# Patient Record
Sex: FEMALE | Race: BLACK OR AFRICAN AMERICAN | NOT HISPANIC OR LATINO | URBAN - METROPOLITAN AREA
[De-identification: names, ages, dates, MRNs, and addresses within clinical notes are randomized per-mention and may not be internally consistent; named-entity substitution may affect disease eponyms.]

---

## 2017-02-21 ENCOUNTER — EMERGENCY (EMERGENCY)
Facility: HOSPITAL | Age: 37
LOS: 1 days | Discharge: OTHER INSTITUTION | End: 2017-02-21
Attending: EMERGENCY MEDICINE
Payer: MEDICAID

## 2017-02-21 VITALS
DIASTOLIC BLOOD PRESSURE: 82 MMHG | HEART RATE: 102 BPM | WEIGHT: 179.02 LBS | HEIGHT: 63 IN | TEMPERATURE: 98 F | SYSTOLIC BLOOD PRESSURE: 131 MMHG | RESPIRATION RATE: 20 BRPM | OXYGEN SATURATION: 97 %

## 2017-02-21 DIAGNOSIS — R69 ILLNESS, UNSPECIFIED: ICD-10-CM

## 2017-02-21 DIAGNOSIS — R56.9 UNSPECIFIED CONVULSIONS: ICD-10-CM

## 2017-02-21 DIAGNOSIS — F32.9 MAJOR DEPRESSIVE DISORDER, SINGLE EPISODE, UNSPECIFIED: ICD-10-CM

## 2017-02-21 DIAGNOSIS — F31.9 BIPOLAR DISORDER, UNSPECIFIED: ICD-10-CM

## 2017-02-21 DIAGNOSIS — Z79.4 LONG TERM (CURRENT) USE OF INSULIN: ICD-10-CM

## 2017-02-21 DIAGNOSIS — E11.9 TYPE 2 DIABETES MELLITUS WITHOUT COMPLICATIONS: ICD-10-CM

## 2017-02-21 DIAGNOSIS — Z91.013 ALLERGY TO SEAFOOD: ICD-10-CM

## 2017-02-21 DIAGNOSIS — F14.10 COCAINE ABUSE, UNCOMPLICATED: ICD-10-CM

## 2017-02-21 LAB
AMPHET UR-MCNC: NEGATIVE — SIGNIFICANT CHANGE UP
ANION GAP SERPL CALC-SCNC: 13 MMOL/L — SIGNIFICANT CHANGE UP (ref 5–17)
APPEARANCE UR: CLEAR — SIGNIFICANT CHANGE UP
BARBITURATES UR SCN-MCNC: NEGATIVE — SIGNIFICANT CHANGE UP
BENZODIAZ UR-MCNC: NEGATIVE — SIGNIFICANT CHANGE UP
BILIRUB UR-MCNC: NEGATIVE — SIGNIFICANT CHANGE UP
BUN SERPL-MCNC: 15 MG/DL — SIGNIFICANT CHANGE UP (ref 8–20)
CALCIUM SERPL-MCNC: 8.6 MG/DL — SIGNIFICANT CHANGE UP (ref 8.6–10.2)
CHLORIDE SERPL-SCNC: 101 MMOL/L — SIGNIFICANT CHANGE UP (ref 98–107)
CO2 SERPL-SCNC: 24 MMOL/L — SIGNIFICANT CHANGE UP (ref 22–29)
COCAINE METAB.OTHER UR-MCNC: POSITIVE
COLOR SPEC: YELLOW — SIGNIFICANT CHANGE UP
CREAT SERPL-MCNC: 0.82 MG/DL — SIGNIFICANT CHANGE UP (ref 0.5–1.3)
DIFF PNL FLD: NEGATIVE — SIGNIFICANT CHANGE UP
ETHANOL SERPL-MCNC: <10 MG/DL — SIGNIFICANT CHANGE UP
GLUCOSE SERPL-MCNC: 385 MG/DL — HIGH (ref 70–115)
GLUCOSE UR QL: 1000 MG/DL
HCG UR QL: NEGATIVE — SIGNIFICANT CHANGE UP
HCT VFR BLD CALC: 41.7 % — SIGNIFICANT CHANGE UP (ref 37–47)
HGB BLD-MCNC: 14.3 G/DL — SIGNIFICANT CHANGE UP (ref 12–16)
KETONES UR-MCNC: NEGATIVE — SIGNIFICANT CHANGE UP
LEUKOCYTE ESTERASE UR-ACNC: NEGATIVE — SIGNIFICANT CHANGE UP
MCHC RBC-ENTMCNC: 29.9 PG — SIGNIFICANT CHANGE UP (ref 27–31)
MCHC RBC-ENTMCNC: 34.3 G/DL — SIGNIFICANT CHANGE UP (ref 32–36)
MCV RBC AUTO: 87.1 FL — SIGNIFICANT CHANGE UP (ref 81–99)
METHADONE UR-MCNC: NEGATIVE — SIGNIFICANT CHANGE UP
NITRITE UR-MCNC: NEGATIVE — SIGNIFICANT CHANGE UP
OPIATES UR-MCNC: NEGATIVE — SIGNIFICANT CHANGE UP
PCP SPEC-MCNC: SIGNIFICANT CHANGE UP
PCP UR-MCNC: NEGATIVE — SIGNIFICANT CHANGE UP
PH UR: 6 — SIGNIFICANT CHANGE UP (ref 4.8–8)
PLATELET # BLD AUTO: 297 K/UL — SIGNIFICANT CHANGE UP (ref 150–400)
POTASSIUM SERPL-MCNC: 4.4 MMOL/L — SIGNIFICANT CHANGE UP (ref 3.5–5.3)
POTASSIUM SERPL-SCNC: 4.4 MMOL/L — SIGNIFICANT CHANGE UP (ref 3.5–5.3)
PROT UR-MCNC: NEGATIVE MG/DL — SIGNIFICANT CHANGE UP
RBC # BLD: 4.79 M/UL — SIGNIFICANT CHANGE UP (ref 4.4–5.2)
RBC # FLD: 13.6 % — SIGNIFICANT CHANGE UP (ref 11–15.6)
SODIUM SERPL-SCNC: 138 MMOL/L — SIGNIFICANT CHANGE UP (ref 135–145)
SP GR SPEC: 1.01 — SIGNIFICANT CHANGE UP (ref 1.01–1.02)
THC UR QL: POSITIVE
UROBILINOGEN FLD QL: 1 MG/DL
WBC # BLD: 4.5 K/UL — LOW (ref 4.8–10.8)
WBC # FLD AUTO: 4.5 K/UL — LOW (ref 4.8–10.8)

## 2017-02-21 PROCEDURE — 99285 EMERGENCY DEPT VISIT HI MDM: CPT | Mod: 25

## 2017-02-21 PROCEDURE — 93010 ELECTROCARDIOGRAM REPORT: CPT

## 2017-02-21 PROCEDURE — 99285 EMERGENCY DEPT VISIT HI MDM: CPT

## 2017-02-21 PROCEDURE — 99053 MED SERV 10PM-8AM 24 HR FAC: CPT

## 2017-02-21 RX ORDER — ACETAMINOPHEN 500 MG
650 TABLET ORAL ONCE
Qty: 0 | Refills: 0 | Status: COMPLETED | OUTPATIENT
Start: 2017-02-21 | End: 2017-02-21

## 2017-02-21 RX ORDER — SODIUM CHLORIDE 9 MG/ML
1000 INJECTION INTRAMUSCULAR; INTRAVENOUS; SUBCUTANEOUS ONCE
Qty: 0 | Refills: 0 | Status: COMPLETED | OUTPATIENT
Start: 2017-02-21 | End: 2017-02-21

## 2017-02-21 RX ORDER — INSULIN HUMAN 100 [IU]/ML
10 INJECTION, SOLUTION SUBCUTANEOUS ONCE
Qty: 0 | Refills: 0 | Status: COMPLETED | OUTPATIENT
Start: 2017-02-21 | End: 2017-02-21

## 2017-02-21 RX ORDER — INSULIN HUMAN 100 [IU]/ML
2 INJECTION, SOLUTION SUBCUTANEOUS ONCE
Qty: 0 | Refills: 0 | Status: COMPLETED | OUTPATIENT
Start: 2017-02-21 | End: 2017-02-21

## 2017-02-21 RX ADMIN — INSULIN HUMAN 10 UNIT(S): 100 INJECTION, SOLUTION SUBCUTANEOUS at 07:46

## 2017-02-21 RX ADMIN — INSULIN HUMAN 2 UNIT(S): 100 INJECTION, SOLUTION SUBCUTANEOUS at 23:36

## 2017-02-21 RX ADMIN — Medication 200 MILLIGRAM(S): at 15:24

## 2017-02-21 RX ADMIN — Medication 300 MILLIGRAM(S): at 15:24

## 2017-02-21 RX ADMIN — Medication 2 MILLIGRAM(S): at 05:42

## 2017-02-21 RX ADMIN — SODIUM CHLORIDE 500 MILLILITER(S): 9 INJECTION INTRAMUSCULAR; INTRAVENOUS; SUBCUTANEOUS at 07:41

## 2017-02-21 RX ADMIN — Medication 650 MILLIGRAM(S): at 21:45

## 2017-02-21 NOTE — ED PROVIDER NOTE - PROGRESS NOTE DETAILS
pt with elevated sugar will need to clear medicaaly before going to  will place on 1;1 fsbs = 123 pt cleared for psych eval Dr Saunders called and saw pt already and pt to go to [facility] vitals and accucheck noted. Will give coverage with meal. Patient with fever likely due to viral syndrome. pt stable awaiting transfer will cover fsbs with humulin pt stable and to be transferred to [facility]
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
## 2017-02-21 NOTE — ED BEHAVIORAL HEALTH ASSESSMENT NOTE - HPI (INCLUDE ILLNESS QUALITY, SEVERITY, DURATION, TIMING, CONTEXT, MODIFYING FACTORS, ASSOCIATED SIGNS AND SYMPTOMS)
Reports father  4 days ago at [facility] from diabetic complications mother  1 month ago was alcoholic Patient feels hopeless Lived with father who supported her with his disability check as she has been unemployed for more than 2 years  over weekend tired to "poison self with alcohol and using a lot of crack cocaine" Now contemplating cutting wrist with razor  reports hopelessness helplessness racing thoughts  unable to contract for safety

## 2017-02-21 NOTE — ED ADULT TRIAGE NOTE - CHIEF COMPLAINT QUOTE
im having suicidal thoughts my dad  3 days ago mom 3 weeks ago hospitalized as a child    hx manic depressant also no period 21 days

## 2017-02-21 NOTE — ED PROVIDER NOTE - OBJECTIVE STATEMENT
35 yo female presents to er c/o depression and suicidal ideations getting worse, pt states both her parents have dies recently and she is depressed has thoughts of hurting herself no plan, noncompliant with her meds  admits to using cocaine/weed

## 2017-02-21 NOTE — ED BEHAVIORAL HEALTH ASSESSMENT NOTE - CURRENT MEDICATION
Lantus insulin 35 units with Humalog coverage  Dilantin 300 mg PO AM and 200 mg PO HS for seizure disorder - non compliant

## 2017-02-21 NOTE — ED ADULT NURSE NOTE - CAS DISCH CONDITION
Alert and oriented, smiles when on stretcher saying good bye to this staff member. States she is well. Ready to transfer. V/S 113/72 P 88 R 20 T 98.4  98*%/Stable

## 2017-02-21 NOTE — ED BEHAVIORAL HEALTH ASSESSMENT NOTE - OTHER PAST PSYCHIATRIC HISTORY (INCLUDE DETAILS REGARDING ONSET, COURSE OF ILLNESS, INPATIENT/OUTPATIENT TREATMENT)
admitted to [facility] and [place] as teenager  last summer at [facility] non compliant immediately with aftercare plan

## 2017-02-21 NOTE — ED BEHAVIORAL HEALTH ASSESSMENT NOTE - DESCRIPTION
passively cooperative minimal disclosure diabetes mellitus type 2 seizure disorder unemployed  adult children in [place]

## 2017-02-21 NOTE — ED ADULT NURSE NOTE - OBJECTIVE STATEMENT
pt reports wants to kill herself. both parents  within weeks of each other. no plan in place at this time.

## 2017-02-21 NOTE — ED BEHAVIORAL HEALTH NOTE - BEHAVIORAL HEALTH NOTE
SWNote 16:20 pt seen by psych,pt found to benefit from inpatient psych hospitaliz. SW attempted to obtain a bed for tonight, no beds available. Effie at [facility] to  if a bed opens up shortly( a pt may be d/c ).   18:00 Phone call from Chica at [facility] to inform worker that bed is not going to be available tonight. To call in the am hours. Pt will stay overnight. SW to follow in the am. All paperwork in place .Pt informed, verbalized understanding. No other concerns reported at this moment.

## 2017-02-22 VITALS
RESPIRATION RATE: 20 BRPM | OXYGEN SATURATION: 100 % | DIASTOLIC BLOOD PRESSURE: 79 MMHG | HEART RATE: 85 BPM | SYSTOLIC BLOOD PRESSURE: 125 MMHG | TEMPERATURE: 100 F

## 2017-02-22 LAB
CK MB CFR SERPL CALC: 2.2 NG/ML — SIGNIFICANT CHANGE UP (ref 0–6.7)
CK SERPL-CCNC: 206 U/L — HIGH (ref 25–170)
PHENYTOIN FREE SERPL-MCNC: 7.6 UG/ML — LOW (ref 10–20)
TROPONIN T SERPL-MCNC: <0.01 NG/ML — SIGNIFICANT CHANGE UP (ref 0–0.06)

## 2017-02-22 PROCEDURE — 80185 ASSAY OF PHENYTOIN TOTAL: CPT

## 2017-02-22 PROCEDURE — 84484 ASSAY OF TROPONIN QUANT: CPT

## 2017-02-22 PROCEDURE — 80048 BASIC METABOLIC PNL TOTAL CA: CPT

## 2017-02-22 PROCEDURE — 82553 CREATINE MB FRACTION: CPT

## 2017-02-22 PROCEDURE — 99285 EMERGENCY DEPT VISIT HI MDM: CPT

## 2017-02-22 PROCEDURE — 93005 ELECTROCARDIOGRAM TRACING: CPT

## 2017-02-22 PROCEDURE — 82550 ASSAY OF CK (CPK): CPT

## 2017-02-22 PROCEDURE — 85027 COMPLETE CBC AUTOMATED: CPT

## 2017-02-22 PROCEDURE — 81003 URINALYSIS AUTO W/O SCOPE: CPT

## 2017-02-22 PROCEDURE — 96372 THER/PROPH/DIAG INJ SC/IM: CPT | Mod: XU

## 2017-02-22 PROCEDURE — 96374 THER/PROPH/DIAG INJ IV PUSH: CPT

## 2017-02-22 PROCEDURE — 81025 URINE PREGNANCY TEST: CPT

## 2017-02-22 PROCEDURE — 80307 DRUG TEST PRSMV CHEM ANLYZR: CPT

## 2017-02-22 RX ORDER — INSULIN HUMAN 100 [IU]/ML
5 INJECTION, SOLUTION SUBCUTANEOUS ONCE
Qty: 0 | Refills: 0 | Status: COMPLETED | OUTPATIENT
Start: 2017-02-22 | End: 2017-02-22

## 2017-02-22 RX ADMIN — INSULIN HUMAN 5 UNIT(S): 100 INJECTION, SOLUTION SUBCUTANEOUS at 09:45

## 2017-02-22 RX ADMIN — Medication 300 MILLIGRAM(S): at 00:48

## 2017-02-22 RX ADMIN — Medication 300 MILLIGRAM(S): at 09:27

## 2017-02-22 RX ADMIN — INSULIN HUMAN 5 UNIT(S): 100 INJECTION, SOLUTION SUBCUTANEOUS at 08:57

## 2017-02-22 NOTE — ED ADULT NURSE REASSESSMENT NOTE - COMFORT CARE
po fluids offered/warm blanket provided/darkened lights
warm blanket provided/meal provided/darkened lights
warm blanket provided/po fluids offered
warm blanket provided/po fluids offered
warm blanket provided/po fluids offered/darkened lights
warm blanket provided/darkened lights
po fluids offered/wait time explained/made comfortable/meal provided/warm blanket provided
ambulated to bathroom

## 2017-02-22 NOTE — ED BEHAVIORAL HEALTH NOTE - BEHAVIORAL HEALTH NOTE
Social work note: Pt medically stable for discharge to inpatient psych facility.  Pt signed all voluntary paperwork and accepted bed available at [facility].  Pt transferred via ambulance.  Pt is self pay and did not require authorization.

## 2017-02-22 NOTE — ED ADULT NURSE REASSESSMENT NOTE - CONDITION
Pt remains on a 1:1 for safety. Up to void one time. V/S 11/67 P 79 R 17 T 98.8 PO2 99%. Pt appears in no distress. `/unchanged

## 2017-02-22 NOTE — ED ADULT NURSE REASSESSMENT NOTE - NS ED NURSE REASSESS COMMENT FT1
Belongings taken away to the back trailer. Pt placed in  yellow gown.
Patient remains on 1:1 for safety, VSS, patient eating snacks, will continue to monitor.
1:1 remains at bedside, currently resting/ sleeping , no s/s of hyper or hypoglycemia at this present time.  Will continue to monitor and maintain safety.
1:1 remains at bedside, no incidents to report at this time.  Will continue to monitor and maintain safety.
Patient recd this morning A/Ox3, VSS, denies chest pain or sob.  Respirations are even and unlabored, lungs cta, +bowel x4 quads, abdomen soft, nontender/nondistended, skin w/d/i. Patient reports she "feels bad", will not describe reasons, remains on 1:1 for safety, will continue to monitor.
Pt brought in to  approx 745p, Alert and oriented x3, flat affect, cooperative with  policy and procedure, Currently denies SI/HI at this present time but seems to be internally preoccupied at this time, no aggressive/assaultive behaviors noted at this time.  Pt ADLs met, currently showering, will continue to monitor and maintain safety.
pt continues on a 1;1 at her bedside, continues to state she is suicidal, no plan at this time, no aggressive /assaultive behaviors at this time.  Dr Leonard was made aware of CBG at 1946 of239, no s/s of hypo/hyperglycemia.  Pt. received Tylenol 650mg  at 2145 for temp of 100.6, MD aware.  Will continue to monitor and maintain safety.
pt expressed to RN that she feels suicidal now at this time and hearing voices to hurt herself, psych was made aware at this present time, cont on a 1:1 for safety.  Will continue to monitor and maintain safety
1:1 remains at patient bedside, pt offers no complaints.  Currently pt is asleep, no incidents to report.

## 2017-02-22 NOTE — ED ADULT NURSE REASSESSMENT NOTE - CONDITION
unchanged/Pt continues on 1:1 for safety. AM accucheck 292. 5 units of Humulin R given. Recheck 45 minutes later 280. additional 5 units given. Pt eating breakfast. Drinking fluid. Arrangements made with transport . Pt informed  approximately 1 hr. Appears glad. Resting comfortably

## 2017-02-22 NOTE — ED ADULT NURSE REASSESSMENT NOTE - GENERAL PATIENT STATE
comfortable appearance
anxious
comfortable appearance
comfortable appearance/resting/sleeping

## 2017-06-08 ENCOUNTER — INPATIENT (INPATIENT)
Facility: HOSPITAL | Age: 37
LOS: 4 days | Discharge: HOME | End: 2017-06-13
Attending: PSYCHIATRY & NEUROLOGY | Admitting: PSYCHIATRY & NEUROLOGY
Payer: MEDICAID

## 2017-06-08 ENCOUNTER — EMERGENCY (EMERGENCY)
Facility: HOSPITAL | Age: 37
LOS: 1 days | Discharge: OTHER INSTITUTION | End: 2017-06-08
Attending: EMERGENCY MEDICINE | Admitting: EMERGENCY MEDICINE
Payer: MEDICAID

## 2017-06-08 VITALS — RESPIRATION RATE: 14 BRPM | HEIGHT: 62 IN | WEIGHT: 173.94 LBS | TEMPERATURE: 98 F

## 2017-06-08 VITALS
DIASTOLIC BLOOD PRESSURE: 82 MMHG | HEART RATE: 99 BPM | OXYGEN SATURATION: 99 % | SYSTOLIC BLOOD PRESSURE: 162 MMHG | TEMPERATURE: 98 F | WEIGHT: 173.94 LBS | HEIGHT: 62 IN | RESPIRATION RATE: 20 BRPM

## 2017-06-08 VITALS
SYSTOLIC BLOOD PRESSURE: 126 MMHG | OXYGEN SATURATION: 98 % | TEMPERATURE: 98 F | RESPIRATION RATE: 18 BRPM | HEART RATE: 96 BPM | DIASTOLIC BLOOD PRESSURE: 79 MMHG

## 2017-06-08 DIAGNOSIS — R45.851 SUICIDAL IDEATIONS: ICD-10-CM

## 2017-06-08 DIAGNOSIS — F31.5 BIPOLAR DISORDER, CURRENT EPISODE DEPRESSED, SEVERE, WITH PSYCHOTIC FEATURES: ICD-10-CM

## 2017-06-08 DIAGNOSIS — F31.9 BIPOLAR DISORDER, UNSPECIFIED: ICD-10-CM

## 2017-06-08 DIAGNOSIS — E11.9 TYPE 2 DIABETES MELLITUS WITHOUT COMPLICATIONS: ICD-10-CM

## 2017-06-08 DIAGNOSIS — R56.9 UNSPECIFIED CONVULSIONS: ICD-10-CM

## 2017-06-08 DIAGNOSIS — Z91.013 ALLERGY TO SEAFOOD: ICD-10-CM

## 2017-06-08 DIAGNOSIS — F33.9 MAJOR DEPRESSIVE DISORDER, RECURRENT, UNSPECIFIED: ICD-10-CM

## 2017-06-08 DIAGNOSIS — Z79.4 LONG TERM (CURRENT) USE OF INSULIN: ICD-10-CM

## 2017-06-08 LAB
AMPHET UR-MCNC: NEGATIVE — SIGNIFICANT CHANGE UP
ANION GAP SERPL CALC-SCNC: 11 MMOL/L — SIGNIFICANT CHANGE UP (ref 5–17)
APAP SERPL-MCNC: <7.5 UG/ML — LOW (ref 10–26)
APPEARANCE UR: CLEAR — SIGNIFICANT CHANGE UP
BACTERIA # UR AUTO: ABNORMAL
BARBITURATES UR SCN-MCNC: NEGATIVE — SIGNIFICANT CHANGE UP
BASOPHILS # BLD AUTO: 0 K/UL — SIGNIFICANT CHANGE UP (ref 0–0.2)
BASOPHILS NFR BLD AUTO: 0.4 % — SIGNIFICANT CHANGE UP (ref 0–2)
BENZODIAZ UR-MCNC: NEGATIVE — SIGNIFICANT CHANGE UP
BILIRUB UR-MCNC: NEGATIVE — SIGNIFICANT CHANGE UP
BUN SERPL-MCNC: 15 MG/DL — SIGNIFICANT CHANGE UP (ref 8–20)
CALCIUM SERPL-MCNC: 9.1 MG/DL — SIGNIFICANT CHANGE UP (ref 8.6–10.2)
CHLORIDE SERPL-SCNC: 96 MMOL/L — LOW (ref 98–107)
CO2 SERPL-SCNC: 26 MMOL/L — SIGNIFICANT CHANGE UP (ref 22–29)
COCAINE METAB.OTHER UR-MCNC: POSITIVE
COLOR SPEC: YELLOW — SIGNIFICANT CHANGE UP
CREAT SERPL-MCNC: 0.77 MG/DL — SIGNIFICANT CHANGE UP (ref 0.5–1.3)
DIFF PNL FLD: NEGATIVE — SIGNIFICANT CHANGE UP
EOSINOPHIL # BLD AUTO: 0 K/UL — SIGNIFICANT CHANGE UP (ref 0–0.5)
EOSINOPHIL NFR BLD AUTO: 0.9 % — SIGNIFICANT CHANGE UP (ref 0–6)
EPI CELLS # UR: SIGNIFICANT CHANGE UP
GLUCOSE SERPL-MCNC: 366 MG/DL — HIGH (ref 70–115)
GLUCOSE UR QL: 1000 MG/DL
HCG SERPL-ACNC: <2 MIU/ML — SIGNIFICANT CHANGE UP
HCG UR QL: NEGATIVE — SIGNIFICANT CHANGE UP
HCT VFR BLD CALC: 44.6 % — SIGNIFICANT CHANGE UP (ref 37–47)
HGB BLD-MCNC: 15.7 G/DL — SIGNIFICANT CHANGE UP (ref 12–16)
KETONES UR-MCNC: NEGATIVE — SIGNIFICANT CHANGE UP
LEUKOCYTE ESTERASE UR-ACNC: ABNORMAL
LYMPHOCYTES # BLD AUTO: 1.9 K/UL — SIGNIFICANT CHANGE UP (ref 1–4.8)
LYMPHOCYTES # BLD AUTO: 35.3 % — SIGNIFICANT CHANGE UP (ref 20–55)
MCHC RBC-ENTMCNC: 30.4 PG — SIGNIFICANT CHANGE UP (ref 27–31)
MCHC RBC-ENTMCNC: 35.2 G/DL — SIGNIFICANT CHANGE UP (ref 32–36)
MCV RBC AUTO: 86.3 FL — SIGNIFICANT CHANGE UP (ref 81–99)
METHADONE UR-MCNC: NEGATIVE — SIGNIFICANT CHANGE UP
MONOCYTES # BLD AUTO: 0.4 K/UL — SIGNIFICANT CHANGE UP (ref 0–0.8)
MONOCYTES NFR BLD AUTO: 7.7 % — SIGNIFICANT CHANGE UP (ref 3–10)
NEUTROPHILS # BLD AUTO: 2.9 K/UL — SIGNIFICANT CHANGE UP (ref 1.8–8)
NEUTROPHILS NFR BLD AUTO: 55.3 % — SIGNIFICANT CHANGE UP (ref 37–73)
NITRITE UR-MCNC: NEGATIVE — SIGNIFICANT CHANGE UP
OPIATES UR-MCNC: NEGATIVE — SIGNIFICANT CHANGE UP
PCP SPEC-MCNC: SIGNIFICANT CHANGE UP
PCP UR-MCNC: NEGATIVE — SIGNIFICANT CHANGE UP
PH UR: 5 — SIGNIFICANT CHANGE UP (ref 5–8)
PHENYTOIN FREE SERPL-MCNC: <2.9 UG/ML — LOW (ref 10–20)
PLATELET # BLD AUTO: 306 K/UL — SIGNIFICANT CHANGE UP (ref 150–400)
POTASSIUM SERPL-MCNC: 4.3 MMOL/L — SIGNIFICANT CHANGE UP (ref 3.5–5.3)
POTASSIUM SERPL-SCNC: 4.3 MMOL/L — SIGNIFICANT CHANGE UP (ref 3.5–5.3)
PROT UR-MCNC: NEGATIVE MG/DL — SIGNIFICANT CHANGE UP
RBC # BLD: 5.17 M/UL — SIGNIFICANT CHANGE UP (ref 4.4–5.2)
RBC # FLD: 13.3 % — SIGNIFICANT CHANGE UP (ref 11–15.6)
RBC CASTS # UR COMP ASSIST: SIGNIFICANT CHANGE UP /HPF (ref 0–4)
SALICYLATES SERPL-MCNC: <2 MG/DL — LOW (ref 10–20)
SODIUM SERPL-SCNC: 133 MMOL/L — LOW (ref 135–145)
SP GR SPEC: 1.01 — SIGNIFICANT CHANGE UP (ref 1.01–1.02)
THC UR QL: POSITIVE
UROBILINOGEN FLD QL: NEGATIVE MG/DL — SIGNIFICANT CHANGE UP
WBC # BLD: 5.3 K/UL — SIGNIFICANT CHANGE UP (ref 4.8–10.8)
WBC # FLD AUTO: 5.3 K/UL — SIGNIFICANT CHANGE UP (ref 4.8–10.8)
WBC UR QL: SIGNIFICANT CHANGE UP

## 2017-06-08 PROCEDURE — 84702 CHORIONIC GONADOTROPIN TEST: CPT

## 2017-06-08 PROCEDURE — 80307 DRUG TEST PRSMV CHEM ANLYZR: CPT

## 2017-06-08 PROCEDURE — 99285 EMERGENCY DEPT VISIT HI MDM: CPT

## 2017-06-08 PROCEDURE — 93005 ELECTROCARDIOGRAM TRACING: CPT

## 2017-06-08 PROCEDURE — 36415 COLL VENOUS BLD VENIPUNCTURE: CPT

## 2017-06-08 PROCEDURE — 93010 ELECTROCARDIOGRAM REPORT: CPT

## 2017-06-08 PROCEDURE — 99285 EMERGENCY DEPT VISIT HI MDM: CPT | Mod: 25

## 2017-06-08 PROCEDURE — 80048 BASIC METABOLIC PNL TOTAL CA: CPT

## 2017-06-08 PROCEDURE — 80185 ASSAY OF PHENYTOIN TOTAL: CPT

## 2017-06-08 PROCEDURE — 85027 COMPLETE CBC AUTOMATED: CPT

## 2017-06-08 PROCEDURE — 81001 URINALYSIS AUTO W/SCOPE: CPT

## 2017-06-08 PROCEDURE — 96372 THER/PROPH/DIAG INJ SC/IM: CPT

## 2017-06-08 PROCEDURE — 81025 URINE PREGNANCY TEST: CPT

## 2017-06-08 RX ORDER — INSULIN GLARGINE 100 [IU]/ML
25 INJECTION, SOLUTION SUBCUTANEOUS AT BEDTIME
Qty: 0 | Refills: 0 | Status: DISCONTINUED | OUTPATIENT
Start: 2017-06-08 | End: 2017-06-12

## 2017-06-08 RX ORDER — SODIUM CHLORIDE 9 MG/ML
1000 INJECTION, SOLUTION INTRAVENOUS
Qty: 0 | Refills: 0 | Status: DISCONTINUED | OUTPATIENT
Start: 2017-06-08 | End: 2017-06-13

## 2017-06-08 RX ORDER — DEXTROSE 50 % IN WATER 50 %
25 SYRINGE (ML) INTRAVENOUS ONCE
Qty: 0 | Refills: 0 | Status: DISCONTINUED | OUTPATIENT
Start: 2017-06-08 | End: 2017-06-12

## 2017-06-08 RX ORDER — INSULIN LISPRO 100/ML
VIAL (ML) SUBCUTANEOUS AT BEDTIME
Qty: 0 | Refills: 0 | Status: DISCONTINUED | OUTPATIENT
Start: 2017-06-08 | End: 2017-06-12

## 2017-06-08 RX ORDER — INSULIN LISPRO 100/ML
VIAL (ML) SUBCUTANEOUS AT BEDTIME
Qty: 0 | Refills: 0 | Status: DISCONTINUED | OUTPATIENT
Start: 2017-06-08 | End: 2017-06-13

## 2017-06-08 RX ORDER — DEXTROSE 50 % IN WATER 50 %
12.5 SYRINGE (ML) INTRAVENOUS ONCE
Qty: 0 | Refills: 0 | Status: DISCONTINUED | OUTPATIENT
Start: 2017-06-08 | End: 2017-06-13

## 2017-06-08 RX ORDER — GLUCAGON INJECTION, SOLUTION 0.5 MG/.1ML
1 INJECTION, SOLUTION SUBCUTANEOUS ONCE
Qty: 0 | Refills: 0 | Status: DISCONTINUED | OUTPATIENT
Start: 2017-06-08 | End: 2017-06-12

## 2017-06-08 RX ORDER — INSULIN LISPRO 100/ML
VIAL (ML) SUBCUTANEOUS
Qty: 0 | Refills: 0 | Status: DISCONTINUED | OUTPATIENT
Start: 2017-06-08 | End: 2017-06-12

## 2017-06-08 RX ORDER — DOCUSATE SODIUM 100 MG
100 CAPSULE ORAL
Qty: 0 | Refills: 0 | Status: DISCONTINUED | OUTPATIENT
Start: 2017-06-08 | End: 2017-06-13

## 2017-06-08 RX ORDER — DEXTROSE 50 % IN WATER 50 %
25 SYRINGE (ML) INTRAVENOUS ONCE
Qty: 0 | Refills: 0 | Status: DISCONTINUED | OUTPATIENT
Start: 2017-06-08 | End: 2017-06-13

## 2017-06-08 RX ORDER — INSULIN GLARGINE 100 [IU]/ML
30 INJECTION, SOLUTION SUBCUTANEOUS AT BEDTIME
Qty: 0 | Refills: 0 | Status: DISCONTINUED | OUTPATIENT
Start: 2017-06-08 | End: 2017-06-12

## 2017-06-08 RX ORDER — DEXTROSE 50 % IN WATER 50 %
12.5 SYRINGE (ML) INTRAVENOUS ONCE
Qty: 0 | Refills: 0 | Status: DISCONTINUED | OUTPATIENT
Start: 2017-06-08 | End: 2017-06-12

## 2017-06-08 RX ORDER — ALBUTEROL 90 UG/1
2 AEROSOL, METERED ORAL EVERY 6 HOURS
Qty: 0 | Refills: 0 | Status: DISCONTINUED | OUTPATIENT
Start: 2017-06-08 | End: 2017-06-13

## 2017-06-08 RX ORDER — DEXTROSE 50 % IN WATER 50 %
1 SYRINGE (ML) INTRAVENOUS ONCE
Qty: 0 | Refills: 0 | Status: DISCONTINUED | OUTPATIENT
Start: 2017-06-08 | End: 2017-06-13

## 2017-06-08 RX ORDER — INSULIN LISPRO 100/ML
VIAL (ML) SUBCUTANEOUS
Qty: 0 | Refills: 0 | Status: DISCONTINUED | OUTPATIENT
Start: 2017-06-08 | End: 2017-06-13

## 2017-06-08 RX ORDER — DEXTROSE 50 % IN WATER 50 %
1 SYRINGE (ML) INTRAVENOUS ONCE
Qty: 0 | Refills: 0 | Status: DISCONTINUED | OUTPATIENT
Start: 2017-06-08 | End: 2017-06-12

## 2017-06-08 RX ORDER — ALBUTEROL 90 UG/1
2 AEROSOL, METERED ORAL EVERY 6 HOURS
Qty: 0 | Refills: 0 | Status: DISCONTINUED | OUTPATIENT
Start: 2017-06-08 | End: 2017-06-12

## 2017-06-08 RX ORDER — SODIUM CHLORIDE 9 MG/ML
1000 INJECTION, SOLUTION INTRAVENOUS
Qty: 0 | Refills: 0 | Status: DISCONTINUED | OUTPATIENT
Start: 2017-06-08 | End: 2017-06-12

## 2017-06-08 RX ORDER — DIPHENHYDRAMINE HCL 50 MG
50 CAPSULE ORAL EVERY 6 HOURS
Qty: 0 | Refills: 0 | Status: DISCONTINUED | OUTPATIENT
Start: 2017-06-08 | End: 2017-06-13

## 2017-06-08 RX ORDER — NICOTINE POLACRILEX 2 MG
1 GUM BUCCAL DAILY
Qty: 0 | Refills: 0 | Status: DISCONTINUED | OUTPATIENT
Start: 2017-06-08 | End: 2017-06-12

## 2017-06-08 RX ORDER — HALOPERIDOL DECANOATE 100 MG/ML
5 INJECTION INTRAMUSCULAR EVERY 6 HOURS
Qty: 0 | Refills: 0 | Status: DISCONTINUED | OUTPATIENT
Start: 2017-06-08 | End: 2017-06-13

## 2017-06-08 RX ORDER — DIPHENHYDRAMINE HCL 50 MG
50 CAPSULE ORAL AT BEDTIME
Qty: 0 | Refills: 0 | Status: DISCONTINUED | OUTPATIENT
Start: 2017-06-08 | End: 2017-06-13

## 2017-06-08 RX ORDER — INSULIN GLARGINE 100 [IU]/ML
25 INJECTION, SOLUTION SUBCUTANEOUS ONCE
Qty: 0 | Refills: 0 | Status: COMPLETED | OUTPATIENT
Start: 2017-06-08 | End: 2017-06-08

## 2017-06-08 RX ORDER — GLUCAGON INJECTION, SOLUTION 0.5 MG/.1ML
1 INJECTION, SOLUTION SUBCUTANEOUS ONCE
Qty: 0 | Refills: 0 | Status: DISCONTINUED | OUTPATIENT
Start: 2017-06-08 | End: 2017-06-13

## 2017-06-08 RX ORDER — NICOTINE POLACRILEX 2 MG
4 GUM BUCCAL
Qty: 0 | Refills: 0 | Status: DISCONTINUED | OUTPATIENT
Start: 2017-06-08 | End: 2017-06-13

## 2017-06-08 RX ORDER — INSULIN LISPRO 100/ML
5 VIAL (ML) SUBCUTANEOUS
Qty: 0 | Refills: 0 | Status: DISCONTINUED | OUTPATIENT
Start: 2017-06-08 | End: 2017-06-12

## 2017-06-08 RX ADMIN — Medication: at 21:38

## 2017-06-08 RX ADMIN — INSULIN GLARGINE 25 UNIT(S): 100 INJECTION, SOLUTION SUBCUTANEOUS at 21:37

## 2017-06-08 RX ADMIN — Medication 300 MILLIGRAM(S): at 11:29

## 2017-06-08 RX ADMIN — Medication 1 PATCH: at 13:16

## 2017-06-08 RX ADMIN — INSULIN GLARGINE 25 UNIT(S): 100 INJECTION, SOLUTION SUBCUTANEOUS at 11:22

## 2017-06-08 NOTE — ED STATDOCS - OBJECTIVE STATEMENT
35 y/o female h/o asthma, IDDM, seizure disorder, presenting c/o SI. Pt admits to cocaine use this morning (states first time using). Pt states her mother passed away several months ago and her father  2 days ago. Pt denies specific plan, any pains, fever, sweats. States she stopped taking her Dilantin medication ~1 month ago but has continued taking Lantus for her DM. Pt also reports she tried to jump in front of a train but couldn't bring herself to do it. No further complaints at this time.

## 2017-06-08 NOTE — ED ADULT NURSE NOTE - OBJECTIVE STATEMENT
Patient received awake and alert, complains of suicidal ideation, no plan or intent, reports her Father  two days ago, lives with her Mother, smoked crack cocaine this am, seeking in-patient psychiatric help and rehab. Denies any prior suicide attempts, reports was in [facility] a few months ago with same complaints of feeling suicidal, reports rehab in the past at [facility] [id number], reports she finished program, insulin dependant diabetic. Smokes two packs of cigarettes daily, has not been taking any medications nor has she seen PCP or Psychiatrist.

## 2017-06-08 NOTE — ED STATDOCS - NS ED MD SCRIBE ATTENDING SCRIBE SECTIONS
REVIEW OF SYSTEMS/PAST MEDICAL/SURGICAL/SOCIAL HISTORY/HIV/PHYSICAL EXAM/VITAL SIGNS( Pullset)/DISPOSITION/HISTORY OF PRESENT ILLNESS

## 2017-06-08 NOTE — CONSULT NOTE ADULT - SUBJECTIVE AND OBJECTIVE BOX
BRUNA MARTINEZ     Chief Complaint: Patient is a 36y old  Female who presents with a chief complaint of     PAST MEDICAL & SURGICAL HISTORY:  Seizure  IDDM (insulin dependent diabetes mellitus)  Bipolar disorder  No significant past surgical history      HPI/OVERNIGHT EVENTS: 37 y/o female with hx of crack cocaine abuse and multiple episodes of SI requiring inpatient management presents to ED with SI for the past "few months" stating that she wanted to jump in front of a train while at the station this morning, but just couldn't get herself to do it. She attempted to drown herself in her bathtub 2 days ago, and admits to multiple other failed or aborted attempts over the past several months. Her mother  several months ago of an unspecified cause, after which she began experiencing depressed mood, unexplained anger, anhedonia, decreased energy, insomnia, loss of appetite, recurrent thoughts of suicide, as well as anxiousness, restlessness, and ruminating thoughts. Her father passed away several days ago, unknown cause, after which her symptoms worsened and she began having increased thoughts of suicide. She was first hospitalized in [facility] for SI when she was 9 y/o and since then she received inpatient care numerous times for SI, her most recent admission was this past winter at [facility] for SI. She denies any current or previous outpatient treatment or pharmacologic management for psychiatric symptoms. She states that she has had recurrent episodes of depressive sxs in the past with SI, and also admits to episodes of euphoria, increased activity, reduced need for sleep, impulsive behavior, and increased spending in the past which lasts for up to one week, though she denies any of these symptoms currently or recently. Pt has been smoking crack cocaine and cigarettes, 2 PPD, since the age of 15, and she last smoked crack cocaine this morning "$100 worth". She recalls treatment for substance abuse in the past when she was admitted to [facility] in , and she wants help for her substance abuse now. Pt denies confusion, hallucinations, delusional thoughts, misuse/abuse of other substances, and HI.  She has had diabetes for more than 20 years always on insulin. She skips doses and does not check her insulin.    MEDICATIONS  (STANDING):  nicotine - 21 mG/24Hr(s) Patch 1patch Transdermal daily  phenytoin   Capsule 300milliGRAM(s) Oral two times a day      Vital Signs Last 24 Hrs  T(C): 36.9, Max: 36.9 ( @ 11:03)  T(F): 98.4, Max: 98.4 ( @ 11:03)  HR: 103 (99 - 103)  BP: 139/88 (139/88 - 162/82)  BP(mean): --  RR: 20 (20 - 20)  SpO2: 96% (96% - 99%)    PHYSICAL EXAM:  Constitutional: NAD, well-groomed, well-developed  HEENT: PERRLA, EOMI, Normal Hearing, MMM  Neck: No LAD, No JVD  Back: Normal spine flexure, No CVA tenderness  Respiratory: CTAB Cardiovascular: S1 and S2, RRR, no M/G/R  Gastrointestinal: BS+, soft, NT/ND  Extremities: No peripheral edema  Vascular: 2+ peripheral pulses  Neurological: A/O x 3, no focal deficits  Psychiatric: Normal mood, normal affect  Musculoskeletal: 5/5 strength b/l upper and lower extremities  Skin: No rashes    CAPILLARY BLOOD GLUCOSE  253 (2017 13:02)  294 (2017 10:42)    LABS:                        15.7   5.3   )-----------( 306      ( 2017 11:06 )             44.6         133<L>  |  96<L>  |  15.0  ----------------------------<  366<H>  4.3   |  26.0  |  0.77    Ca    9.1      2017 11:06        Urinalysis Basic - ( 2017 13:56 )    Color: Yellow / Appearance: Clear / S.015 / pH: x  Gluc: x / Ketone: Negative  / Bili: Negative / Urobili: Negative mg/dL   Blood: x / Protein: Negative mg/dL / Nitrite: Negative   Leuk Esterase: Trace / RBC: x / WBC x   Sq Epi: x / Non Sq Epi: x / Bacteria: x        RADIOLOGY & ADDITIONAL TESTS:

## 2017-06-08 NOTE — ED ADULT TRIAGE NOTE - CHIEF COMPLAINT QUOTE
Patient arrived to ED today with c/o wanting to kill herself.  Patient denies alcohol use today.  Patient states she used some cocaine earlier today.

## 2017-06-08 NOTE — ED STATDOCS - CARE PLAN
Principal Discharge DX:	Bipolar disorder  Secondary Diagnosis:	IDDM (insulin dependent diabetes mellitus)  Secondary Diagnosis:	Seizure

## 2017-06-08 NOTE — ED ADULT NURSE REASSESSMENT NOTE - CONDITION
awake and visible on unit, constantly asking for food, patient encouraged to comply with Diabetic diet.
Report given to Juan MUNIZ on Low 6 floor at [facility] at 1500 [phone]

## 2017-06-08 NOTE — ED BEHAVIORAL HEALTH ASSESSMENT NOTE - HPI (INCLUDE ILLNESS QUALITY, SEVERITY, DURATION, TIMING, CONTEXT, MODIFYING FACTORS, ASSOCIATED SIGNS AND SYMPTOMS)
35 y/o female with hx of crack cocaine abuse and multiple episodes of SI requiring inpatient management presents to ED with SI for the past "few months" stating that she wanted to jump in front of a train while at the station this morning, but just couldn't get herself to do it. She attempted to drown herself in her bathtub 2 days ago, and admits to multiple other failed or aborted attempts over the past several months. Her mother  several months ago of an unspecified cause, after which she began experiencing depressed mood, unexplained anger, anhedonia, decreased energy, insomnia, loss of appetite, recurrent thoughts of suicide, as well as anxiousness, restlessness, and ruminating thoughts. Her father passed away several days ago, unknown cause, after which her symptoms worsened and she began having increased thoughts of suicide. She was first hospitalized in [facility] for SI when she was 9 y/o and since then she received inpatient care numerous times for SI, her most recent admission was this past winter at [facility] for SI. She denies any current or previous outpatient treatment or pharmacologic management for psychiatric symptoms. She states that she has had recurrent episodes of depressive sxs in the past with SI, and also admits to episodes of euphoria, increased activity, reduced need for sleep, impulsive behavior, and increased spending in the past which lasts for up to one week, though she denies any of these symptoms currently or recently. Pt has been smoking crack cocaine and cigarettes, 2 PPD, since the age of 15, and she last smoked crack cocaine this morning "$100 worth". She recalls treatment for substance abuse in the past when she was admitted to [facility] in , and she wants help for her substance abuse now. Pt denies confusion, hallucinations, delusional thoughts, misuse/abuse of other substances, and HI.

## 2017-06-08 NOTE — ED STATDOCS - PROGRESS NOTE DETAILS
NP NOTE: on behalf of Dr. Lee, called medicine consult as pt likely to be transferred to [facility]. Requested Dilantin 1 gm, 300mg ordered, will order remaining dose. NP NOTE: on behalf of Dr. Lee, called medicine consult as pt likely to be transferred to [facility]. Requested Dilantin 1 gm, 300mg has been ordered/administered, will wait for consult for dosing. NP NOTE: on behalf of Dr. Lee, called medicine consult as pt likely to be transferred to [facility]. Requested Dilantin 1 gm, 300mg has been ordered/administered, will wait for consult for dosing. Dr. Collier to see patient.

## 2017-06-08 NOTE — ED BEHAVIORAL HEALTH ASSESSMENT NOTE - SUMMARY
35 y/o female with hx of recurrent depressive sxs, possible manic sxs, SI, and abuse of crack cocaine presents to ED with active SI and depressive sxs for the past "few months" triggered by the death of her mother and worsened by the recent death of her father. Seeking inpt tx for suicidality and substance abuse.

## 2017-06-08 NOTE — ED STATDOCS - ATTENDING CONTRIBUTION TO CARE
I, Sathya Lee, performed the initial face to face bedside interview with this patient regarding history of present illness, review of symptoms and relevant past medical, social and family history.  I completed an independent physical examination.  I was the initial provider who evaluated this patient. I have signed out the follow up of any pending tests (i.e. labs, radiological studies) to the ACP.  I have communicated the patient’s plan of care and disposition with the ACP.  The history, relevant review of systems, past medical and surgical history, medical decision making, and physical examination was documented by the scribe in my presence and I attest to the accuracy of the documentation.

## 2017-06-08 NOTE — ED BEHAVIORAL HEALTH ASSESSMENT NOTE - DIFFERENTIAL
Mood disturbance vs. Major depression consider consider Bipolar d/o current episode depression  Substance abuse

## 2017-06-08 NOTE — CONSULT NOTE ADULT - PROBLEM SELECTOR RECOMMENDATION 2
I educated the patient regarding Diabetes. I urged her to check her sugars.  I will resume Lantus 30 units sq qhs and add humalo 4 units sq ac tid.

## 2017-06-08 NOTE — ED BEHAVIORAL HEALTH ASSESSMENT NOTE - DESCRIPTION (FIRST USE, LAST USE, QUANTITY, FREQUENCY, DURATION)
would not elaborate on amount or frequency recurrent crack use, began using at age 15, last smoked this morning "$100 worth" smokes 2 PPD x 21 years

## 2017-06-08 NOTE — ED BEHAVIORAL HEALTH NOTE - BEHAVIORAL HEALTH NOTE
SW Note: Pt will be transferred to [facility], Low 6. Unit # [phone]. Allyssa MUNIZ already called unit with hand off. Pt accepted by Dr. Mejia. Pt requested this facility because she has had previous tx there. Pt signed Vol. admission form. legals faxed to [facility] admissions. Ambulance arranged with [facility]. [facility] financial dept indicates that medicaid policy is inactive. pt listed as self pay.
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
## 2017-06-08 NOTE — ED BEHAVIORAL HEALTH ASSESSMENT NOTE - OTHER PAST PSYCHIATRIC HISTORY (INCLUDE DETAILS REGARDING ONSET, COURSE OF ILLNESS, INPATIENT/OUTPATIENT TREATMENT)
admitted to [facility] and [place] as teenager  last summer at [facility] non compliant immediately with aftercare plan

## 2017-06-09 LAB
ALBUMIN SERPL ELPH-MCNC: 3.3 G/DL — SIGNIFICANT CHANGE UP (ref 3.3–5)
ALP SERPL-CCNC: 53 U/L — SIGNIFICANT CHANGE UP (ref 40–120)
ALT FLD-CCNC: 16 U/L — SIGNIFICANT CHANGE UP (ref 4–33)
AST SERPL-CCNC: 14 U/L — SIGNIFICANT CHANGE UP (ref 4–32)
BILIRUB SERPL-MCNC: 0.3 MG/DL — SIGNIFICANT CHANGE UP (ref 0.2–1.2)
BUN SERPL-MCNC: 13 MG/DL — SIGNIFICANT CHANGE UP (ref 7–23)
CALCIUM SERPL-MCNC: 8.3 MG/DL — LOW (ref 8.4–10.5)
CHLORIDE SERPL-SCNC: 99 MMOL/L — SIGNIFICANT CHANGE UP (ref 98–107)
CHOLEST SERPL-MCNC: 226 MG/DL — HIGH (ref 120–199)
CO2 SERPL-SCNC: 21 MMOL/L — LOW (ref 22–31)
CREAT SERPL-MCNC: 0.86 MG/DL — SIGNIFICANT CHANGE UP (ref 0.5–1.3)
GLUCOSE SERPL-MCNC: 126 MG/DL — HIGH (ref 70–99)
HBA1C BLD-MCNC: 10.7 % — HIGH (ref 4–5.6)
HDLC SERPL-MCNC: 73 MG/DL — HIGH (ref 45–65)
HIV1 AG SER QL: SIGNIFICANT CHANGE UP
HIV1+2 AB SPEC QL: SIGNIFICANT CHANGE UP
LIPID PNL WITH DIRECT LDL SERPL: 153 MG/DL — SIGNIFICANT CHANGE UP
POTASSIUM SERPL-MCNC: 4.2 MMOL/L — SIGNIFICANT CHANGE UP (ref 3.5–5.3)
POTASSIUM SERPL-SCNC: 4.2 MMOL/L — SIGNIFICANT CHANGE UP (ref 3.5–5.3)
PROT SERPL-MCNC: 6.5 G/DL — SIGNIFICANT CHANGE UP (ref 6–8.3)
SODIUM SERPL-SCNC: 135 MMOL/L — SIGNIFICANT CHANGE UP (ref 135–145)
TRIGL SERPL-MCNC: 73 MG/DL — SIGNIFICANT CHANGE UP (ref 10–149)
TSH SERPL-MCNC: 1.52 UIU/ML — SIGNIFICANT CHANGE UP (ref 0.27–4.2)

## 2017-06-09 PROCEDURE — 99233 SBSQ HOSP IP/OBS HIGH 50: CPT

## 2017-06-09 RX ORDER — QUETIAPINE FUMARATE 200 MG/1
25 TABLET, FILM COATED ORAL
Qty: 0 | Refills: 0 | Status: DISCONTINUED | OUTPATIENT
Start: 2017-06-09 | End: 2017-06-10

## 2017-06-09 RX ADMIN — INSULIN GLARGINE 25 UNIT(S): 100 INJECTION, SOLUTION SUBCUTANEOUS at 20:59

## 2017-06-09 RX ADMIN — Medication 100 MILLIGRAM(S): at 20:27

## 2017-06-09 RX ADMIN — QUETIAPINE FUMARATE 25 MILLIGRAM(S): 200 TABLET, FILM COATED ORAL at 20:27

## 2017-06-09 RX ADMIN — Medication 100 MILLIGRAM(S): at 08:54

## 2017-06-10 LAB
HCV AB S/CO SERPL IA: 0.14 S/CO — SIGNIFICANT CHANGE UP
HCV AB SERPL-IMP: SIGNIFICANT CHANGE UP
T PALLIDUM AB TITR SER: NEGATIVE — SIGNIFICANT CHANGE UP

## 2017-06-10 PROCEDURE — 99232 SBSQ HOSP IP/OBS MODERATE 35: CPT

## 2017-06-10 RX ORDER — QUETIAPINE FUMARATE 200 MG/1
50 TABLET, FILM COATED ORAL
Qty: 0 | Refills: 0 | Status: DISCONTINUED | OUTPATIENT
Start: 2017-06-10 | End: 2017-06-11

## 2017-06-10 RX ADMIN — QUETIAPINE FUMARATE 50 MILLIGRAM(S): 200 TABLET, FILM COATED ORAL at 20:59

## 2017-06-10 RX ADMIN — Medication 100 MILLIGRAM(S): at 21:00

## 2017-06-10 RX ADMIN — Medication: at 16:31

## 2017-06-10 RX ADMIN — Medication: at 12:16

## 2017-06-10 RX ADMIN — Medication 4 MILLIGRAM(S): at 20:07

## 2017-06-10 RX ADMIN — QUETIAPINE FUMARATE 25 MILLIGRAM(S): 200 TABLET, FILM COATED ORAL at 09:35

## 2017-06-10 RX ADMIN — INSULIN GLARGINE 25 UNIT(S): 100 INJECTION, SOLUTION SUBCUTANEOUS at 21:00

## 2017-06-10 RX ADMIN — Medication: at 07:34

## 2017-06-11 PROCEDURE — 99232 SBSQ HOSP IP/OBS MODERATE 35: CPT

## 2017-06-11 RX ORDER — QUETIAPINE FUMARATE 200 MG/1
100 TABLET, FILM COATED ORAL
Qty: 0 | Refills: 0 | Status: DISCONTINUED | OUTPATIENT
Start: 2017-06-11 | End: 2017-06-13

## 2017-06-11 RX ADMIN — QUETIAPINE FUMARATE 50 MILLIGRAM(S): 200 TABLET, FILM COATED ORAL at 09:04

## 2017-06-11 RX ADMIN — Medication 4 MILLIGRAM(S): at 19:10

## 2017-06-11 RX ADMIN — INSULIN GLARGINE 25 UNIT(S): 100 INJECTION, SOLUTION SUBCUTANEOUS at 21:24

## 2017-06-11 RX ADMIN — QUETIAPINE FUMARATE 100 MILLIGRAM(S): 200 TABLET, FILM COATED ORAL at 19:09

## 2017-06-11 RX ADMIN — Medication: at 17:18

## 2017-06-12 DIAGNOSIS — F17.200 NICOTINE DEPENDENCE, UNSPECIFIED, UNCOMPLICATED: ICD-10-CM

## 2017-06-12 DIAGNOSIS — F31.4 BIPOLAR DISORDER, CURRENT EPISODE DEPRESSED, SEVERE, WITHOUT PSYCHOTIC FEATURES: ICD-10-CM

## 2017-06-12 DIAGNOSIS — E11.9 TYPE 2 DIABETES MELLITUS WITHOUT COMPLICATIONS: ICD-10-CM

## 2017-06-12 DIAGNOSIS — E78.2 MIXED HYPERLIPIDEMIA: ICD-10-CM

## 2017-06-12 PROCEDURE — 99223 1ST HOSP IP/OBS HIGH 75: CPT

## 2017-06-12 PROCEDURE — 99233 SBSQ HOSP IP/OBS HIGH 50: CPT

## 2017-06-12 RX ORDER — DEXTROSE 50 % IN WATER 50 %
1 SYRINGE (ML) INTRAVENOUS ONCE
Qty: 0 | Refills: 0 | Status: DISCONTINUED | OUTPATIENT
Start: 2017-06-12 | End: 2017-06-13

## 2017-06-12 RX ORDER — INSULIN LISPRO 100/ML
2 VIAL (ML) SUBCUTANEOUS
Qty: 0 | Refills: 0 | Status: DISCONTINUED | OUTPATIENT
Start: 2017-06-12 | End: 2017-06-13

## 2017-06-12 RX ORDER — SODIUM CHLORIDE 9 MG/ML
1000 INJECTION, SOLUTION INTRAVENOUS
Qty: 0 | Refills: 0 | Status: DISCONTINUED | OUTPATIENT
Start: 2017-06-12 | End: 2017-06-13

## 2017-06-12 RX ORDER — DEXTROSE 50 % IN WATER 50 %
25 SYRINGE (ML) INTRAVENOUS ONCE
Qty: 0 | Refills: 0 | Status: DISCONTINUED | OUTPATIENT
Start: 2017-06-12 | End: 2017-06-13

## 2017-06-12 RX ORDER — GLUCAGON INJECTION, SOLUTION 0.5 MG/.1ML
1 INJECTION, SOLUTION SUBCUTANEOUS ONCE
Qty: 0 | Refills: 0 | Status: DISCONTINUED | OUTPATIENT
Start: 2017-06-12 | End: 2017-06-13

## 2017-06-12 RX ORDER — QUETIAPINE FUMARATE 200 MG/1
1 TABLET, FILM COATED ORAL
Qty: 30 | Refills: 0 | OUTPATIENT
Start: 2017-06-12 | End: 2017-06-27

## 2017-06-12 RX ORDER — DEXTROSE 50 % IN WATER 50 %
12.5 SYRINGE (ML) INTRAVENOUS ONCE
Qty: 0 | Refills: 0 | Status: DISCONTINUED | OUTPATIENT
Start: 2017-06-12 | End: 2017-06-13

## 2017-06-12 RX ORDER — INSULIN GLARGINE 100 [IU]/ML
28 INJECTION, SOLUTION SUBCUTANEOUS AT BEDTIME
Qty: 0 | Refills: 0 | Status: DISCONTINUED | OUTPATIENT
Start: 2017-06-12 | End: 2017-06-13

## 2017-06-12 RX ADMIN — Medication 100 MILLIGRAM(S): at 21:38

## 2017-06-12 RX ADMIN — Medication: at 09:06

## 2017-06-12 RX ADMIN — QUETIAPINE FUMARATE 100 MILLIGRAM(S): 200 TABLET, FILM COATED ORAL at 21:39

## 2017-06-12 RX ADMIN — INSULIN GLARGINE 28 UNIT(S): 100 INJECTION, SOLUTION SUBCUTANEOUS at 21:38

## 2017-06-12 RX ADMIN — QUETIAPINE FUMARATE 100 MILLIGRAM(S): 200 TABLET, FILM COATED ORAL at 09:06

## 2017-06-12 NOTE — CONSULT NOTE ADULT - PROBLEM SELECTOR RECOMMENDATION 9
increase lantus to 28 u, added humalog 2 u tid  continue HISS  DM diet  f/u FS  discussed about diet control and regular exercise with Pt

## 2017-06-12 NOTE — CONSULT NOTE ADULT - ASSESSMENT
37 yo F PMH DM II on lantus, uncontrolled due to medication non-compliance, Hx of smoking crack cocaine and cigarettes, bipolar disorder was admitted in [facility] for SI depression. Medical consult was called for DM management.

## 2017-06-12 NOTE — CONSULT NOTE ADULT - SUBJECTIVE AND OBJECTIVE BOX
HPI: 35 yo F PMH DM II on lantus, uncontrolled due to medication non-compliance, Hx of smoking crack cocaine and cigarettes, bipolar disorder was admitted in [facility] for SI depression. Medical consult was called for DM management. Pt has long Hx of DM, no self FS at home. Recent Hg A1C 10.7, on June 9, 2017. Called Pt pharmacy ([facility], [place] at [phone]), confirmed pt home insulin regimen (lantus 25 u QHS and humalog 2 u TID, she did not pick her Rx from Feb). FS ran 200's in [facility]. Pt denied CP, SOB, no N/V/D, denied dysuria.    PAST MEDICAL & SURGICAL HISTORY:  Seizure  IDDM (insulin dependent diabetes mellitus)  Bipolar disorder  No significant past surgical history      Review of Systems:   CONSTITUTIONAL: No fever, weight loss, or fatigue  EYES: No eye pain, visual disturbances, or discharge  ENMT:  No difficulty hearing, tinnitus, vertigo; No sinus or throat pain  NECK: No pain or stiffness  BREASTS: No pain, masses, or nipple discharge  RESPIRATORY: No cough, wheezing, chills or hemoptysis; No shortness of breath  CARDIOVASCULAR: No chest pain, palpitations, dizziness, or leg swelling  GASTROINTESTINAL: No abdominal or epigastric pain. No nausea, vomiting, or hematemesis; No diarrhea or constipation. No melena or hematochezia.  GENITOURINARY: No dysuria, frequency, hematuria, or incontinence  NEUROLOGICAL: No headaches, memory loss, loss of strength, numbness, or tremors  SKIN: No itching, burning, rashes, or lesions   LYMPH NODES: No enlarged glands  ENDOCRINE: uncontrolled DM.   MUSCULOSKELETAL: No joint pain or swelling; No muscle, back, or extremity pain  PSYCHIATRIC: No depression, anxiety, mood swings, or difficulty sleeping  HEME/LYMPH: No easy bruising, or bleeding gums  ALLERY AND IMMUNOLOGIC: No hives or eczema    Allergies    No Known Drug Allergies  Seafood (Swelling)  shellfish (Swelling)    Intolerances        Social History:   smoking cigarettes PPD since age 15  smoking cracking cocaine    FAMILY HISTORY:  No pertinent family history in first degree relatives      MEDICATIONS  (STANDING):  insulin glargine Injectable (LANTUS) 25Unit(s) SubCutaneous at bedtime  insulin lispro (HumaLOG) corrective regimen sliding scale  SubCutaneous three times a day before meals  insulin lispro (HumaLOG) corrective regimen sliding scale  SubCutaneous at bedtime  dextrose 5%. 1000milliLiter(s) IV Continuous <Continuous>  dextrose 50% Injectable 12.5Gram(s) IV Push once  dextrose 50% Injectable 25Gram(s) IV Push once  dextrose 50% Injectable 25Gram(s) IV Push once  docusate sodium 100milliGRAM(s) Oral two times a day  QUEtiapine 100milliGRAM(s) Oral two times a day    MEDICATIONS  (PRN):  ALBUTerol    90 MICROgram(s) HFA Inhaler 2Puff(s) Inhalation every 6 hours PRN Shortness of Breath and/or Wheezing  diphenhydrAMINE   Injectable 50milliGRAM(s) IntraMuscular every 6 hours PRN Agitation  LORazepam   Injectable 2milliGRAM(s) IntraMuscular every 4 hours PRN severe agitation  haloperidol    Injectable 5milliGRAM(s) IntraMuscular every 6 hours PRN severe agitation  nicotine  Polacrilex Gum 4milliGRAM(s) Oral every 2 hours PRN breakthrough cravings  dextrose Gel 1Dose(s) Oral once PRN Blood Glucose LESS THAN 70 milliGRAM(s)/deciliter  glucagon  Injectable 1milliGRAM(s) IntraMuscular once PRN Glucose LESS THAN 70 milligrams/deciliter  diphenhydrAMINE   Capsule 50milliGRAM(s) Oral at bedtime PRN Insomnia      Vital Signs Last 24 Hrs  T(C): 36.1, Max: 36.1 (06-12 @ 09:39)  HR: 88 (88 - 88)  BP: 116/74 (116/74 - 116/74)  RR: --  SpO2: --  Wt(kg): --  CAPILLARY BLOOD GLUCOSE  233 (12 Jun 2017 11:39)  225 (12 Jun 2017 07:25)  210 (11 Jun 2017 21:38)  220 (11 Jun 2017 16:51)    I&O's Summary      PHYSICAL EXAM:  GENERAL: NAD, well-developed  HEAD:  Atraumatic, Normocephalic  EYES: EOMI, PERRLA, conjunctiva and sclera clear  NECK: Supple, No JVD  CHEST/LUNG: Clear to auscultation bilaterally; No wheeze  HEART: Regular rate and rhythm; No murmurs, rubs, or gallops  ABDOMEN: Soft, Nontender, Nondistended; Bowel sounds present  EXTREMITIES:  2+ Peripheral Pulses, No clubbing, cyanosis, or edema  PSYCH: AAOx3  NEUROLOGY: non-focal  SKIN: No rashes or lesions    LABS:                    EKG:    RADIOLOGY & ADDITIONAL TESTS:    Imaging Personally Reviewed:    Consultant(s) Notes Reviewed:      Care Discussed with Consultants/Other Providers: HPI: 37 yo F PMH DM II on lantus, uncontrolled due to medication non-compliance, Hx of smoking crack cocaine and cigarettes, bipolar disorder was admitted in [facility] for SI depression. Medical consult was called for DM management. Pt has long Hx of DM, no self FS at home. Recent Hg A1C 10.7, on June 9, 2017. Called Pt pharmacy ([facility], [place] at [phone]), confirmed pt home insulin regimen (lantus 25 u QHS and humalog 2 u TID, she did not pick her Rx from Feb). FS ran 200's in [facility]. Pt denied CP, SOB, no N/V/D, denied dysuria.    PAST MEDICAL & SURGICAL HISTORY:  Seizure  IDDM (insulin dependent diabetes mellitus)  Bipolar disorder  No significant past surgical history      Review of Systems:   CONSTITUTIONAL: No fever, weight loss, or fatigue  EYES: No eye pain, visual disturbances, or discharge  ENMT:  No difficulty hearing, tinnitus, vertigo; No sinus or throat pain  NECK: No pain or stiffness  BREASTS: No pain, masses, or nipple discharge  RESPIRATORY: No cough, wheezing, chills or hemoptysis; No shortness of breath  CARDIOVASCULAR: No chest pain, palpitations, dizziness, or leg swelling  GASTROINTESTINAL: No abdominal or epigastric pain. No nausea, vomiting, or hematemesis; No diarrhea or constipation. No melena or hematochezia.  GENITOURINARY: No dysuria, frequency, hematuria, or incontinence  NEUROLOGICAL: No headaches, memory loss, loss of strength, numbness, or tremors  SKIN: No itching, burning, rashes, or lesions   LYMPH NODES: No enlarged glands  ENDOCRINE: uncontrolled DM.   MUSCULOSKELETAL: No joint pain or swelling; No muscle, back, or extremity pain  PSYCHIATRIC: No depression, anxiety, mood swings, or difficulty sleeping  HEME/LYMPH: No easy bruising, or bleeding gums  ALLERY AND IMMUNOLOGIC: No hives or eczema    Allergies    No Known Drug Allergies  Seafood (Swelling)  shellfish (Swelling)    Intolerances        Social History:   smoking cigarettes PPD since age 15  smoking cracking cocaine    FAMILY HISTORY:  No pertinent family history in first degree relatives      MEDICATIONS  (STANDING):  insulin glargine Injectable (LANTUS) 25Unit(s) SubCutaneous at bedtime  insulin lispro (HumaLOG) corrective regimen sliding scale  SubCutaneous three times a day before meals  insulin lispro (HumaLOG) corrective regimen sliding scale  SubCutaneous at bedtime  dextrose 5%. 1000milliLiter(s) IV Continuous <Continuous>  dextrose 50% Injectable 12.5Gram(s) IV Push once  dextrose 50% Injectable 25Gram(s) IV Push once  dextrose 50% Injectable 25Gram(s) IV Push once  docusate sodium 100milliGRAM(s) Oral two times a day  QUEtiapine 100milliGRAM(s) Oral two times a day    MEDICATIONS  (PRN):  ALBUTerol    90 MICROgram(s) HFA Inhaler 2Puff(s) Inhalation every 6 hours PRN Shortness of Breath and/or Wheezing  diphenhydrAMINE   Injectable 50milliGRAM(s) IntraMuscular every 6 hours PRN Agitation  LORazepam   Injectable 2milliGRAM(s) IntraMuscular every 4 hours PRN severe agitation  haloperidol    Injectable 5milliGRAM(s) IntraMuscular every 6 hours PRN severe agitation  nicotine  Polacrilex Gum 4milliGRAM(s) Oral every 2 hours PRN breakthrough cravings  dextrose Gel 1Dose(s) Oral once PRN Blood Glucose LESS THAN 70 milliGRAM(s)/deciliter  glucagon  Injectable 1milliGRAM(s) IntraMuscular once PRN Glucose LESS THAN 70 milligrams/deciliter  diphenhydrAMINE   Capsule 50milliGRAM(s) Oral at bedtime PRN Insomnia      Vital Signs Last 24 Hrs  T(C): 36.1, Max: 36.1 (06-12 @ 09:39)  HR: 88 (88 - 88)  BP: 116/74 (116/74 - 116/74)  RR: --  SpO2: --  Wt(kg): --  CAPILLARY BLOOD GLUCOSE  233 (12 Jun 2017 11:39)  225 (12 Jun 2017 07:25)  210 (11 Jun 2017 21:38)  220 (11 Jun 2017 16:51)    I&O's Summary      PHYSICAL EXAM:  GENERAL: NAD, well-developed  HEAD:  Atraumatic, Normocephalic  EYES: EOMI, PERRLA, conjunctiva and sclera clear  NECK: Supple, No JVD  CHEST/LUNG: Clear to auscultation bilaterally; No wheeze  HEART: Regular rate and rhythm; No murmurs, rubs, or gallops  ABDOMEN: Soft, Nontender, Nondistended; Bowel sounds present  EXTREMITIES:  2+ Peripheral Pulses, No clubbing, cyanosis, or edema  PSYCH: AAOx3, calm  NEUROLOGY: non-focal  SKIN: No rashes or lesions    LABS:    Complete Blood Count + Automated Diff (06.08.17 @ 11:06)    WBC Count: 5.3 K/uL    RBC Count: 5.17 M/uL    Hemoglobin: 15.7 g/dL    Hematocrit: 44.6 %    Mean Cell Volume: 86.3 fl    Mean Cell Hemoglobin: 30.4 pg    Mean Cell Hemoglobin Conc: 35.2 g/dL    Red Cell Distrib Width: 13.3 %    Platelet Count - Automated: 306 K/uL    Auto Neutrophil #: 2.9 K/uL    Auto Lymphocyte #: 1.9 K/uL    Auto Monocyte #: 0.4 K/uL    Auto Eosinophil #: 0.0 K/uL    Auto Basophil #: 0.0 K/uL    Auto Neutrophil %: 55.3: Differential percentages must be correlated with absolute numbers for  clinical significance. %    Auto Lymphocyte %: 35.3 %    Auto Monocyte %: 7.7 %    Auto Eosinophil %: 0.9 %    Auto Basophil %: 0.4 %    Comprehensive Metabolic Panel in AM (06.09.17 @ 07:52)    Sodium, Serum: 135 mmol/L    Potassium, Serum: 4.2 mmol/L    Chloride, Serum: 99 mmol/L    Carbon Dioxide, Serum: 21 mmol/L    Blood Urea Nitrogen, Serum: 13 mg/dL    Creatinine, Serum: 0.86 mg/dL    Glucose, Serum: 126 mg/dL    Calcium, Total Serum: 8.3 mg/dL    Protein Total, Serum: 6.5 g/dL    Albumin, Serum: 3.3 g/dL    Bilirubin Total, Serum: 0.3 mg/dL    Alkaline Phosphatase, Serum: 53: Please note new reference ranges are adjusted for age and  gender. u/L    Aspartate Aminotransferase (AST/SGOT): 14 u/L    Alanine Aminotransferase (ALT/SGPT): 16 u/L    eGFR if Non : 86: The units for eGFR are ml/min/1.73m2 (normalized body  surface area). The eGFR is calculated from a serum  creatinine using the CKD-EPI equation. Other variables  required for calculation are race, age and sex. Among  patients with chronic kidney dise86: ase (CKD), the eGFR is  useful in determining the stage of disease according to  KDOQI CKD classification. All eGFR results are reported  numerically with the following interpretation.    GFR  (ml/min/1.73 m2)          W/KIDNEY DAMAGE    W/O KIDNEY DM86: G      Hemoglobin A1C, Whole Blood: 10.7: High Risk (prediabetic)    5.7 - 6.4 %  Diabetic, diagnostic           > 6.5 %  ADA diabetic treatment goal    < 7.0 %    HbA1C values may not accurately reflect mean blood glucose  in patients with Hb variants.  Suggest clinical correlation. % (06.09.17 @ 07:52)    ==========================================================  >= 90.......................Stage 1..............Normal  60-89.......................Stage 2...........Decreased GFR  30-59.......................Stage 3..............Stage 3  15-29.......86: ................Stage 4..............Stage 4  < 15........................Stage 5..............Stage 5    Each stage of CKD assumes that the associated GFR level  has been in effect for at least 3 months. Determination of  stages one and two (with eGF86: R > 59ml/min/m2) requires  estimation of kidney damage for at least 3 months as  defined by structural or functional abnormalities.    Limitations: All estimates of GFR will be less accurate  for patients at extremes of muscle mass (including but  not86:  limited to frail elderly, critically ill, or cancer  patients), those with unusual diets, and those with  conditions associated with reduced secretion or  extrarenal elimination of creatinine. The eGFR equation  is not recommended for use in [id number]:  with unstable  creatinine levels. mL/min    eGFR if : 99 mL/min      Direct LDL: 153:   RESULT (mg/dL)   (For adults 18 years and older)  ----------------------------------------------------------  Below 70         Ideal for people at very high risk of  heart disease  Below 100        Ideal for people at risk of heart disease  100 - [address]990: 9        [place]  130 - 159        Borderline high  160 - 189        High  190 and above    Very high mg/dL (06.09.17 @ 07:52)                  EKG:Diagnosis Line Normal sinus rhythm  Possible Left atrial enlargement  Left ventricular hypertrophy  T wave abnormality, consider inferior ischemia  Abnormal ECGAL TESTS:        RADIOLOGY & ADDITION    Imaging Personally Reviewed:    Consultant(s) Notes Reviewed:      Care Discussed with Consultants/Other Providers:

## 2017-06-13 VITALS — SYSTOLIC BLOOD PRESSURE: 114 MMHG | DIASTOLIC BLOOD PRESSURE: 66 MMHG | TEMPERATURE: 98 F

## 2017-06-13 PROCEDURE — 99238 HOSP IP/OBS DSCHRG MGMT 30/<: CPT

## 2017-06-13 PROCEDURE — 99233 SBSQ HOSP IP/OBS HIGH 50: CPT

## 2017-06-13 RX ORDER — ATORVASTATIN CALCIUM 80 MG/1
20 TABLET, FILM COATED ORAL AT BEDTIME
Qty: 0 | Refills: 0 | Status: DISCONTINUED | OUTPATIENT
Start: 2017-06-13 | End: 2017-06-13

## 2017-06-13 RX ADMIN — Medication: at 08:38

## 2017-06-13 RX ADMIN — Medication 2 UNIT(S): at 08:38

## 2017-06-13 RX ADMIN — QUETIAPINE FUMARATE 100 MILLIGRAM(S): 200 TABLET, FILM COATED ORAL at 08:38

## 2017-06-13 NOTE — PROGRESS NOTE ADULT - ASSESSMENT
35 yo F PMH DM II on lantus, uncontrolled due to medication non-compliance, Hx of smoking crack cocaine and cigarettes, bipolar disorder was admitted in [facility] for SI depression. Medical consult was called for DM management.

## 2017-06-13 NOTE — PROGRESS NOTE ADULT - PROBLEM SELECTOR PLAN 1
FS improved, continue lantus 28 u and humalog 2 u tid with HISS  repeat Hg A1C in 3 months, encourage medication compliance

## 2017-06-13 NOTE — PROGRESS NOTE ADULT - SUBJECTIVE AND OBJECTIVE BOX
CC/Reason for Consult: follow up DM management.     SUBJECTIVE / OVERNIGHT EVENTS: FS improved, pt compliant with medical treatment.     MEDICATIONS  (STANDING):  insulin lispro (HumaLOG) corrective regimen sliding scale  SubCutaneous three times a day before meals  insulin lispro (HumaLOG) corrective regimen sliding scale  SubCutaneous at bedtime  dextrose 5%. 1000milliLiter(s) IV Continuous <Continuous>  dextrose 50% Injectable 12.5Gram(s) IV Push once  dextrose 50% Injectable 25Gram(s) IV Push once  dextrose 50% Injectable 25Gram(s) IV Push once  docusate sodium 100milliGRAM(s) Oral two times a day  QUEtiapine 100milliGRAM(s) Oral two times a day  insulin glargine Injectable (LANTUS) 28Unit(s) SubCutaneous at bedtime  insulin lispro Injectable (HumaLOG) 2Unit(s) SubCutaneous three times a day before meals  dextrose 50% Injectable 12.5Gram(s) IV Push once  dextrose 50% Injectable 25Gram(s) IV Push once  dextrose 50% Injectable 25Gram(s) IV Push once  atorvastatin 20milliGRAM(s) Oral at bedtime    MEDICATIONS  (PRN):  ALBUTerol    90 MICROgram(s) HFA Inhaler 2Puff(s) Inhalation every 6 hours PRN Shortness of Breath and/or Wheezing  diphenhydrAMINE   Injectable 50milliGRAM(s) IntraMuscular every 6 hours PRN Agitation  LORazepam   Injectable 2milliGRAM(s) IntraMuscular every 4 hours PRN severe agitation  haloperidol    Injectable 5milliGRAM(s) IntraMuscular every 6 hours PRN severe agitation  nicotine  Polacrilex Gum 4milliGRAM(s) Oral every 2 hours PRN breakthrough cravings  dextrose Gel 1Dose(s) Oral once PRN Blood Glucose LESS THAN 70 milliGRAM(s)/deciliter  glucagon  Injectable 1milliGRAM(s) IntraMuscular once PRN Glucose LESS THAN 70 milligrams/deciliter  diphenhydrAMINE   Capsule 50milliGRAM(s) Oral at bedtime PRN Insomnia  dextrose Gel 1Dose(s) Oral once PRN Blood Glucose LESS THAN 70 milliGRAM(s)/deciliter  glucagon  Injectable 1milliGRAM(s) IntraMuscular once PRN Glucose LESS THAN 70 milligrams/deciliter      Vital Signs Last 24 Hrs  T(C): 36.7, Max: 36.7 (06-13 @ 06:35)  HR: --  BP: 114/66 (114/66 - 114/66)  RR: --  SpO2: --  Wt(kg): --  CAPILLARY BLOOD GLUCOSE  212 (13 Jun 2017 07:53)  205 (12 Jun 2017 20:33)    I&O's Summary      PHYSICAL EXAM:  GENERAL: NAD, well-developed  HEAD:  Atraumatic, Normocephalic  EYES: EOMI, PERRLA, conjunctiva and sclera clear  NECK: Supple, No JVD  CHEST/LUNG: Clear to auscultation bilaterally; No wheeze  HEART: Regular rate and rhythm; No murmurs, rubs, or gallops  ABDOMEN: Soft, Nontender, Nondistended; Bowel sounds present  EXTREMITIES:  2+ Peripheral Pulses, No clubbing, cyanosis, or edema  PSYCH: AAOx3  NEUROLOGY: non-focal  SKIN: No rashes or lesions    LABS:                    RADIOLOGY & ADDITIONAL TESTS:    Imaging Personally Reviewed:    Consultant(s) Notes Reviewed:      Care Discussed with Consultants/Other Providers:

## 2017-09-01 ENCOUNTER — OUTPATIENT (OUTPATIENT)
Dept: OUTPATIENT SERVICES | Facility: HOSPITAL | Age: 37
LOS: 1 days | End: 2017-09-01
Payer: MEDICAID

## 2017-09-05 DIAGNOSIS — R69 ILLNESS, UNSPECIFIED: ICD-10-CM

## 2017-10-01 PROCEDURE — G9001: CPT

## 2017-12-20 ENCOUNTER — EMERGENCY (EMERGENCY)
Facility: HOSPITAL | Age: 37
LOS: 1 days | Discharge: HOME | End: 2017-12-20
Attending: EMERGENCY MEDICINE
Payer: COMMERCIAL

## 2017-12-20 VITALS
DIASTOLIC BLOOD PRESSURE: 88 MMHG | SYSTOLIC BLOOD PRESSURE: 152 MMHG | WEIGHT: 160.06 LBS | HEIGHT: 62 IN | HEART RATE: 127 BPM | TEMPERATURE: 98 F | OXYGEN SATURATION: 99 % | RESPIRATION RATE: 18 BRPM

## 2017-12-20 DIAGNOSIS — F19.94 OTHER PSYCHOACTIVE SUBSTANCE USE, UNSPECIFIED WITH PSYCHOACTIVE SUBSTANCE-INDUCED MOOD DISORDER: ICD-10-CM

## 2017-12-20 DIAGNOSIS — F14.10 COCAINE ABUSE, UNCOMPLICATED: ICD-10-CM

## 2017-12-20 PROCEDURE — 99284 EMERGENCY DEPT VISIT MOD MDM: CPT

## 2017-12-20 PROCEDURE — 93010 ELECTROCARDIOGRAM REPORT: CPT

## 2017-12-20 PROCEDURE — 90792 PSYCH DIAG EVAL W/MED SRVCS: CPT

## 2017-12-20 RX ADMIN — Medication 2 MILLIGRAM(S): at 16:28

## 2017-12-20 NOTE — ED STATDOCS - SHIFT CHANGE DETAILS
RECEIVED IN SIGN OUT FROM DR LIRIANO, WHO RECEIVED SIGN OUT FROM DR CARLSON.  SUBSTANCE ABUSE AND SI. + MEDICAL COMORBIDITIES  WILL FOLLOW PSYCH RECOMMENDATIONS

## 2017-12-20 NOTE — ED BEHAVIORAL HEALTH ASSESSMENT NOTE - RISK ASSESSMENT
Chronic risk includes hx of substance induced mood disroder and substance abuse, possible prior suicide attempts. ACute risk include cocaine intoxication. However maykel currently denies suicidal ideation plan or intent, displays help seeking behavior, is no hopeless, denies depression, does not have guns, reports social supports; assessed to not be at imminent risk of harm to self or others Patient at acute risk due to suicidal ideation with plan to jump in front of car.

## 2017-12-20 NOTE — ED STATDOCS - MEDICAL DECISION MAKING DETAILS
Pt drug addiction suicidal not homicidal. Past hx of psych admission. Will need to be evaluated by psychiatrist.

## 2017-12-20 NOTE — ED STATDOCS - OBJECTIVE STATEMENT
37 y/o F pt with a hx of DM and seizures presents to ED stating she is addicted to cocaine. She adds that the substance abuse drug enhances her thoughts including SI. Her last usage of the substance abuse dug was 9 hours ago. Pt was admitted last year for this addiction. Denies N/V/D, fever, chills, SOB, CP, and abdominal pain. No further complaints at this time.

## 2017-12-20 NOTE — ED ADULT NURSE NOTE - OBJECTIVE STATEMENT
Patient reports fleeting suicidal ideation, as she is not over death of her mother in 2013. Reports multiple stressors r/t substance abuse. Stated she last used crack cocaine about 9 hours ago. Multiple hospitalizations for substance abuse and mental illness. Reports she was feeling depressed, but not currently suicidal. Also denied thoughts to harm self/others. No a/v hallucinations.

## 2017-12-20 NOTE — ED BEHAVIORAL HEALTH ASSESSMENT NOTE - HPI (INCLUDE ILLNESS QUALITY, SEVERITY, DURATION, TIMING, CONTEXT, MODIFYING FACTORS, ASSOCIATED SIGNS AND SYMPTOMS)
35 year old woman, long hx of crack cocaine and cannabis, children in foster care, domiciled with friend, prior psychiatric hospitalization at [facility] earlier this  year, prior inpatient detox/rehab, BIB self due to having suicidal thoughts.  Patient reports any negative emotion she may have gets amplified everytime she does crack, which is daily. She reports daily suicidal ideation since the age of 14 which intensifies when she does crack, states it became intense today, however denies associated plan or intent, states "I don't really want to die". She states she wishes to stay in ED in order to come down off of her crack high.   She reports her mood in general is all over the place, frequent flucations, denies extended periods of depressed mood. She reports having thoughts of hurting a friend erlier today while high on crack because she felt angry at a friend whom she feels did not support her after death of mother however denies homicidal plan or intent. she reports suicidal ideation has resovled because she feels she is in a calm environment.   She denies AH, VH and paranoia.  She states when transferred to [facility] last time she signed out AMA, while there she was told she does not have a primary psychiatric disorder, but was given diagnosis of Substance induced mood disorder, she also states she does not think she has a psychiatric diagnosis.  she denies hx of manic episodes however during last visit in april did report hx of blake.   She reports interest in stopping cocaine.  She reports not eating well for days, however immediately requests meal. 35 year old woman, long hx of crack cocaine and cannabis, children in foster care, domiciled with friend, prior psychiatric hospitalization at [facility] earlier this  year, prior inpatient detox/rehab, BIB self due to having suicidal thoughts.  Patient reports any negative emotion she may have gets amplified every time she does crack, which is daily. She reports daily suicidal ideation since the age of 14 which intensifies when she does crack, states it became intense today, however denies associated plan or intent, states "I don't really want to die". She states she wishes to stay in ED in order to come down off of her crack high.   She reports her mood in general is all over the place, frequent fluctuations, denies extended periods of depressed mood. She reports having thoughts of hurting a friend erlier today while high on crack because she felt angry at a friend whom she feels did not support her after death of mother however denies homicidal plan or intent. she reports suicidal ideation has resolved because she feels she is in a calm environment.   She denies AH, VH and paranoia.  She states when transferred to [facility] last time she signed out AMA, while there she was told she does not have a primary psychiatric disorder, but was given diagnosis of Substance induced mood disorder, she also states she does not think she has a psychiatric diagnosis.  Patient was initially held to come off crack high and be discharged as she reported resolution of SI, however on following day endorsed depressed mood with suicidal ideation plan to jump in front of car with intent. She reports rapidly fluctuating and unpredictable moods and requested inpatient psychiatric admission.   she denies hx of manic episodes however during last visit in April did report hx of blake.   She reports interest in stopping cocaine.  She reports not eating well for days, however immediately requests meal.

## 2017-12-20 NOTE — ED BEHAVIORAL HEALTH ASSESSMENT NOTE - OTHER PAST PSYCHIATRIC HISTORY (INCLUDE DETAILS REGARDING ONSET, COURSE OF ILLNESS, INPATIENT/OUTPATIENT TREATMENT)
[facility] admission age 10  past admission to [facility] un sure of dates   admission to [facility] in April 2017, diagnosed with substance inudced mood disorder.   not in outpatient treatment

## 2017-12-20 NOTE — ED STATDOCS - CARE PLAN
Principal Discharge DX:	Cocaine abuse Principal Discharge DX:	Substance induced mood disorder  Secondary Diagnosis:	Cocaine abuse

## 2017-12-20 NOTE — ED BEHAVIORAL HEALTH ASSESSMENT NOTE - DETAILS
self ED physician NA per record age 10 cut wrist refused to elaborate current method however today denies prior attempts admissions

## 2017-12-20 NOTE — ED ADULT TRIAGE NOTE - CHIEF COMPLAINT QUOTE
pt reports she hasn't grieved yet over her mother death and she feels really angry. expresses anger towards herself. states multiple times "I do not want to hurt others". no plan reported, used crack cocaine 8 hrs ago. also with pain to left posterior leg.

## 2017-12-20 NOTE — ED BEHAVIORAL HEALTH ASSESSMENT NOTE - DESCRIPTION
cooperative obesity diabetes mellitus never  children ages 12, 8 ,4 reports today they are in foster care however previously reported them  living in [place] with grandfather Vital Signs Last 24 Hrs  T(C): 36.9 (21 Dec 2017 15:05), Max: 36.9 (20 Dec 2017 19:10)  T(F): 98.4 (21 Dec 2017 15:05), Max: 98.5 (21 Dec 2017 07:37)  HR: 81 (21 Dec 2017 15:05) (80 - 98)  BP: 116/80 (21 Dec 2017 15:05) (109/65 - 132/77)  BP(mean): --  RR: 18 (21 Dec 2017 15:05) (16 - 20)  SpO2: 98% (21 Dec 2017 15:05) (98% - 100%)

## 2017-12-20 NOTE — ED BEHAVIORAL HEALTH ASSESSMENT NOTE - SUMMARY
35 year old with history of substance issues now presents due to suicidal ideation, now resolved, wants to stay in ED to come down off crack. Patient presents hyperactive and anxious secondary to crack.  will give ativan 2 mg PO,   observe patient to improve from crack agitation/ hyperactivity then discharge. 35 year old with history of substance issues now presents due to suicidal ideation, now resolved, wants to stay in ED to come down off crack. Patient presents hyperactive and anxious secondary to crack.  intially discharge recommended as maykel reported resolution of suicidal thoughts and just needing to come off crack high however patient reported return of suicidal thoughts, endorsed plan to jump in front of car.

## 2017-12-20 NOTE — ED STATDOCS - PROGRESS NOTE DETAILS
Pt. seen by psych. Pt. will remain with psych until the morning. Pt. will most likely be discharged. labs cancelled by psych. Pt. with no chest pain. No sob. Pt. endorsed to Dr. Eduardo. marii sign out  night was uneventful  signed out to am attending await bed placement marii sign out  night was uneventful  called for a fs 233, will not cover when patient will get breakfast, she does nt know her lantus dose  signed out to am attending await bed placement CLEARED FOR DISCHARGE PT WAS TO BE DISCHARGED BY AS PER DR OWENS, PT IS SUICIDAL AGAIN AND WILL BE TRANSFERRED TO INPATIENT

## 2017-12-21 VITALS
SYSTOLIC BLOOD PRESSURE: 117 MMHG | OXYGEN SATURATION: 97 % | HEART RATE: 85 BPM | TEMPERATURE: 98 F | RESPIRATION RATE: 16 BRPM | DIASTOLIC BLOOD PRESSURE: 74 MMHG

## 2017-12-21 DIAGNOSIS — F33.8 OTHER RECURRENT DEPRESSIVE DISORDERS: ICD-10-CM

## 2017-12-21 LAB
ALBUMIN SERPL ELPH-MCNC: 3.2 G/DL — LOW (ref 3.3–5.2)
ALP SERPL-CCNC: 51 U/L — SIGNIFICANT CHANGE UP (ref 40–120)
ALT FLD-CCNC: 14 U/L — SIGNIFICANT CHANGE UP
AMPHET UR-MCNC: NEGATIVE — SIGNIFICANT CHANGE UP
ANION GAP SERPL CALC-SCNC: 14 MMOL/L — SIGNIFICANT CHANGE UP (ref 5–17)
APPEARANCE UR: CLEAR — SIGNIFICANT CHANGE UP
AST SERPL-CCNC: 14 U/L — SIGNIFICANT CHANGE UP
BACTERIA # UR AUTO: ABNORMAL
BARBITURATES UR SCN-MCNC: NEGATIVE — SIGNIFICANT CHANGE UP
BASOPHILS # BLD AUTO: 0 K/UL — SIGNIFICANT CHANGE UP (ref 0–0.2)
BASOPHILS NFR BLD AUTO: 0.3 % — SIGNIFICANT CHANGE UP (ref 0–2)
BENZODIAZ UR-MCNC: NEGATIVE — SIGNIFICANT CHANGE UP
BILIRUB SERPL-MCNC: 0.2 MG/DL — LOW (ref 0.4–2)
BILIRUB UR-MCNC: NEGATIVE — SIGNIFICANT CHANGE UP
BUN SERPL-MCNC: 15 MG/DL — SIGNIFICANT CHANGE UP (ref 8–20)
CALCIUM SERPL-MCNC: 8.9 MG/DL — SIGNIFICANT CHANGE UP (ref 8.6–10.2)
CHLORIDE SERPL-SCNC: 99 MMOL/L — SIGNIFICANT CHANGE UP (ref 98–107)
CO2 SERPL-SCNC: 25 MMOL/L — SIGNIFICANT CHANGE UP (ref 22–29)
COCAINE METAB.OTHER UR-MCNC: POSITIVE
COLOR SPEC: YELLOW — SIGNIFICANT CHANGE UP
CREAT SERPL-MCNC: 0.69 MG/DL — SIGNIFICANT CHANGE UP (ref 0.5–1.3)
DIFF PNL FLD: NEGATIVE — SIGNIFICANT CHANGE UP
EOSINOPHIL # BLD AUTO: 0.1 K/UL — SIGNIFICANT CHANGE UP (ref 0–0.5)
EOSINOPHIL NFR BLD AUTO: 2.8 % — SIGNIFICANT CHANGE UP (ref 0–6)
EPI CELLS # UR: SIGNIFICANT CHANGE UP
ETHANOL SERPL-MCNC: <10 MG/DL — SIGNIFICANT CHANGE UP
GLUCOSE BLDC GLUCOMTR-MCNC: 134 MG/DL — HIGH (ref 70–99)
GLUCOSE BLDC GLUCOMTR-MCNC: 233 MG/DL — HIGH (ref 70–99)
GLUCOSE SERPL-MCNC: 185 MG/DL — HIGH (ref 70–115)
GLUCOSE UR QL: NEGATIVE MG/DL — SIGNIFICANT CHANGE UP
HCT VFR BLD CALC: 38.8 % — SIGNIFICANT CHANGE UP (ref 37–47)
HGB BLD-MCNC: 13.2 G/DL — SIGNIFICANT CHANGE UP (ref 12–16)
KETONES UR-MCNC: ABNORMAL
LEUKOCYTE ESTERASE UR-ACNC: ABNORMAL
LYMPHOCYTES # BLD AUTO: 1.5 K/UL — SIGNIFICANT CHANGE UP (ref 1–4.8)
LYMPHOCYTES # BLD AUTO: 45.6 % — SIGNIFICANT CHANGE UP (ref 20–55)
MCHC RBC-ENTMCNC: 29.5 PG — SIGNIFICANT CHANGE UP (ref 27–31)
MCHC RBC-ENTMCNC: 34 G/DL — SIGNIFICANT CHANGE UP (ref 32–36)
MCV RBC AUTO: 86.6 FL — SIGNIFICANT CHANGE UP (ref 81–99)
METHADONE UR-MCNC: NEGATIVE — SIGNIFICANT CHANGE UP
MONOCYTES # BLD AUTO: 0.4 K/UL — SIGNIFICANT CHANGE UP (ref 0–0.8)
MONOCYTES NFR BLD AUTO: 10.9 % — HIGH (ref 3–10)
NEUTROPHILS # BLD AUTO: 1.3 K/UL — LOW (ref 1.8–8)
NEUTROPHILS NFR BLD AUTO: 40.4 % — SIGNIFICANT CHANGE UP (ref 37–73)
NITRITE UR-MCNC: NEGATIVE — SIGNIFICANT CHANGE UP
OPIATES UR-MCNC: NEGATIVE — SIGNIFICANT CHANGE UP
PCP SPEC-MCNC: SIGNIFICANT CHANGE UP
PCP UR-MCNC: NEGATIVE — SIGNIFICANT CHANGE UP
PH UR: 6 — SIGNIFICANT CHANGE UP (ref 5–8)
PLATELET # BLD AUTO: 259 K/UL — SIGNIFICANT CHANGE UP (ref 150–400)
POTASSIUM SERPL-MCNC: 4.4 MMOL/L — SIGNIFICANT CHANGE UP (ref 3.5–5.3)
POTASSIUM SERPL-SCNC: 4.4 MMOL/L — SIGNIFICANT CHANGE UP (ref 3.5–5.3)
PROT SERPL-MCNC: 6.1 G/DL — LOW (ref 6.6–8.7)
PROT UR-MCNC: NEGATIVE MG/DL — SIGNIFICANT CHANGE UP
RBC # BLD: 4.48 M/UL — SIGNIFICANT CHANGE UP (ref 4.4–5.2)
RBC # FLD: 13.9 % — SIGNIFICANT CHANGE UP (ref 11–15.6)
RBC CASTS # UR COMP ASSIST: SIGNIFICANT CHANGE UP /HPF (ref 0–4)
SODIUM SERPL-SCNC: 138 MMOL/L — SIGNIFICANT CHANGE UP (ref 135–145)
SP GR SPEC: 1.02 — SIGNIFICANT CHANGE UP (ref 1.01–1.02)
THC UR QL: POSITIVE
TSH SERPL-MCNC: 1.56 UIU/ML — SIGNIFICANT CHANGE UP (ref 0.27–4.2)
UROBILINOGEN FLD QL: NEGATIVE MG/DL — SIGNIFICANT CHANGE UP
WBC # BLD: 3.2 K/UL — LOW (ref 4.8–10.8)
WBC # FLD AUTO: 3.2 K/UL — LOW (ref 4.8–10.8)
WBC UR QL: ABNORMAL

## 2017-12-21 PROCEDURE — 93010 ELECTROCARDIOGRAM REPORT: CPT

## 2017-12-21 PROCEDURE — 84443 ASSAY THYROID STIM HORMONE: CPT

## 2017-12-21 PROCEDURE — 36415 COLL VENOUS BLD VENIPUNCTURE: CPT

## 2017-12-21 PROCEDURE — 81001 URINALYSIS AUTO W/SCOPE: CPT

## 2017-12-21 PROCEDURE — 80053 COMPREHEN METABOLIC PANEL: CPT

## 2017-12-21 PROCEDURE — 85027 COMPLETE CBC AUTOMATED: CPT

## 2017-12-21 PROCEDURE — 93005 ELECTROCARDIOGRAM TRACING: CPT

## 2017-12-21 PROCEDURE — 82962 GLUCOSE BLOOD TEST: CPT

## 2017-12-21 PROCEDURE — 80307 DRUG TEST PRSMV CHEM ANLYZR: CPT

## 2017-12-21 PROCEDURE — 99285 EMERGENCY DEPT VISIT HI MDM: CPT | Mod: 25

## 2017-12-21 RX ADMIN — Medication 200 MILLIGRAM(S): at 14:23

## 2017-12-21 NOTE — ED ADULT NURSE REASSESSMENT NOTE - STATUS
awaiting discharge, no change
awaiting discharge, no change
awaiting discharge, assessment change
awaiting transfer, no change
awaiting transfer, no change

## 2017-12-21 NOTE — SBIRT NOTE. - NSSBIRTSERVICES_GEN_A_ED
Full Screen ONLY/Referral to Treatment Provided/Smoking Cessation Information Provided/Brief Intervention Performed

## 2017-12-21 NOTE — ED ADULT NURSE REASSESSMENT NOTE - NS ED NURSE REASSESS COMMENT FT1
Patient endorses not feeling safe if discharged and was reassessed.  Patient now awaiting transfer.
Pt currently resting/sleeping comfortably. No distress noted at this time. Safety maintained. Will continue to monitor the pt for safety.
Patient educated about pending transfer to [facility] and agreed with plan.  Patient motivated to continue treatment inpatient for depression.  No attempts to harm self or others and safety maintained.
Assumed care of patient at 0720.  Patient awake and calm.  Patient denies suicidal or homicidal ideation.  Patient requesting to talk to  to explore resources available to her prior to discharge.  Safety of patient maintained.

## 2017-12-21 NOTE — SBIRT NOTE. - NSSBIRTSERVICES_GEN_A_ED_FT
Provided SBIRT services: Full screen positive. Referral to Treatment attempted. Screening results were reviewed with the patient and patient was provided information about healthy guidelines and potential negative consequences associated with level of risk. Motivation and readiness to reduce or stop use was discussed and goals and activities to make changes were suggested/offered.  Options discussed for further evaluation and treatment, but referral to treatment was not completed because  Patient additional medical care  Audit Score: 0  DAST Score: 6  Duration = 15 Minutes

## 2017-12-21 NOTE — ED BEHAVIORAL HEALTH NOTE - BEHAVIORAL HEALTH NOTE
SW Note - pt accepted to [facility] by Elaina; faxed [facility] results. pt on 9.13 to [facility] Admitted by Dr. Currie. [facility] called to transport pt. Will get auth.

## 2017-12-21 NOTE — ED ADULT NURSE REASSESSMENT NOTE - COMFORT CARE
meal provided/plan of care explained/ambulated to bathroom/po fluids offered
plan of care explained/warm blanket provided/wait time explained/po fluids offered/repositioned
plan of care explained/po fluids offered
po fluids offered
WDL

## 2018-01-24 ENCOUNTER — EMERGENCY (EMERGENCY)
Facility: HOSPITAL | Age: 38
LOS: 1 days | Discharge: OTHER INSTITUTION | End: 2018-01-24
Attending: EMERGENCY MEDICINE
Payer: COMMERCIAL

## 2018-01-24 VITALS
DIASTOLIC BLOOD PRESSURE: 99 MMHG | WEIGHT: 164.91 LBS | OXYGEN SATURATION: 101 % | TEMPERATURE: 98 F | SYSTOLIC BLOOD PRESSURE: 179 MMHG | HEART RATE: 101 BPM | RESPIRATION RATE: 16 BRPM | HEIGHT: 62 IN

## 2018-01-24 LAB
ALBUMIN SERPL ELPH-MCNC: 3.8 G/DL — SIGNIFICANT CHANGE UP (ref 3.3–5.2)
ALP SERPL-CCNC: 73 U/L — SIGNIFICANT CHANGE UP (ref 40–120)
ALT FLD-CCNC: 13 U/L — SIGNIFICANT CHANGE UP
AMPHET UR-MCNC: NEGATIVE — SIGNIFICANT CHANGE UP
ANION GAP SERPL CALC-SCNC: 14 MMOL/L — SIGNIFICANT CHANGE UP (ref 5–17)
APAP SERPL-MCNC: <7.5 UG/ML — LOW (ref 10–26)
APPEARANCE UR: CLEAR — SIGNIFICANT CHANGE UP
AST SERPL-CCNC: 23 U/L — SIGNIFICANT CHANGE UP
BARBITURATES UR SCN-MCNC: NEGATIVE — SIGNIFICANT CHANGE UP
BENZODIAZ UR-MCNC: NEGATIVE — SIGNIFICANT CHANGE UP
BILIRUB SERPL-MCNC: 0.4 MG/DL — SIGNIFICANT CHANGE UP (ref 0.4–2)
BILIRUB UR-MCNC: NEGATIVE — SIGNIFICANT CHANGE UP
BUN SERPL-MCNC: 19 MG/DL — SIGNIFICANT CHANGE UP (ref 8–20)
CALCIUM SERPL-MCNC: 8.8 MG/DL — SIGNIFICANT CHANGE UP (ref 8.6–10.2)
CHLORIDE SERPL-SCNC: 101 MMOL/L — SIGNIFICANT CHANGE UP (ref 98–107)
CO2 SERPL-SCNC: 23 MMOL/L — SIGNIFICANT CHANGE UP (ref 22–29)
COCAINE METAB.OTHER UR-MCNC: POSITIVE
COLOR SPEC: YELLOW — SIGNIFICANT CHANGE UP
CREAT SERPL-MCNC: 0.92 MG/DL — SIGNIFICANT CHANGE UP (ref 0.5–1.3)
DIFF PNL FLD: ABNORMAL
EPI CELLS # UR: SIGNIFICANT CHANGE UP
GLUCOSE SERPL-MCNC: 207 MG/DL — HIGH (ref 70–115)
GLUCOSE UR QL: 1000 MG/DL
HCG UR QL: NEGATIVE — SIGNIFICANT CHANGE UP
HCT VFR BLD CALC: 41.4 % — SIGNIFICANT CHANGE UP (ref 37–47)
HGB BLD-MCNC: 13.9 G/DL — SIGNIFICANT CHANGE UP (ref 12–16)
KETONES UR-MCNC: ABNORMAL
LEUKOCYTE ESTERASE UR-ACNC: ABNORMAL
MCHC RBC-ENTMCNC: 29.2 PG — SIGNIFICANT CHANGE UP (ref 27–31)
MCHC RBC-ENTMCNC: 33.6 G/DL — SIGNIFICANT CHANGE UP (ref 32–36)
MCV RBC AUTO: 87 FL — SIGNIFICANT CHANGE UP (ref 81–99)
METHADONE UR-MCNC: NEGATIVE — SIGNIFICANT CHANGE UP
NITRITE UR-MCNC: NEGATIVE — SIGNIFICANT CHANGE UP
OPIATES UR-MCNC: NEGATIVE — SIGNIFICANT CHANGE UP
PCP SPEC-MCNC: SIGNIFICANT CHANGE UP
PCP UR-MCNC: NEGATIVE — SIGNIFICANT CHANGE UP
PH UR: 6.5 — SIGNIFICANT CHANGE UP (ref 5–8)
PLATELET # BLD AUTO: 255 K/UL — SIGNIFICANT CHANGE UP (ref 150–400)
POTASSIUM SERPL-MCNC: 4.5 MMOL/L — SIGNIFICANT CHANGE UP (ref 3.5–5.3)
POTASSIUM SERPL-SCNC: 4.5 MMOL/L — SIGNIFICANT CHANGE UP (ref 3.5–5.3)
PROT SERPL-MCNC: 7.4 G/DL — SIGNIFICANT CHANGE UP (ref 6.6–8.7)
PROT UR-MCNC: 15 MG/DL
RBC # BLD: 4.76 M/UL — SIGNIFICANT CHANGE UP (ref 4.4–5.2)
RBC # FLD: 14.1 % — SIGNIFICANT CHANGE UP (ref 11–15.6)
SALICYLATES SERPL-MCNC: <0.6 MG/DL — LOW (ref 10–20)
SODIUM SERPL-SCNC: 138 MMOL/L — SIGNIFICANT CHANGE UP (ref 135–145)
SP GR SPEC: 1.01 — SIGNIFICANT CHANGE UP (ref 1.01–1.02)
THC UR QL: POSITIVE
UROBILINOGEN FLD QL: NEGATIVE MG/DL — SIGNIFICANT CHANGE UP
WBC # BLD: 5.2 K/UL — SIGNIFICANT CHANGE UP (ref 4.8–10.8)
WBC # FLD AUTO: 5.2 K/UL — SIGNIFICANT CHANGE UP (ref 4.8–10.8)
WBC UR QL: SIGNIFICANT CHANGE UP

## 2018-01-24 PROCEDURE — 93010 ELECTROCARDIOGRAM REPORT: CPT

## 2018-01-24 PROCEDURE — 99285 EMERGENCY DEPT VISIT HI MDM: CPT

## 2018-01-24 RX ORDER — INSULIN GLARGINE 100 [IU]/ML
20 INJECTION, SOLUTION SUBCUTANEOUS AT BEDTIME
Qty: 0 | Refills: 0 | Status: DISCONTINUED | OUTPATIENT
Start: 2018-01-24 | End: 2018-01-28

## 2018-01-24 RX ADMIN — INSULIN GLARGINE 20 UNIT(S): 100 INJECTION, SOLUTION SUBCUTANEOUS at 22:29

## 2018-01-24 NOTE — ED ADULT NURSE NOTE - OBJECTIVE STATEMENT
Patient presented in  area dressed in casual attire with personal belongings in several bags.  Patient endorses she needs inpatient psychiatric hospitalization due to worsening depression and experiencing auditory hallucinations.  Patient describes the hallucinations as being command in nature with a single male voice telling her people are watching her.  Patient reports feeling suicidal with no specific plan or intent to harm herself.  Patient was transferred from [facility] in past for treatment with depression and suicidal ideations and was released from [facility] on 1/11/18.  Patient reports she has been staying at a friends house, was unable to obtain medication sent by [facility] secondary to not having insurance ID card which was lost in mail per patient, and does not recall being given any foolow-up from [facility].  Patient admits she relapsed on crack cocaine with last use three days ago.  Cooperative and pleasant during interview.

## 2018-01-24 NOTE — ED BEHAVIORAL HEALTH ASSESSMENT NOTE - RISK ASSESSMENT
Patient at acute risk due to suicidal ideation with plan to jump in front of car. Patient at acute risk due to suicidal ideation with reported plan to jump in front of car, noncompliance with treatment paranoia and AH.

## 2018-01-24 NOTE — ED BEHAVIORAL HEALTH ASSESSMENT NOTE - OTHER PAST PSYCHIATRIC HISTORY (INCLUDE DETAILS REGARDING ONSET, COURSE OF ILLNESS, INPATIENT/OUTPATIENT TREATMENT)
[facility] admission age 10  past admission to [facility] un sure of dates   admission to [facility] in April 2017, diagnosed with substance inudced mood disorder.   not in outpatient treatment

## 2018-01-24 NOTE — ED STATDOCS - CARE PLAN
Principal Discharge DX:	Bipolar disorder  Secondary Diagnosis:	Cocaine abuse  Secondary Diagnosis:	Noncompliance with medication regimen

## 2018-01-24 NOTE — ED BEHAVIORAL HEALTH ASSESSMENT NOTE - DETAILS
NA per record age 10 cut wrist refused to elaborate current method however on last admission  today denied prior attempts self patient does not remember name of psychiatrist [facility] December 2017; admission

## 2018-01-24 NOTE — ED ADULT NURSE NOTE - CHIEF COMPLAINT QUOTE
pt presents to ED with suicidal thought , pt states "I want to kill myself". pt states she is hearing voices. pt states voices are telling her to kill herself and to do it fast. pt does not have a plan to hurt herself. pt placed on 1:1 for safety.

## 2018-01-24 NOTE — ED ADULT NURSE NOTE - CHPI ED SYMPTOMS NEG
no weight loss/no agitation/no paranoia/no homicidal/no change in level of consciousness/no weakness

## 2018-01-24 NOTE — ED STATDOCS - OBJECTIVE STATEMENT
"Thoughts of killing myself"  Reports symtpoms for a few years, worse today.  reports command hallucinations tell her to kill hersefl.  Denies specific plan/ method.  Not currently taking meds: previously prescribed haldol, trazadone, remuron. Has not taken any meds since discharge from [facility] in nov 2017.  H/O crack cocaine abuse: last used 3 days ago.  denies headahce, chest pain, sob, cough, abd pain, dysuria

## 2018-01-24 NOTE — ED BEHAVIORAL HEALTH ASSESSMENT NOTE - SUMMARY
37 year old with history of substance issues now presents due to suicidal ideation, now resolved, wants to stay in ED to come down off crack. Patient presents hyperactive and anxious secondary to crack.  initially discharge recommended as patient reported resolution of suicidal thoughts and just needing to come off crack high however patient reported return of suicidal thoughts, endorsed plan to jump in front of car.  Patient to be held for further evaluation as she is not able to participate in full psychiatric interview. 37 year old with history of substance issues now presents due to suicidal ideation, now resolved, wants to stay in ED to come down off crack. Patient presents hyperactive and anxious secondary to crack.  initially discharge recommended as patient reported resolution of suicidal thoughts and just needing to come off crack high however patient reported return of suicidal thoughts, endorsed plan to jump in front of car.  Patient to be held for further evaluation as she is not able to participate in full psychiatric interview.    Patient re-evaluated on 1/25/18. Patient reports she is having fluctuating unstable moods feels paranoid "in general" that people are after her, reports  stating that people are after her and feels agitated, requesting medications. She reports noncompliance with zoloft and haldol since discharge from [facility] stating the medications did not make her "feel right" cannot further elaborate. She reports she is having strong urges to kill herself and feels she needs to be hospitalized for safety.

## 2018-01-24 NOTE — ED BEHAVIORAL HEALTH ASSESSMENT NOTE - HPI (INCLUDE ILLNESS QUALITY, SEVERITY, DURATION, TIMING, CONTEXT, MODIFYING FACTORS, ASSOCIATED SIGNS AND SYMPTOMS)
37 year old woman, long hx of crack cocaine and cannabis, children in foster care, domiciled with friend, prior psychiatric hospitalization at [facility] and [facility] last month, prior inpatient detox/rehab, BIB self due to having suicidal thoughts.  Upon arrival to Triage patient reported having suicidal ideation without plan and AH.   Pt is a 38 yo F with unknown past psychiatric history, history of crack cocaine use, DMII, Asthma, and seizure disorder presenting to  because she is hearing voices and having suicidal ideations. Pt states that she has been hearing voices for “mad years” but is unable to tell how long she has been having voices. She states the voices are telling her that “people are out to get her” and that “nobody is her friend”. She states that between the constant voices and her “anger” she is having suicidal ideations. She has had suicidal ideations in the past and has been hospitalized at [facility] and [facility]. She endorses that she had one aborted suicide attempt when she was a teenager in which she cut herself with a razor blade but did not follow through with the attempt. She states that the voices make her antisocial so she doesn’t have any friends, and states that her mom recently passed away so she has nothing to live for now. She denies having active suicidal intent or plan, but that if she is discharged she feels like she might try to actually kill herself. She states that she knows that “she signed out before she should have the last time she was here” but states she will not do that this time because she “actually wants help this time and she wants to get into a drug program to stop using crack”. Pt endorses feelings of hopelessness, decreased energy (feels “drained” all the time), decreased appetite, and poor sleep due to the auditory hallucinations. Pt denies visual or tactile hallucinations, anxiety, euphoria, depressed mood, restlessness, panic, or obsessions.    Attempted to further evaluate patient; she was poorly responsive, not responding to questions, or closing eyes immediately when asked to wake up, had same behavior when visited by SBIRT. At this time patient now denied having any AH, reports she is still taking medications prescribed by [facility] but is not attending outpatient treatment , and that they have been significantly helpful but is unable to name which medications she is on. [facility] , however [facility] does not have her on file when called.  Attempted to re-assess suicidality, patient stopped responding to these questions and kept eyes closed. 37 year old woman, long hx of crack cocaine and cannabis, children in foster care, domiciled with friend, prior psychiatric hospitalization at [facility] and [facility] last month, prior inpatient detox/rehab, BIB self due to having suicidal thoughts.  Upon arrival to Triage patient reported having suicidal ideation without plan and AH.   Pt is a 36 yo F with unknown past psychiatric history, history of crack cocaine use, DMII, Asthma, and seizure disorder presenting to  because she is hearing voices and having suicidal ideations. Pt states that she has been hearing voices for “mad years” but is unable to tell how long she has been having voices. She states the voices are telling her that “people are out to get her” and that “nobody is her friend”. She states that between the constant voices and her “anger” she is having suicidal ideations. She has had suicidal ideations in the past and has been hospitalized at [facility] and [facility]. She endorses that she had one aborted suicide attempt when she was a teenager in which she cut herself with a razor blade but did not follow through with the attempt. She states that the voices make her antisocial so she doesn’t have any friends, and states that her mom recently passed away so she has nothing to live for now. She denies having active suicidal intent or plan, but that if she is discharged she feels like she might try to actually kill herself. She states that she knows that “she signed out before she should have the last time she was here” but states she will not do that this time because she “actually wants help this time and she wants to get into a drug program to stop using crack”. Pt endorses feelings of hopelessness, decreased energy (feels “drained” all the time), decreased appetite, and poor sleep due to the auditory hallucinations. Pt denies visual or tactile hallucinations, anxiety, euphoria, depressed mood, restlessness, panic, or obsessions.    Attempted to further evaluate patient; she was poorly responsive, not responding to questions, or closing eyes immediately when asked to wake up, had same behavior when visited by SBIRT. At this time patient now denied having any AH, reports she is still taking medications prescribed by [facility] but is not attending outpatient treatment , and that they have been significantly helpful but is unable to name which medications she is on. [facility] , however [facility] does not have her on file when called.  Attempted to re-assess suicidality, patient stopped responding to these questions and kept eyes closed.  Patient re-evaluted on 1/25/18. Patient reports she is having fluctuating unstable moods feels paranoid "in general" that people are after her, reports  stating that people are after her and feels agitated, requesting medications. She reports noncompliance with zoloft and haldol since discharge from [facility] stating the medications did not make her "feel right" cannot further elaborate. She reports she is having strong urges to kill herself and feels she needs to be hospitalized for safety.

## 2018-01-24 NOTE — ED BEHAVIORAL HEALTH ASSESSMENT NOTE - DESCRIPTION
Vital Signs Last 24 Hrs  T(C): 36.8 (24 Jan 2018 10:49), Max: 36.8 (24 Jan 2018 10:49)  T(F): 98.2 (24 Jan 2018 10:49), Max: 98.2 (24 Jan 2018 10:49)  HR: 96 (24 Jan 2018 10:49) (96 - 101)  BP: 133/83 (24 Jan 2018 10:49) (133/83 - 179/99)  BP(mean): --  RR: 20 (24 Jan 2018 10:49) (16 - 20)  SpO2: 100% (24 Jan 2018 10:49) (100% - 101%) obesity diabetes mellitus never  children ages 12, 8 ,4

## 2018-01-24 NOTE — ED ADULT TRIAGE NOTE - CHIEF COMPLAINT QUOTE
pt presents to ED with suicidal thought , pt states "I want to kill myself". pt states she is hearing voices. pt states voices are telling her to kill herself and to do it fast. pt does not have a plan to hurt herself. pt presents to ED with suicidal thought , pt states "I want to kill myself". pt states she is hearing voices. pt states voices are telling her to kill herself and to do it fast. pt does not have a plan to hurt herself. pt placed on 1:1 for safety.

## 2018-01-25 ENCOUNTER — INPATIENT (INPATIENT)
Facility: HOSPITAL | Age: 38
LOS: 3 days | Discharge: HOME | End: 2018-01-29
Attending: PSYCHIATRY & NEUROLOGY | Admitting: PSYCHIATRY & NEUROLOGY
Payer: MEDICAID

## 2018-01-25 VITALS
SYSTOLIC BLOOD PRESSURE: 109 MMHG | RESPIRATION RATE: 15 BRPM | HEART RATE: 93 BPM | OXYGEN SATURATION: 99 % | TEMPERATURE: 98 F | DIASTOLIC BLOOD PRESSURE: 56 MMHG

## 2018-01-25 DIAGNOSIS — F39 UNSPECIFIED MOOD [AFFECTIVE] DISORDER: ICD-10-CM

## 2018-01-25 PROCEDURE — 80053 COMPREHEN METABOLIC PANEL: CPT

## 2018-01-25 PROCEDURE — 93005 ELECTROCARDIOGRAM TRACING: CPT

## 2018-01-25 PROCEDURE — 96372 THER/PROPH/DIAG INJ SC/IM: CPT

## 2018-01-25 PROCEDURE — 99285 EMERGENCY DEPT VISIT HI MDM: CPT | Mod: 25

## 2018-01-25 PROCEDURE — 85027 COMPLETE CBC AUTOMATED: CPT

## 2018-01-25 PROCEDURE — 36415 COLL VENOUS BLD VENIPUNCTURE: CPT

## 2018-01-25 PROCEDURE — 82962 GLUCOSE BLOOD TEST: CPT

## 2018-01-25 PROCEDURE — 81025 URINE PREGNANCY TEST: CPT

## 2018-01-25 PROCEDURE — 81001 URINALYSIS AUTO W/SCOPE: CPT

## 2018-01-25 PROCEDURE — 80307 DRUG TEST PRSMV CHEM ANLYZR: CPT

## 2018-01-25 RX ORDER — GLUCAGON INJECTION, SOLUTION 0.5 MG/.1ML
1 INJECTION, SOLUTION SUBCUTANEOUS ONCE
Qty: 0 | Refills: 0 | Status: DISCONTINUED | OUTPATIENT
Start: 2018-01-25 | End: 2018-01-29

## 2018-01-25 RX ORDER — HALOPERIDOL DECANOATE 100 MG/ML
5 INJECTION INTRAMUSCULAR EVERY 6 HOURS
Qty: 0 | Refills: 0 | Status: DISCONTINUED | OUTPATIENT
Start: 2018-01-25 | End: 2018-01-29

## 2018-01-25 RX ORDER — RISPERIDONE 4 MG/1
1 TABLET ORAL AT BEDTIME
Qty: 0 | Refills: 0 | Status: DISCONTINUED | OUTPATIENT
Start: 2018-01-25 | End: 2018-01-26

## 2018-01-25 RX ORDER — DIPHENHYDRAMINE HCL 50 MG
50 CAPSULE ORAL EVERY 6 HOURS
Qty: 0 | Refills: 0 | Status: DISCONTINUED | OUTPATIENT
Start: 2018-01-25 | End: 2018-01-29

## 2018-01-25 RX ORDER — DEXTROSE 50 % IN WATER 50 %
1 SYRINGE (ML) INTRAVENOUS ONCE
Qty: 0 | Refills: 0 | Status: DISCONTINUED | OUTPATIENT
Start: 2018-01-25 | End: 2018-01-29

## 2018-01-25 RX ORDER — NICOTINE POLACRILEX 2 MG
2 GUM BUCCAL
Qty: 0 | Refills: 0 | Status: DISCONTINUED | OUTPATIENT
Start: 2018-01-25 | End: 2018-01-29

## 2018-01-25 RX ORDER — DIPHENHYDRAMINE HCL 50 MG
50 CAPSULE ORAL ONCE
Qty: 0 | Refills: 0 | Status: DISCONTINUED | OUTPATIENT
Start: 2018-01-25 | End: 2018-01-29

## 2018-01-25 RX ORDER — HALOPERIDOL DECANOATE 100 MG/ML
5 INJECTION INTRAMUSCULAR ONCE
Qty: 0 | Refills: 0 | Status: DISCONTINUED | OUTPATIENT
Start: 2018-01-25 | End: 2018-01-29

## 2018-01-25 RX ORDER — HYDROXYZINE HCL 10 MG
50 TABLET ORAL EVERY 6 HOURS
Qty: 0 | Refills: 0 | Status: DISCONTINUED | OUTPATIENT
Start: 2018-01-25 | End: 2018-01-29

## 2018-01-25 RX ORDER — SODIUM CHLORIDE 9 MG/ML
1000 INJECTION, SOLUTION INTRAVENOUS
Qty: 0 | Refills: 0 | Status: DISCONTINUED | OUTPATIENT
Start: 2018-01-25 | End: 2018-01-29

## 2018-01-25 RX ADMIN — Medication 200 MILLIGRAM(S): at 21:12

## 2018-01-25 RX ADMIN — RISPERIDONE 1 MILLIGRAM(S): 4 TABLET ORAL at 21:13

## 2018-01-25 NOTE — CHART NOTE - NSCHARTNOTEFT_GEN_A_CORE
Screening Medical Evaluation  Patient Admitted from: [facility] ED    [facility] admitting diagnosis:  Episodic mood disorder    PAST MEDICAL & SURGICAL HISTORY:  Cocaine abuse  Seizure  IDDM (insulin dependent diabetes mellitus)  Bipolar disorder  No significant past surgical history        Allergies    No Known Drug Allergies  Seafood (Swelling)  shellfish (Swelling)    Intolerances        Social History:     FAMILY HISTORY:  No pertinent family history in first degree relatives      MEDICATIONS  (STANDING):  dextrose 5%. 1000 milliLiter(s) (50 mL/Hr) IV Continuous <Continuous>  phenytoin   Capsule 200 milliGRAM(s) Oral two times a day  risperiDONE   Tablet 1 milliGRAM(s) Oral at bedtime    MEDICATIONS  (PRN):  dextrose Gel 1 Dose(s) Oral once PRN Blood Glucose LESS THAN 70 milliGRAM(s)/deciliter  diphenhydrAMINE   Capsule 50 milliGRAM(s) Oral every 6 hours PRN EPS and/or agitation  diphenhydrAMINE   Injectable 50 milliGRAM(s) IntraMuscular once PRN EPS and/or agitation  glucagon  Injectable 1 milliGRAM(s) IntraMuscular once PRN Glucose LESS THAN 70 milligrams/deciliter  haloperidol     Tablet 5 milliGRAM(s) Oral every 6 hours PRN agitation/aggression  haloperidol    Injectable 5 milliGRAM(s) IntraMuscular once PRN agitation/aggression  hydrOXYzine hydrochloride 50 milliGRAM(s) Oral every 6 hours PRN anxiety/agitation  LORazepam   Injectable 2 milliGRAM(s) IntraMuscular once PRN agitation/aggression  nicotine  Polacrilex Gum 2 milliGRAM(s) Oral every 2 hours PRN nicotine dependence      Vital Signs Last 24 Hrs  T(C): 36.9 (2018 12:14), Max: 37.1 (2018 23:34)  T(F): 98.5 (2018 12:14), Max: 98.8 (2018 11:21)  HR: 93 (2018 12:14) (89 - 101)  BP: 109/56 (2018 12:14) (109/56 - 127/81)  RR: 15 (2018 12:14) (15 - 20)  SpO2: 99% (2018 12:14) (97% - 100%)  CAPILLARY BLOOD GLUCOSE      POCT Blood Glucose.: 227 mg/dL (2018 02:12)  POCT Blood Glucose.: 258 mg/dL (2018 22:25)        PHYSICAL EXAM:  GENERAL: NAD, well-developed  HEAD:  Atraumatic, Normocephalic  EYES: EOMI, PERRLA, conjunctiva and sclera clear  NECK: Supple.  CHEST/LUNG: Clear to auscultation bilaterally; No wheezes, rhonchi, or crackles present.  HEART: Regular rate and rhythm; +s1 and +s2; No murmurs, rubs, or gallops  ABDOMEN: Soft, Nontender, Nondistended; Normoactive Bowel sounds present  EXTREMITIES:  2+ radial and 2+ posterior tibial peripheral Pulses, No clubbing, cyanosis, or edema  PSYCH: AAOx3  NEUROLOGY: CN 2-12 intact  SKIN: No rashes or lesions    LABS:                        13.9   5.2   )-----------( 255      ( 2018 11:45 )             41.4         138  |  101  |  19.0  ----------------------------<  207<H>  4.5   |  23.0  |  0.92    Ca    8.8      2018 11:45    TPro  7.4  /  Alb  3.8  /  TBili  0.4  /  DBili  x   /  AST  23  /  ALT  13  /  AlkPhos  73            Urinalysis Basic - ( 2018 11:20 )    Color: Yellow / Appearance: Clear / S.015 / pH: x  Gluc: x / Ketone: Moderate  / Bili: Negative / Urobili: Negative mg/dL   Blood: x / Protein: 15 mg/dL / Nitrite: Negative   Leuk Esterase: Trace / RBC: x / WBC 0-2   Sq Epi: x / Non Sq Epi: Occasional / Bacteria: x        RADIOLOGY & ADDITIONAL TESTS:    Assessment and Plan:  37 year old female presenting today from [facility] ED to [facility] with presenting diagnosis of  Episodic mood disorder with PMH of Cocaine abuse, Seizure, IDDM (insulin dependent diabetes mellitus), and Bipolar disorder. Pt denies any medical concerns at this time. Pt has been noncompliant with home medications for IDDM and other medical conditions. Denies any fever, chills, headache, dizziness, SOB, chest pain, abdominal pain, N/V/D/C, dysuria.  1) IDDM: Continue to monitor blood glucose levels. Watch for hypoglycemia. Dextrose Gel 1 dose prn blood glucose if less than70 mg/dL and glucagon injectable 1 mg IM prn if blood glucose less than 70 mg/dL.  2) Episodic mood disorder: Follow care plan as per primary psych team.

## 2018-01-25 NOTE — ED ADULT NURSE REASSESSMENT NOTE - NS ED NURSE REASSESS COMMENT FT1
@ 22:25.
Called Dr. Jacques to inform about pt's Fingerstick, no interventions at this time, pt in no acute distress no s/s of hypo/hyperglycemia.   Will continue to monitor and maintain safety.
Spoke Dr Jacques regarding to Finger stick, currently no [id number].  Md stated repeat in 2hrs.  Pt in no acute distress at this time.  Will continue to monitor and maintain safety.  No s/s of hyper/hypoglycemia at this time.
pt in no acute distress at this present time, Finger stick at 2:12a was 227, pt has no s/s of hypo/hyperglycemia at this time.  Will notify MD.  Will continue to monitor and maintain safety.
Pt currently resting offers no complaints at this present time, no s/s of hypo/hyperglycemia at this time, in no acute distress.  Will continue to monitor and maintain safety.
Pt currently in bed offers no complaints at this present time.  in bed no s/s of hypo/hyperglycemia at this time.  No aggressive behaviors at this time.  Will continue to monitor and maintain safety.
Patient ate 100% of her lunch, pleasant while awake.  Patient currently resting in bed and not attempting to harm self or others.
Patient mood and behavior subdued.  No attempts to harm self or others, agrees with plan for inpatient treatment.  Safety maintained.

## 2018-01-25 NOTE — ED ADULT NURSE REASSESSMENT NOTE - GENERAL PATIENT STATE
comfortable appearance
comfortable appearance
comfortable appearance/resting/sleeping
cooperative/comfortable appearance

## 2018-01-26 PROCEDURE — 99232 SBSQ HOSP IP/OBS MODERATE 35: CPT

## 2018-01-26 PROCEDURE — 99223 1ST HOSP IP/OBS HIGH 75: CPT

## 2018-01-26 RX ORDER — INSULIN LISPRO 100/ML
VIAL (ML) SUBCUTANEOUS AT BEDTIME
Qty: 0 | Refills: 0 | Status: DISCONTINUED | OUTPATIENT
Start: 2018-01-26 | End: 2018-01-29

## 2018-01-26 RX ORDER — ALBUTEROL 90 UG/1
2 AEROSOL, METERED ORAL EVERY 6 HOURS
Qty: 0 | Refills: 0 | Status: DISCONTINUED | OUTPATIENT
Start: 2018-01-26 | End: 2018-01-29

## 2018-01-26 RX ORDER — INSULIN GLARGINE 100 [IU]/ML
20 INJECTION, SOLUTION SUBCUTANEOUS AT BEDTIME
Qty: 0 | Refills: 0 | Status: DISCONTINUED | OUTPATIENT
Start: 2018-01-26 | End: 2018-01-29

## 2018-01-26 RX ORDER — ACETAMINOPHEN 500 MG
650 TABLET ORAL ONCE
Qty: 0 | Refills: 0 | Status: COMPLETED | OUTPATIENT
Start: 2018-01-26 | End: 2018-01-26

## 2018-01-26 RX ORDER — RISPERIDONE 4 MG/1
1 TABLET ORAL
Qty: 0 | Refills: 0 | Status: DISCONTINUED | OUTPATIENT
Start: 2018-01-26 | End: 2018-01-29

## 2018-01-26 RX ORDER — INSULIN LISPRO 100/ML
VIAL (ML) SUBCUTANEOUS
Qty: 0 | Refills: 0 | Status: DISCONTINUED | OUTPATIENT
Start: 2018-01-26 | End: 2018-01-29

## 2018-01-26 RX ORDER — RISPERIDONE 4 MG/1
1 TABLET ORAL ONCE
Qty: 0 | Refills: 0 | Status: COMPLETED | OUTPATIENT
Start: 2018-01-26 | End: 2018-01-26

## 2018-01-26 RX ADMIN — Medication 200 MILLIGRAM(S): at 09:19

## 2018-01-26 RX ADMIN — Medication 50 MILLIGRAM(S): at 17:56

## 2018-01-26 RX ADMIN — Medication 200 MILLIGRAM(S): at 21:25

## 2018-01-26 RX ADMIN — INSULIN GLARGINE 20 UNIT(S): 100 INJECTION, SOLUTION SUBCUTANEOUS at 21:23

## 2018-01-26 RX ADMIN — Medication 4: at 17:55

## 2018-01-26 RX ADMIN — RISPERIDONE 1 MILLIGRAM(S): 4 TABLET ORAL at 21:25

## 2018-01-26 RX ADMIN — Medication 650 MILLIGRAM(S): at 17:12

## 2018-01-26 RX ADMIN — RISPERIDONE 1 MILLIGRAM(S): 4 TABLET ORAL at 09:26

## 2018-01-26 RX ADMIN — Medication 650 MILLIGRAM(S): at 08:00

## 2018-01-26 NOTE — CONSULT NOTE ADULT - SUBJECTIVE AND OBJECTIVE BOX
HPI:  36 yo F crack cocaine abuse (last use few days ago per chart), asthma, seizure disorder, diabetes on insulin, prior psych hospitalizations at [facility] and [facility], most recently at [facility] (previously dx with MDD, bipolar depression, substance induced mood disorder, recently signed herself out of [facility]) presented to ED reporting auditory hallucinations commanding her to hurt herself. Admitted to [facility] for further psychiatric management. Per psych, she is providing history which contradicts what she stated on prior admissions.     Patient sleeping in bed, arousable to voice, however only allows brief interview/exam before getting little bit annoyed, wanting to go back to sleep. No acute complaints, aware that medicine service available if any concerns, states that she uses "insulin 25 Units" at home.     PAST MEDICAL & SURGICAL HISTORY:  Cocaine abuse  Seizure  IDDM (insulin dependent diabetes mellitus)  Bipolar disorder  No significant past surgical history    Review of Systems:   CONSTITUTIONAL: No f/c  EYES: No eye pain  ENMT:  No throat pain  NECK: No pain   RESPIRATORY: No shortness of breath  CARDIOVASCULAR: No chest pain  GASTROINTESTINAL: No abdominal pain. No nausea, vomiting  GENITOURINARY: No dysuria,  NEUROLOGICAL: No headaches, loss of strength, numbness.  PSYCH: +AH  SKIN: No rash  ENDOCRINE: Diabetes on insulin  MUSCULOSKELETAL: No joint pain  HEME/LYMPH: No easy bleeding/bruising  ALLERY AND IMMUNOLOGIC: NKDA, rxn seafood    Allergies  No Known Drug Allergies  Seafood (Swelling)  shellfish (Swelling)    Intolerances    Social History:   Lives alone, unemployed, +crack cocaine, +cannabis use, smoker    FAMILY HISTORY:  Multiple family members with psychiatric issues    MEDICATIONS  (STANDING):  dextrose 5%. 1000 milliLiter(s) (50 mL/Hr) IV Continuous <Continuous>  phenytoin   Capsule 200 milliGRAM(s) Oral two times a day  risperiDONE   Tablet 1 milliGRAM(s) Oral two times a day    MEDICATIONS  (PRN):  dextrose Gel 1 Dose(s) Oral once PRN Blood Glucose LESS THAN 70 milliGRAM(s)/deciliter  diphenhydrAMINE   Capsule 50 milliGRAM(s) Oral every 6 hours PRN EPS and/or agitation  diphenhydrAMINE   Injectable 50 milliGRAM(s) IntraMuscular once PRN EPS and/or agitation  glucagon  Injectable 1 milliGRAM(s) IntraMuscular once PRN Glucose LESS THAN 70 milligrams/deciliter  haloperidol     Tablet 5 milliGRAM(s) Oral every 6 hours PRN agitation/aggression  haloperidol    Injectable 5 milliGRAM(s) IntraMuscular once PRN agitation/aggression  hydrOXYzine hydrochloride 50 milliGRAM(s) Oral every 6 hours PRN anxiety/agitation  LORazepam   Injectable 2 milliGRAM(s) IntraMuscular once PRN agitation/aggression  nicotine  Polacrilex Gum 2 milliGRAM(s) Oral every 2 hours PRN nicotine dependence    Vital Signs Last 24 Hrs  T(C): --  T(F): --  HR: 84  BP: 124/63  BP(mean): --  RR: --  SpO2: --  CAPILLARY BLOOD GLUCOSE    PHYSICAL EXAM:  GENERAL: NAD, sleeping in bed, arousable to voice  HEAD:  Atraumatic, Normocephalic  EYES: EOMI, conjunctiva and sclera clear  NECK: Supple, No JVD  CHEST/LUNG: Clear to auscultation bilaterally; No wheeze  HEART: S1S2 Regular rate and rhythm;   ABDOMEN: Soft, Nontender, Bowel sounds present  EXTREMITIES:   No clubbing, cyanosis, or edema  PSYCH: AAOx3, calm  NEUROLOGY: non-focal, moving all extremities,   SKIN: No obvious rash (limited skin exam)    LABS:  Reviewed from 1/24: no leukocytosis (WC 5.2), no anemia (hgb 13.9) no thrombocytopenia (plt 255)  Cr .92, no chronic kidney disease, with hyperglycemia Glu 207    , 227    A1C 10.7 on June 9th 2017    Tox screen + THC and cocaine    EKG(personally reviewed): sinus 90 bpm 1/24/18 qtc 459 ms    RADIOLOGY & ADDITIONAL TESTS:    Imaging Personally Reviewed:  No imaging studies available for review    Consultant(s) Notes Reviewed:      Care Discussed with Consultants/Other Providers: Dr. Sorenson (psych)

## 2018-01-26 NOTE — CONSULT NOTE ADULT - ASSESSMENT
38 yo F cocaine abuse, smoker, uncontrolled DM2 (last known A1C 10.7) on insulin (reports of non-compliance in past), seizure disorder, prior psych admissions admitted to [facility] for further management of AH and SI.     DM2 uncontrolled (last A1C 10.7) on insulin (?compliance issues)  - elevated sugars here >200  - will start patient on basal insulin with sliding scale coverage  - diabetic diet  - monitor FS and adjust regimen as needed, consider addition of premeal humalog if needed  - check current hgbA1C    Seizure disorder: c/w home medication (on dilantin bid)    Tobacco abuse:   c/w nicotine replacement therapy    Cocaine abuse:    cessation    Asthma: no wheeze noted on exam today  - albuterol prn SOB/wheezing    Psych (previously dx MDD vs bipolar vs substance induced mood disorder) p/w Auditory hallucinations and suicidal ideation  - management as per psych  - ?possibility of malingering per psych given inconsistent history 36 yo F cocaine abuse, smoker, uncontrolled DM2 (last known A1C 10.7) on insulin (reports of non-compliance in past), seizure disorder, prior psych admissions admitted to [facility] for further management of AH and SI.     DM2 uncontrolled (last A1C 10.7) on insulin (?compliance issues)  - elevated sugars here >200  - will start patient on basal insulin with sliding scale coverage (as unknown recent A1C and unclear compliance with insulin at home, will start at slightly lower than reported home dose and titrate up as needed).   - diabetic diet  - monitor FS and adjust regimen as needed, consider addition of premeal humalog if needed  - check current hgbA1C    Seizure disorder: c/w home medication (on dilantin bid)    Tobacco abuse:   c/w nicotine replacement therapy    Cocaine abuse:    cessation    Asthma: no wheeze noted on exam today  - albuterol prn SOB/wheezing    Psych (previously dx MDD vs bipolar vs substance induced mood disorder) p/w Auditory hallucinations and suicidal ideation  - management as per psych  - ?possibility of malingering per psych given inconsistent history

## 2018-01-27 VITALS — TEMPERATURE: 98 F | RESPIRATION RATE: 20 BRPM

## 2018-01-27 PROCEDURE — 99232 SBSQ HOSP IP/OBS MODERATE 35: CPT

## 2018-01-27 RX ADMIN — Medication 50 MILLIGRAM(S): at 19:45

## 2018-01-27 RX ADMIN — RISPERIDONE 1 MILLIGRAM(S): 4 TABLET ORAL at 21:28

## 2018-01-27 RX ADMIN — INSULIN GLARGINE 20 UNIT(S): 100 INJECTION, SOLUTION SUBCUTANEOUS at 21:27

## 2018-01-27 RX ADMIN — Medication: at 07:31

## 2018-01-27 RX ADMIN — Medication 200 MILLIGRAM(S): at 08:52

## 2018-01-27 RX ADMIN — Medication 2: at 12:51

## 2018-01-27 RX ADMIN — Medication 200 MILLIGRAM(S): at 21:28

## 2018-01-27 RX ADMIN — Medication 2 MILLIGRAM(S): at 19:45

## 2018-01-27 RX ADMIN — Medication 1: at 17:06

## 2018-01-27 RX ADMIN — RISPERIDONE 1 MILLIGRAM(S): 4 TABLET ORAL at 08:52

## 2018-01-28 LAB
GLUCOSE BLDC GLUCOMTR-MCNC: 172 MG/DL — HIGH (ref 70–99)
GLUCOSE BLDC GLUCOMTR-MCNC: 197 MG/DL — HIGH (ref 70–99)
GLUCOSE BLDC GLUCOMTR-MCNC: 224 MG/DL — HIGH (ref 70–99)
GLUCOSE BLDC GLUCOMTR-MCNC: 256 MG/DL — HIGH (ref 70–99)

## 2018-01-28 PROCEDURE — 99232 SBSQ HOSP IP/OBS MODERATE 35: CPT

## 2018-01-28 RX ORDER — ACETAMINOPHEN 500 MG
650 TABLET ORAL EVERY 6 HOURS
Qty: 0 | Refills: 0 | Status: DISCONTINUED | OUTPATIENT
Start: 2018-01-28 | End: 2018-01-29

## 2018-01-28 RX ADMIN — Medication 200 MILLIGRAM(S): at 08:29

## 2018-01-28 RX ADMIN — Medication 50 MILLIGRAM(S): at 21:00

## 2018-01-28 RX ADMIN — Medication 1: at 17:07

## 2018-01-28 RX ADMIN — RISPERIDONE 1 MILLIGRAM(S): 4 TABLET ORAL at 08:29

## 2018-01-28 RX ADMIN — Medication 200 MILLIGRAM(S): at 21:16

## 2018-01-28 RX ADMIN — Medication 650 MILLIGRAM(S): at 10:31

## 2018-01-28 RX ADMIN — RISPERIDONE 1 MILLIGRAM(S): 4 TABLET ORAL at 21:16

## 2018-01-28 RX ADMIN — Medication 3: at 12:44

## 2018-01-28 RX ADMIN — Medication 2: at 08:29

## 2018-01-28 RX ADMIN — INSULIN GLARGINE 20 UNIT(S): 100 INJECTION, SOLUTION SUBCUTANEOUS at 21:16

## 2018-01-28 RX ADMIN — Medication 50 MILLIGRAM(S): at 16:30

## 2018-01-29 VITALS — RESPIRATION RATE: 18 BRPM | TEMPERATURE: 99 F

## 2018-01-29 LAB
GLUCOSE BLDC GLUCOMTR-MCNC: 159 MG/DL — HIGH (ref 70–99)
GLUCOSE BLDC GLUCOMTR-MCNC: 233 MG/DL — HIGH (ref 70–99)

## 2018-01-29 PROCEDURE — 99238 HOSP IP/OBS DSCHRG MGMT 30/<: CPT

## 2018-01-29 RX ORDER — RISPERIDONE 4 MG/1
1 TABLET ORAL
Qty: 28 | Refills: 0 | OUTPATIENT
Start: 2018-01-29 | End: 2018-02-11

## 2018-01-29 RX ORDER — INSULIN GLARGINE 100 [IU]/ML
20 INJECTION, SOLUTION SUBCUTANEOUS
Qty: 2.8 | Refills: 0 | OUTPATIENT
Start: 2018-01-29 | End: 2018-02-11

## 2018-01-29 RX ADMIN — RISPERIDONE 1 MILLIGRAM(S): 4 TABLET ORAL at 08:53

## 2018-01-29 RX ADMIN — Medication 200 MILLIGRAM(S): at 08:53

## 2018-01-29 RX ADMIN — Medication 1: at 08:30

## 2018-01-29 RX ADMIN — Medication 2: at 12:48

## 2018-01-29 RX ADMIN — Medication 50 MILLIGRAM(S): at 08:40

## 2018-02-08 ENCOUNTER — EMERGENCY (EMERGENCY)
Facility: HOSPITAL | Age: 38
LOS: 1 days | Discharge: OTHER INSTITUTION | End: 2018-02-08
Attending: EMERGENCY MEDICINE | Admitting: STUDENT IN AN ORGANIZED HEALTH CARE EDUCATION/TRAINING PROGRAM
Payer: COMMERCIAL

## 2018-02-08 VITALS
TEMPERATURE: 99 F | OXYGEN SATURATION: 98 % | SYSTOLIC BLOOD PRESSURE: 123 MMHG | DIASTOLIC BLOOD PRESSURE: 81 MMHG | HEART RATE: 85 BPM | RESPIRATION RATE: 18 BRPM

## 2018-02-08 VITALS
RESPIRATION RATE: 18 BRPM | TEMPERATURE: 98 F | WEIGHT: 175.05 LBS | SYSTOLIC BLOOD PRESSURE: 156 MMHG | DIASTOLIC BLOOD PRESSURE: 97 MMHG | HEART RATE: 93 BPM | OXYGEN SATURATION: 100 % | HEIGHT: 63 IN

## 2018-02-08 DIAGNOSIS — F29 UNSPECIFIED PSYCHOSIS NOT DUE TO A SUBSTANCE OR KNOWN PHYSIOLOGICAL CONDITION: ICD-10-CM

## 2018-02-08 DIAGNOSIS — F14.90 COCAINE USE, UNSPECIFIED, UNCOMPLICATED: ICD-10-CM

## 2018-02-08 LAB
ALBUMIN SERPL ELPH-MCNC: 3.4 G/DL — SIGNIFICANT CHANGE UP (ref 3.3–5.2)
ALP SERPL-CCNC: 69 U/L — SIGNIFICANT CHANGE UP (ref 40–120)
ALT FLD-CCNC: 31 U/L — SIGNIFICANT CHANGE UP
AMPHET UR-MCNC: NEGATIVE — SIGNIFICANT CHANGE UP
ANION GAP SERPL CALC-SCNC: 10 MMOL/L — SIGNIFICANT CHANGE UP (ref 5–17)
APAP SERPL-MCNC: <7.5 UG/ML — LOW (ref 10–26)
APPEARANCE UR: CLEAR — SIGNIFICANT CHANGE UP
AST SERPL-CCNC: 20 U/L — SIGNIFICANT CHANGE UP
BARBITURATES UR SCN-MCNC: NEGATIVE — SIGNIFICANT CHANGE UP
BASOPHILS # BLD AUTO: 0 K/UL — SIGNIFICANT CHANGE UP (ref 0–0.2)
BASOPHILS NFR BLD AUTO: 0.2 % — SIGNIFICANT CHANGE UP (ref 0–2)
BENZODIAZ UR-MCNC: NEGATIVE — SIGNIFICANT CHANGE UP
BILIRUB SERPL-MCNC: <0.2 MG/DL — LOW (ref 0.4–2)
BILIRUB UR-MCNC: NEGATIVE — SIGNIFICANT CHANGE UP
BUN SERPL-MCNC: 10 MG/DL — SIGNIFICANT CHANGE UP (ref 8–20)
CALCIUM SERPL-MCNC: 8.6 MG/DL — SIGNIFICANT CHANGE UP (ref 8.6–10.2)
CHLORIDE SERPL-SCNC: 102 MMOL/L — SIGNIFICANT CHANGE UP (ref 98–107)
CO2 SERPL-SCNC: 27 MMOL/L — SIGNIFICANT CHANGE UP (ref 22–29)
COCAINE METAB.OTHER UR-MCNC: POSITIVE
COLOR SPEC: YELLOW — SIGNIFICANT CHANGE UP
CREAT SERPL-MCNC: 0.68 MG/DL — SIGNIFICANT CHANGE UP (ref 0.5–1.3)
DIFF PNL FLD: NEGATIVE — SIGNIFICANT CHANGE UP
EOSINOPHIL # BLD AUTO: 0.1 K/UL — SIGNIFICANT CHANGE UP (ref 0–0.5)
EOSINOPHIL NFR BLD AUTO: 2.9 % — SIGNIFICANT CHANGE UP (ref 0–6)
ETHANOL SERPL-MCNC: <10 MG/DL — SIGNIFICANT CHANGE UP
GLUCOSE SERPL-MCNC: 295 MG/DL — HIGH (ref 70–115)
GLUCOSE UR QL: 1000 MG/DL
HCG UR QL: NEGATIVE — SIGNIFICANT CHANGE UP
HCT VFR BLD CALC: 37.5 % — SIGNIFICANT CHANGE UP (ref 37–47)
HGB BLD-MCNC: 12.6 G/DL — SIGNIFICANT CHANGE UP (ref 12–16)
KETONES UR-MCNC: NEGATIVE — SIGNIFICANT CHANGE UP
LEUKOCYTE ESTERASE UR-ACNC: NEGATIVE — SIGNIFICANT CHANGE UP
LYMPHOCYTES # BLD AUTO: 1.8 K/UL — SIGNIFICANT CHANGE UP (ref 1–4.8)
LYMPHOCYTES # BLD AUTO: 43.4 % — SIGNIFICANT CHANGE UP (ref 20–55)
MCHC RBC-ENTMCNC: 29.3 PG — SIGNIFICANT CHANGE UP (ref 27–31)
MCHC RBC-ENTMCNC: 33.6 G/DL — SIGNIFICANT CHANGE UP (ref 32–36)
MCV RBC AUTO: 87.2 FL — SIGNIFICANT CHANGE UP (ref 81–99)
METHADONE UR-MCNC: NEGATIVE — SIGNIFICANT CHANGE UP
MONOCYTES # BLD AUTO: 0.5 K/UL — SIGNIFICANT CHANGE UP (ref 0–0.8)
MONOCYTES NFR BLD AUTO: 12.2 % — HIGH (ref 3–10)
NEUTROPHILS # BLD AUTO: 1.7 K/UL — LOW (ref 1.8–8)
NEUTROPHILS NFR BLD AUTO: 41.3 % — SIGNIFICANT CHANGE UP (ref 37–73)
NITRITE UR-MCNC: NEGATIVE — SIGNIFICANT CHANGE UP
OPIATES UR-MCNC: NEGATIVE — SIGNIFICANT CHANGE UP
PCP SPEC-MCNC: SIGNIFICANT CHANGE UP
PCP UR-MCNC: NEGATIVE — SIGNIFICANT CHANGE UP
PH UR: 6 — SIGNIFICANT CHANGE UP (ref 5–8)
PHENYTOIN FREE SERPL-MCNC: <2.9 UG/ML — LOW (ref 10–20)
PLATELET # BLD AUTO: 356 K/UL — SIGNIFICANT CHANGE UP (ref 150–400)
POTASSIUM SERPL-MCNC: 4.2 MMOL/L — SIGNIFICANT CHANGE UP (ref 3.5–5.3)
POTASSIUM SERPL-SCNC: 4.2 MMOL/L — SIGNIFICANT CHANGE UP (ref 3.5–5.3)
PROT SERPL-MCNC: 6.5 G/DL — LOW (ref 6.6–8.7)
PROT UR-MCNC: NEGATIVE MG/DL — SIGNIFICANT CHANGE UP
RBC # BLD: 4.3 M/UL — LOW (ref 4.4–5.2)
RBC # FLD: 13.9 % — SIGNIFICANT CHANGE UP (ref 11–15.6)
SALICYLATES SERPL-MCNC: <0.6 MG/DL — LOW (ref 10–20)
SODIUM SERPL-SCNC: 139 MMOL/L — SIGNIFICANT CHANGE UP (ref 135–145)
SP GR SPEC: 1.01 — SIGNIFICANT CHANGE UP (ref 1.01–1.02)
THC UR QL: POSITIVE
TSH SERPL-MCNC: 2.63 UIU/ML — SIGNIFICANT CHANGE UP (ref 0.27–4.2)
UROBILINOGEN FLD QL: NEGATIVE MG/DL — SIGNIFICANT CHANGE UP
WBC # BLD: 4.1 K/UL — LOW (ref 4.8–10.8)
WBC # FLD AUTO: 4.1 K/UL — LOW (ref 4.8–10.8)

## 2018-02-08 PROCEDURE — 99285 EMERGENCY DEPT VISIT HI MDM: CPT

## 2018-02-08 PROCEDURE — 81025 URINE PREGNANCY TEST: CPT

## 2018-02-08 PROCEDURE — 80307 DRUG TEST PRSMV CHEM ANLYZR: CPT

## 2018-02-08 PROCEDURE — 93005 ELECTROCARDIOGRAM TRACING: CPT

## 2018-02-08 PROCEDURE — 82962 GLUCOSE BLOOD TEST: CPT

## 2018-02-08 PROCEDURE — 84443 ASSAY THYROID STIM HORMONE: CPT

## 2018-02-08 PROCEDURE — 96372 THER/PROPH/DIAG INJ SC/IM: CPT

## 2018-02-08 PROCEDURE — 81003 URINALYSIS AUTO W/O SCOPE: CPT

## 2018-02-08 PROCEDURE — 99285 EMERGENCY DEPT VISIT HI MDM: CPT | Mod: 25

## 2018-02-08 PROCEDURE — 36415 COLL VENOUS BLD VENIPUNCTURE: CPT

## 2018-02-08 PROCEDURE — 80185 ASSAY OF PHENYTOIN TOTAL: CPT

## 2018-02-08 PROCEDURE — 80053 COMPREHEN METABOLIC PANEL: CPT

## 2018-02-08 PROCEDURE — 93010 ELECTROCARDIOGRAM REPORT: CPT

## 2018-02-08 PROCEDURE — 85027 COMPLETE CBC AUTOMATED: CPT

## 2018-02-08 RX ORDER — INSULIN HUMAN 100 [IU]/ML
2 INJECTION, SOLUTION SUBCUTANEOUS ONCE
Qty: 0 | Refills: 0 | Status: COMPLETED | OUTPATIENT
Start: 2018-02-08 | End: 2018-02-08

## 2018-02-08 RX ORDER — RISPERIDONE 4 MG/1
1 TABLET ORAL
Qty: 0 | Refills: 0 | Status: DISCONTINUED | OUTPATIENT
Start: 2018-02-08 | End: 2018-02-12

## 2018-02-08 RX ADMIN — Medication 200 MILLIGRAM(S): at 12:49

## 2018-02-08 RX ADMIN — RISPERIDONE 1 MILLIGRAM(S): 4 TABLET ORAL at 12:49

## 2018-02-08 RX ADMIN — RISPERIDONE 1 MILLIGRAM(S): 4 TABLET ORAL at 18:20

## 2018-02-08 RX ADMIN — INSULIN HUMAN 2 UNIT(S): 100 INJECTION, SOLUTION SUBCUTANEOUS at 07:37

## 2018-02-08 RX ADMIN — Medication 200 MILLIGRAM(S): at 18:20

## 2018-02-08 NOTE — ED BEHAVIORAL HEALTH ASSESSMENT NOTE - HPI (INCLUDE ILLNESS QUALITY, SEVERITY, DURATION, TIMING, CONTEXT, MODIFYING FACTORS, ASSOCIATED SIGNS AND SYMPTOMS)
Pt is a 36 yo female with history of episodic mood disorder  cocaine use, seizure disorder, h/o cutting and SI, DM who presents because she is hearing voices "telling her to hurt herself and others" and she is afraid she might hurt herself and she needs to go back to [facility]. Pt states she has been using cocaine and marijuana. She states she is tired. She states she has been taking her medications.

## 2018-02-08 NOTE — ED BEHAVIORAL HEALTH ASSESSMENT NOTE - CURRENT MEDICATION
Basaglar, Humalog  Phenytoin 200mg BID  Ventolin  Risperidone, Depakote 500mg BID, Haldol 10mg, Trazadone 100mg

## 2018-02-08 NOTE — ED ADULT NURSE REASSESSMENT NOTE - NS ED NURSE REASSESS COMMENT FT1
Pt brought to  approx 650a, stating she is depressed and wants to hurt other people, calm and cooperative at this present time in no acute distress.  Fingerstick at 6:58a 295, pt has no s/s of hyper/hypoglycemia at this present time.  Complaint with  policy and procedure at this time, wanded by security.   Will continue to monitor and maintain safety.

## 2018-02-08 NOTE — ED PROVIDER NOTE - PROGRESS NOTE DETAILS
ATTENDING MD Malini: signout from [facility]. Pt with suicidal ideation, polysubstance abuse, prior hospitalizations. Medically stable. Evaluated by psychiatry and inpatient care recommended. Bed search in progress.

## 2018-02-08 NOTE — ED BEHAVIORAL HEALTH ASSESSMENT NOTE - SUMMARY
Pt is 36 yo F with long history of inpatient hospitalizations for mood / psychotic disorders and substance use presenting for suicidal ideations and AH. Pt feels like she needs to be back in [facility]. Pt is still under the influence of cocaine.

## 2018-02-08 NOTE — ED PROVIDER NOTE - OBJECTIVE STATEMENT
36 y/o F pt with a pmhx bipolar disorder, cocaine abuse, IDDM and seizures presents to the ED c/o SI with a plan worsening today. She has a plan to cut herself. She has been admitted to the hospital for SI. Denies chest pain, sob, back pain, n/v/d, urinary symptoms, recent trauma, neck pain or any other complaints. NKDA. Unable to obtain full Hx and ROS due to pt's mental status secondary to psychiatric disorder.

## 2018-02-08 NOTE — ED BEHAVIORAL HEALTH ASSESSMENT NOTE - OTHER PAST PSYCHIATRIC HISTORY (INCLUDE DETAILS REGARDING ONSET, COURSE OF ILLNESS, INPATIENT/OUTPATIENT TREATMENT)
[place] admission age 10  past admission to [facility] un sure of dates   admission to [facility] in April 2017, Jan 2018  not in outpatient treatment

## 2018-02-08 NOTE — ED ADULT NURSE REASSESSMENT NOTE - CONDITION
Pt sleeping in rounds, . 2 units Humulin R given in Left arm. Pt returned to sleep. Pt offers no complaint . appears very fatigued. will continue to monitor/unchanged

## 2018-02-08 NOTE — ED BEHAVIORAL HEALTH NOTE - BEHAVIORAL HEALTH NOTE
SW Note: Plan is to transfer pt for inpt psychiatric tx. Referral made to [facility]. bed available and chart being reviewed. Working with Jacquelin in admissions [phone]. Tried to question pt about hx of seizures and sleep apnea. Pt difficult to arouse. Closing eyes and falling asleep. Pt stated she last had a seizure when she was " young" and that she has a sleep apnea machine at home but doesn't use it and doesn't remember when she last used it. Reports that when she was inpt at [facility] 1/2018 she didn't use a machine. 9.37 legals on chart. SW to follow for placement.

## 2018-02-08 NOTE — ED BEHAVIORAL HEALTH ASSESSMENT NOTE - DESCRIPTION
Sleeping. Barely arousable.  T(C): 36.9 (08 Feb 2018 11:08), Max: 37 (08 Feb 2018 07:20)  T(F): 98.5 (08 Feb 2018 11:08), Max: 98.6 (08 Feb 2018 07:20)  HR: 84 (08 Feb 2018 11:08) (84 - 93)  BP: 150/92 (08 Feb 2018 11:08) (148/92 - 156/97)  RR: 18 (08 Feb 2018 11:08) (18 - 18)  SpO2: 96% (08 Feb 2018 11:08) (96% - 100%) DM, asthma, seizure disorder never  children ages 12, 8 ,4

## 2018-02-08 NOTE — ED BEHAVIORAL HEALTH ASSESSMENT NOTE - RISK ASSESSMENT
Elevated risk: pt unable to contract for safety, active substance use, current SI, psychotic features (AH)

## 2018-02-08 NOTE — ED BEHAVIORAL HEALTH NOTE - BEHAVIORAL HEALTH NOTE
SW Note - spoke to pt MD Dr Ying and MD states that only "pt reports using C-PAP and pt denies using recently, IF she till has it is in [facility] somewhere and no idea where" spoke to Effie at [facility] and she checked with MD (simultaneously presented to [facility] - low on beds not sure they can take her back - per Eva GUIDRY..ND.) pt very sleepy, kept drifting off during conversation. and pt accepted to [facility] on 9.37 by Dr. Currie - transport called and pt comfortable waiting. Will need Auth.

## 2018-02-09 NOTE — ED BEHAVIORAL HEALTH NOTE - BEHAVIORAL HEALTH NOTE
SW Note: Pt transferred to [facility] 2/8/18. Ins precert completed. Spoke with Shannan from [facility], [phone]. Auth approved for 2 days 2/8/18 & 2/9/18. Auth# [id number]. Review due 2/9 with Alesha Nick @ [phone]. Info forwarded to [facility] UR dept.

## 2018-02-20 ENCOUNTER — INPATIENT (INPATIENT)
Facility: HOSPITAL | Age: 38
LOS: 0 days | Discharge: HOME | DRG: 313 | End: 2018-02-21
Attending: INTERNAL MEDICINE | Admitting: HOSPITALIST
Payer: COMMERCIAL

## 2018-02-20 VITALS
RESPIRATION RATE: 18 BRPM | TEMPERATURE: 98 F | HEART RATE: 63 BPM | HEIGHT: 62 IN | OXYGEN SATURATION: 95 % | SYSTOLIC BLOOD PRESSURE: 113 MMHG | WEIGHT: 173.94 LBS | DIASTOLIC BLOOD PRESSURE: 66 MMHG

## 2018-02-20 DIAGNOSIS — R07.9 CHEST PAIN, UNSPECIFIED: ICD-10-CM

## 2018-02-20 DIAGNOSIS — E11.9 TYPE 2 DIABETES MELLITUS WITHOUT COMPLICATIONS: ICD-10-CM

## 2018-02-20 DIAGNOSIS — F14.10 COCAINE ABUSE, UNCOMPLICATED: ICD-10-CM

## 2018-02-20 DIAGNOSIS — Z29.9 ENCOUNTER FOR PROPHYLACTIC MEASURES, UNSPECIFIED: ICD-10-CM

## 2018-02-20 DIAGNOSIS — F17.210 NICOTINE DEPENDENCE, CIGARETTES, UNCOMPLICATED: ICD-10-CM

## 2018-02-20 DIAGNOSIS — F31.9 BIPOLAR DISORDER, UNSPECIFIED: ICD-10-CM

## 2018-02-20 DIAGNOSIS — R56.9 UNSPECIFIED CONVULSIONS: ICD-10-CM

## 2018-02-20 LAB
ALBUMIN SERPL ELPH-MCNC: 3.3 G/DL — SIGNIFICANT CHANGE UP (ref 3.3–5.2)
ALP SERPL-CCNC: 37 U/L — LOW (ref 40–120)
ALT FLD-CCNC: 16 U/L — SIGNIFICANT CHANGE UP
ANION GAP SERPL CALC-SCNC: 9 MMOL/L — SIGNIFICANT CHANGE UP (ref 5–17)
APTT BLD: 31.6 SEC — SIGNIFICANT CHANGE UP (ref 27.5–37.4)
AST SERPL-CCNC: 25 U/L — SIGNIFICANT CHANGE UP
BASOPHILS # BLD AUTO: 0 K/UL — SIGNIFICANT CHANGE UP (ref 0–0.2)
BASOPHILS NFR BLD AUTO: 0.2 % — SIGNIFICANT CHANGE UP (ref 0–2)
BILIRUB SERPL-MCNC: <0.2 MG/DL — LOW (ref 0.4–2)
BUN SERPL-MCNC: 18 MG/DL — SIGNIFICANT CHANGE UP (ref 8–20)
CALCIUM SERPL-MCNC: 8.5 MG/DL — LOW (ref 8.6–10.2)
CHLORIDE SERPL-SCNC: 102 MMOL/L — SIGNIFICANT CHANGE UP (ref 98–107)
CO2 SERPL-SCNC: 26 MMOL/L — SIGNIFICANT CHANGE UP (ref 22–29)
CREAT SERPL-MCNC: 0.66 MG/DL — SIGNIFICANT CHANGE UP (ref 0.5–1.3)
EOSINOPHIL # BLD AUTO: 0 K/UL — SIGNIFICANT CHANGE UP (ref 0–0.5)
EOSINOPHIL NFR BLD AUTO: 1 % — SIGNIFICANT CHANGE UP (ref 0–6)
GLUCOSE SERPL-MCNC: 110 MG/DL — SIGNIFICANT CHANGE UP (ref 70–115)
HCT VFR BLD CALC: 36.9 % — LOW (ref 37–47)
HGB BLD-MCNC: 12.1 G/DL — SIGNIFICANT CHANGE UP (ref 12–16)
INR BLD: 0.94 RATIO — SIGNIFICANT CHANGE UP (ref 0.88–1.16)
LYMPHOCYTES # BLD AUTO: 2.1 K/UL — SIGNIFICANT CHANGE UP (ref 1–4.8)
LYMPHOCYTES # BLD AUTO: 41.7 % — SIGNIFICANT CHANGE UP (ref 20–55)
MCHC RBC-ENTMCNC: 28.8 PG — SIGNIFICANT CHANGE UP (ref 27–31)
MCHC RBC-ENTMCNC: 32.8 G/DL — SIGNIFICANT CHANGE UP (ref 32–36)
MCV RBC AUTO: 87.9 FL — SIGNIFICANT CHANGE UP (ref 81–99)
MONOCYTES # BLD AUTO: 0.5 K/UL — SIGNIFICANT CHANGE UP (ref 0–0.8)
MONOCYTES NFR BLD AUTO: 10.3 % — HIGH (ref 3–10)
NEUTROPHILS # BLD AUTO: 2.4 K/UL — SIGNIFICANT CHANGE UP (ref 1.8–8)
NEUTROPHILS NFR BLD AUTO: 46.6 % — SIGNIFICANT CHANGE UP (ref 37–73)
NT-PROBNP SERPL-SCNC: 97 PG/ML — SIGNIFICANT CHANGE UP (ref 0–300)
PLATELET # BLD AUTO: 194 K/UL — SIGNIFICANT CHANGE UP (ref 150–400)
POTASSIUM SERPL-MCNC: 5.5 MMOL/L — HIGH (ref 3.5–5.3)
POTASSIUM SERPL-SCNC: 5.5 MMOL/L — HIGH (ref 3.5–5.3)
PROT SERPL-MCNC: 6.5 G/DL — LOW (ref 6.6–8.7)
PROTHROM AB SERPL-ACNC: 10.3 SEC — SIGNIFICANT CHANGE UP (ref 9.8–12.7)
RBC # BLD: 4.2 M/UL — LOW (ref 4.4–5.2)
RBC # FLD: 14.2 % — SIGNIFICANT CHANGE UP (ref 11–15.6)
SODIUM SERPL-SCNC: 137 MMOL/L — SIGNIFICANT CHANGE UP (ref 135–145)
TROPONIN T SERPL-MCNC: <0.01 NG/ML — SIGNIFICANT CHANGE UP (ref 0–0.06)
WBC # BLD: 5.1 K/UL — SIGNIFICANT CHANGE UP (ref 4.8–10.8)
WBC # FLD AUTO: 5.1 K/UL — SIGNIFICANT CHANGE UP (ref 4.8–10.8)

## 2018-02-20 PROCEDURE — 99497 ADVNCD CARE PLAN 30 MIN: CPT | Mod: GC

## 2018-02-20 PROCEDURE — 99285 EMERGENCY DEPT VISIT HI MDM: CPT

## 2018-02-20 PROCEDURE — 71045 X-RAY EXAM CHEST 1 VIEW: CPT | Mod: 26

## 2018-02-20 PROCEDURE — 99222 1ST HOSP IP/OBS MODERATE 55: CPT

## 2018-02-20 PROCEDURE — 99223 1ST HOSP IP/OBS HIGH 75: CPT | Mod: GC

## 2018-02-20 RX ORDER — IPRATROPIUM/ALBUTEROL SULFATE 18-103MCG
3 AEROSOL WITH ADAPTER (GRAM) INHALATION ONCE
Qty: 0 | Refills: 0 | Status: COMPLETED | OUTPATIENT
Start: 2018-02-20 | End: 2018-02-20

## 2018-02-20 RX ORDER — DEXTROSE 50 % IN WATER 50 %
25 SYRINGE (ML) INTRAVENOUS ONCE
Qty: 0 | Refills: 0 | Status: DISCONTINUED | OUTPATIENT
Start: 2018-02-20 | End: 2018-02-21

## 2018-02-20 RX ORDER — GLUCAGON INJECTION, SOLUTION 0.5 MG/.1ML
1 INJECTION, SOLUTION SUBCUTANEOUS ONCE
Qty: 0 | Refills: 0 | Status: DISCONTINUED | OUTPATIENT
Start: 2018-02-20 | End: 2018-02-21

## 2018-02-20 RX ORDER — BENZTROPINE MESYLATE 1 MG
2 TABLET ORAL
Qty: 0 | Refills: 0 | Status: DISCONTINUED | OUTPATIENT
Start: 2018-02-20 | End: 2018-02-21

## 2018-02-20 RX ORDER — SODIUM CHLORIDE 9 MG/ML
1000 INJECTION, SOLUTION INTRAVENOUS
Qty: 0 | Refills: 0 | Status: DISCONTINUED | OUTPATIENT
Start: 2018-02-20 | End: 2018-02-21

## 2018-02-20 RX ORDER — ALBUTEROL 90 UG/1
2.5 AEROSOL, METERED ORAL EVERY 4 HOURS
Qty: 0 | Refills: 0 | Status: DISCONTINUED | OUTPATIENT
Start: 2018-02-20 | End: 2018-02-21

## 2018-02-20 RX ORDER — ASPIRIN/CALCIUM CARB/MAGNESIUM 324 MG
81 TABLET ORAL DAILY
Qty: 0 | Refills: 0 | Status: DISCONTINUED | OUTPATIENT
Start: 2018-02-20 | End: 2018-02-21

## 2018-02-20 RX ORDER — INSULIN LISPRO 100/ML
VIAL (ML) SUBCUTANEOUS
Qty: 0 | Refills: 0 | Status: DISCONTINUED | OUTPATIENT
Start: 2018-02-20 | End: 2018-02-21

## 2018-02-20 RX ORDER — DIVALPROEX SODIUM 500 MG/1
500 TABLET, DELAYED RELEASE ORAL
Qty: 0 | Refills: 0 | Status: DISCONTINUED | OUTPATIENT
Start: 2018-02-20 | End: 2018-02-21

## 2018-02-20 RX ORDER — DEXTROSE 50 % IN WATER 50 %
1 SYRINGE (ML) INTRAVENOUS ONCE
Qty: 0 | Refills: 0 | Status: DISCONTINUED | OUTPATIENT
Start: 2018-02-20 | End: 2018-02-21

## 2018-02-20 RX ORDER — HALOPERIDOL DECANOATE 100 MG/ML
5 INJECTION INTRAMUSCULAR EVERY 6 HOURS
Qty: 0 | Refills: 0 | Status: DISCONTINUED | OUTPATIENT
Start: 2018-02-20 | End: 2018-02-21

## 2018-02-20 RX ORDER — DIPHENHYDRAMINE HCL 50 MG
50 CAPSULE ORAL ONCE
Qty: 0 | Refills: 0 | Status: DISCONTINUED | OUTPATIENT
Start: 2018-02-20 | End: 2018-02-20

## 2018-02-20 RX ORDER — SIMVASTATIN 20 MG/1
10 TABLET, FILM COATED ORAL AT BEDTIME
Qty: 0 | Refills: 0 | Status: DISCONTINUED | OUTPATIENT
Start: 2018-02-20 | End: 2018-02-21

## 2018-02-20 RX ORDER — BENZTROPINE MESYLATE 1 MG
1 TABLET ORAL
Qty: 0 | Refills: 0 | Status: DISCONTINUED | OUTPATIENT
Start: 2018-02-20 | End: 2018-02-21

## 2018-02-20 RX ORDER — INSULIN GLARGINE 100 [IU]/ML
20 INJECTION, SOLUTION SUBCUTANEOUS AT BEDTIME
Qty: 0 | Refills: 0 | Status: DISCONTINUED | OUTPATIENT
Start: 2018-02-20 | End: 2018-02-21

## 2018-02-20 RX ORDER — DEXTROSE 50 % IN WATER 50 %
12.5 SYRINGE (ML) INTRAVENOUS ONCE
Qty: 0 | Refills: 0 | Status: DISCONTINUED | OUTPATIENT
Start: 2018-02-20 | End: 2018-02-21

## 2018-02-20 RX ORDER — NICOTINE POLACRILEX 2 MG
2 GUM BUCCAL
Qty: 0 | Refills: 0 | Status: DISCONTINUED | OUTPATIENT
Start: 2018-02-20 | End: 2018-02-21

## 2018-02-20 RX ORDER — NICOTINE POLACRILEX 2 MG
1 GUM BUCCAL DAILY
Qty: 0 | Refills: 0 | Status: DISCONTINUED | OUTPATIENT
Start: 2018-02-20 | End: 2018-02-21

## 2018-02-20 RX ORDER — HYDROXYZINE HCL 10 MG
50 TABLET ORAL EVERY 6 HOURS
Qty: 0 | Refills: 0 | Status: DISCONTINUED | OUTPATIENT
Start: 2018-02-20 | End: 2018-02-21

## 2018-02-20 RX ORDER — TRAZODONE HCL 50 MG
100 TABLET ORAL AT BEDTIME
Qty: 0 | Refills: 0 | Status: DISCONTINUED | OUTPATIENT
Start: 2018-02-20 | End: 2018-02-21

## 2018-02-20 RX ORDER — SODIUM CHLORIDE 9 MG/ML
3 INJECTION INTRAMUSCULAR; INTRAVENOUS; SUBCUTANEOUS ONCE
Qty: 0 | Refills: 0 | Status: COMPLETED | OUTPATIENT
Start: 2018-02-20 | End: 2018-02-20

## 2018-02-20 RX ORDER — HALOPERIDOL DECANOATE 100 MG/ML
10 INJECTION INTRAMUSCULAR EVERY 12 HOURS
Qty: 0 | Refills: 0 | Status: DISCONTINUED | OUTPATIENT
Start: 2018-02-20 | End: 2018-02-21

## 2018-02-20 RX ADMIN — Medication 125 MILLIGRAM(S): at 21:14

## 2018-02-20 RX ADMIN — SODIUM CHLORIDE 3 MILLILITER(S): 9 INJECTION INTRAMUSCULAR; INTRAVENOUS; SUBCUTANEOUS at 18:10

## 2018-02-20 RX ADMIN — Medication 3 MILLILITER(S): at 21:14

## 2018-02-20 NOTE — CONSULT NOTE ADULT - PROBLEM SELECTOR RECOMMENDATION 9
Cycle Troponins  ECHO  Exercise Nuclear Stress TEST    If Stress Test and Echo are normal, Patient can be discharged home on 2/21/2018

## 2018-02-20 NOTE — ED PROVIDER NOTE - CARE PLAN
Principal Discharge DX:	Chest pain with high risk of acute coronary syndrome  Secondary Diagnosis:	Cocaine abuse  Secondary Diagnosis:	IDDM (insulin dependent diabetes mellitus)

## 2018-02-20 NOTE — CONSULT NOTE ADULT - ASSESSMENT
Patient is a 37 year old  female with past medical history of Asthma, IDDM, Chronic Smoker, + ETOH abuse and +Crack/ Cocaine use, last use 1 week ago comes to ER with chest pain

## 2018-02-20 NOTE — H&P ADULT - ASSESSMENT
36yo F w/PMH DM on insulin, Asthma Intermittent, Bipolar, seizure, Cocaine and nicotine addiction. Admitted to rule out ACS. 36yo F w/PMH DM on insulin, Asthma Intermittent, Bipolar, seizure, Cocaine and nicotine addiction. Admitted to rule out ACS.    Admit to Medicine/Hospitalist Team Telemetry  Bedrest  Consistent Carb Diet   NPO after midnight  vital routine

## 2018-02-20 NOTE — H&P ADULT - PROBLEM SELECTOR PLAN 1
No previous history of ACS  Seen by cardiology Dr. Arnold  trop (-) x 1  Pro BNP WNL  EKG nonspecific T-wave abnormality  Scheduled for TTE and stress test in AM  NPO after midnight  Aspirin 81 in AM  simvastatin 10 hs  Serial troponins x 3  EKG in AM  Lipid Panel in AM  HbA1c in AM

## 2018-02-20 NOTE — ED ADULT TRIAGE NOTE - CHIEF COMPLAINT QUOTE
patient arrived via EMS, awake, alert, and oriented times 3,  breathing unlabored.  Patient complaining of midsternal chest pain radiating to upper back which started 2 hours ago.  No SOB.  No cough.  patient also complaining of intermittent SOB on exertion.  patient sent from [facility].  patient was given 325 Aspirin and 2 sublingual nitro with relief

## 2018-02-20 NOTE — H&P ADULT - NSHPPHYSICALEXAM_GEN_ALL_CORE
Vital Signs Last 24 Hrs  T(C): 36.9 (20 Feb 2018 16:23), Max: 36.9 (20 Feb 2018 16:23)  T(F): 98.4 (20 Feb 2018 16:23), Max: 98.4 (20 Feb 2018 16:23)  HR: 81 (20 Feb 2018 19:43) (63 - 81)  BP: 113/66 (20 Feb 2018 16:23) (113/66 - 113/66)  RR: 18 (20 Feb 2018 16:23) (18 - 18)  SpO2: 95% (20 Feb 2018 16:23) (95% - 95%)    GENERAL: NAD, well-groomed, well-developed  HEAD:  Atraumatic, Normocephalic  EYES: EOMI, PERRLA, 3mm -> 2mm, conjunctiva and sclera clear  ENMT: No tonsillar erythema, exudates, or enlargement; Moist mucous membranes, Good dentition, No lesions  NECK: Supple, No JVD  RESPIRATORY: Clear to auscultation bilaterally; No rales, rhonchi, wheezing, or rubs  CARDIOVASCULAR: Regular rate; No murmurs, rubs, or gallops  GI: Soft, Nontender, Nondistended; Bowel sounds present  EXTREMITIES:  FROM, 2+ Peripheral Pulses, No clubbing, cyanosis, or edema  LYMPH: No cervical, submandibular, supraclavicular, axial lymphadenopathy noted  NERVOUS SYSTEM:  Alert & Oriented X3, Good concentration; Motor Strength 5/5 B/L upper and lower extremities  SKIN: No suspicious rashes or lesions noted

## 2018-02-20 NOTE — H&P ADULT - PMH
Bipolar disorder    Cocaine abuse    IDDM (insulin dependent diabetes mellitus)    Mild intermittent asthma without complication    Seizure

## 2018-02-20 NOTE — H&P ADULT - HISTORY OF PRESENT ILLNESS
36yo F w/PMH DM on insulin, Asthma Intermittent, Bipolar, seizure, Cocaine and nicotine addiction Presents with CP. Pt felt substernal CP morning of admission. It was sharp, radiated to Left flank and back, 7/10. She has never felt that way before. Pain was relieved in the ambulance with nitro. She denies SOB, heart palpitations, nausea, abdominal pain, cough, wheezing, sick contacts, recent travel. 36yo F w/PMH DM on insulin, Asthma Intermittent, Bipolar, seizure, Cocaine and nicotine addiction who is currently residing at [facility] after involuntary admission for psychiatric reasons who presents with CP. Pt felt substernal CP morning of admission. It was sharp, radiated to Left flank and back, 7/10. She has never felt that way before. Pain was relieved in the ambulance with nitro. She denies SOB, heart palpitations, nausea, abdominal pain, cough, wheezing, sick contacts, recent travel. 38yo F w/PMH DM on insulin, Asthma Intermittent, Bipolar, seizure, Cocaine and nicotine addiction who is currently residing at [facility] after involuntary admission for psychiatric reasons who presents with CP. Pt felt substernal CP morning of admission. It was sharp, radiated to Left flank and back, 7/10. She has never felt that way before. Pain was relieved in the ambulance with nitro. She denies SOB, heart palpitations, nausea, abdominal pain, cough, wheezing, sick contacts, recent travel.
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
## 2018-02-20 NOTE — H&P ADULT - NSHPSOCIALHISTORY_GEN_ALL_CORE
Current smoker on nicoderm and nicorette at [facility]  Denies alcohol usage  Cocaine usage - last time 10 days previous

## 2018-02-20 NOTE — ED PROVIDER NOTE - OBJECTIVE STATEMENT
36 y/o F pt with hx of DM and asthma presents to ED from [facility] c/o chest pain radiating to back and intermittent SOB for 2 days. Denies abdominal pain, motorsensory loss, and headache. Pt also drank alcohol PTA.

## 2018-02-20 NOTE — ED ADULT NURSE NOTE - CHIEF COMPLAINT QUOTE
patient arrived via EMS, awake, alert, and oriented times 3,  breathing unlabored.  Patient complaining of midsternal chest pain radiating to upper back which started 2 hours ago.  No SOB.  No cough.  patient also complaining of intermittent SOB on exertion.  patient sent from [facility].  patient was given 325 Aspirin and 2 sublingual nitro with relief

## 2018-02-20 NOTE — H&P ADULT - PROBLEM SELECTOR PLAN 2
C/W home dose Lantus 20 hs  Finger sticks premeal and hs  HISS moderate  Consistent Carbohydrate diet  HbA1c AM C/W home dose Lantus 20 hs  POC QAC/HS  HISS moderate  Consistent Carbohydrate diet  HbA1c AM

## 2018-02-20 NOTE — H&P ADULT - ATTENDING COMMENTS
20 min time spent discussing advanced care planning including code status, existence of health care proxy, plan of treatment, consultants if called, and prognosis. Family and pt in agreement with above, all questions answered and concerns addressed.

## 2018-02-21 VITALS
TEMPERATURE: 99 F | OXYGEN SATURATION: 97 % | SYSTOLIC BLOOD PRESSURE: 108 MMHG | DIASTOLIC BLOOD PRESSURE: 58 MMHG | RESPIRATION RATE: 39 BRPM | HEART RATE: 79 BPM

## 2018-02-21 DIAGNOSIS — R69 ILLNESS, UNSPECIFIED: ICD-10-CM

## 2018-02-21 DIAGNOSIS — F39 UNSPECIFIED MOOD [AFFECTIVE] DISORDER: ICD-10-CM

## 2018-02-21 PROBLEM — Z00.00 ENCOUNTER FOR PREVENTIVE HEALTH EXAMINATION: Status: ACTIVE | Noted: 2018-02-21

## 2018-02-21 LAB
ANION GAP SERPL CALC-SCNC: 11 MMOL/L — SIGNIFICANT CHANGE UP (ref 5–17)
BUN SERPL-MCNC: 17 MG/DL — SIGNIFICANT CHANGE UP (ref 8–20)
CALCIUM SERPL-MCNC: 8.6 MG/DL — SIGNIFICANT CHANGE UP (ref 8.6–10.2)
CHLORIDE SERPL-SCNC: 98 MMOL/L — SIGNIFICANT CHANGE UP (ref 98–107)
CHOLEST SERPL-MCNC: 318 MG/DL — HIGH (ref 110–199)
CO2 SERPL-SCNC: 25 MMOL/L — SIGNIFICANT CHANGE UP (ref 22–29)
CREAT SERPL-MCNC: 0.65 MG/DL — SIGNIFICANT CHANGE UP (ref 0.5–1.3)
GLUCOSE SERPL-MCNC: 136 MG/DL — HIGH (ref 70–115)
HBA1C BLD-MCNC: 8.6 % — HIGH (ref 4–5.6)
HCG SERPL-ACNC: <5 MIU/ML — SIGNIFICANT CHANGE UP
HDLC SERPL-MCNC: 132 MG/DL — SIGNIFICANT CHANGE UP
LIPID PNL WITH DIRECT LDL SERPL: 177 MG/DL — SIGNIFICANT CHANGE UP
POTASSIUM SERPL-MCNC: 4.2 MMOL/L — SIGNIFICANT CHANGE UP (ref 3.5–5.3)
POTASSIUM SERPL-SCNC: 4.2 MMOL/L — SIGNIFICANT CHANGE UP (ref 3.5–5.3)
SODIUM SERPL-SCNC: 134 MMOL/L — LOW (ref 135–145)
TOTAL CHOLESTEROL/HDL RATIO MEASUREMENT: 2 RATIO — LOW (ref 3.3–7.1)
TRIGL SERPL-MCNC: 70 MG/DL — SIGNIFICANT CHANGE UP (ref 10–200)
TROPONIN T SERPL-MCNC: <0.01 NG/ML — SIGNIFICANT CHANGE UP (ref 0–0.06)
TROPONIN T SERPL-MCNC: <0.01 NG/ML — SIGNIFICANT CHANGE UP (ref 0–0.06)

## 2018-02-21 PROCEDURE — 93306 TTE W/DOPPLER COMPLETE: CPT | Mod: 26

## 2018-02-21 PROCEDURE — 99238 HOSP IP/OBS DSCHRG MGMT 30/<: CPT

## 2018-02-21 RX ORDER — BENZTROPINE MESYLATE 1 MG
1 TABLET ORAL
Qty: 0 | Refills: 0 | COMMUNITY

## 2018-02-21 RX ORDER — DIVALPROEX SODIUM 500 MG/1
2 TABLET, DELAYED RELEASE ORAL
Qty: 0 | Refills: 0 | COMMUNITY

## 2018-02-21 RX ORDER — HALOPERIDOL DECANOATE 100 MG/ML
1 INJECTION INTRAMUSCULAR
Qty: 0 | Refills: 0 | COMMUNITY

## 2018-02-21 RX ORDER — ALBUTEROL 90 UG/1
2 AEROSOL, METERED ORAL
Qty: 0 | Refills: 0 | COMMUNITY

## 2018-02-21 RX ORDER — DIVALPROEX SODIUM 500 MG/1
2 TABLET, DELAYED RELEASE ORAL
Qty: 120 | Refills: 0
Start: 2018-02-21 | End: 2018-03-22

## 2018-02-21 RX ORDER — INSULIN LISPRO 100/ML
1 VIAL (ML) SUBCUTANEOUS
Qty: 0 | Refills: 0 | COMMUNITY

## 2018-02-21 RX ORDER — NICOTINE POLACRILEX 2 MG
1 GUM BUCCAL
Qty: 0 | Refills: 0 | COMMUNITY

## 2018-02-21 RX ORDER — INSULIN LISPRO 100/ML
1 VIAL (ML) SUBCUTANEOUS
Qty: 10 | Refills: 0
Start: 2018-02-21

## 2018-02-21 RX ORDER — INSULIN GLARGINE 100 [IU]/ML
20 INJECTION, SOLUTION SUBCUTANEOUS
Qty: 10 | Refills: 0
Start: 2018-02-21 | End: 2018-03-22

## 2018-02-21 RX ORDER — HALOPERIDOL DECANOATE 100 MG/ML
1 INJECTION INTRAMUSCULAR
Qty: 60 | Refills: 0
Start: 2018-02-21 | End: 2018-03-22

## 2018-02-21 RX ORDER — BENZTROPINE MESYLATE 1 MG
1 TABLET ORAL
Qty: 60 | Refills: 0
Start: 2018-02-21 | End: 2018-03-22

## 2018-02-21 RX ORDER — TRAZODONE HCL 50 MG
1 TABLET ORAL
Qty: 30 | Refills: 0
Start: 2018-02-21 | End: 2018-03-22

## 2018-02-21 RX ORDER — TRAZODONE HCL 50 MG
1 TABLET ORAL
Qty: 0 | Refills: 0 | COMMUNITY

## 2018-02-21 RX ORDER — ALBUTEROL 90 UG/1
2 AEROSOL, METERED ORAL
Qty: 1 | Refills: 0
Start: 2018-02-21 | End: 2018-03-07

## 2018-02-21 RX ORDER — INSULIN GLARGINE 100 [IU]/ML
20 INJECTION, SOLUTION SUBCUTANEOUS
Qty: 0 | Refills: 0 | COMMUNITY

## 2018-02-21 RX ADMIN — HALOPERIDOL DECANOATE 10 MILLIGRAM(S): 100 INJECTION INTRAMUSCULAR at 05:08

## 2018-02-21 RX ADMIN — Medication 100 MILLIGRAM(S): at 05:08

## 2018-02-21 RX ADMIN — DIVALPROEX SODIUM 500 MILLIGRAM(S): 500 TABLET, DELAYED RELEASE ORAL at 05:08

## 2018-02-21 RX ADMIN — Medication 1 MILLIGRAM(S): at 05:07

## 2018-02-21 NOTE — CHART NOTE - NSCHARTNOTEFT_GEN_A_CORE
SOCIAL WORK NOTE:  NURSE MANAGER ASKED THIS WORKER TO MEET WITH PATIENT TO HELP HER WITH TRANSPORT TO GET HER BELONGINGS AND THEN HOME.  PATIENT STATED SHE WAS A PATIENT AT [facility] AND WAS SCHEDULED TO BE DISCHARGED TODAY BUT WAS SENT TO OUR ED WITH CHEST PAIN.  PATIENT MEDICALLY CLEARED FOR DISCHARGE BUT WAS UPSET AND ANGRY SHE DOESN'T HAVE ACCESS TO HER BELONGINGS.  MADE PATIENT AWARE THAT I AM UNABLE TO GET HER MEDICAID TAXI TO [facility] DUE TO POSSIBLE MISINTERPRETATION OF [employer] LAWS BUT CAN PROVIDE HER WITH BUS TICKETS TO ARRIVE THERE, GATHER HER BELONGINGS AND THEN SHE CAN USE THE TAXI VOUCHER I PROVIDED HER HOME IN THE AMOUNT OF $34.00.  PATIENT AGREED TO THIS PLAN. ESCORTED HER TO ED LOBBY EXIT.

## 2018-02-21 NOTE — PROGRESS NOTE ADULT - SUBJECTIVE AND OBJECTIVE BOX
CC: chest pain    INTERVAL HPI/OVERNIGHT EVENTS: Patient seen and examined, at the time of my assesment she became very agitated and combative. She said she did not want to remain in the hospital. HEr chest pain is "gone now and just wants to get out of here" she did not complete the second part of her stress test this morning.       Vital Signs Last 24 Hrs  T(C): 37.2 (21 Feb 2018 11:36), Max: 37.2 (21 Feb 2018 11:36)  T(F): 98.9 (21 Feb 2018 11:36), Max: 98.9 (21 Feb 2018 11:36)  HR: 79 (21 Feb 2018 11:36) (63 - 81)  BP: 108/58 (21 Feb 2018 11:36) (108/58 - 150/83)  BP(mean): --  RR: 39 (21 Feb 2018 11:36) (17 - 39)  SpO2: 97% (21 Feb 2018 11:36) (95% - 100%)    PHYSICAL EXAM:    GENERAL: AOX3, agitated  CHEST/LUNG: Clear to auscultation bilaterally; No rales, rhonchi, wheezing, or rubs  HEART: Regular rate and rhythm; No murmurs, rubs, or gallops  ABDOMEN: Soft, Nontender, Nondistended; Bowel sounds present  EXTREMITIES:  2+ Peripheral Pulses, No clubbing, cyanosis, or edema        MEDICATIONS  (STANDING):  aspirin enteric coated 81 milliGRAM(s) Oral daily  benztropine 1 milliGRAM(s) Oral two times a day  dextrose 5%. 1000 milliLiter(s) (50 mL/Hr) IV Continuous <Continuous>  dextrose 50% Injectable 12.5 Gram(s) IV Push once  dextrose 50% Injectable 25 Gram(s) IV Push once  dextrose 50% Injectable 25 Gram(s) IV Push once  diVALproex  milliGRAM(s) Oral two times a day  haloperidol     Tablet 10 milliGRAM(s) Oral every 12 hours  insulin glargine Injectable (LANTUS) 20 Unit(s) SubCutaneous at bedtime  insulin lispro (HumaLOG) corrective regimen sliding scale   SubCutaneous Before meals and at bedtime  nicotine -  14 mG/24Hr(s) Patch 1 patch Transdermal daily  phenytoin   Capsule 100 milliGRAM(s) Oral every 12 hours  simvastatin 10 milliGRAM(s) Oral at bedtime  traZODone 100 milliGRAM(s) Oral at bedtime    MEDICATIONS  (PRN):  ALBUTerol    0.083% 2.5 milliGRAM(s) Nebulizer every 4 hours PRN Shortness of Breath and/or Wheezing  benztropine 2 milliGRAM(s) Oral two times a day PRN Extrapyramidal symptoms  dextrose Gel 1 Dose(s) Oral once PRN Blood Glucose LESS THAN 70 milliGRAM(s)/deciliter  glucagon  Injectable 1 milliGRAM(s) IntraMuscular once PRN Glucose LESS THAN 70 milligrams/deciliter  haloperidol     Tablet 5 milliGRAM(s) Oral every 6 hours PRN Acute Agitation  hydrOXYzine hydrochloride 50 milliGRAM(s) Oral every 6 hours PRN Anxiety  LORazepam     Tablet 2 milliGRAM(s) Oral every 6 hours PRN Anxiety  nicotine  Polacrilex Lozenge 2 milliGRAM(s) Oral every 2 hours PRN Nicotine w/d      Allergies    No Known Drug Allergies  Seafood (Swelling)  shellfish (Swelling)    Intolerances          LABS:                          12.1   5.1   )-----------( 194      ( 20 Feb 2018 18:20 )             36.9     02-21    134<L>  |  98  |  17.0  ----------------------------<  136<H>  4.2   |  25.0  |  0.65    Ca    8.6      21 Feb 2018 08:41    TPro  6.5<L>  /  Alb  3.3  /  TBili  <0.2<L>  /  DBili  x   /  AST  25  /  ALT  16  /  AlkPhos  37<L>  02-20    PT/INR - ( 20 Feb 2018 18:20 )   PT: 10.3 sec;   INR: 0.94 ratio         PTT - ( 20 Feb 2018 18:20 )  PTT:31.6 sec      RADIOLOGY & ADDITIONAL TESTS:

## 2018-02-21 NOTE — DISCHARGE NOTE ADULT - PATIENT PORTAL LINK FT
You can access the [facility] Patient Portal, offered by [facility], by registering with the following website: [email or web address]

## 2018-02-21 NOTE — ED BEHAVIORAL HEALTH ASSESSMENT NOTE - SUMMARY
36 yo female with history of mood and psychotic disorders, polysubstance use, who presented from [facility] for chest pain. Once medically cleared she can be discharged as [facility] was planning on discharging her today.

## 2018-02-21 NOTE — ED BEHAVIORAL HEALTH ASSESSMENT NOTE - HPI (INCLUDE ILLNESS QUALITY, SEVERITY, DURATION, TIMING, CONTEXT, MODIFYING FACTORS, ASSOCIATED SIGNS AND SYMPTOMS)
Pt is a 38 yo female who had been at [facility] since February 9th involuntary admission for psychosis and polysubstance use who presented from [facility] last night for complaints of substernal chest pain. Pt admitted to medicine being worked up for cardiac symptoms. At time of interview, pt states that she no longer has the chest pain and is ready to go back to [facility]. She states she wanted to get back to [facility] because she is being discharged today and she wants to get all of her things. Pt states that she is doing much better, was pleasant and smiling during interview. She is denying any acute psych sx specifically denies suicidal urges, violent urges, or psychotic sx.    COLLATERAL: Spoke with admission coordinator at [facility] who confirmed that pt is to be discharged from [facility] today or tomorrow, gave number to the  Tenzin Menezes NP who has been caring for her. Left a message and awaiting returned call [phone].

## 2018-02-21 NOTE — ED ADULT NURSE REASSESSMENT NOTE - NS ED NURSE REASSESS COMMENT FT1
MD Cardenas @ bedside
Pt calm and cooeprative with [facility], pt refusing to wear tele monitoring @ this time, updated on poc, by tthis rn, pt states "nah I just don't want to wear that", risks and benefits explained to pt, pt verbalized understanding and still refusing tele box, in no apparent distress, md made aware in ed, will continue to monitor and reassess
Report given to MAKAYLAU FLAVIO Chambers pt to be moved by ROSA Duckworth with [facility] bedside to a16 per flavio meadows

## 2018-02-21 NOTE — ED BEHAVIORAL HEALTH ASSESSMENT NOTE - OTHER PAST PSYCHIATRIC HISTORY (INCLUDE DETAILS REGARDING ONSET, COURSE OF ILLNESS, INPATIENT/OUTPATIENT TREATMENT)
[facility] admission age 10  past admission to [facility] unsure of dates   admission to [facility] in April 2017, Jan 2018  not in outpatient treatment  [facility] 1/25-1/28/2018 for mood disorder, [facility] 2/9/18 - current for psychosis

## 2018-02-21 NOTE — DISCHARGE NOTE ADULT - MEDICATION SUMMARY - MEDICATIONS TO TAKE
I will START or STAY ON the medications listed below when I get home from the hospital:    Milk of Magnesia 8% oral suspension  -- 15 milliliter(s) by mouth once a day (at bedtime), As Needed constipation  -- Indication: For Constipation    Mylanta oral suspension  -- 10 milliliter(s) by mouth 4 times a day (after meals and at bedtime), As Needed  -- Indication: For Constipation    Dilantin 100 mg oral capsule  -- 2 tab(s) by mouth 2 times a day   -- Indication: For Seizure    Ativan 2 mg oral tablet  -- 1 tab(s) by mouth every 6 hours, As Needed Anxiety  -- Indication: For Bipolar disorder    Depakote 500 mg oral delayed release tablet  -- 2 tab(s) by mouth 2 times a day  -- Indication: For Bipolar disorder    Desyrel 100 mg oral tablet  -- 1 tab(s) by mouth once a day (at bedtime)  -- Indication: For Depression    HumaLOG 100 units/mL injectable solution  -- 1 unit(s) injectable 4 times a day (before meals and at bedtime), As Needed  -- Indication: For Diabetes mellitus    Lantus 100 units/mL subcutaneous solution  -- 20 unit(s) subcutaneous once a day (at bedtime)  -- Indication: For Diabetes mellitus    Cogentin 1 mg oral tablet  -- 1 tab(s) by mouth 2 times a day  -- Indication: For Bipolar disorder    Haldol 10 mg oral tablet  -- 1 tab(s) by mouth 2 times a day  -- Indication: For Bipolar disorder    Vistaril 50 mg oral capsule  -- 1 cap(s) by mouth 4 times a day, As Needed - for anxiety  -- Indication: For Bipolar disorder    Ventolin 90 mcg/inh inhalation aerosol with adapter  -- 2 puff(s) inhaled every 4 hours, As Needed  -- Indication: For asthma    EpiPen Auto-Injector 0.3 mg injectable kit  -- injectable once a day, As Needed Anaphylaxis  -- Indication: For as needed    Nicoderm 14 mg/24 hr transdermal film, extended release  -- 1 patch by transdermal patch once a day  -- Indication: For tobacco abuse

## 2018-02-21 NOTE — ED BEHAVIORAL HEALTH ASSESSMENT NOTE - DESCRIPTION
irritable eager for discharge worried about belongings at [facility]  T(C): 37.2 (21 Feb 2018 11:36), Max: 37.2 (21 Feb 2018 11:36)  T(F): 98.9 (21 Feb 2018 11:36), Max: 98.9 (21 Feb 2018 11:36)  HR: 79 (21 Feb 2018 11:36) (63 - 81)  BP: 108/58 (21 Feb 2018 11:36) (108/58 - 150/83)  RR: 39 (21 Feb 2018 11:36) (17 - 39)  SpO2: 97% (21 Feb 2018 11:36) (95% - 100%) never  children ages 12, 8 ,4 DM, asthma, seizure disorder

## 2018-02-21 NOTE — ED BEHAVIORAL HEALTH ASSESSMENT NOTE - CURRENT MEDICATION
As per [facility]: Ativan 2mg PRN, Cogentin 3mg BID, Depakote 500mg BID, Desyrel 100mg hs, Dilantin 200mg BID, Haldol 10mg BID, Haldol 5mg PRN, Vistaril 50mg PRN

## 2018-02-21 NOTE — ED BEHAVIORAL HEALTH ASSESSMENT NOTE - DETAILS
[facility] 2/9/18 - current, [facility] 1/25/18-1/29/18 for mood disorder See HPI s/w primary team Left message with LILLY Dave

## 2018-02-21 NOTE — PROGRESS NOTE ADULT - ASSESSMENT
38yo F w/PMH DM on insulin, Asthma Intermittent, Bipolar, seizure, Cocaine and nicotine addiction. Admitted to rule out ACS.      Patient became aggressive, refused second part of stress test this morning. Spoke with cardiology, they cannot clear her for discharge without completing cardiac work up. I tried to explain this to the patient and she became very combative and agitated. She asked me to get out of the room followed me out and began yelling. security was called. I spoke with Dr Saunders, he said she was discharged from [facility] [place]. She is able to go home; she left the hospital AMA. Dr Saunders did call in her psych medications to her pharmacy.

## 2018-02-21 NOTE — DISCHARGE NOTE ADULT - HOSPITAL COURSE
38yo F w/PMH DM on insulin, Asthma Intermittent, Bipolar, seizure, Cocaine and nicotine addiction who is currently residing at [facility] after involuntary admission for psychiatric reasons who presents with CP. Pt felt substernal CP morning of admission. It was sharp, radiated to Left flank and back, 7/10. She has never felt that way before. Pain was relieved in the ambulance with nitro. serial cardiac enzymes were negatie. Echocardiogram with normal ef and no wall motion abnormlality. chest xray negative.   Patient became aggressive, refused second part of stress test. Spoke with cardiology, they cannot clear her for discharge without completing cardiac work up. I tried to explain this to the patient and she became very combative and agitated. She asked me to get out of the room followed me out and began yelling. security was called. I spoke with Dr Saunders, he said she was discharged from [facility]. She is able to go home; she left the hospital AMA. Dr Saunders did call in her psych medications to her pharmacy.

## 2018-03-08 ENCOUNTER — APPOINTMENT (OUTPATIENT)
Dept: CARDIOLOGY | Facility: CLINIC | Age: 38
End: 2018-03-08

## 2018-03-27 ENCOUNTER — EMERGENCY (EMERGENCY)
Facility: HOSPITAL | Age: 38
LOS: 1 days | End: 2018-03-27
Payer: OTHER GOVERNMENT

## 2018-03-27 PROCEDURE — 99283 EMERGENCY DEPT VISIT LOW MDM: CPT

## 2018-03-27 PROCEDURE — 73610 X-RAY EXAM OF ANKLE: CPT | Mod: 26,LT

## 2018-03-27 PROCEDURE — 73590 X-RAY EXAM OF LOWER LEG: CPT | Mod: 26,LT

## 2018-04-03 ENCOUNTER — OUTPATIENT (OUTPATIENT)
Dept: OUTPATIENT SERVICES | Facility: HOSPITAL | Age: 38
LOS: 1 days | End: 2018-04-03
Payer: OTHER GOVERNMENT

## 2018-04-03 PROCEDURE — 73718 MRI LOWER EXTREMITY W/O DYE: CPT | Mod: 26,LT

## 2018-04-03 PROCEDURE — 73721 MRI JNT OF LWR EXTRE W/O DYE: CPT | Mod: 26,LT

## 2018-05-23 PROCEDURE — 85027 COMPLETE CBC AUTOMATED: CPT

## 2018-05-23 PROCEDURE — 94640 AIRWAY INHALATION TREATMENT: CPT

## 2018-05-23 PROCEDURE — 85610 PROTHROMBIN TIME: CPT

## 2018-05-23 PROCEDURE — 85730 THROMBOPLASTIN TIME PARTIAL: CPT

## 2018-05-23 PROCEDURE — 71045 X-RAY EXAM CHEST 1 VIEW: CPT

## 2018-05-23 PROCEDURE — 80048 BASIC METABOLIC PNL TOTAL CA: CPT

## 2018-05-23 PROCEDURE — 93306 TTE W/DOPPLER COMPLETE: CPT

## 2018-05-23 PROCEDURE — 78451 HT MUSCLE IMAGE SPECT SING: CPT

## 2018-05-23 PROCEDURE — 80061 LIPID PANEL: CPT

## 2018-05-23 PROCEDURE — 83036 HEMOGLOBIN GLYCOSYLATED A1C: CPT

## 2018-05-23 PROCEDURE — 36415 COLL VENOUS BLD VENIPUNCTURE: CPT

## 2018-05-23 PROCEDURE — G0378: CPT

## 2018-05-23 PROCEDURE — 84484 ASSAY OF TROPONIN QUANT: CPT

## 2018-05-23 PROCEDURE — 83880 ASSAY OF NATRIURETIC PEPTIDE: CPT

## 2018-05-23 PROCEDURE — 99285 EMERGENCY DEPT VISIT HI MDM: CPT | Mod: 25

## 2018-05-23 PROCEDURE — 84702 CHORIONIC GONADOTROPIN TEST: CPT

## 2018-05-23 PROCEDURE — 80053 COMPREHEN METABOLIC PANEL: CPT

## 2018-05-23 PROCEDURE — 93005 ELECTROCARDIOGRAM TRACING: CPT

## 2018-05-23 PROCEDURE — A9500: CPT

## 2020-03-06 NOTE — ED STATDOCS - DR. NAME
Speech Language Pathology  Daily Treatment     Patient Name: Rey Medina  Age:  23 y.o., Sex:  male  Medical Record #: [id number]  Today's Date: 3/6/2020     Subjective    Pt pointing to chair and floor to indicate preference to complete therapy in room this session. Mother present.      Objective     03/06/20 0934   Receptive Language / Auditory Comprehension   Follows One Unit Commands Supervision (5)   Follows Two Unit Commands Minimal (4)   Dysphagia    Other Treatments trials of Kit Kelsey   Diet / Liquid Recommendation Thin (0);Minced & Moist (5) - (Dysphagia II)   Nutritional Liquid Intake Rating Scale Non thickened beverages   Nutritional Food Intake Rating Scale Total oral diet with multiple consistencies but requiring special preparation or compensations   Interdisciplinary Plan of Care Collaboration   IDT Collaboration with  Family / Caregiver;Nursing   Patient Position at End of Therapy Seated;Self Releasing Lap Belt Applied;Family / Friend in Room   Collaboration Comments mother present for session   SLP Total Time Spent   SLP Individual Total Time Spent (Mins) 30   Charge Group   SLP Treatment - Individual Speech Language Treatment - Individual   SLP Swallowing Dysfunction Treatment Swallowing Dysfunction Treatment         Assessment    Pt agreeable to trials of Kit Kelsey (pt preferred item), pt demonstrating munch bite chew pattern (no rotary pattern exhibited). Pt with timely swallow and able to elicit 2nd and 3rd swallows with prompt from SLP. Pt with 1 instance of immediate, productive cough response (as evidenced by secretions from stoma site) with thin liquid wash via straw handled cup. No other overt s/sx of pen/asp. 1-step auditory commands: 100%, 2-step auditory commands: 80% IND, with repetition increased to 90%. Pt answering open ended questions with extra time needed and repetitions requested secondary to decreased intelligibility.     Plan    Continue oral trials as appropriate, continue  to target receptive/expressive language     Cole

## 2020-09-22 NOTE — ED BEHAVIORAL HEALTH ASSESSMENT NOTE - NS ED BHA MED ROS SKIN
One month history of "wobbly" gait  Patient reports feeling unsteady intermittently and bilateral numbness in his hands and feet  Gait is not explained by abnormal MRI findings as the findings were not cerebellar  Possible etiologies include alcoholic neuropathy or vestibular origin       Plan  · Patient counseled on alcohol abstinence  · PT/OT follow-up No complaints

## 2021-01-14 NOTE — H&P ADULT - NSHPOUTPATIENTPROVIDERS_GEN_ALL_CORE
Tenzin, NP Intermediate / Complex Repair - Final Wound Length In Cm: 3.5 Tenzin, NP  Lives at [facility]

## 2021-03-01 ENCOUNTER — OUTPATIENT (OUTPATIENT)
Dept: OUTPATIENT SERVICES | Facility: HOSPITAL | Age: 41
LOS: 1 days | End: 2021-03-01
Payer: MEDICAID

## 2021-03-19 ENCOUNTER — EMERGENCY (EMERGENCY)
Facility: HOSPITAL | Age: 41
LOS: 1 days | Discharge: HOME | End: 2021-03-19
Attending: EMERGENCY MEDICINE
Payer: COMMERCIAL

## 2021-03-19 VITALS
WEIGHT: 205.91 LBS | TEMPERATURE: 98 F | HEART RATE: 96 BPM | OXYGEN SATURATION: 100 % | RESPIRATION RATE: 14 BRPM | HEIGHT: 62 IN | SYSTOLIC BLOOD PRESSURE: 135 MMHG | DIASTOLIC BLOOD PRESSURE: 85 MMHG

## 2021-03-19 DIAGNOSIS — F25.9 SCHIZOAFFECTIVE DISORDER, UNSPECIFIED: ICD-10-CM

## 2021-03-19 PROBLEM — R56.9 UNSPECIFIED CONVULSIONS: Chronic | Status: ACTIVE | Noted: 2017-02-21

## 2021-03-19 PROBLEM — F14.10 COCAINE ABUSE, UNCOMPLICATED: Chronic | Status: ACTIVE | Noted: 2018-01-24

## 2021-03-19 PROBLEM — J45.20 MILD INTERMITTENT ASTHMA, UNCOMPLICATED: Chronic | Status: ACTIVE | Noted: 2018-02-20

## 2021-03-19 LAB
ALBUMIN SERPL ELPH-MCNC: 3.8 G/DL — SIGNIFICANT CHANGE UP (ref 3.3–5.2)
ALP SERPL-CCNC: 76 U/L — SIGNIFICANT CHANGE UP (ref 40–120)
ALT FLD-CCNC: 19 U/L — SIGNIFICANT CHANGE UP
AMPHET UR-MCNC: NEGATIVE — SIGNIFICANT CHANGE UP
ANION GAP SERPL CALC-SCNC: 14 MMOL/L — SIGNIFICANT CHANGE UP (ref 5–17)
APAP SERPL-MCNC: <3 UG/ML — LOW (ref 10–26)
APPEARANCE UR: ABNORMAL
AST SERPL-CCNC: 21 U/L — SIGNIFICANT CHANGE UP
BACTERIA # UR AUTO: ABNORMAL
BARBITURATES UR SCN-MCNC: NEGATIVE — SIGNIFICANT CHANGE UP
BASOPHILS # BLD AUTO: 0.02 K/UL — SIGNIFICANT CHANGE UP (ref 0–0.2)
BASOPHILS NFR BLD AUTO: 0.5 % — SIGNIFICANT CHANGE UP (ref 0–2)
BENZODIAZ UR-MCNC: NEGATIVE — SIGNIFICANT CHANGE UP
BILIRUB SERPL-MCNC: 0.3 MG/DL — LOW (ref 0.4–2)
BILIRUB UR-MCNC: NEGATIVE — SIGNIFICANT CHANGE UP
BUN SERPL-MCNC: 10 MG/DL — SIGNIFICANT CHANGE UP (ref 8–20)
CALCIUM SERPL-MCNC: 8.7 MG/DL — SIGNIFICANT CHANGE UP (ref 8.6–10.2)
CHLORIDE SERPL-SCNC: 99 MMOL/L — SIGNIFICANT CHANGE UP (ref 98–107)
CO2 SERPL-SCNC: 25 MMOL/L — SIGNIFICANT CHANGE UP (ref 22–29)
COCAINE METAB.OTHER UR-MCNC: POSITIVE
COLOR SPEC: YELLOW — SIGNIFICANT CHANGE UP
CREAT SERPL-MCNC: 0.81 MG/DL — SIGNIFICANT CHANGE UP (ref 0.5–1.3)
DIFF PNL FLD: ABNORMAL
EOSINOPHIL # BLD AUTO: 0.09 K/UL — SIGNIFICANT CHANGE UP (ref 0–0.5)
EOSINOPHIL NFR BLD AUTO: 2.3 % — SIGNIFICANT CHANGE UP (ref 0–6)
EPI CELLS # UR: ABNORMAL
ETHANOL SERPL-MCNC: <10 MG/DL — SIGNIFICANT CHANGE UP (ref 0–9)
GLUCOSE BLDC GLUCOMTR-MCNC: 337 MG/DL — HIGH (ref 70–99)
GLUCOSE SERPL-MCNC: 239 MG/DL — HIGH (ref 70–99)
GLUCOSE UR QL: 250 MG/DL
HCT VFR BLD CALC: 41.6 % — SIGNIFICANT CHANGE UP (ref 34.5–45)
HGB BLD-MCNC: 13.9 G/DL — SIGNIFICANT CHANGE UP (ref 11.5–15.5)
IMM GRANULOCYTES NFR BLD AUTO: 0.5 % — SIGNIFICANT CHANGE UP (ref 0–1.5)
KETONES UR-MCNC: ABNORMAL
LEUKOCYTE ESTERASE UR-ACNC: ABNORMAL
LYMPHOCYTES # BLD AUTO: 1.61 K/UL — SIGNIFICANT CHANGE UP (ref 1–3.3)
LYMPHOCYTES # BLD AUTO: 41.6 % — SIGNIFICANT CHANGE UP (ref 13–44)
MCHC RBC-ENTMCNC: 29.1 PG — SIGNIFICANT CHANGE UP (ref 27–34)
MCHC RBC-ENTMCNC: 33.4 GM/DL — SIGNIFICANT CHANGE UP (ref 32–36)
MCV RBC AUTO: 87 FL — SIGNIFICANT CHANGE UP (ref 80–100)
METHADONE UR-MCNC: NEGATIVE — SIGNIFICANT CHANGE UP
MONOCYTES # BLD AUTO: 0.54 K/UL — SIGNIFICANT CHANGE UP (ref 0–0.9)
MONOCYTES NFR BLD AUTO: 14 % — SIGNIFICANT CHANGE UP (ref 2–14)
NEUTROPHILS # BLD AUTO: 1.59 K/UL — LOW (ref 1.8–7.4)
NEUTROPHILS NFR BLD AUTO: 41.1 % — LOW (ref 43–77)
NITRITE UR-MCNC: NEGATIVE — SIGNIFICANT CHANGE UP
OPIATES UR-MCNC: NEGATIVE — SIGNIFICANT CHANGE UP
PCP SPEC-MCNC: SIGNIFICANT CHANGE UP
PCP UR-MCNC: NEGATIVE — SIGNIFICANT CHANGE UP
PH UR: 6 — SIGNIFICANT CHANGE UP (ref 5–8)
PLATELET # BLD AUTO: 321 K/UL — SIGNIFICANT CHANGE UP (ref 150–400)
POTASSIUM SERPL-MCNC: 4.3 MMOL/L — SIGNIFICANT CHANGE UP (ref 3.5–5.3)
POTASSIUM SERPL-SCNC: 4.3 MMOL/L — SIGNIFICANT CHANGE UP (ref 3.5–5.3)
PROT SERPL-MCNC: 7.4 G/DL — SIGNIFICANT CHANGE UP (ref 6.6–8.7)
PROT UR-MCNC: 30 MG/DL
RBC # BLD: 4.78 M/UL — SIGNIFICANT CHANGE UP (ref 3.8–5.2)
RBC # FLD: 14 % — SIGNIFICANT CHANGE UP (ref 10.3–14.5)
RBC CASTS # UR COMP ASSIST: ABNORMAL /HPF (ref 0–4)
SALICYLATES SERPL-MCNC: <0.6 MG/DL — LOW (ref 10–20)
SARS-COV-2 RNA SPEC QL NAA+PROBE: SIGNIFICANT CHANGE UP
SODIUM SERPL-SCNC: 138 MMOL/L — SIGNIFICANT CHANGE UP (ref 135–145)
SP GR SPEC: 1.02 — SIGNIFICANT CHANGE UP (ref 1.01–1.02)
THC UR QL: POSITIVE
TSH SERPL-MCNC: 1.41 UIU/ML — SIGNIFICANT CHANGE UP (ref 0.27–4.2)
UROBILINOGEN FLD QL: 4 MG/DL
WBC # BLD: 3.87 K/UL — SIGNIFICANT CHANGE UP (ref 3.8–10.5)
WBC # FLD AUTO: 3.87 K/UL — SIGNIFICANT CHANGE UP (ref 3.8–10.5)
WBC UR QL: ABNORMAL

## 2021-03-19 PROCEDURE — 93010 ELECTROCARDIOGRAM REPORT: CPT

## 2021-03-19 PROCEDURE — 90792 PSYCH DIAG EVAL W/MED SRVCS: CPT

## 2021-03-19 PROCEDURE — 99218: CPT

## 2021-03-19 RX ORDER — NICOTINE POLACRILEX 2 MG
1 GUM BUCCAL
Qty: 0 | Refills: 0 | DISCHARGE

## 2021-03-19 RX ORDER — INSULIN LISPRO 100/ML
VIAL (ML) SUBCUTANEOUS
Refills: 0 | Status: DISCONTINUED | OUTPATIENT
Start: 2021-03-19 | End: 2021-03-24

## 2021-03-19 RX ORDER — MAGNESIUM HYDROXIDE 400 MG/1
15 TABLET, CHEWABLE ORAL
Qty: 0 | Refills: 0 | DISCHARGE

## 2021-03-19 RX ORDER — HYDROXYZINE HCL 10 MG
1 TABLET ORAL
Qty: 0 | Refills: 0 | DISCHARGE

## 2021-03-19 RX ORDER — DEXTROSE 50 % IN WATER 50 %
25 SYRINGE (ML) INTRAVENOUS ONCE
Refills: 0 | Status: DISCONTINUED | OUTPATIENT
Start: 2021-03-19 | End: 2021-03-24

## 2021-03-19 RX ORDER — EPINEPHRINE 0.3 MG/.3ML
0 INJECTION INTRAMUSCULAR; SUBCUTANEOUS
Qty: 0 | Refills: 0 | DISCHARGE

## 2021-03-19 RX ORDER — DEXTROSE 50 % IN WATER 50 %
15 SYRINGE (ML) INTRAVENOUS ONCE
Refills: 0 | Status: DISCONTINUED | OUTPATIENT
Start: 2021-03-19 | End: 2021-03-24

## 2021-03-19 RX ORDER — ASPIRIN/CALCIUM CARB/MAGNESIUM 324 MG
81 TABLET ORAL DAILY
Refills: 0 | Status: DISCONTINUED | OUTPATIENT
Start: 2021-03-19 | End: 2021-03-24

## 2021-03-19 RX ORDER — SODIUM CHLORIDE 9 MG/ML
1000 INJECTION, SOLUTION INTRAVENOUS
Refills: 0 | Status: DISCONTINUED | OUTPATIENT
Start: 2021-03-19 | End: 2021-03-24

## 2021-03-19 RX ORDER — INSULIN GLARGINE 100 [IU]/ML
25 INJECTION, SOLUTION SUBCUTANEOUS AT BEDTIME
Refills: 0 | Status: DISCONTINUED | OUTPATIENT
Start: 2021-03-19 | End: 2021-03-24

## 2021-03-19 RX ORDER — DEXTROSE 50 % IN WATER 50 %
12.5 SYRINGE (ML) INTRAVENOUS ONCE
Refills: 0 | Status: DISCONTINUED | OUTPATIENT
Start: 2021-03-19 | End: 2021-03-24

## 2021-03-19 RX ORDER — GLUCAGON INJECTION, SOLUTION 0.5 MG/.1ML
1 INJECTION, SOLUTION SUBCUTANEOUS ONCE
Refills: 0 | Status: DISCONTINUED | OUTPATIENT
Start: 2021-03-19 | End: 2021-03-24

## 2021-03-19 RX ADMIN — INSULIN GLARGINE 25 UNIT(S): 100 INJECTION, SOLUTION SUBCUTANEOUS at 22:04

## 2021-03-19 NOTE — ED BEHAVIORAL HEALTH ASSESSMENT NOTE - SUMMARY
39 y/o female, lives with bf Perry, PPH depression, psychosis with hx of crack cocaine abuse (states in remission 1 yr) and multiple episodes of SI requiring inpatient management presents to ED with depression and SI for the past month,  increased after learning her 29 yo brother was killed yesterday, with vague plan to OD, h/o aborted suicide attempt by train,  per record, h/o attempt to drown herself in her bathtub, h/o cutting wrists,  PMH IDDM, HTN bib  for psych admissions secondary to depression and SI  Pt reports depressed mood, sadness, grief,  anger, anhedonia, impaired sleep and command  to "end it".  Pt is not on meds and has not been in tx since last psych admission since last in pt admission at [facility] Feb 2018.  Pt admits to crack cocaine abuse however claims she is in remission x 1 year. Pt came to ED with a suitcase planning a psych admission.  No evidence of agitation, aggression, mood is depressed, SI with plan to OD with alcohol.  Spoke with  Perry, who reports Pt depressed irritable and has made suicidal statements.  reports Pt has been struggling with addiction currently and he told her she could not be with him if she was using and she decided to get help.  Pt will be admitted to in psych on voluntary status for mood stability tx of psychosis and safety, pending available bed and med clearance.

## 2021-03-19 NOTE — ED ADULT TRIAGE NOTE - CHIEF COMPLAINT QUOTE
Patient A&Ox4 complaining of anxiety being "kyle high" & wants to see a doctor. Stated recently lost her brother. Denies any Hx of anxiety.

## 2021-03-19 NOTE — ED PROVIDER NOTE - NS ED ROS FT
Constitutional: (-) fever  (-)chills  (-)sweats  Eyes/ENT: (-)   Cardiovascular: (-) chest pain, (-) palpitations (-) edema   Respiratory: (-) cough, (-) shortness of breath   Gastrointestinal: (-)nausea  (-)vomiting, (-) diarrhea  (-) abdominal pain   :  (-)dysuria, (-)frequency, (-)urgency, (-)hematuria  Musculoskeletal: (-) neck pain, (-) back pain, (-) joint pain  Integumentary: (-) rash, (-) edema  Neurological: (-) headache, (-) altered mental status  (-)LOC   Psych: (+) SI (+) AH (+) anxiety

## 2021-03-19 NOTE — ED BEHAVIORAL HEALTH ASSESSMENT NOTE - DETAILS
reports intermittent SI with plan to ED and command AH to "end it". Dr Morillo no questions to follow

## 2021-03-19 NOTE — ED BEHAVIORAL HEALTH ASSESSMENT NOTE - REASON FOR REFERRAL
Called and relayed message below from Emi.  Patient verbalized understanding and has no further questions at this time. Anticoag flowsheet updated.  Patient asked about compression stockings, see other encounter today.        \"Notes recorded by Emi Santos PA-C on 7/5/2019 at 5:19 PM CDT  The patient should be called and told that the INR is in the therapeutic range at 2.4  and that he/she should continue the same dose of coumadin at 8 mg daily and stop Lovenox and return for the next lab draw in 3 days on Monday 7/8/19. Please complete anticoagulation flowsheet an updated medication list.\"   depression SI

## 2021-03-19 NOTE — ED BEHAVIORAL HEALTH ASSESSMENT NOTE - OTHER PAST PSYCHIATRIC HISTORY (INCLUDE DETAILS REGARDING ONSET, COURSE OF ILLNESS, INPATIENT/OUTPATIENT TREATMENT)
[facility] admission age 10  past admission to [facility], [facility] (last admission)   &  [facility]    not in outpatient treatment  [facility] 1/25-1/28/2018 for mood disorder, [facility] 2/9/18 - current for psychosis

## 2021-03-19 NOTE — ED BEHAVIORAL HEALTH ASSESSMENT NOTE - PAST PSYCHOTROPIC MEDICATION
Ativan 2mg PRN, Cogentin 3mg BID, Depakote 500mg BID, Desyrel 100mg hs, Dilantin 200mg BID, Haldol 10mg BID, Haldol 5mg PRN, Vistaril 50mg PRN  Risperdal,

## 2021-03-19 NOTE — ED BEHAVIORAL HEALTH ASSESSMENT NOTE - HPI (INCLUDE ILLNESS QUALITY, SEVERITY, DURATION, TIMING, CONTEXT, MODIFYING FACTORS, ASSOCIATED SIGNS AND SYMPTOMS)
41 y/o female, lives with bf Perry, PPH depression, psychosis with hx of crack cocaine abuse (states in remission 1 yr) and multiple episodes of SI requiring inpatient management presents to ED with depression and SI for the past month,  increased after learning her 29 yo brother was killed yesterday, with vague plan to OD, h/o aborted suicide attempt by train,  per record, h/o attempt to drown herself in her bathtub, h/o cutting wrists,  PMH IDDM, HTN bib  for psych admissions secondary to depression and SI  Pt reports depressed mood, sadness, grief,  anger, anhedonia, impaired sleep and command  to "end it".  Pt is not on meds and has not been in tx since last psych admission since last in pt admission at [facility] Feb 2018.  Pt admits to crack cocaine abuse however claims she is in remission x 1 year. Pt came to ED with a suitcase planning a psych admission.  No evidence of agitation, aggression, mood is depressed, SI with plan to OD with alcohol.  Spoke with jayce Amador, who reports Pt depressed irritable and has made suicidal statements.  reports Pt has been struggling with addiction currently and he told her she could not be with him if she was using and she decided to get help.

## 2021-03-19 NOTE — ED BEHAVIORAL HEALTH ASSESSMENT NOTE - RISK ASSESSMENT
Moderate Acute Suicide Risk RF SI, AH, noncompliance, maladaptive coping  PF supportive BF, help seeking

## 2021-03-19 NOTE — ED ADULT NURSE NOTE - HPI (INCLUDE ILLNESS QUALITY, SEVERITY, DURATION, TIMING, CONTEXT, MODIFYING FACTORS, ASSOCIATED SIGNS AND SYMPTOMS)
Patient comes to  ED after being medically cleared in main ED. She reports she is very depressed and anxious after her brother  yesterday. Pt has been in hospitals in the past for depression, SI and AH. Stated she hasn't been on medications for a "long time", and needs to get back on them. Admits to hx of cocaine addiction, but hasn't used for "about a year". She is cooperative with intake interview and provided blood and urine for labwork.  Endorses need for inpatient hospitalization.

## 2021-03-19 NOTE — ED PROVIDER NOTE - PHYSICAL EXAMINATION
General:     NAD, well-nourished, well-appearing  Head:     NC/AT, EOMI, oral mucosa moist  Neck:     trachea midline  Lungs:     CTA b/l, no w/r/r  CVS:     S1S2, RRR, no m/g/r  Abd:     +BS, s/nt/nd, no organomegaly  Ext:    2+ radial and pedal pulses, no c/c/e  Neuro: AAOx3, no sensory/motor deficits General:     NAD  Head:     NC/AT, EOMI, oral mucosa moist  Neck:     trachea midline  Lungs:     CTA b/l, no w/r/r  CVS:     S1S2, RRR, no m/g/r  Abd:     +BS, s/nt/nd, no organomegaly  Ext:    2+ radial and pedal pulses, no c/c/e  Neuro: grossly intact

## 2021-03-19 NOTE — CHART NOTE - NSCHARTNOTEFT_GEN_A_CORE
SWNote: pt seen by behavioral NP(Tye) pt found to benefit from inpatient hospitalization 9.13 status. Covid negative result just obtained. [facility]) and [facility](Jane) no adult female bed available. SW to follow.

## 2021-03-19 NOTE — ED BEHAVIORAL HEALTH ASSESSMENT NOTE - ADDITIONAL DETAILS ALL
plan to OD, h/o aborted suicide attempt by train,  per record, h/o attempt to drown herself in her bathtub, h/o cutting wrists

## 2021-03-19 NOTE — ED BEHAVIORAL HEALTH ASSESSMENT NOTE - NSCURPASTPSYDX_PSY_ALL_CORE
Mood disorder/Psychotic disorder/Alcohol/Substance Use disorders/Cluster B Personality disorder/traits

## 2021-03-19 NOTE — ED CDU PROVIDER INITIAL DAY NOTE - OBJECTIVE STATEMENT
40yoF with PMH signif for anxiety, Bipolar disorder, Cocaine abuse, IDDM, Mild intermittent asthma, Seizure and CAD(s/p stent); now p/w anxiety, SI and auditory hallucinations. Pt states auditory hallucinations are telling her not to talk to anyone. Pt notes she is grieving the death of her brother who  yesterday. Pt states she has been seen by psych in  and was transferred to [facility], discharged with vistaril and trazodone. Pt notes using crack cocaine, last use was a year ago. Pt denies currently being on medications and hasn't been on insulin in 2 weeks. Denies HI. Denies VH. Denies N/V.   PMH: Bipolar disorder, Cocaine abuse, IDDM, Mild intermittent asthma, Seizure, heart stent  Meds: Lantis 25u, Vistaril, Coreg, ASA 81mg,  SOCIAL: +social EtOH use

## 2021-03-19 NOTE — ED PROVIDER NOTE - OBJECTIVE STATEMENT
40yoF with PMH signif for anxiety, Bipolar disorder, Cocaine abuse, IDDM, Mild intermittent asthma, Seizure and heart stent now p/w anxiety, SI and auditory hallucinations. Pt states auditory hallucinations are telling her not to talk to anyone. Pt notes she is grieving the death of her brother who  yesterday. Pt states she has been seen by psych in  and was transferred to [facility], discharged with vistaril and trazodone. Pt notes using crack cocaine, last use was a year ago. Pt denies currently being on medications and hasn't been on insulin in 2 weeks. Denies HI. Denies VH. Denies N/V.   PMH: Bipolar disorder, Cocaine abuse, IDDM, Mild intermittent asthma, Seizure, heart stent  Meds: Lantis 25u, Vistaril, Coreg, ASA 81mg,  SOCIAL: +social EtOH use 40yoF with PMH signif for anxiety, Bipolar disorder, Cocaine abuse, IDDM, Mild intermittent asthma, Seizure and CAD(s/p stent); now p/w anxiety, SI and auditory hallucinations. Pt states auditory hallucinations are telling her not to talk to anyone. Pt notes she is grieving the death of her brother who  yesterday. Pt states she has been seen by psych in  and was transferred to [facility] [place], discharged with vistaril and trazodone. Pt notes using crack cocaine, last use was a year ago. Pt denies currently being on medications and hasn't been on insulin in 2 weeks. Denies HI. Denies VH. Denies N/V.   PMH: Bipolar disorder, Cocaine abuse, IDDM, Mild intermittent asthma, Seizure, heart stent  Meds: Lantis 25u, Vistaril, Coreg, ASA 81mg,  SOCIAL: +social EtOH use

## 2021-03-19 NOTE — ED CDU PROVIDER INITIAL DAY NOTE - PHYSICAL EXAMINATION
General:     NAD  Head:     NC/AT, EOMI, oral mucosa moist  Neck:     trachea midline  Lungs:     CTA b/l, no w/r/r  CVS:     S1S2, RRR, no m/g/r  Abd:     +BS, s/nt/nd, no organomegaly  Ext:    2+ radial and pedal pulses, no c/c/e  Neuro: grossly intact

## 2021-03-19 NOTE — ED BEHAVIORAL HEALTH ASSESSMENT NOTE - DIFFERENTIAL
substance induced mood disorder  COVID Exposure Screen- Patient    1.	*In the past 14 days, have you been around anyone with a positive COVID-19 test?*   (  ) Yes   ( x ) No   (  ) Unknown- Reason (e.g. patient uncertain, sedated, refusing to answer, etc.):  ______  IF YES PROCEED TO QUESTION #2. IF NO or UNKNOWN, PLEASE SKIP TO QUESTION #7  2.	Were you within 6 feet of them for at least 15 minutes? (  ) Yes   (  ) No   (  ) Unknown- Reason: ______    3.	Have you provided care for them? (  ) Yes   (  ) No   (  ) Unknown- Reason: ______    4.	Have you had direct physical contact with them (touched, hugged, or kissed them)?  (  ) Yes   (  ) No    (  ) Unknown- Reason: ______    5.	Have you shared eating or drinking utensils with them? (  ) Yes   (  ) No    (  ) Unknown- Reason: ______    6.	Have they sneezed, coughed, or somehow got respiratory droplets on you? (  ) Yes   (  ) No    (  ) Unknown- Reason: ______      7.	*Have you been out of [place] within the past 14 days?*  (  ) Yes   ( x ) No   (  ) Unknown- Reason (e.g. patient uncertain, sedated, refusing to answer, etc.): _______  IF YES PLEASE ANSWER THE FOLLOWING QUESTIONS:  8.	Which state/country have you been to? ______   9.	Were you there over 24 hours? (  ) Yes   (  ) No    (  ) Unknown- Reason: ______    10.	What date did you return to [facility]? ______

## 2021-03-19 NOTE — ED BEHAVIORAL HEALTH ASSESSMENT NOTE - DESCRIPTION
never  children ages 12, 8 ,4 Cooperative, fair relatedness, no agitation or aggression. DM, asthma, seizure disorder

## 2021-03-20 ENCOUNTER — INPATIENT (INPATIENT)
Facility: HOSPITAL | Age: 41
LOS: 5 days | Discharge: NURSING, REHAB OR LONG-TERM CARE | End: 2021-03-26
Attending: PSYCHIATRY & NEUROLOGY | Admitting: PSYCHIATRY & NEUROLOGY
Payer: MEDICAID

## 2021-03-20 VITALS
TEMPERATURE: 98 F | OXYGEN SATURATION: 100 % | RESPIRATION RATE: 20 BRPM | SYSTOLIC BLOOD PRESSURE: 121 MMHG | HEART RATE: 82 BPM | DIASTOLIC BLOOD PRESSURE: 79 MMHG

## 2021-03-20 VITALS — RESPIRATION RATE: 14 BRPM | WEIGHT: 190.92 LBS | OXYGEN SATURATION: 100 % | HEIGHT: 63 IN | TEMPERATURE: 98 F

## 2021-03-20 DIAGNOSIS — F25.9 SCHIZOAFFECTIVE DISORDER, UNSPECIFIED: ICD-10-CM

## 2021-03-20 DIAGNOSIS — F33.2 MAJOR DEPRESSIVE DISORDER, RECURRENT SEVERE WITHOUT PSYCHOTIC FEATURES: ICD-10-CM

## 2021-03-20 DIAGNOSIS — F19.20 OTHER PSYCHOACTIVE SUBSTANCE DEPENDENCE, UNCOMPLICATED: ICD-10-CM

## 2021-03-20 DIAGNOSIS — Z91.19 PATIENT'S NONCOMPLIANCE WITH OTHER MEDICAL TREATMENT AND REGIMEN: ICD-10-CM

## 2021-03-20 DIAGNOSIS — F43.21 ADJUSTMENT DISORDER WITH DEPRESSED MOOD: ICD-10-CM

## 2021-03-20 LAB
A1C WITH ESTIMATED AVERAGE GLUCOSE RESULT: 11.9 % — HIGH (ref 4–5.6)
COVID-19 SPIKE DOMAIN AB INTERP: NEGATIVE — SIGNIFICANT CHANGE UP
COVID-19 SPIKE DOMAIN ANTIBODY RESULT: 0.4 U/ML — SIGNIFICANT CHANGE UP
ESTIMATED AVERAGE GLUCOSE: 295 MG/DL — HIGH (ref 68–114)
GLUCOSE BLDC GLUCOMTR-MCNC: 106 MG/DL — HIGH (ref 70–99)
GLUCOSE BLDC GLUCOMTR-MCNC: 211 MG/DL — HIGH (ref 70–99)
HCG SERPL-ACNC: <1 MIU/ML — SIGNIFICANT CHANGE UP
SARS-COV-2 IGG+IGM SERPL QL IA: 0.4 U/ML — SIGNIFICANT CHANGE UP
SARS-COV-2 IGG+IGM SERPL QL IA: NEGATIVE — SIGNIFICANT CHANGE UP

## 2021-03-20 PROCEDURE — 93005 ELECTROCARDIOGRAM TRACING: CPT

## 2021-03-20 PROCEDURE — 80053 COMPREHEN METABOLIC PANEL: CPT

## 2021-03-20 PROCEDURE — 86769 SARS-COV-2 COVID-19 ANTIBODY: CPT

## 2021-03-20 PROCEDURE — 80061 LIPID PANEL: CPT

## 2021-03-20 PROCEDURE — 99232 SBSQ HOSP IP/OBS MODERATE 35: CPT

## 2021-03-20 PROCEDURE — 85025 COMPLETE CBC W/AUTO DIFF WBC: CPT

## 2021-03-20 PROCEDURE — G0378: CPT

## 2021-03-20 PROCEDURE — 36415 COLL VENOUS BLD VENIPUNCTURE: CPT

## 2021-03-20 PROCEDURE — U0003: CPT

## 2021-03-20 PROCEDURE — 84702 CHORIONIC GONADOTROPIN TEST: CPT

## 2021-03-20 PROCEDURE — 99217: CPT

## 2021-03-20 PROCEDURE — 80048 BASIC METABOLIC PNL TOTAL CA: CPT

## 2021-03-20 PROCEDURE — 85027 COMPLETE CBC AUTOMATED: CPT

## 2021-03-20 PROCEDURE — U0005: CPT

## 2021-03-20 PROCEDURE — 80307 DRUG TEST PRSMV CHEM ANLYZR: CPT

## 2021-03-20 PROCEDURE — 99221 1ST HOSP IP/OBS SF/LOW 40: CPT

## 2021-03-20 PROCEDURE — 84443 ASSAY THYROID STIM HORMONE: CPT

## 2021-03-20 PROCEDURE — 82962 GLUCOSE BLOOD TEST: CPT

## 2021-03-20 PROCEDURE — 83036 HEMOGLOBIN GLYCOSYLATED A1C: CPT

## 2021-03-20 PROCEDURE — 99285 EMERGENCY DEPT VISIT HI MDM: CPT

## 2021-03-20 PROCEDURE — 81001 URINALYSIS AUTO W/SCOPE: CPT

## 2021-03-20 PROCEDURE — 0031A: CPT

## 2021-03-20 RX ORDER — NICOTINE POLACRILEX 2 MG
1 GUM BUCCAL DAILY
Refills: 0 | Status: DISCONTINUED | OUTPATIENT
Start: 2021-03-20 | End: 2021-03-24

## 2021-03-20 RX ORDER — DEXTROSE 50 % IN WATER 50 %
25 SYRINGE (ML) INTRAVENOUS ONCE
Refills: 0 | Status: DISCONTINUED | OUTPATIENT
Start: 2021-03-20 | End: 2021-03-22

## 2021-03-20 RX ORDER — DEXTROSE 50 % IN WATER 50 %
15 SYRINGE (ML) INTRAVENOUS ONCE
Refills: 0 | Status: DISCONTINUED | OUTPATIENT
Start: 2021-03-20 | End: 2021-03-22

## 2021-03-20 RX ORDER — INSULIN GLARGINE 100 [IU]/ML
25 INJECTION, SOLUTION SUBCUTANEOUS AT BEDTIME
Refills: 0 | Status: DISCONTINUED | OUTPATIENT
Start: 2021-03-20 | End: 2021-03-22

## 2021-03-20 RX ORDER — QUETIAPINE FUMARATE 200 MG/1
50 TABLET, FILM COATED ORAL AT BEDTIME
Refills: 0 | Status: DISCONTINUED | OUTPATIENT
Start: 2021-03-20 | End: 2021-03-22

## 2021-03-20 RX ORDER — SODIUM CHLORIDE 9 MG/ML
1000 INJECTION, SOLUTION INTRAVENOUS
Refills: 0 | Status: DISCONTINUED | OUTPATIENT
Start: 2021-03-20 | End: 2021-03-22

## 2021-03-20 RX ORDER — HALOPERIDOL DECANOATE 100 MG/ML
5 INJECTION INTRAMUSCULAR EVERY 6 HOURS
Refills: 0 | Status: DISCONTINUED | OUTPATIENT
Start: 2021-03-20 | End: 2021-03-26

## 2021-03-20 RX ORDER — INSULIN LISPRO 100/ML
VIAL (ML) SUBCUTANEOUS
Refills: 0 | Status: DISCONTINUED | OUTPATIENT
Start: 2021-03-20 | End: 2021-03-22

## 2021-03-20 RX ORDER — GLUCAGON INJECTION, SOLUTION 0.5 MG/.1ML
1 INJECTION, SOLUTION SUBCUTANEOUS ONCE
Refills: 0 | Status: DISCONTINUED | OUTPATIENT
Start: 2021-03-20 | End: 2021-03-26

## 2021-03-20 RX ORDER — ALBUTEROL 90 UG/1
2 AEROSOL, METERED ORAL EVERY 6 HOURS
Refills: 0 | Status: DISCONTINUED | OUTPATIENT
Start: 2021-03-20 | End: 2021-03-24

## 2021-03-20 RX ORDER — ASPIRIN/CALCIUM CARB/MAGNESIUM 324 MG
81 TABLET ORAL DAILY
Refills: 0 | Status: DISCONTINUED | OUTPATIENT
Start: 2021-03-20 | End: 2021-03-26

## 2021-03-20 RX ORDER — DEXTROSE 50 % IN WATER 50 %
12.5 SYRINGE (ML) INTRAVENOUS ONCE
Refills: 0 | Status: DISCONTINUED | OUTPATIENT
Start: 2021-03-20 | End: 2021-03-22

## 2021-03-20 RX ORDER — DIVALPROEX SODIUM 500 MG/1
1000 TABLET, DELAYED RELEASE ORAL ONCE
Refills: 0 | Status: DISCONTINUED | OUTPATIENT
Start: 2021-03-20 | End: 2021-03-24

## 2021-03-20 RX ADMIN — QUETIAPINE FUMARATE 50 MILLIGRAM(S): 200 TABLET, FILM COATED ORAL at 22:28

## 2021-03-20 RX ADMIN — Medication 2 MILLIGRAM(S): at 22:28

## 2021-03-20 RX ADMIN — Medication 81 MILLIGRAM(S): at 10:09

## 2021-03-20 RX ADMIN — Medication 2: at 11:45

## 2021-03-20 RX ADMIN — INSULIN GLARGINE 25 UNIT(S): 100 INJECTION, SOLUTION SUBCUTANEOUS at 23:01

## 2021-03-20 NOTE — BEHAVIORAL HEALTH ASSESSMENT NOTE - NSBHREFERDETAILS_PSY_A_CORE_FT
Pt admitted to [place] [id number] [address] inpatient psychiatry on 3/20/2021 on a 9.13 voluntary legal status for acute safety and for further evaluation and treatment of her recent grief fueled anxious depression and recent SI.

## 2021-03-20 NOTE — BEHAVIORAL HEALTH ASSESSMENT NOTE - HPI (INCLUDE ILLNESS QUALITY, SEVERITY, DURATION, TIMING, CONTEXT, MODIFYING FACTORS, ASSOCIATED SIGNS AND SYMPTOMS)
· The pt is a 40 year-old AA female , lives with a h/o polysubstance use d/o, substance -induced mood and anxiety disorders, and a PMH + for IDDM and HTN  who was BIB self on 3/19/2021 to [facility] due to pt report of SI and worsening depression after the sudden death of her younger brother.   The pt,  with hx of crack cocaine abuse (states in remission 1 yr) and multiple episodes of SI requiring inpatient management presents to ED with depression and SI for the past month,  increased after learning her 27 yo brother was killed yesterday, with vague plan to OD, h/o aborted suicide attempt by train,  per record, h/o attempt to drown herself in her bathtub, h/o cutting wrists,  PMH IDDM, HTN bib bf for psych admissions secondary to depression and SI  Pt reports depressed mood, sadness, grief,  anger, anhedonia, impaired sleep and command  to "end it".  Pt is not on meds and has not been in tx since last psych admission since last in pt admission at [facility] Feb 2018.  Pt admits to crack cocaine abuse however claims she is in remission x 1 year. Pt came to ED with a suitcase planning a psych admission.  No evidence of agitation, aggression, mood is depressed, SI with plan to OD with alcohol.    The pt. was  admitted to [address] inpatient psychiatry on 3/20/2021 on a 9.13 voluntary legal status for acute safety and for further evaluation and treatment of her recent grief fueled anxious depression and recent SI.

## 2021-03-20 NOTE — BEHAVIORAL HEALTH ASSESSMENT NOTE - PROBLEM SELECTOR PLAN 1
1. To discuss antidepressant treatment options  2.Disposition planning ongoing  3.Pt encouraged to attend therapy groups as tolerated

## 2021-03-20 NOTE — ED ADULT NURSE REASSESSMENT NOTE - COMFORT CARE
darkened lights/meal provided/po fluids offered/side rails down/wait time explained/warm blanket provided
darkened lights

## 2021-03-20 NOTE — BEHAVIORAL HEALTH ASSESSMENT NOTE - NSBHCHARTREVIEWVS_PSY_A_CORE FT
Vital Signs Last 24 Hrs  T(C): 36.9 (20 Mar 2021 13:15), Max: 36.9 (19 Mar 2021 17:35)  T(F): 98.4 (20 Mar 2021 13:15), Max: 98.4 (19 Mar 2021 17:35)  HR: 82 (20 Mar 2021 11:20) (79 - 96)  BP: 121/79 (20 Mar 2021 11:20) (106/66 - 136/90)  BP(mean): --  RR: 14 (20 Mar 2021 13:15) (14 - 20)  SpO2: 100% (20 Mar 2021 13:15) (97% - 100%)

## 2021-03-20 NOTE — BEHAVIORAL HEALTH ASSESSMENT NOTE - SUMMARY
The pt is a 40 year-old AA female , lives with a h/o polysubstance use d/o, substance -induced mood and anxiety disorders, and a PMH + for IDDM and HTN  who was BIB self on 3/19/2021 to [facility] due to pt report of SI and worsening depression after the sudden death of her younger brother.   The pt,  with hx of crack cocaine abuse (states in remission 1 yr) and multiple episodes of SI requiring inpatient management presents to ED with depression and SI for the past month,  increased after learning her 27 yo brother was killed yesterday, with vague plan to OD, h/o aborted suicide attempt by train,  per record, h/o attempt to drown herself in her bathtub, h/o cutting wrists,  PMH IDDM, HTN bib bf for psych admissions secondary to depression and SI  Pt reports depressed mood, sadness, grief,  anger, anhedonia, impaired sleep and command  to "end it".  Pt is not on meds and has not been in tx since last psych admission since last in pt admission at [facility] Feb 2018.  Pt admits to crack cocaine abuse however claims she is in remission x 1 year. Pt came to ED with a suitcase planning a psych admission.  No evidence of agitation, aggression, mood is depressed, SI with plan to OD with alcohol.    The pt. was  admitted to [address] inpatient psychiatry on 3/20/2021 on a 9.13 voluntary legal status for acute safety and for further evaluation and treatment of her recent grief fueled anxious depression and recent SI.

## 2021-03-20 NOTE — ED ADULT NURSE REASSESSMENT NOTE - NS ED NURSE REASSESS COMMENT FT1
Report called to Joya MUNIZ on [address] at [facility].
Assumed care of patient. patient alert and cooperative. Pt requesting dinner tray and po fluids given and tolerated well.  Pt aware of accucheck to be done later tonight and patient in agreement.  Pt offering no complaints at present time. Pt aware of waiting for transfer for bed availability.  Pt tolerated about 90 % of dinner tray given will monitor and chart changes and maintain safe environment
Assumed care of patient at 0720.  Patient resting in bed sleeping with no distress and regular nonlabored breathing.  Safety of patient maintained.

## 2021-03-20 NOTE — ED CDU PROVIDER DISPOSITION NOTE - CLINICAL COURSE
Pt with bipolar and schizoaffective as well as anxiety (also sz and sthma) presents anxious, SI and auditory hallucinations after loss of her brother a few days ago  Psychiatric evaluation determined need for inpatient hospitalization  agree with determination, so does patient

## 2021-03-20 NOTE — ED CDU PROVIDER SUBSEQUENT DAY NOTE - HISTORY
Pt in observation awaiting placement for inpatient psychiatric care  Pt has hx bipolar and schizoaffective disorder.  Pt has auditory hallucinations   after her brother  2 days ago

## 2021-03-20 NOTE — PATIENT PROFILE BEHAVIORAL HEALTH - REASON FOR ADMISSION
Patient is a 40 year-old Black female, domiciled with best friend Perry, with a history of depression, and crack/cocaine abuse, with self-reported remission x1 year, however, patient tested positive for THC and Cocaine in the ED; she also has a history of multiple episodes of SI which required admissions; she presented to the ED with depression and SI x1 month, s/p learning of the death of her 28 year-old brother.  Per report, she had vague plan to OD, and has had multiple prior aborted suicide attempts by train, to drown herself in bathtub and of cutting wrists.  She reported depressed mood, sadness, grief, anger, anhedonia and impaired sleep and command  to” end it”.  She is admitted on a 913 voluntary status.  Her admitting diagnosis is Schizoaffective Disorder.  Her admitting physician is Dr. Romero.  Her medical history includes Type 2 Diabetes, Seizure Disorder, Asthma, and Hypertension.  She has no known drug allergies, but has shellfish allergy.  During admission interview, patient is cooperative, but not very forthcoming with certain information; she denied she had three children, and said she only had two, and denied having diabetes and hypertension.  Patient reported she is depressed and anxious and needs help.  Continue supportive care and safety; continue monitor patient closely.

## 2021-03-20 NOTE — CONSULT NOTE ADULT - ASSESSMENT
41 yo F with PMH significant for IDDM, HTN, Cardiac stent, Asthma, Depression, Psychosis, Hx of crack cocaine abuse in remission 1 year, multiple  episodes of Suicidal ideation admitted to N for Mx of Depression and SI.     # Depression/Psychosis/ Suicidal ideation/ Psych problems  - Manage as per primary psych team     # Hx of IDDM  - Pt reported that she takes 25 U Lantus at bedtime  - ISS  - Diabetic diet  - fingerstick check  - HBA1C: 11.9    # Hx of Cardiac stent  - c/w ASA, coreg    # Hx of  HTN  - Pt reported that she takes Coreg at home but doesn't remember dose, Pt reported that she will call family member to ask for home dose and let nurse know    # Asthma  - Nebs prn  - stable    # Hx of Marijuana and cocaine use  - Counselled on cessation, education provided  - EKG  - Denies chest pain, palpitations or any acute c/o at present    # DVT Ppx  - Encourage Ambulation  , Pt is Ambulatory    IMPROVE VTE Individual Risk Assessment    RISK                                                                Points    [  ] Previous VTE                                                  3    [  ] Thrombophilia                                               2    [  ] Lower limb paralysis                                      2        (unable to hold up >15 seconds)      [  ] Current Cancer                                              2         (within 6 months)    [  ] Immobilization > 24 hrs                                1    [  ] ICU/CCU stay > 24 hours                              1    [  ] Age > 60                                                      1    IMPROVE VTE Score ____0_____    IMPROVE Score 0-1: Low Risk, No VTE prophylaxis required for most patients, encourage ambulation.   IMPROVE Score 2-3: At risk, pharmacologic VTE prophylaxis is indicated for most patients (in the absence of a contraindication)  IMPROVE Score > or = 4: High Risk, pharmacologic VTE prophylaxis is indicated for most patients (in the absence of a contraindication)    Case d/w

## 2021-03-20 NOTE — ED CDU PROVIDER SUBSEQUENT DAY NOTE - PROGRESS NOTE DETAILS
Med list found in aold chart: Dilantin 100 mg BID Lantus 20 unit HS Humalog 1 unit before meal and at QHS Ventolin inhaler 2 puff Q 6 hr Haldol 10 mg bid Desyrel 100 mg HS Depakote DR 1000 mg BID  Cogentin 1 mg BID Ativan 2 mg q 6hr prn anxiety Nicoderm 14 mg/d vistaril 50 mg QID prn anxiety

## 2021-03-20 NOTE — CONSULT NOTE ADULT - ATTENDING COMMENTS
Case discussed and reviewed in detail after initial evaluation above by HOOD Calero. Please note my plan below.     41 y/o F w/ PMH of DM, asthma, bipolar, seizure disorder, polysubstance abuse, CAD s/p stent, depression, psychosis, p/w suicidal ideation     *Suicidal ideation / depression / bipolar / psychosis  -Management as per psych    *IDDM  -C/w basal-bolus insulin  -Diabetic diet    *H/o CAD / HTN / asthma  -C/w home meds and f/u outpatient for further management if conditions remain stable during hospitalization     *DVT ppx  -Encourage ambulation

## 2021-03-20 NOTE — ED CDU PROVIDER SUBSEQUENT DAY NOTE - MEDICAL DECISION MAKING DETAILS
Pt with known psychiatric illness presented with auditory Hallucination and si  also anxiety  brother  a few days ago  voices concern for her own well being  BH arslan sig needing inpatient psychiatric care

## 2021-03-20 NOTE — BEHAVIORAL HEALTH ASSESSMENT NOTE - RISK ASSESSMENT
moderate risk due to polysubstance use d/o and h/o apparent dangerous impulsivity Moderate Acute Suicide Risk

## 2021-03-20 NOTE — CHART NOTE - NSCHARTNOTEFT_GEN_A_CORE
As per MD, patient is medically stable for discharge. As per NP, patient would benefit from 9.13 inpatient admission. CHER placed call to [facility]- employee, Teresa, reported there are no beds available. CHER placed call to [facility]- employee, Hanna, reported there is a bed available and MD will review the case. Gabriela requested CHER fax over patient's 9.13 legals to Fax: [phone]. CHER completed fax. CHER continues to follow for safe and appropriate discharge.

## 2021-03-20 NOTE — CHART NOTE - NSCHARTNOTEFT_GEN_A_CORE
As per MD, patient is medically stable. As per NP patient would benefit from inpatient admission. Dr. Romero completed certificate of transfer for transport. Patient completed 9.13 legals. Patient was accepted to [facility] by Dr. Mesa- sending MD is Dr. Mckeon. SW completed  transfer packet with certificate of transfer BH assessment, negative covid, 9.13 legals, EKG, and negative covid results. Dr. Sultana provided report with [facility]. EMS employee Waqar arranged trip. Patient will require auth as patient has [employer]. SW to follow for auth.

## 2021-03-21 LAB
ANION GAP SERPL CALC-SCNC: 4 MMOL/L — LOW (ref 5–17)
BUN SERPL-MCNC: 16 MG/DL — SIGNIFICANT CHANGE UP (ref 7–23)
CALCIUM SERPL-MCNC: 8.9 MG/DL — SIGNIFICANT CHANGE UP (ref 8.5–10.1)
CHLORIDE SERPL-SCNC: 104 MMOL/L — SIGNIFICANT CHANGE UP (ref 96–108)
CHOLEST SERPL-MCNC: 225 MG/DL — HIGH
CO2 SERPL-SCNC: 29 MMOL/L — SIGNIFICANT CHANGE UP (ref 22–31)
CREAT SERPL-MCNC: 1 MG/DL — SIGNIFICANT CHANGE UP (ref 0.5–1.3)
GLUCOSE SERPL-MCNC: 196 MG/DL — HIGH (ref 70–99)
HCT VFR BLD CALC: 43.6 % — SIGNIFICANT CHANGE UP (ref 34.5–45)
HDLC SERPL-MCNC: 70 MG/DL — SIGNIFICANT CHANGE UP
HGB BLD-MCNC: 13.9 G/DL — SIGNIFICANT CHANGE UP (ref 11.5–15.5)
LIPID PNL WITH DIRECT LDL SERPL: 137 MG/DL — HIGH
MCHC RBC-ENTMCNC: 28.5 PG — SIGNIFICANT CHANGE UP (ref 27–34)
MCHC RBC-ENTMCNC: 31.9 GM/DL — LOW (ref 32–36)
MCV RBC AUTO: 89.3 FL — SIGNIFICANT CHANGE UP (ref 80–100)
NON HDL CHOLESTEROL: 155 MG/DL — HIGH
PLATELET # BLD AUTO: 291 K/UL — SIGNIFICANT CHANGE UP (ref 150–400)
POTASSIUM SERPL-MCNC: 4.5 MMOL/L — SIGNIFICANT CHANGE UP (ref 3.5–5.3)
POTASSIUM SERPL-SCNC: 4.5 MMOL/L — SIGNIFICANT CHANGE UP (ref 3.5–5.3)
RBC # BLD: 4.88 M/UL — SIGNIFICANT CHANGE UP (ref 3.8–5.2)
RBC # FLD: 14.2 % — SIGNIFICANT CHANGE UP (ref 10.3–14.5)
SODIUM SERPL-SCNC: 137 MMOL/L — SIGNIFICANT CHANGE UP (ref 135–145)
TRIGL SERPL-MCNC: 89 MG/DL — SIGNIFICANT CHANGE UP
WBC # BLD: 3.86 K/UL — SIGNIFICANT CHANGE UP (ref 3.8–10.5)
WBC # FLD AUTO: 3.86 K/UL — SIGNIFICANT CHANGE UP (ref 3.8–10.5)

## 2021-03-21 PROCEDURE — 99232 SBSQ HOSP IP/OBS MODERATE 35: CPT

## 2021-03-21 PROCEDURE — 93010 ELECTROCARDIOGRAM REPORT: CPT

## 2021-03-21 RX ADMIN — QUETIAPINE FUMARATE 50 MILLIGRAM(S): 200 TABLET, FILM COATED ORAL at 20:35

## 2021-03-21 RX ADMIN — INSULIN GLARGINE 25 UNIT(S): 100 INJECTION, SOLUTION SUBCUTANEOUS at 21:08

## 2021-03-21 RX ADMIN — Medication 2 MILLIGRAM(S): at 20:34

## 2021-03-21 RX ADMIN — HALOPERIDOL DECANOATE 5 MILLIGRAM(S): 100 INJECTION INTRAMUSCULAR at 20:34

## 2021-03-21 RX ADMIN — Medication 2: at 08:15

## 2021-03-21 RX ADMIN — Medication 3: at 17:50

## 2021-03-21 RX ADMIN — Medication 1: at 12:12

## 2021-03-21 RX ADMIN — Medication 81 MILLIGRAM(S): at 09:21

## 2021-03-21 NOTE — BH SAFETY PLAN - INTERNAL COPING STRATEGY 1
Inpatient:   1. Ask for help from staff when I need it.  2. Attend group programming to utilize my coping skills.  3. Journal about my emotions to reduce my anxiety and aggression.

## 2021-03-21 NOTE — BH SAFETY PLAN - WARNING SIGN 3
Thoughts of hurting other people  "When I'm anxious it turns into aggression since it makes me think of wanting to hurt someone"

## 2021-03-21 NOTE — PROGRESS NOTE BEHAVIORAL HEALTH - NSBHFUPINTERVALHXFT_PSY_A_CORE
Vaccine Information Statement(s) was given today. This has been reviewed, questions answered, and verbal consent given by Parent for injection(s) and administration of Multi Vaccines between birth and 6 months.   Covering MD Note ( 3/21/2021)  Pt seen in her room where she has continued to retreat to her bed despite ongoing staff support and encouragement to have the pt participate in therapy groups.   The pt was seen by the hospitalist and her Lantus dose was increased from 20 to 25 UNITS qhs for treatment of her IDDM. Pt with a h/o Coreg for HTN but pt unsure of dose and stated she would verify with her family. Vital signs have been largely WNL on no antihypertensive med at this time. Pt had not been consistently attending therapy groups but did so with writer's reminder that rehab treatment would entail a high percentage of group therapy attendance.   Pt alert and oriented x 3. Not initiating interactions with staff or peers but slowly becoming more amenable to join with select peers in art focused groups.    The pt was begun on low dose Seroquel 50 mg po qhs for mood and for sleep with good effect. The pt has not wished to discuss the recent death of her younger brother.   The pt continues to appear dysphoric and irritable with constricted affect but she has not currently endorsed SI /HI.  Initial referral diagnosis appears doubtful  with current pt clinical information and presentation. Judgment is fair with limited insight.

## 2021-03-22 PROCEDURE — 99232 SBSQ HOSP IP/OBS MODERATE 35: CPT

## 2021-03-22 RX ORDER — INSULIN LISPRO 100/ML
VIAL (ML) SUBCUTANEOUS
Refills: 0 | Status: DISCONTINUED | OUTPATIENT
Start: 2021-03-22 | End: 2021-03-26

## 2021-03-22 RX ORDER — DEXTROSE 50 % IN WATER 50 %
15 SYRINGE (ML) INTRAVENOUS ONCE
Refills: 0 | Status: DISCONTINUED | OUTPATIENT
Start: 2021-03-22 | End: 2021-03-26

## 2021-03-22 RX ORDER — DEXTROSE 50 % IN WATER 50 %
12.5 SYRINGE (ML) INTRAVENOUS ONCE
Refills: 0 | Status: DISCONTINUED | OUTPATIENT
Start: 2021-03-22 | End: 2021-03-26

## 2021-03-22 RX ORDER — SODIUM CHLORIDE 9 MG/ML
1000 INJECTION, SOLUTION INTRAVENOUS
Refills: 0 | Status: DISCONTINUED | OUTPATIENT
Start: 2021-03-22 | End: 2021-03-26

## 2021-03-22 RX ORDER — INSULIN LISPRO 100/ML
4 VIAL (ML) SUBCUTANEOUS ONCE
Refills: 0 | Status: COMPLETED | OUTPATIENT
Start: 2021-03-22 | End: 2021-03-22

## 2021-03-22 RX ORDER — HALOPERIDOL DECANOATE 100 MG/ML
2 INJECTION INTRAMUSCULAR
Refills: 0 | Status: DISCONTINUED | OUTPATIENT
Start: 2021-03-22 | End: 2021-03-26

## 2021-03-22 RX ORDER — DEXTROSE 50 % IN WATER 50 %
25 SYRINGE (ML) INTRAVENOUS ONCE
Refills: 0 | Status: DISCONTINUED | OUTPATIENT
Start: 2021-03-22 | End: 2021-03-26

## 2021-03-22 RX ORDER — INSULIN GLARGINE 100 [IU]/ML
25 INJECTION, SOLUTION SUBCUTANEOUS AT BEDTIME
Refills: 0 | Status: DISCONTINUED | OUTPATIENT
Start: 2021-03-22 | End: 2021-03-26

## 2021-03-22 RX ADMIN — INSULIN GLARGINE 25 UNIT(S): 100 INJECTION, SOLUTION SUBCUTANEOUS at 20:45

## 2021-03-22 RX ADMIN — Medication 4 UNIT(S): at 20:45

## 2021-03-22 RX ADMIN — Medication 3: at 08:04

## 2021-03-22 RX ADMIN — Medication 81 MILLIGRAM(S): at 09:48

## 2021-03-22 RX ADMIN — Medication 2: at 17:59

## 2021-03-22 RX ADMIN — Medication 4: at 12:17

## 2021-03-22 RX ADMIN — Medication 2 MILLIGRAM(S): at 20:40

## 2021-03-22 RX ADMIN — HALOPERIDOL DECANOATE 2 MILLIGRAM(S): 100 INJECTION INTRAMUSCULAR at 20:39

## 2021-03-22 NOTE — CHART NOTE - NSCHARTNOTEFT_GEN_A_CORE
CHER placed call to [employer] ([phone]) and spoke with employee, Kiley DOYLE. CHER provided patient's policy # and obtained authorization. Kiley reported authorization # is [id number]. Dates of authorization are 3/20, 3/21, 3/22. # of days authorized: 3 days. Kiley reported name and number of person for concurrent review will be Kristi AMARO ([phone]). CHER informed HH, as patient was transferred on 3/20/21.

## 2021-03-22 NOTE — PROGRESS NOTE BEHAVIORAL HEALTH - NSBHFUPINTERVALHXFT_PSY_A_CORE
Covering MD Note ( 3/21/2021)  Pt seen in her room where she has continued to retreat to her bed despite ongoing staff support and encouragement to have the pt participate in therapy groups.   The pt was seen by the hospitalist and her Lantus dose was increased from 20 to 25 UNITS qhs for treatment of her IDDM. Pt with a h/o Coreg for HTN but pt unsure of dose and stated she would verify with her family. Vital signs have been largely WNL on no antihypertensive med at this time. Pt had not been consistently attending therapy groups but did so with writer's reminder that rehab treatment would entail a high percentage of group therapy attendance.   Pt alert and oriented x 3. Not initiating interactions with staff or peers but slowly becoming more amenable to join with select peers in art focused groups.   The pt was begun on low dose Seroquel 50 mg po qhs for mood and for sleep with good effect. The pt has not wished to discuss the recent death of her younger brother.  The pt continues to appear dysphoric and irritable with constricted affect but she has not currently endorsed SI /HI.  Initial referral diagnosis appears doubtful  with current pt clinical information and presentation. Judgment is fair with limited insight.    03/22/2021: Patient was seen today AM. She is admitted voluntarily from [facility]. She is 40, has 2 children a D-18 domiciled by herself and a S-15 domiciled with Paternal GF. She endorses that she is unemployed, domiciled with BF, who also helps her financial ly, she does not receive any PA, endorses that she has been non-compliant with meds with fair sleep/appetite. She was initially started on Seroquel 50 mg HS, which she got for 2 days, but today AM she endorses that she has nightmares so she opted to take Haldol with Ambien. She endorses that she has bipolar D/O since childhood and was also indulging Crack Cocaine, she was on a binge all day the day before she came to [facility]. She has been using Crack Cocaine 3-4 times a week since she started at age 15. She added that she was in rehab, at least 3 times, last at [facility] for 3 weeks in 2017. She then added that she was also in rehab at [facility] in 2018, which proves inconsistencies in her story. She prefers to go to rehab preferable at [facility] and was jailed for 2 years for drug possession and selling. Her parole with end in May/2022.  Her mood at times irritable, but in control and she has no S/H/I/P now, but she has hx of cutting, last cut many years back. She smokes 3 cigarettes a day, and denied using other drug.

## 2021-03-23 PROCEDURE — 99231 SBSQ HOSP IP/OBS SF/LOW 25: CPT

## 2021-03-23 RX ORDER — NICOTINE POLACRILEX 2 MG
2 GUM BUCCAL
Refills: 0 | Status: DISCONTINUED | OUTPATIENT
Start: 2021-03-23 | End: 2021-03-23

## 2021-03-23 RX ORDER — NICOTINE POLACRILEX 2 MG
2 GUM BUCCAL
Refills: 0 | Status: DISCONTINUED | OUTPATIENT
Start: 2021-03-23 | End: 2021-03-26

## 2021-03-23 RX ADMIN — Medication 2 MILLIGRAM(S): at 20:59

## 2021-03-23 RX ADMIN — INSULIN GLARGINE 25 UNIT(S): 100 INJECTION, SOLUTION SUBCUTANEOUS at 20:59

## 2021-03-23 RX ADMIN — Medication 8: at 12:27

## 2021-03-23 RX ADMIN — Medication 2 MILLIGRAM(S): at 08:35

## 2021-03-23 RX ADMIN — Medication 2: at 08:34

## 2021-03-23 RX ADMIN — Medication 6: at 17:27

## 2021-03-23 RX ADMIN — HALOPERIDOL DECANOATE 2 MILLIGRAM(S): 100 INJECTION INTRAMUSCULAR at 20:58

## 2021-03-23 RX ADMIN — Medication 81 MILLIGRAM(S): at 08:35

## 2021-03-23 RX ADMIN — HALOPERIDOL DECANOATE 2 MILLIGRAM(S): 100 INJECTION INTRAMUSCULAR at 08:34

## 2021-03-23 RX ADMIN — Medication 30 MILLILITER(S): at 21:02

## 2021-03-23 NOTE — PROGRESS NOTE BEHAVIORAL HEALTH - NSBHFUPINTERVALHXFT_PSY_A_CORE
Covering MD Note ( 3/21/2021)  Pt seen in her room where she has continued to retreat to her bed despite ongoing staff support and encouragement to have the pt participate in therapy groups.   The pt was seen by the hospitalist and her Lantus dose was increased from 20 to 25 UNITS qhs for treatment of her IDDM. Pt with a h/o Coreg for HTN but pt unsure of dose and stated she would verify with her family. Vital signs have been largely WNL on no antihypertensive med at this time. Pt had not been consistently attending therapy groups but did so with writer's reminder that rehab treatment would entail a high percentage of group therapy attendance.   Pt alert and oriented x 3. Not initiating interactions with staff or peers but slowly becoming more amenable to join with select peers in art focused groups.   The pt was begun on low dose Seroquel 50 mg po qhs for mood and for sleep with good effect. The pt has not wished to discuss the recent death of her younger brother.  The pt continues to appear dysphoric and irritable with constricted affect but she has not currently endorsed SI /HI.  Initial referral diagnosis appears doubtful  with current pt clinical information and presentation. Judgment is fair with limited insight.    03/22/2021: Patient was seen today AM. She is admitted voluntarily from [facility]. She is 40, has 2 children a D-18 domiciled by herself and a S-15 domiciled with Paternal GF. She endorses that she is unemployed, domiciled with BF, who also helps her financially, she does not receive any PA, endorses that she has been non-compliant with meds with fair sleep/appetite. She was initially started on Seroquel 50 mg HS, which she got for 2 days, but today AM she endorses that she has nightmares so she opted to take Haldol with Ambien. She endorses that she has bipolar D/O since childhood and was also indulging Crack Cocaine, she was on a binge all day the day before she came to [facility]. She has been using Crack Cocaine 3-4 times a week since she started at age 15. She added that she was in rehab, at least 3 times, last at [facility] for 3 weeks in 2017. She then added that she was also in rehab at [facility] in 2018, which proves inconsistencies in her story. She prefers to go to rehab preferable at [facility] and was jailed for 2 years for drug possession and selling. Her parole will end in May/2022.  Her mood at times irritable, but in control and she has no S/H/I/P now, but she has hx of cutting, last cut many years back. She smokes 3 cigarettes a day, and denied using other drug.    03/23/2021: patient was seen today AM. She has been meds compliant since she came here. has Hx fo Crack cocaine abuse almost daily and was also on Cocaine/Crack Binge before she came to ED at [facility]. Now she realizes that she needs to go to rehab for helping with Cocaine/Crack abuse/misuse. She remains fixated to get help and has been endorsing to maintain sustained remission of cocaine/crack to have a decent life from now on. She has been here for 3-4 days, and has been showing good mood control, with no aggression/agitation, good sleep/appetite and no PRN meds were needed. She has to been to limited groups and was advised to have group participation which she has to do at rehab for overall improvement.  Haldol to be increased to Haldol 5 mg HS from now on. Covering MD Note ( 3/21/2021)  Pt seen in her room where she has continued to retreat to her bed despite ongoing staff support and encouragement to have the pt participate in therapy groups.   The pt was seen by the hospitalist and her Lantus dose was increased from 20 to 25 UNITS qhs for treatment of her IDDM. Pt with a h/o Coreg for HTN but pt unsure of dose and stated she would verify with her family. Vital signs have been largely WNL on no antihypertensive med at this time. Pt had not been consistently attending therapy groups but did so with writer's reminder that rehab treatment would entail a high percentage of group therapy attendance.   Pt alert and oriented x 3. Not initiating interactions with staff or peers but slowly becoming more amenable to join with select peers in art focused groups.   The pt was begun on low dose Seroquel 50 mg po qhs for mood and for sleep with good effect. The pt has not wished to discuss the recent death of her younger brother.  The pt continues to appear dysphoric and irritable with constricted affect but she has not currently endorsed SI /HI.  Initial referral diagnosis appears doubtful  with current pt clinical information and presentation. Judgment is fair with limited insight.    03/22/2021: Patient was seen today AM. She is admitted voluntarily from [facility]. She is 40, has 2 children a D-18 domiciled by herself and a S-15 domiciled with Paternal GF. She endorses that she is unemployed, domiciled with BF, who also helps her financially, she does not receive any PA, endorses that she has been non-compliant with meds with fair sleep/appetite. She was initially started on Seroquel 50 mg HS, which she got for 2 days, but today AM she endorses that she has nightmares so she opted to take Haldol with Ambien. She endorses that she has bipolar D/O since childhood and was also indulging Crack Cocaine, she was on a binge all day the day before she came to [facility]. She has been using Crack Cocaine 3-4 times a week since she started at age 15. She added that she was in rehab, at least 3 times, last at [facility] for 3 weeks in 2017. She then added that she was also in rehab at [facility] in 2018, which proves inconsistencies in her story. She prefers to go to rehab preferable at [facility] and was jailed for 2 years for drug possession and selling. Her parole will end in May/2022.  Her mood at times irritable, but in control and she has no S/H/I/P now, but she has hx of cutting, last cut many years back. She smokes 3 cigarettes a day, and denied using other drug.    03/23/2021: Patient was seen today AM. She has been meds compliant since she came here. She has hx of Crack Cocaine abuse almost daily and was also on Cocaine/Crack Binge before she came to ED at [facility]. Now she realizes that she needs to go to rehab for helping with Cocaine/Crack abuse/misuse. She remains fixated to get help and has been endorsing to maintain sustained remission of cocaine/crack to have a decent life from now on. She has been here for 3-4 days, and has been showing good mood control, with no aggression/agitation, good sleep/appetite and no PRN meds were needed. She has to been to limited groups and was advised to have group participation which she has to do at rehab for overall improvement.  Haldol to be increased to Haldol 5 mg HS from now on.

## 2021-03-24 DIAGNOSIS — Z71.89 OTHER SPECIFIED COUNSELING: ICD-10-CM

## 2021-03-24 DIAGNOSIS — F19.94 OTHER PSYCHOACTIVE SUBSTANCE USE, UNSPECIFIED WITH PSYCHOACTIVE SUBSTANCE-INDUCED MOOD DISORDER: ICD-10-CM

## 2021-03-24 PROCEDURE — 99231 SBSQ HOSP IP/OBS SF/LOW 25: CPT

## 2021-03-24 RX ORDER — TUBERCULIN PURIFIED PROTEIN DERIVATIVE 5 [IU]/.1ML
5 INJECTION, SOLUTION INTRADERMAL ONCE
Refills: 0 | Status: COMPLETED | OUTPATIENT
Start: 2021-03-24 | End: 2021-03-24

## 2021-03-24 RX ORDER — IBUPROFEN 200 MG
600 TABLET ORAL EVERY 6 HOURS
Refills: 0 | Status: DISCONTINUED | OUTPATIENT
Start: 2021-03-24 | End: 2021-03-26

## 2021-03-24 RX ADMIN — HALOPERIDOL DECANOATE 2 MILLIGRAM(S): 100 INJECTION INTRAMUSCULAR at 20:15

## 2021-03-24 RX ADMIN — Medication 81 MILLIGRAM(S): at 09:26

## 2021-03-24 RX ADMIN — Medication 2 MILLIGRAM(S): at 12:02

## 2021-03-24 RX ADMIN — Medication 600 MILLIGRAM(S): at 10:40

## 2021-03-24 RX ADMIN — Medication 4: at 07:50

## 2021-03-24 RX ADMIN — TUBERCULIN PURIFIED PROTEIN DERIVATIVE 5 UNIT(S): 5 INJECTION, SOLUTION INTRADERMAL at 17:23

## 2021-03-24 RX ADMIN — Medication 8: at 17:22

## 2021-03-24 RX ADMIN — Medication 600 MILLIGRAM(S): at 10:52

## 2021-03-24 RX ADMIN — HALOPERIDOL DECANOATE 2 MILLIGRAM(S): 100 INJECTION INTRAMUSCULAR at 09:26

## 2021-03-24 RX ADMIN — INSULIN GLARGINE 25 UNIT(S): 100 INJECTION, SOLUTION SUBCUTANEOUS at 20:15

## 2021-03-24 RX ADMIN — Medication 6: at 12:01

## 2021-03-24 RX ADMIN — Medication 2 MILLIGRAM(S): at 20:15

## 2021-03-24 NOTE — PROGRESS NOTE BEHAVIORAL HEALTH - NSBHFUPINTERVALHXFT_PSY_A_CORE
Covering MD Note ( 3/21/2021)  Pt seen in her room where she has continued to retreat to her bed despite ongoing staff support and encouragement to have the pt participate in therapy groups.   The pt was seen by the hospitalist and her Lantus dose was increased from 20 to 25 UNITS qhs for treatment of her IDDM. Pt with a h/o Coreg for HTN but pt unsure of dose and stated she would verify with her family. Vital signs have been largely WNL on no antihypertensive med at this time. Pt had not been consistently attending therapy groups but did so with writer's reminder that rehab treatment would entail a high percentage of group therapy attendance.   Pt alert and oriented x 3. Not initiating interactions with staff or peers but slowly becoming more amenable to join with select peers in art focused groups.   The pt was begun on low dose Seroquel 50 mg po qhs for mood and for sleep with good effect. The pt has not wished to discuss the recent death of her younger brother.  The pt continues to appear dysphoric and irritable with constricted affect but she has not currently endorsed SI /HI.  Initial referral diagnosis appears doubtful  with current pt clinical information and presentation. Judgment is fair with limited insight.    03/22/2021: Patient was seen today AM. She is admitted voluntarily from [facility]. She is 40, has 2 children a D-18 domiciled by herself and a S-15 domiciled with Paternal GF. She endorses that she is unemployed, domiciled with BF, who also helps her financially, she does not receive any PA, endorses that she has been non-compliant with meds with fair sleep/appetite. She was initially started on Seroquel 50 mg HS, which she got for 2 days, but today AM she endorses that she has nightmares so she opted to take Haldol with Ambien. She endorses that she has bipolar D/O since childhood and was also indulging Crack Cocaine, she was on a binge all day the day before she came to [facility]. She has been using Crack Cocaine 3-4 times a week since she started at age 15. She added that she was in rehab, at least 3 times, last at [facility] for 3 weeks in 2017. She then added that she was also in rehab at [facility] in 2018, which proves inconsistencies in her story. She prefers to go to rehab preferable at [facility] and was jailed for 2 years for drug possession and selling. Her parole will end in May/2022.  Her mood at times irritable, but in control and she has no S/H/I/P now, but she has hx of cutting, last cut many years back. She smokes 3 cigarettes a day, and denied using other drug.    03/23/2021: Patient was seen today AM. She has been meds compliant since she came here. She has hx of Crack Cocaine abuse almost daily and was also on Cocaine/Crack Binge before she came to ED at [facility]. Now she realizes that she needs to go to rehab for helping with Cocaine/Crack abuse/misuse. She remains fixated to get help and has been endorsing to maintain sustained remission of cocaine/crack to have a decent life from now on. She has been here for 3-4 days, and has been showing good mood control, with no aggression/agitation, good sleep/appetite and no PRN meds were needed. She has to been to limited groups and was advised to have group participation which she has to do at rehab for overall improvement.  Haldol to be increased to Haldol 5 mg HS from now on.    03/24/2021: Patient was seen today AM. Mood in control and also participating in groups and other unit activities. Overall OK with no behavioral issues till now and also meds complaint as ordered. To continue to look  for rehab as requested for stability. Haldol increased to Haldol 5 mg HS from today.

## 2021-03-25 LAB — SARS-COV-2 RNA SPEC QL NAA+PROBE: SIGNIFICANT CHANGE UP

## 2021-03-25 PROCEDURE — 99231 SBSQ HOSP IP/OBS SF/LOW 25: CPT

## 2021-03-25 RX ADMIN — Medication 4: at 07:34

## 2021-03-25 RX ADMIN — Medication 600 MILLIGRAM(S): at 13:35

## 2021-03-25 RX ADMIN — Medication 2 MILLIGRAM(S): at 13:35

## 2021-03-25 RX ADMIN — Medication 2 MILLIGRAM(S): at 11:45

## 2021-03-25 RX ADMIN — Medication 8: at 17:17

## 2021-03-25 RX ADMIN — HALOPERIDOL DECANOATE 2 MILLIGRAM(S): 100 INJECTION INTRAMUSCULAR at 21:05

## 2021-03-25 RX ADMIN — Medication 81 MILLIGRAM(S): at 07:30

## 2021-03-25 RX ADMIN — INSULIN GLARGINE 25 UNIT(S): 100 INJECTION, SOLUTION SUBCUTANEOUS at 21:06

## 2021-03-25 RX ADMIN — Medication 2 MILLIGRAM(S): at 21:22

## 2021-03-25 RX ADMIN — Medication 2 MILLIGRAM(S): at 07:30

## 2021-03-25 RX ADMIN — HALOPERIDOL DECANOATE 2 MILLIGRAM(S): 100 INJECTION INTRAMUSCULAR at 09:05

## 2021-03-25 RX ADMIN — Medication 600 MILLIGRAM(S): at 22:38

## 2021-03-25 RX ADMIN — Medication 4: at 12:10

## 2021-03-25 NOTE — DISCHARGE NOTE BEHAVIORAL HEALTH - PROVIDER TOKENS
FREE:[LAST:[[facility]],PHONE:[(   )    -],FAX:[(   )    -],ADDRESS:[See SW note below elana continued care]]

## 2021-03-25 NOTE — DISCHARGE NOTE BEHAVIORAL HEALTH - NSBHDCCRISISPLAN2FT_PSY_A_CORE
Call [facility] 5N: [phone]  : Joyce Block LMSW  Psychiatrists- Dr. Kalani Romero

## 2021-03-25 NOTE — DISCHARGE NOTE BEHAVIORAL HEALTH - HPI (INCLUDE ILLNESS QUALITY, SEVERITY, DURATION, TIMING, CONTEXT, MODIFYING FACTORS, ASSOCIATED SIGNS AND SYMPTOMS)
Per pt's  assessment completed 3/20/21 by Dr. Ida Mesa: "The pt is a 40 year-old AA female , lives with a h/o polysubstance use d/o, substance -induced mood and anxiety disorders, and a PMH + for IDDM and HTN  who was BIB self on 3/19/2021 to [facility] due to pt report of SI and worsening depression after the sudden death of her younger brother.   The pt,  with hx of crack cocaine abuse (states in remission 1 yr) and multiple episodes of SI requiring inpatient management presents to ED with depression and SI for the past month,  increased after learning her 27 yo brother was killed yesterday, with vague plan to OD, h/o aborted suicide attempt by train,  per record, h/o attempt to drown herself in her bathtub, h/o cutting wrists,  PMH IDDM, HTN bib bf for psych admissions secondary to depression and SI  Pt reports depressed mood, sadness, grief,  anger, anhedonia, impaired sleep and command  to "end it".  Pt is not on meds and has not been in tx since last psych admission since last in pt admission at [facility] Feb 2018.  Pt admits to crack cocaine abuse however claims she is in remission x 1 year. Pt came to ED with a suitcase planning a psych admission.  No evidence of agitation, aggression, mood is depressed, SI with plan to OD with alcohol.    The pt. was  admitted to [address] inpatient psychiatry on 3/20/2021 on a 9.13 voluntary legal status for acute safety and for further evaluation and treatment of her recent grief fueled anxious depression and recent SI." Per pt's  assessment completed 3/20/21 by Dr. Ida Mesa: "The pt is a 40 year-old AA female , lives with a h/o polysubstance use d/o, substance -induced mood and anxiety disorders, and a PMH + for IDDM and HTN  who was BIB self on 3/19/2021 to [facility] due to pt report of SI and worsening depression after the sudden death of her younger brother.     The pt,  with hx of crack cocaine abuse (states in remission 1 yr) and multiple episodes of SI requiring inpatient management presents to ED with depression and SI for the past month,  increased after learning her 29 yo brother was killed yesterday, with vague plan to OD, h/o aborted suicide attempt by train,  per record, h/o attempt to drown herself in her bathtub, h/o cutting wrists,  PMH IDDM, HTN bib bf for psych admissions secondary to depression and SI    Pt reports depressed mood, sadness, grief,  anger, anhedonia, impaired sleep and command  to "end it".  Pt is not on meds and has not been in tx since last psych admission since last in pt admission at [facility] Feb 2018.  Pt admits to crack cocaine abuse however claims she is in remission x 1 year. Pt came to ED with a suitcase planning a psych admission.  No evidence of agitation, aggression, mood is depressed, SI with plan to OD with alcohol.    The pt. was  admitted to [address] inpatient psychiatry on 3/20/2021 on a 9.13 voluntary legal status for acute safety and for further evaluation and treatment of her recent grief fueled anxious depression and recent SI."    Interval Hx: Patient was admitted voluntarily from [facility]. She was positive for THC/Cocaine. On admission she was given Seroquel 50 mg HS for 1-2 days , later she endorsed that she needs to take haldol as Seroquel causes Nightmares for her. She was stared on Haldol 2 mg BID with Later titration to Haldol 5 mg HS. She was on Haldol and there are no meds induced s/e till now. She had good sleep/appetite. She denied S/H/I/P, endorsed that she never tried to commit suicide. There were no Psychotic components noted, her symptoms were mostly Cocaine related. She did participate in unit group activities and was visible in the unit.      Medically has DM controlled with Lantis 25 mg HS. Her labs are WNL, she received COVID-19 J&J Vaccine 1 dose and was COVID-19 Negative on 03/25/2021.

## 2021-03-25 NOTE — DISCHARGE NOTE BEHAVIORAL HEALTH - NSBHDCCRISISPLAN1FT_PSY_A_CORE
Tell a trusted friend/family member, tell housing staff, tell clinic staff, call a crisis line ( Crisis Center ph. [phone], [facility] DASH [phone], [facility] (peer support) ph. [phone]), return to the nearest emergency room. You may call 9-1-1 for assistance.

## 2021-03-25 NOTE — DISCHARGE NOTE BEHAVIORAL HEALTH - NS TRANSFER PATIENT BELONGINGS
$53, phone , insurance card, gift card, bank cards, credit cards/Cell Phone/PDA (specify)/Other belongings/Clothing

## 2021-03-25 NOTE — DISCHARGE NOTE BEHAVIORAL HEALTH - NSBHDCRESOURCESOTHERFT_PSY_A_CORE
Pt with [facility] , LORENZO Cesar, [phone]    [facility] D[facility]- for psychiatric crises (available AFTER HOURS)  24/7 hotline: [phone]  Address: [place] [address]    SMART Recovery Groups  *Can meet online   [email or web address]://www.[email or web address]     Find a local AA group:  [facility]  [email or web address]  [phone]    [phone] ([phone]) or [email or web address]    Mood Disorder Support Group of [place]:   Free Zoom Support Groups:   [email or web address]

## 2021-03-25 NOTE — DISCHARGE NOTE BEHAVIORAL HEALTH - NSBHDCREFEROTHERFT_PSY_A_CORE
[facility] DASH- for psychiatric crises (available AFTER HOURS)  24/7 hotline: [phone]  Address:  [address], [place] [address]

## 2021-03-25 NOTE — DISCHARGE NOTE BEHAVIORAL HEALTH - MEDICATION SUMMARY - MEDICATIONS TO TAKE
I will START or STAY ON the medications listed below when I get home from the hospital:    aspirin 81 mg oral tablet, chewable  -- 1 tab(s) by mouth once a day  -- Indication: For Cardio-Protection    insulin glargine  -- 25 unit(s) subcutaneous once a day (at bedtime)  -- Indication: For DM    haloperidol 5 mg oral tablet  -- 1 tab(s) by mouth once a day (at bedtime)  -- Indication: For Mood

## 2021-03-25 NOTE — DISCHARGE NOTE BEHAVIORAL HEALTH - CARE PROVIDER_API CALL
[facility],   See SW note below elana continued care  Phone: (   )    -  Fax: (   )    -  Follow Up Time:

## 2021-03-25 NOTE — DISCHARGE NOTE BEHAVIORAL HEALTH - FAMILY HISTORY OF PSYCHIATRIC ILLNESS
Per 5N hx, sister has a hx of drug addiction, some mental health issues with family members, dx unclear.

## 2021-03-25 NOTE — DISCHARGE NOTE BEHAVIORAL HEALTH - NSBHDCSUBSTHXFT_PSY_A_CORE
Per 5N hx, pt reports long hx of drug use, Cocaine/Crack, has periods of sobriety, relapsed, hx of tx both inpatient and outpatient, last inpatient admission at [facility] for rehab/detox 2018.

## 2021-03-25 NOTE — DISCHARGE NOTE BEHAVIORAL HEALTH - NSBHDCTESTSFT_PSY_A_CORE
CBC--WNL  Chemistry--WNL  HBA1-C--11.9  Pregnancy test--Negative  COVID-19 Negative on 03/25/2021  S. Cholesterol--225  S. TRi--89  S.HDL--70  S.LDL--137

## 2021-03-25 NOTE — PROGRESS NOTE BEHAVIORAL HEALTH - NSBHFUPINTERVALHXFT_PSY_A_CORE
Covering MD Note ( 3/21/2021)  Pt seen in her room where she has continued to retreat to her bed despite ongoing staff support and encouragement to have the pt participate in therapy groups.   The pt was seen by the hospitalist and her Lantus dose was increased from 20 to 25 UNITS qhs for treatment of her IDDM. Pt with a h/o Coreg for HTN but pt unsure of dose and stated she would verify with her family. Vital signs have been largely WNL on no antihypertensive med at this time. Pt had not been consistently attending therapy groups but did so with writer's reminder that rehab treatment would entail a high percentage of group therapy attendance.   Pt alert and oriented x 3. Not initiating interactions with staff or peers but slowly becoming more amenable to join with select peers in art focused groups.   The pt was begun on low dose Seroquel 50 mg po qhs for mood and for sleep with good effect. The pt has not wished to discuss the recent death of her younger brother.  The pt continues to appear dysphoric and irritable with constricted affect but she has not currently endorsed SI /HI.  Initial referral diagnosis appears doubtful  with current pt clinical information and presentation. Judgment is fair with limited insight.    03/22/2021: Patient was seen today AM. She is admitted voluntarily from [facility]. She is 40, has 2 children a D-18 domiciled by herself and a S-15 domiciled with Paternal GF. She endorses that she is unemployed, domiciled with BF, who also helps her financially, she does not receive any PA, endorses that she has been non-compliant with meds with fair sleep/appetite. She was initially started on Seroquel 50 mg HS, which she got for 2 days, but today AM she endorses that she has nightmares so she opted to take Haldol with Ambien. She endorses that she has bipolar D/O since childhood and was also indulging Crack Cocaine, she was on a binge all day the day before she came to [facility]. She has been using Crack Cocaine 3-4 times a week since she started at age 15. She added that she was in rehab, at least 3 times, last at [facility] for 3 weeks in 2017. She then added that she was also in rehab at [facility] in 2018, which proves inconsistencies in her story. She prefers to go to rehab preferable at [facility] and was jailed for 2 years for drug possession and selling. Her parole will end in May/2022.  Her mood at times irritable, but in control and she has no S/H/I/P now, but she has hx of cutting, last cut many years back. She smokes 3 cigarettes a day, and denied using other drug.    03/23/2021: Patient was seen today AM. She has been meds compliant since she came here. She has hx of Crack Cocaine abuse almost daily and was also on Cocaine/Crack Binge before she came to ED at [facility]. Now she realizes that she needs to go to rehab for helping with Cocaine/Crack abuse/misuse. She remains fixated to get help and has been endorsing to maintain sustained remission of cocaine/crack to have a decent life from now on. She has been here for 3-4 days, and has been showing good mood control, with no aggression/agitation, good sleep/appetite and no PRN meds were needed. She has to been to limited groups and was advised to have group participation which she has to do at rehab for overall improvement.  Haldol to be increased to Haldol 5 mg HS from now on.    03/24/2021: Patient was seen today AM. Mood in control and also participating in groups and other unit activities. Overall OK with no behavioral issues till now and also meds complaint as ordered. To continue to look  for rehab as requested for stability. Haldol increased to Haldol 5 mg HS from today.    03/25/2021: Patient was seen today AM. She prefers to stay in bed in AM. Overall OK and received her PPD test yesterday. Awaiting for rehab at [facility] or other places and to continue to look for suitability, she is also meds compliant and has no issues till now.

## 2021-03-25 NOTE — DISCHARGE NOTE BEHAVIORAL HEALTH - MEDICATION SUMMARY - MEDICATIONS TO STOP TAKING
I will STOP taking the medications listed below when I get home from the hospital:    Lantus 100 units/mL subcutaneous solution  -- 20 unit(s) subcutaneous once a day (at bedtime)

## 2021-03-25 NOTE — DISCHARGE NOTE BEHAVIORAL HEALTH - NSBHDCHOUSINGFT_PSY_A_CORE
[facility]  [address] [place] [id number] [phone]

## 2021-03-25 NOTE — DISCHARGE NOTE BEHAVIORAL HEALTH - REASON FOR ADMISSION
Noted, thanks.  
Patient's mother Yessica came in and wanted to let Dr. Jacob know that her and her ex Yuliana have been going to mediation and since 06/07/17 Yuliana now has a temporary partial custody court order. It was agreed upon that Yuliana can now call regarding the patient's health but would still not have access to the patient's [facility] account. But we can now talk to Yuliana regarding the patient's chart. Yessica is the primary placement in the custody agreement so she still would like to be the patient's primary contact.    Dr. Cecil NOGUERA.  
Per pt's BH assessment completed 3/20/21 by Dr. Ida Mesa: " I don't know if I'm coming or going."

## 2021-03-25 NOTE — DISCHARGE NOTE BEHAVIORAL HEALTH - NSBHDCCRISISPLAN3FT_PSY_A_CORE
Discuss in treatment with counselor, attended a local/online self-help group, call a hotline ([facility] ([facility])[phone])

## 2021-03-26 VITALS — OXYGEN SATURATION: 99 % | TEMPERATURE: 98 F | RESPIRATION RATE: 18 BRPM

## 2021-03-26 PROCEDURE — 99239 HOSP IP/OBS DSCHRG MGMT >30: CPT

## 2021-03-26 RX ORDER — HALOPERIDOL DECANOATE 100 MG/ML
5 INJECTION INTRAMUSCULAR AT BEDTIME
Refills: 0 | Status: DISCONTINUED | OUTPATIENT
Start: 2021-03-26 | End: 2021-03-26

## 2021-03-26 RX ORDER — ASPIRIN/CALCIUM CARB/MAGNESIUM 324 MG
1 TABLET ORAL
Qty: 0 | Refills: 0 | DISCHARGE
Start: 2021-03-26

## 2021-03-26 RX ORDER — INSULIN GLARGINE 100 [IU]/ML
25 INJECTION, SOLUTION SUBCUTANEOUS
Qty: 0 | Refills: 0 | DISCHARGE
Start: 2021-03-26

## 2021-03-26 RX ORDER — HALOPERIDOL DECANOATE 100 MG/ML
1 INJECTION INTRAMUSCULAR
Qty: 0 | Refills: 0 | DISCHARGE
Start: 2021-03-26

## 2021-03-26 RX ORDER — JNJ-78436735 50000000000 [PFU]/.5ML
0.5 SUSPENSION INTRAMUSCULAR ONCE
Refills: 0 | Status: COMPLETED | OUTPATIENT
Start: 2021-03-26 | End: 2021-03-26

## 2021-03-26 RX ADMIN — JNJ-78436735 0.5 MILLILITER(S): 50000000000 SUSPENSION INTRAMUSCULAR at 13:04

## 2021-03-26 RX ADMIN — Medication 81 MILLIGRAM(S): at 09:21

## 2021-03-26 RX ADMIN — TUBERCULIN PURIFIED PROTEIN DERIVATIVE 5 UNIT(S): 5 INJECTION, SOLUTION INTRADERMAL at 15:18

## 2021-03-26 RX ADMIN — Medication 4: at 08:33

## 2021-03-26 RX ADMIN — Medication 2: at 12:44

## 2021-03-26 RX ADMIN — HALOPERIDOL DECANOATE 2 MILLIGRAM(S): 100 INJECTION INTRAMUSCULAR at 09:21

## 2021-03-26 RX ADMIN — Medication 2 MILLIGRAM(S): at 15:21

## 2021-03-26 NOTE — PROGRESS NOTE BEHAVIORAL HEALTH - PRIMARY DX
Severe episode of recurrent major depressive disorder, without psychotic features
Substance induced mood disorder
Substance induced mood disorder
Severe episode of recurrent major depressive disorder, without psychotic features
Severe episode of recurrent major depressive disorder, without psychotic features
Substance induced mood disorder

## 2021-03-26 NOTE — PROGRESS NOTE BEHAVIORAL HEALTH - NSBHCHARTREVIEWVS_PSY_A_CORE FT
Vital Signs Last 24 Hrs  T(C): 36.7 (24 Mar 2021 07:42), Max: 36.7 (24 Mar 2021 07:42)  T(F): 98 (24 Mar 2021 07:42), Max: 98 (24 Mar 2021 07:42)  HR: --  BP: --  BP(mean): --  RR: 18 (24 Mar 2021 07:42) (18 - 18)  SpO2: 100% (24 Mar 2021 07:42) (100% - 100%)
Vital Signs Last 24 Hrs  T(C): 36.7 (21 Mar 2021 08:14), Max: 36.7 (21 Mar 2021 08:14)  T(F): 98.1 (21 Mar 2021 08:14), Max: 98.1 (21 Mar 2021 08:14)  HR: --  BP: --  BP(mean): --  RR: 18 (21 Mar 2021 08:14) (18 - 18)  SpO2: 100% (21 Mar 2021 08:14) (100% - 100%)
Vital Signs Last 24 Hrs  T(C): 36.7 (25 Mar 2021 07:25), Max: 36.7 (25 Mar 2021 07:25)  T(F): 98.1 (25 Mar 2021 07:25), Max: 98.1 (25 Mar 2021 07:25)  HR: --  BP: --  BP(mean): --  RR: 16 (25 Mar 2021 07:25) (16 - 16)  SpO2: 100% (25 Mar 2021 07:25) (100% - 100%)
Vital Signs Last 24 Hrs  T(C): 36.8 (23 Mar 2021 08:19), Max: 36.8 (23 Mar 2021 08:19)  T(F): 98.2 (23 Mar 2021 08:19), Max: 98.2 (23 Mar 2021 08:19)  HR: --  BP: --  BP(mean): --  RR: 14 (23 Mar 2021 08:19) (14 - 14)  SpO2: 100% (23 Mar 2021 08:19) (100% - 100%)
Vital Signs Last 24 Hrs  T(C): 36.6 (22 Mar 2021 08:10), Max: 36.6 (22 Mar 2021 08:10)  T(F): 97.9 (22 Mar 2021 08:10), Max: 97.9 (22 Mar 2021 08:10)  HR: --  BP: --  BP(mean): --  RR: 14 (22 Mar 2021 08:10) (14 - 14)  SpO2: 100% (22 Mar 2021 08:10) (100% - 100%)
Vital Signs Last 24 Hrs  T(C): 36.8 (26 Mar 2021 07:26), Max: 36.8 (26 Mar 2021 07:26)  T(F): 98.2 (26 Mar 2021 07:26), Max: 98.2 (26 Mar 2021 07:26)  HR: --  BP: --  BP(mean): --  RR: 18 (26 Mar 2021 07:26) (18 - 18)  SpO2: 99% (26 Mar 2021 07:26) (99% - 99%)

## 2021-03-26 NOTE — PROGRESS NOTE BEHAVIORAL HEALTH - RISK ASSESSMENT
Low risk for suicide  Acute risk factors- active polysubstance use d/o, recent unexpected death of her younger brother, pt is homeless   Chronic risk factors- longstanding severe polysubstance use d/o, h/o comorbid severe and poorly controlled IDDM, pt h/o treatment noncompliance  Protective factors- pt is intelligent, creative, ,future oriented, now reportedly interested in inpatient drug rehab treatment  Mitigating factors- pt now in safe setting of hospital, pt amenable to ongoing treatment of her medical and psychiatric illnesses, pt able to form limited therapeutic alliances  with select staff

## 2021-03-26 NOTE — PROGRESS NOTE BEHAVIORAL HEALTH - PROBLEM SELECTOR PROBLEM 2
Polysubstance dependence

## 2021-03-26 NOTE — PROGRESS NOTE BEHAVIORAL HEALTH - THOUGHT CONTENT
Preoccupations/Ruminations

## 2021-03-26 NOTE — PROGRESS NOTE BEHAVIORAL HEALTH - AXIS III
IDDM  HTN  ? seizure d/o

## 2021-03-26 NOTE — PROGRESS NOTE BEHAVIORAL HEALTH - NSBHCHARTREVIEWLAB_PSY_A_CORE FT
HgA1C= 11.9  Tox screen + cocaine and THC on 3/19/2021

## 2021-03-26 NOTE — PROGRESS NOTE BEHAVIORAL HEALTH - NSBHPTASSESSDT_PSY_A_CORE
22-Mar-2021 14:37
24-Mar-2021 11:48
21-Mar-2021
25-Mar-2021 11:08
23-Mar-2021 11:31
26-Mar-2021 11:06

## 2021-03-26 NOTE — PROGRESS NOTE BEHAVIORAL HEALTH - NSBHFUPINTERVALHXFT_PSY_A_CORE
Patient was seen today AM. Mood in control, doing very well with her meds, overall no complaints and is happy to go to rehab at [facility], no aggression/agitation noted. COVID-19-Negative, she is interested to have COVID-19 vaccine before she goes to rehab. Meds to be continues as ordered for stability.

## 2021-03-26 NOTE — PROGRESS NOTE BEHAVIORAL HEALTH - OTHER
" I have to feel better first before I go to rehab"

## 2021-03-26 NOTE — PROGRESS NOTE BEHAVIORAL HEALTH - NSBHCHARTREVIEWINVESTIGATE_PSY_A_CORE FT
k< from: 12 Lead ECG (03.21.21 @ 08:01) >    Ventricular Rate 74 BPM    Atrial Rate 74 BPM    P-R Interval 144 ms    QRS Duration 98 ms    Q-T Interval 402 ms    QTC Calculation(Bazett) 446 ms    P Axis 54 degrees    R Axis 67 degrees    T Axis -32 degrees    Diagnosis Line Normal sinus rhythm  Nonspecific T wave abnormality  Abnormal ECG  No previous ECGs available  Confirmed by MD BRODY, DIANE ([id number] on 3/21/2021 8:08:48 PM    < end of copied text >

## 2021-03-26 NOTE — PROGRESS NOTE BEHAVIORAL HEALTH - NSBHCHARTREVIEWIMAGING_PSY_A_CORE FT
MEDICATIONS  (STANDING):  aspirin  chewable 81 milliGRAM(s) Oral daily  dextrose 40% Gel 15 Gram(s) Oral once  dextrose 5%. 1000 milliLiter(s) (50 mL/Hr) IV Continuous <Continuous>  dextrose 5%. 1000 milliLiter(s) (100 mL/Hr) IV Continuous <Continuous>  dextrose 50% Injectable 25 Gram(s) IV Push once  dextrose 50% Injectable 12.5 Gram(s) IV Push once  dextrose 50% Injectable 25 Gram(s) IV Push once  glucagon  Injectable 1 milliGRAM(s) IntraMuscular once  insulin glargine Injectable (LANTUS) 25 Unit(s) SubCutaneous at bedtime  insulin lispro (ADMELOG) corrective regimen sliding scale   SubCutaneous three times a day before meals  QUEtiapine 50 milliGRAM(s) Oral at bedtime    MEDICATIONS  (PRN):  haloperidol     Tablet 5 milliGRAM(s) Oral every 6 hours PRN psychosis/agitation  LORazepam     Tablet 2 milliGRAM(s) Oral every 6 hours PRN severe anxiety/agitation/insomnia

## 2021-03-26 NOTE — PROGRESS NOTE BEHAVIORAL HEALTH - PROBLEM SELECTOR PLAN 1
1. To discuss antidepressant treatment options  2. Disposition planning ongoing  3. Pt encouraged to attend therapy groups as tolerated  4. Seroquel 50 mg po qhs resumed for mood stability and for chronic insomnia
1. To discuss antidepressant treatment options  2.Disposition planning ongoing  3.Pt encouraged to attend therapy groups as tolerated  4. Seroquel 50 mg po qhs resumed for mood stability and for chronic insomnia
1. To discuss antidepressant treatment options  2. Disposition planning ongoing  3. Pt encouraged to attend therapy groups as tolerated  4. Seroquel 50 mg po qhs resumed for mood stability and for chronic insomnia

## 2021-03-26 NOTE — PROGRESS NOTE BEHAVIORAL HEALTH - SUMMARY
The pt is a 40 year-old AA female , lives with a h/o polysubstance use d/o, substance -induced mood and anxiety disorders, and a PMH + for IDDM and HTN  who was BIB self on 3/19/2021 to [facility] due to pt report of SI and worsening depression after the sudden death of her younger brother.   The pt,  with hx of crack cocaine abuse (states in remission 1 yr) and multiple episodes of SI requiring inpatient management presents to ED with depression and SI for the past month,  increased after learning her 29 yo brother was killed yesterday, with vague plan to OD, h/o aborted suicide attempt by train,  per record, h/o attempt to drown herself in her bathtub, h/o cutting wrists,  PMH IDDM, HTN bib bf for psych admissions secondary to depression and SI  Pt reports depressed mood, sadness, grief,  anger, anhedonia, impaired sleep and command  to "end it".  Pt is not on meds and has not been in tx since last psych admission since last in pt admission at [facility] Feb 2018.  Pt admits to crack cocaine abuse however claims she is in remission x 1 year. Pt came to ED with a suitcase planning a psych admission.  No evidence of agitation, aggression, mood is depressed, SI with plan to OD with alcohol.    The pt. was  admitted to [address] inpatient psychiatry on 3/20/2021 on a 9.13 voluntary legal status for acute safety and for further evaluation and treatment of her recent grief fueled anxious depression and recent SI.
The pt is a 40 year-old AA female , lives with a h/o polysubstance use d/o, substance -induced mood and anxiety disorders, and a PMH + for IDDM and HTN  who was BIB self on 3/19/2021 to [facility] due to pt report of SI and worsening depression after the sudden death of her younger brother.   The pt,  with hx of crack cocaine abuse (states in remission 1 yr) and multiple episodes of SI requiring inpatient management presents to ED with depression and SI for the past month,  increased after learning her 27 yo brother was killed yesterday, with vague plan to OD, h/o aborted suicide attempt by train,  per record, h/o attempt to drown herself in her bathtub, h/o cutting wrists,  PMH IDDM, HTN bib bf for psych admissions secondary to depression and SI  Pt reports depressed mood, sadness, grief,  anger, anhedonia, impaired sleep and command  to "end it".  Pt is not on meds and has not been in tx since last psych admission since last in pt admission at [facility] Feb 2018.  Pt admits to crack cocaine abuse however claims she is in remission x 1 year. Pt came to ED with a suitcase planning a psych admission.  No evidence of agitation, aggression, mood is depressed, SI with plan to OD with alcohol.    The pt. was  admitted to [address] inpatient psychiatry on 3/20/2021 on a 9.13 voluntary legal status for acute safety and for further evaluation and treatment of her recent grief fueled anxious depression and recent SI.
The pt is a 40 year-old AA female , lives with a h/o polysubstance use d/o, substance -induced mood and anxiety disorders, and a PMH + for IDDM and HTN  who was BIB self on 3/19/2021 to [facility] due to pt report of SI and worsening depression after the sudden death of her younger brother.   The pt,  with hx of crack cocaine abuse (states in remission 1 yr) and multiple episodes of SI requiring inpatient management presents to ED with depression and SI for the past month,  increased after learning her 27 yo brother was killed yesterday, with vague plan to OD, h/o aborted suicide attempt by train,  per record, h/o attempt to drown herself in her bathtub, h/o cutting wrists,  PMH IDDM, HTN bib bf for psych admissions secondary to depression and SI  Pt reports depressed mood, sadness, grief,  anger, anhedonia, impaired sleep and command  to "end it".  Pt is not on meds and has not been in tx since last psych admission since last in pt admission at [facility] Feb 2018.  Pt admits to crack cocaine abuse however claims she is in remission x 1 year. Pt came to ED with a suitcase planning a psych admission.  No evidence of agitation, aggression, mood is depressed, SI with plan to OD with alcohol.    The pt. was  admitted to [address] inpatient psychiatry on 3/20/2021 on a 9.13 voluntary legal status for acute safety and for further evaluation and treatment of her recent grief fueled anxious depression and recent SI.
The pt is a 40 year-old AA female , lives with a h/o polysubstance use d/o, substance -induced mood and anxiety disorders, and a PMH + for IDDM and HTN  who was BIB self on 3/19/2021 to [facility] due to pt report of SI and worsening depression after the sudden death of her younger brother.   The pt,  with hx of crack cocaine abuse (states in remission 1 yr) and multiple episodes of SI requiring inpatient management presents to ED with depression and SI for the past month,  increased after learning her 29 yo brother was killed yesterday, with vague plan to OD, h/o aborted suicide attempt by train,  per record, h/o attempt to drown herself in her bathtub, h/o cutting wrists,  PMH IDDM, HTN bib bf for psych admissions secondary to depression and SI  Pt reports depressed mood, sadness, grief,  anger, anhedonia, impaired sleep and command  to "end it".  Pt is not on meds and has not been in tx since last psych admission since last in pt admission at [facility] Feb 2018.  Pt admits to crack cocaine abuse however claims she is in remission x 1 year. Pt came to ED with a suitcase planning a psych admission.  No evidence of agitation, aggression, mood is depressed, SI with plan to OD with alcohol.    The pt. was  admitted to [address] inpatient psychiatry on 3/20/2021 on a 9.13 voluntary legal status for acute safety and for further evaluation and treatment of her recent grief fueled anxious depression and recent SI.
The pt is a 40 year-old AA female , lives with a h/o polysubstance use d/o, substance -induced mood and anxiety disorders, and a PMH + for IDDM and HTN  who was BIB self on 3/19/2021 to [facility] due to pt report of SI and worsening depression after the sudden death of her younger brother.   The pt,  with hx of crack cocaine abuse (states in remission 1 yr) and multiple episodes of SI requiring inpatient management presents to ED with depression and SI for the past month,  increased after learning her 27 yo brother was killed yesterday, with vague plan to OD, h/o aborted suicide attempt by train,  per record, h/o attempt to drown herself in her bathtub, h/o cutting wrists,  PMH IDDM, HTN bib bf for psych admissions secondary to depression and SI  Pt reports depressed mood, sadness, grief,  anger, anhedonia, impaired sleep and command  to "end it".  Pt is not on meds and has not been in tx since last psych admission since last in pt admission at [facility] Feb 2018.  Pt admits to crack cocaine abuse however claims she is in remission x 1 year. Pt came to ED with a suitcase planning a psych admission.  No evidence of agitation, aggression, mood is depressed, SI with plan to OD with alcohol.    The pt. was  admitted to [address] inpatient psychiatry on 3/20/2021 on a 9.13 voluntary legal status for acute safety and for further evaluation and treatment of her recent grief fueled anxious depression and recent SI.
The pt is a 40 year-old AA female , lives with a h/o polysubstance use d/o, substance -induced mood and anxiety disorders, and a PMH + for IDDM and HTN  who was BIB self on 3/19/2021 to [facility] due to pt report of SI and worsening depression after the sudden death of her younger brother.   The pt,  with hx of crack cocaine abuse (states in remission 1 yr) and multiple episodes of SI requiring inpatient management presents to ED with depression and SI for the past month,  increased after learning her 29 yo brother was killed yesterday, with vague plan to OD, h/o aborted suicide attempt by train,  per record, h/o attempt to drown herself in her bathtub, h/o cutting wrists,  PMH IDDM, HTN bib bf for psych admissions secondary to depression and SI  Pt reports depressed mood, sadness, grief,  anger, anhedonia, impaired sleep and command  to "end it".  Pt is not on meds and has not been in tx since last psych admission since last in pt admission at [facility] Feb 2018.  Pt admits to crack cocaine abuse however claims she is in remission x 1 year. Pt came to ED with a suitcase planning a psych admission.  No evidence of agitation, aggression, mood is depressed, SI with plan to OD with alcohol.    The pt. was  admitted to [address] inpatient psychiatry on 3/20/2021 on a 9.13 voluntary legal status for acute safety and for further evaluation and treatment of her recent grief fueled anxious depression and recent SI.

## 2021-03-26 NOTE — PROGRESS NOTE BEHAVIORAL HEALTH - THOUGHT PROCESS
Linear/Normal reasoning

## 2021-03-26 NOTE — PROGRESS NOTE BEHAVIORAL HEALTH - NSBHFUPINTERVALCCFT_PSY_A_CORE
" I want to go to rehab but first I got to fix myself."

## 2021-03-26 NOTE — PROGRESS NOTE BEHAVIORAL HEALTH - NSBHADMITIPOBSFT_PSY_A_CORE
pt  currently denying SI

## 2021-03-26 NOTE — PROGRESS NOTE BEHAVIORAL HEALTH - AFFECT QUALITY
Irritable/Anxious

## 2021-03-26 NOTE — PROGRESS NOTE BEHAVIORAL HEALTH - NS ED BHA MSE GENERAL APPEARANCE
No deformities present

## 2021-03-26 NOTE — PROGRESS NOTE BEHAVIORAL HEALTH - SECONDARY DX3
Nonadherence to medical treatment

## 2021-03-26 NOTE — PROGRESS NOTE BEHAVIORAL HEALTH - PROBLEM SELECTOR PLAN 3
1.Ongoing staff support and encouragement

## 2021-03-26 NOTE — PROGRESS NOTE BEHAVIORAL HEALTH - PROBLEM SELECTOR PLAN 2
1. Maintain abstinence from all substances  2. The pt is now expressing an interest in attending Dual diagnosis after care    treatment to be discussed
1. Maintain abstinence from all substances  2. The pt is now expressing an interest in attending Dual diagnosis after care    treatment to be discussed
1.Maintain abstinence from all substances  2. The pt is now expressing an interest in attending Dual diagnosis after care treatment to be discussed
1. Maintain abstinence from all substances  2. The pt is now expressing an interest in attending Dual diagnosis after care    treatment to be discussed
1. Maintain abstinence from all substances  2. The pt is now expressing an interest in attending Dual diagnosis after care    treatment to be discussed
1.Maintain abstinence from all substances  2. The pt is now expressing an interest in attending Dual diagnosis after care treatment to be discussed

## 2021-04-01 DIAGNOSIS — G40.909 EPILEPSY, UNSPECIFIED, NOT INTRACTABLE, WITHOUT STATUS EPILEPTICUS: ICD-10-CM

## 2021-04-01 DIAGNOSIS — Z91.19 PATIENT'S NONCOMPLIANCE WITH OTHER MEDICAL TREATMENT AND REGIMEN: ICD-10-CM

## 2021-04-01 DIAGNOSIS — F51.04 PSYCHOPHYSIOLOGIC INSOMNIA: ICD-10-CM

## 2021-04-01 DIAGNOSIS — F19.94 OTHER PSYCHOACTIVE SUBSTANCE USE, UNSPECIFIED WITH PSYCHOACTIVE SUBSTANCE-INDUCED MOOD DISORDER: ICD-10-CM

## 2021-04-01 DIAGNOSIS — F33.2 MAJOR DEPRESSIVE DISORDER, RECURRENT SEVERE WITHOUT PSYCHOTIC FEATURES: ICD-10-CM

## 2021-04-01 DIAGNOSIS — F31.9 BIPOLAR DISORDER, UNSPECIFIED: ICD-10-CM

## 2021-04-01 DIAGNOSIS — F14.24 COCAINE DEPENDENCE WITH COCAINE-INDUCED MOOD DISORDER: ICD-10-CM

## 2021-04-01 DIAGNOSIS — F43.23 ADJUSTMENT DISORDER WITH MIXED ANXIETY AND DEPRESSED MOOD: ICD-10-CM

## 2021-04-01 DIAGNOSIS — Z23 ENCOUNTER FOR IMMUNIZATION: ICD-10-CM

## 2021-04-01 DIAGNOSIS — Z79.4 LONG TERM (CURRENT) USE OF INSULIN: ICD-10-CM

## 2021-04-01 DIAGNOSIS — F12.90 CANNABIS USE, UNSPECIFIED, UNCOMPLICATED: ICD-10-CM

## 2021-04-01 DIAGNOSIS — J45.909 UNSPECIFIED ASTHMA, UNCOMPLICATED: ICD-10-CM

## 2021-04-01 DIAGNOSIS — E11.9 TYPE 2 DIABETES MELLITUS WITHOUT COMPLICATIONS: ICD-10-CM

## 2021-04-01 DIAGNOSIS — Z63.4 DISAPPEARANCE AND DEATH OF FAMILY MEMBER: ICD-10-CM

## 2021-04-01 DIAGNOSIS — Z91.5 PERSONAL HISTORY OF SELF-HARM: ICD-10-CM

## 2021-04-01 DIAGNOSIS — E11.65 TYPE 2 DIABETES MELLITUS WITH HYPERGLYCEMIA: ICD-10-CM

## 2021-04-01 DIAGNOSIS — F19.24 OTHER PSYCHOACTIVE SUBSTANCE DEPENDENCE WITH PSYCHOACTIVE SUBSTANCE-INDUCED MOOD DISORDER: ICD-10-CM

## 2021-04-01 DIAGNOSIS — Z91.14 PATIENT'S OTHER NONCOMPLIANCE WITH MEDICATION REGIMEN: ICD-10-CM

## 2021-04-01 DIAGNOSIS — Z91.013 ALLERGY TO SEAFOOD: ICD-10-CM

## 2021-04-01 DIAGNOSIS — I10 ESSENTIAL (PRIMARY) HYPERTENSION: ICD-10-CM

## 2021-04-01 SDOH — SOCIAL STABILITY - SOCIAL INSECURITY: DISSAPEARANCE AND DEATH OF FAMILY MEMBER: Z63.4

## 2021-04-30 NOTE — ED CDU PROVIDER SUBSEQUENT DAY NOTE - PSYCHIATRIC [+], MLM
Quality 111:Pneumonia Vaccination Status For Older Adults: Pneumococcal Vaccination not Administered or Previously Received, Reason not Otherwise Specified Quality 431: Preventive Care And Screening: Unhealthy Alcohol Use - Screening: Patient screened for unhealthy alcohol use using a single question and scores less than 2 times per year Quality 110: Preventive Care And Screening: Influenza Immunization: Influenza Immunization Administered during Influenza season Quality 226: Preventive Care And Screening: Tobacco Use: Screening And Cessation Intervention: Tobacco Screening not Performed for Medical Reasons Detail Level: Detailed DEPRESSION/ANXIETY/SUICIDAL

## 2021-09-21 NOTE — CHART NOTE - NSCHARTNOTEFT_GEN_A_CORE
20-Sep-2021 22:00 Patient Admitted from: [facility] ED    [facility] admitting diagnosis: Recurrent major depressive disorder    PAST MEDICAL & SURGICAL HISTORY:  Seizure  IDDM (insulin dependent diabetes mellitus)  Bipolar disorder  No significant past surgical history        Allergies    No Known Drug Allergies  Seafood (Swelling)  shellfish (Swelling)    Intolerances        Social History:     FAMILY HISTORY:  No pertinent family history in first degree relatives      MEDICATIONS  (STANDING):  insulin glargine Injectable (LANTUS) 25Unit(s) SubCutaneous at bedtime  insulin lispro (HumaLOG) corrective regimen sliding scale  SubCutaneous three times a day before meals  insulin lispro (HumaLOG) corrective regimen sliding scale  SubCutaneous at bedtime  dextrose 5%. 1000milliLiter(s) IV Continuous <Continuous>  dextrose 50% Injectable 12.5Gram(s) IV Push once  dextrose 50% Injectable 25Gram(s) IV Push once  dextrose 50% Injectable 25Gram(s) IV Push once  docusate sodium 100milliGRAM(s) Oral two times a day    MEDICATIONS  (PRN):  ALBUTerol    90 MICROgram(s) HFA Inhaler 2Puff(s) Inhalation every 6 hours PRN Shortness of Breath and/or Wheezing  diphenhydrAMINE   Injectable 50milliGRAM(s) IntraMuscular every 6 hours PRN Agitation  LORazepam   Injectable 2milliGRAM(s) IntraMuscular every 4 hours PRN severe agitation  haloperidol    Injectable 5milliGRAM(s) IntraMuscular every 6 hours PRN severe agitation  nicotine  Polacrilex Gum 4milliGRAM(s) Oral every 2 hours PRN breakthrough cravings  dextrose Gel 1Dose(s) Oral once PRN Blood Glucose LESS THAN 70 milliGRAM(s)/deciliter  glucagon  Injectable 1milliGRAM(s) IntraMuscular once PRN Glucose LESS THAN 70 milligrams/deciliter  diphenhydrAMINE   Capsule 50milliGRAM(s) Oral at bedtime PRN Insomnia      Vital Signs Last 24 Hrs  T(C): 36.9, Max: 36.9 (-08 @ 20:38)  HR: -- 92  BP: -- 131/90  RR: 14 (14 - 14)  SpO2: --  Wt(kg): -- 78.9  CAPILLARY BLOOD GLUCOSE  389 (2017 20:38)  253 (2017 13:02)  294 (2017 10:42)    I&O's Summary      PHYSICAL EXAM:  GENERAL: NAD, well-developed  HEAD:  Atraumatic, Normocephalic  EYES: EOMI, PERRLA, conjunctiva and sclera clear  NECK: Supple, No JVD  CHEST/LUNG: Clear to auscultation bilaterally; No wheeze  HEART: Regular rate and rhythm; No murmurs, rubs, or gallops  ABDOMEN: Soft, Nontender, Nondistended; Bowel sounds present  EXTREMITIES:  2+ Peripheral Pulses, No clubbing, cyanosis, or edema  PSYCH: AAOx3  NEUROLOGY: non-focal  SKIN: No rashes or lesions    LABS:                        15.7   5.3   )-----------( 306      ( 2017 11:06 )             44.6     06-08    133<L>  |  96<L>  |  15.0  ----------------------------<  366<H>  4.3   |  26.0  |  0.77    Ca    9.1      2017 11:06    Ordered: CMP, HBA1C, Lipid profile, TSH, Clamydia amplification, HIV Ag/Ab, Syphilis screen, Hepatitis C antibody           Urinalysis Basic - ( 2017 13:56 )    Color: Yellow / Appearance: Clear / S.015 / pH: x  Gluc: x / Ketone: Negative  / Bili: Negative / Urobili: Negative mg/dL   Blood: x / Protein: Negative mg/dL / Nitrite: Negative   Leuk Esterase: Trace / RBC: 0-2 /HPF / WBC 3-5   Sq Epi: x / Non Sq Epi: Few / Bacteria: Occasional        RADIOLOGY & ADDITIONAL TESTS:    EKG ordered     Assessment and Plan: 36 yr. old female with a PMH of DM, seizures, and asthma and a psychiatric h/o recurrent major depressive disorder and bipolar disorder. She states that her last seizure was 2 years ago. She also states that she has asthma and her last exacerbation was 4 months ago. She admits to constipation of 2 days. She denies any other issues or symptoms including SOB, chest pain, abdominal pain, headache, lightheadedness/nausea, pain/burning with urination, loss of sensation in peripherals, n/v/d. Her physical was unremarkable.   1. Recurrent major depressive disorder: Continue treatment as per primary psychiatry team: Haloperidol, lorazepam, and diphenhydramine PRN  2. DM: Glucose 366, . Continue insulin lispro corrective regimen TID before meals and QHS. Continue 25 U Lantus. CMP and HBA1C ordered   3. Asthma: 90mcg albuterol inhaler Q6hrs, PRN ordered  4. Constipation: 100mg Colace BID ordered

## 2021-10-01 PROCEDURE — G9005: CPT

## 2022-12-01 ENCOUNTER — INPATIENT (INPATIENT)
Facility: HOSPITAL | Age: 42
LOS: 0 days | Discharge: HOME | DRG: 198 | End: 2022-12-02
Attending: FAMILY MEDICINE | Admitting: INTERNAL MEDICINE
Payer: MEDICAID

## 2022-12-01 VITALS
DIASTOLIC BLOOD PRESSURE: 104 MMHG | RESPIRATION RATE: 22 BRPM | HEIGHT: 62 IN | OXYGEN SATURATION: 100 % | TEMPERATURE: 98 F | HEART RATE: 82 BPM | SYSTOLIC BLOOD PRESSURE: 164 MMHG | WEIGHT: 210.1 LBS

## 2022-12-01 DIAGNOSIS — R07.9 CHEST PAIN, UNSPECIFIED: ICD-10-CM

## 2022-12-01 DIAGNOSIS — Z98.890 OTHER SPECIFIED POSTPROCEDURAL STATES: Chronic | ICD-10-CM

## 2022-12-01 LAB
ALBUMIN SERPL ELPH-MCNC: 2.6 G/DL — LOW (ref 3.3–5)
ALP SERPL-CCNC: 42 U/L — SIGNIFICANT CHANGE UP (ref 40–120)
ALT FLD-CCNC: 23 U/L — SIGNIFICANT CHANGE UP (ref 12–78)
ANION GAP SERPL CALC-SCNC: 4 MMOL/L — LOW (ref 5–17)
AST SERPL-CCNC: 16 U/L — SIGNIFICANT CHANGE UP (ref 15–37)
BASOPHILS # BLD AUTO: 0.04 K/UL — SIGNIFICANT CHANGE UP (ref 0–0.2)
BASOPHILS NFR BLD AUTO: 1.1 % — SIGNIFICANT CHANGE UP (ref 0–2)
BILIRUB SERPL-MCNC: 0.2 MG/DL — SIGNIFICANT CHANGE UP (ref 0.2–1.2)
BUN SERPL-MCNC: 11 MG/DL — SIGNIFICANT CHANGE UP (ref 7–23)
CALCIUM SERPL-MCNC: 8.1 MG/DL — LOW (ref 8.5–10.1)
CHLORIDE SERPL-SCNC: 110 MMOL/L — HIGH (ref 96–108)
CO2 SERPL-SCNC: 26 MMOL/L — SIGNIFICANT CHANGE UP (ref 22–31)
CREAT SERPL-MCNC: 0.76 MG/DL — SIGNIFICANT CHANGE UP (ref 0.5–1.3)
EGFR: 100 ML/MIN/1.73M2 — SIGNIFICANT CHANGE UP
EOSINOPHIL # BLD AUTO: 0.16 K/UL — SIGNIFICANT CHANGE UP (ref 0–0.5)
EOSINOPHIL NFR BLD AUTO: 4.3 % — SIGNIFICANT CHANGE UP (ref 0–6)
FLUAV AG NPH QL: SIGNIFICANT CHANGE UP
FLUBV AG NPH QL: SIGNIFICANT CHANGE UP
GLUCOSE SERPL-MCNC: 209 MG/DL — HIGH (ref 70–99)
HCG SERPL-ACNC: <1 MIU/ML — SIGNIFICANT CHANGE UP
HCT VFR BLD CALC: 38.2 % — SIGNIFICANT CHANGE UP (ref 34.5–45)
HGB BLD-MCNC: 12.6 G/DL — SIGNIFICANT CHANGE UP (ref 11.5–15.5)
IMM GRANULOCYTES NFR BLD AUTO: 0.3 % — SIGNIFICANT CHANGE UP (ref 0–0.9)
LYMPHOCYTES # BLD AUTO: 1.72 K/UL — SIGNIFICANT CHANGE UP (ref 1–3.3)
LYMPHOCYTES # BLD AUTO: 45.7 % — HIGH (ref 13–44)
MAGNESIUM SERPL-MCNC: 1.6 MG/DL — SIGNIFICANT CHANGE UP (ref 1.6–2.6)
MCHC RBC-ENTMCNC: 28.6 PG — SIGNIFICANT CHANGE UP (ref 27–34)
MCHC RBC-ENTMCNC: 33 GM/DL — SIGNIFICANT CHANGE UP (ref 32–36)
MCV RBC AUTO: 86.8 FL — SIGNIFICANT CHANGE UP (ref 80–100)
MONOCYTES # BLD AUTO: 0.37 K/UL — SIGNIFICANT CHANGE UP (ref 0–0.9)
MONOCYTES NFR BLD AUTO: 9.8 % — SIGNIFICANT CHANGE UP (ref 2–14)
NEUTROPHILS # BLD AUTO: 1.46 K/UL — LOW (ref 1.8–7.4)
NEUTROPHILS NFR BLD AUTO: 38.8 % — LOW (ref 43–77)
PLATELET # BLD AUTO: 283 K/UL — SIGNIFICANT CHANGE UP (ref 150–400)
POTASSIUM SERPL-MCNC: 3.8 MMOL/L — SIGNIFICANT CHANGE UP (ref 3.5–5.3)
POTASSIUM SERPL-SCNC: 3.8 MMOL/L — SIGNIFICANT CHANGE UP (ref 3.5–5.3)
PROT SERPL-MCNC: 6.4 GM/DL — SIGNIFICANT CHANGE UP (ref 6–8.3)
RBC # BLD: 4.4 M/UL — SIGNIFICANT CHANGE UP (ref 3.8–5.2)
RBC # FLD: 13.8 % — SIGNIFICANT CHANGE UP (ref 10.3–14.5)
RSV RNA NPH QL NAA+NON-PROBE: SIGNIFICANT CHANGE UP
SARS-COV-2 RNA SPEC QL NAA+PROBE: SIGNIFICANT CHANGE UP
SODIUM SERPL-SCNC: 140 MMOL/L — SIGNIFICANT CHANGE UP (ref 135–145)
TROPONIN I, HIGH SENSITIVITY RESULT: 4.84 NG/L — SIGNIFICANT CHANGE UP
TROPONIN I, HIGH SENSITIVITY RESULT: 5.67 NG/L — SIGNIFICANT CHANGE UP
TROPONIN I, HIGH SENSITIVITY RESULT: 6.99 NG/L — SIGNIFICANT CHANGE UP
WBC # BLD: 3.76 K/UL — LOW (ref 3.8–10.5)
WBC # FLD AUTO: 3.76 K/UL — LOW (ref 3.8–10.5)

## 2022-12-01 PROCEDURE — 78452 HT MUSCLE IMAGE SPECT MULT: CPT

## 2022-12-01 PROCEDURE — 74174 CTA ABD&PLVS W/CONTRAST: CPT

## 2022-12-01 PROCEDURE — 71275 CT ANGIOGRAPHY CHEST: CPT | Mod: 26

## 2022-12-01 PROCEDURE — 99285 EMERGENCY DEPT VISIT HI MDM: CPT

## 2022-12-01 PROCEDURE — A9500: CPT

## 2022-12-01 PROCEDURE — 93017 CV STRESS TEST TRACING ONLY: CPT

## 2022-12-01 PROCEDURE — 83036 HEMOGLOBIN GLYCOSYLATED A1C: CPT

## 2022-12-01 PROCEDURE — 99223 1ST HOSP IP/OBS HIGH 75: CPT

## 2022-12-01 PROCEDURE — 84484 ASSAY OF TROPONIN QUANT: CPT

## 2022-12-01 PROCEDURE — 74174 CTA ABD&PLVS W/CONTRAST: CPT | Mod: 26

## 2022-12-01 PROCEDURE — 93306 TTE W/DOPPLER COMPLETE: CPT

## 2022-12-01 PROCEDURE — 85027 COMPLETE CBC AUTOMATED: CPT

## 2022-12-01 PROCEDURE — 93005 ELECTROCARDIOGRAM TRACING: CPT

## 2022-12-01 PROCEDURE — 71275 CT ANGIOGRAPHY CHEST: CPT

## 2022-12-01 PROCEDURE — 71045 X-RAY EXAM CHEST 1 VIEW: CPT | Mod: 26

## 2022-12-01 PROCEDURE — 80061 LIPID PANEL: CPT

## 2022-12-01 PROCEDURE — 80048 BASIC METABOLIC PNL TOTAL CA: CPT

## 2022-12-01 PROCEDURE — 36415 COLL VENOUS BLD VENIPUNCTURE: CPT

## 2022-12-01 PROCEDURE — 82962 GLUCOSE BLOOD TEST: CPT

## 2022-12-01 RX ORDER — DEXTROSE 50 % IN WATER 50 %
12.5 SYRINGE (ML) INTRAVENOUS ONCE
Refills: 0 | Status: DISCONTINUED | OUTPATIENT
Start: 2022-12-01 | End: 2022-12-02

## 2022-12-01 RX ORDER — INSULIN LISPRO 100/ML
VIAL (ML) SUBCUTANEOUS
Refills: 0 | Status: DISCONTINUED | OUTPATIENT
Start: 2022-12-01 | End: 2022-12-02

## 2022-12-01 RX ORDER — SODIUM CHLORIDE 9 MG/ML
1000 INJECTION, SOLUTION INTRAVENOUS
Refills: 0 | Status: DISCONTINUED | OUTPATIENT
Start: 2022-12-01 | End: 2022-12-02

## 2022-12-01 RX ORDER — INSULIN GLARGINE 100 [IU]/ML
24 INJECTION, SOLUTION SUBCUTANEOUS AT BEDTIME
Refills: 0 | Status: DISCONTINUED | OUTPATIENT
Start: 2022-12-01 | End: 2022-12-02

## 2022-12-01 RX ORDER — ENOXAPARIN SODIUM 100 MG/ML
40 INJECTION SUBCUTANEOUS EVERY 24 HOURS
Refills: 0 | Status: DISCONTINUED | OUTPATIENT
Start: 2022-12-01 | End: 2022-12-02

## 2022-12-01 RX ORDER — NITROGLYCERIN 6.5 MG
0.4 CAPSULE, EXTENDED RELEASE ORAL
Refills: 0 | Status: DISCONTINUED | OUTPATIENT
Start: 2022-12-01 | End: 2022-12-02

## 2022-12-01 RX ORDER — DEXTROSE 50 % IN WATER 50 %
25 SYRINGE (ML) INTRAVENOUS ONCE
Refills: 0 | Status: DISCONTINUED | OUTPATIENT
Start: 2022-12-01 | End: 2022-12-02

## 2022-12-01 RX ORDER — LANOLIN ALCOHOL/MO/W.PET/CERES
3 CREAM (GRAM) TOPICAL AT BEDTIME
Refills: 0 | Status: DISCONTINUED | OUTPATIENT
Start: 2022-12-01 | End: 2022-12-02

## 2022-12-01 RX ORDER — ATORVASTATIN CALCIUM 80 MG/1
20 TABLET, FILM COATED ORAL AT BEDTIME
Refills: 0 | Status: DISCONTINUED | OUTPATIENT
Start: 2022-12-01 | End: 2022-12-02

## 2022-12-01 RX ORDER — ACETAMINOPHEN 500 MG
650 TABLET ORAL EVERY 6 HOURS
Refills: 0 | Status: DISCONTINUED | OUTPATIENT
Start: 2022-12-01 | End: 2022-12-02

## 2022-12-01 RX ORDER — ONDANSETRON 8 MG/1
4 TABLET, FILM COATED ORAL EVERY 8 HOURS
Refills: 0 | Status: DISCONTINUED | OUTPATIENT
Start: 2022-12-01 | End: 2022-12-02

## 2022-12-01 RX ORDER — ASPIRIN/CALCIUM CARB/MAGNESIUM 324 MG
81 TABLET ORAL DAILY
Refills: 0 | Status: DISCONTINUED | OUTPATIENT
Start: 2022-12-02 | End: 2022-12-02

## 2022-12-01 RX ORDER — INSULIN GLARGINE 100 [IU]/ML
24 INJECTION, SOLUTION SUBCUTANEOUS
Qty: 0 | Refills: 0 | DISCHARGE

## 2022-12-01 RX ORDER — LISINOPRIL 2.5 MG/1
10 TABLET ORAL DAILY
Refills: 0 | Status: DISCONTINUED | OUTPATIENT
Start: 2022-12-01 | End: 2022-12-02

## 2022-12-01 RX ORDER — CARVEDILOL PHOSPHATE 80 MG/1
25 CAPSULE, EXTENDED RELEASE ORAL EVERY 12 HOURS
Refills: 0 | Status: DISCONTINUED | OUTPATIENT
Start: 2022-12-01 | End: 2022-12-02

## 2022-12-01 RX ORDER — DEXTROSE 50 % IN WATER 50 %
15 SYRINGE (ML) INTRAVENOUS ONCE
Refills: 0 | Status: DISCONTINUED | OUTPATIENT
Start: 2022-12-01 | End: 2022-12-02

## 2022-12-01 RX ORDER — LEVOTHYROXINE SODIUM 125 MCG
100 TABLET ORAL DAILY
Refills: 0 | Status: DISCONTINUED | OUTPATIENT
Start: 2022-12-02 | End: 2022-12-02

## 2022-12-01 RX ORDER — KETOROLAC TROMETHAMINE 30 MG/ML
30 SYRINGE (ML) INJECTION ONCE
Refills: 0 | Status: DISCONTINUED | OUTPATIENT
Start: 2022-12-01 | End: 2022-12-01

## 2022-12-01 RX ORDER — FLUOXETINE HCL 10 MG
20 CAPSULE ORAL DAILY
Refills: 0 | Status: DISCONTINUED | OUTPATIENT
Start: 2022-12-01 | End: 2022-12-02

## 2022-12-01 RX ORDER — PANTOPRAZOLE SODIUM 20 MG/1
40 TABLET, DELAYED RELEASE ORAL
Refills: 0 | Status: DISCONTINUED | OUTPATIENT
Start: 2022-12-02 | End: 2022-12-02

## 2022-12-01 RX ORDER — GLUCAGON INJECTION, SOLUTION 0.5 MG/.1ML
1 INJECTION, SOLUTION SUBCUTANEOUS ONCE
Refills: 0 | Status: DISCONTINUED | OUTPATIENT
Start: 2022-12-01 | End: 2022-12-02

## 2022-12-01 RX ORDER — ACETAMINOPHEN 500 MG
650 TABLET ORAL ONCE
Refills: 0 | Status: COMPLETED | OUTPATIENT
Start: 2022-12-01 | End: 2022-12-01

## 2022-12-01 RX ORDER — INSULIN LISPRO 100/ML
VIAL (ML) SUBCUTANEOUS AT BEDTIME
Refills: 0 | Status: DISCONTINUED | OUTPATIENT
Start: 2022-12-01 | End: 2022-12-02

## 2022-12-01 RX ADMIN — Medication 30 MILLIGRAM(S): at 16:46

## 2022-12-01 RX ADMIN — CARVEDILOL PHOSPHATE 25 MILLIGRAM(S): 80 CAPSULE, EXTENDED RELEASE ORAL at 22:17

## 2022-12-01 RX ADMIN — INSULIN GLARGINE 24 UNIT(S): 100 INJECTION, SOLUTION SUBCUTANEOUS at 22:16

## 2022-12-01 RX ADMIN — ATORVASTATIN CALCIUM 20 MILLIGRAM(S): 80 TABLET, FILM COATED ORAL at 22:16

## 2022-12-01 RX ADMIN — Medication 650 MILLIGRAM(S): at 15:39

## 2022-12-01 RX ADMIN — Medication 1: at 18:10

## 2022-12-01 RX ADMIN — ENOXAPARIN SODIUM 40 MILLIGRAM(S): 100 INJECTION SUBCUTANEOUS at 18:10

## 2022-12-01 RX ADMIN — Medication 0.4 MILLIGRAM(S): at 14:40

## 2022-12-01 NOTE — ED PROVIDER NOTE - NSICDXFAMILYHX_GEN_ALL_CORE_FT
FAMILY HISTORY:  Mother  Still living? Unknown  Family history of acute heart failure, Age at diagnosis: Age Unknown

## 2022-12-01 NOTE — ED PROCEDURE NOTE - ATTENDING CONTRIBUTION TO CARE
I, Ciera Mcdonough DO, personally saw the patient with Resident.  I have personally performed a face to face diagnostic evaluation on this patient.  A substantiative portion of visit including medical decision making done by myself in coordination with the resident.

## 2022-12-01 NOTE — ED PROVIDER NOTE - OBJECTIVE STATEMENT
42 y/o female with PMHx of prior bipolar disorder, cocaine abuse, asthma, seizure, prior MI, CAD with stents, HTN, DM presents to the ED c/o crushing tightening mid sternal chest pain x2 days. EMS noted some ST elevation in V2 and V1. Pt states that it feels like an elephant is sitting on her back since yesterday. Denies dizziness, denies sweating. EMS gave 324mg aspirin PTA. 42 y/o female with PMHx of prior bipolar disorder, cocaine abuse, asthma, seizure, prior MI, CAD with stents, HTN, DM presents to the ED c/o crushing tightening mid sternal chest pain x2 days. EMS states that they noted some ST elevation in V2 and V1. Pt states that it feels like an elephant is sitting on her back since yesterday. Denies dizziness, denies sweating. EMS gave 324mg aspirin PTA.

## 2022-12-01 NOTE — ED ADULT NURSE NOTE - OBJECTIVE STATEMENT
Pt presented to the ER with c/o chest pain. Pt stated that she has been having chest pain for the past three days. Pt stated that she feels like a elephant is sitting on her back. Pt stated that the pain increases when taking a deep breath. Pt has a hx of MI and cardiac stents in 2019. Pt received 4 81mg of ASA by EMS. Pt denies that the pain radiates anywhere. Pt denies any dizziness, N/V or SOB at this time.

## 2022-12-01 NOTE — PATIENT PROFILE ADULT - FALL HARM RISK - UNIVERSAL INTERVENTIONS
Bed in lowest position, wheels locked, appropriate side rails in place/Call bell, personal items and telephone in reach/Instruct patient to call for assistance before getting out of bed or chair/Non-slip footwear when patient is out of bed/[place] to call system/Physically safe environment - no spills, clutter or unnecessary equipment/Purposeful Proactive Rounding/Room/bathroom lighting operational, light cord in reach

## 2022-12-01 NOTE — ED PROVIDER NOTE - NS ED ROS FT
Constitutional: No reported recent fever.  Neurological: No reported acute headache.  Eyes: No reported new vision changes.   Ears, Nose, Mouth, Throat: No reported acute sore throat.  Cardiovascular: +chest pain  Respiratory: No reported new shortness of breath.  Gastrointestinal: No reported vomiting.  Genitourinary: No reported new urinary problems.  Musculoskeletal: No reported acute extremity pain.  Integumentary (skin and/or breast): No reported new rash.

## 2022-12-01 NOTE — H&P ADULT - NSHPPHYSICALEXAM_GEN_ALL_CORE
Vital Signs Last 24 Hrs  T(C): 37.1 (01 Dec 2022 16:15), Max: 37.1 (01 Dec 2022 16:15)  T(F): 98.7 (01 Dec 2022 16:15), Max: 98.7 (01 Dec 2022 16:15)  HR: 85 (01 Dec 2022 16:15) (82 - 88)  BP: 141/74 (01 Dec 2022 16:15) (134/78 - 164/104)  BP(mean): --  RR: 18 (01 Dec 2022 16:15) (18 - 22)  SpO2: 100% (01 Dec 2022 16:15) (100% - 100%)    Parameters below as of 01 Dec 2022 16:15  Patient On (Oxygen Delivery Method): room air

## 2022-12-01 NOTE — ED PROVIDER NOTE - NSICDXPASTMEDICALHX_GEN_ALL_CORE_FT
PAST MEDICAL HISTORY:  Bipolar disorder     Cocaine abuse     IDDM (insulin dependent diabetes mellitus)     Mild intermittent asthma without complication     Seizure       Acute myocardial infarction     CAD (coronary artery disease)     H/O heart artery stent

## 2022-12-01 NOTE — ED ADULT TRIAGE NOTE - CHIEF COMPLAINT QUOTE
pt c/o midsternal CP beginning 2 days ago. hx- 1 cardiac stent, HTN, DM1. pre-notification via [facility] for possible STEMI @1058. STAT EKG completed upon arrival to ED. No STEMI as per MD Mcdonough.

## 2022-12-01 NOTE — H&P ADULT - HISTORY OF PRESENT ILLNESS
40 yo female with PMH of CAD s/p stent, DM2, HTN, HLD, bipolar disorder, h/o cocaine abuse (last use in July) presented with chest pain. pt states for the past 2 days she has been having an intermittent left sided chest pain under her left breast which radiates to her back. pain is now more constant. States it started 2 days ago at rest. Pain feels worse with some exertion (states she felt it more while cleaning at her program today). Had some nausea earlier but now resolved. NO SOB or dizziness. no syncope. She has a history of stent place in a few years ago in [facility]. she does not have a local cardiologist.

## 2022-12-01 NOTE — PHARMACOTHERAPY INTERVENTION NOTE - COMMENTS
Medication history complete, patient gets medication filled at [facility], called them at [phone] to get list of patient meds.

## 2022-12-01 NOTE — H&P ADULT - ASSESSMENT
42 yo female with PMH of CAD s/p stent, DM2, HTN, HLD, bipolar disorder, h/o cocaine abuse (last use in July) admitted with:    #chest pain with history of CAD s/p stent  - admit to tele  - serial cardiac enzymes  - CT chest/abdomen r/o dissection - pt states received contrast in the past with no reaction, did not need premedication (has history of shellfish allergy)  - echo  - continue asa, BB, statin  - cardio consulted - Dr. Mckinney to see in AM    #DM2  - lantus and ISS    #Hypothyroidism  - continue synthroid    #HTN  - continue coreg, lisinopril    #HLD  - continue statin  - check lipids    #DVT prophylaxis  - lovenox

## 2022-12-01 NOTE — ED PROVIDER NOTE - CLINICAL SUMMARY MEDICAL DECISION MAKING FREE TEXT BOX
Pt here with chest pain x2 days, significant history of CAD, new/worsening t-wave inversions on EKG, questionable ST elevations similar to elevations in the past. Will have cardio review EKG, r/o ACS, plan for admission.

## 2022-12-01 NOTE — ED ADULT NURSE NOTE - CHIEF COMPLAINT QUOTE
pt c/o midsternal CP beginning 2 days ago. hx- 1 cardiac stent, HTN, DM1. pre-notification via [facility] for possible STEMI @1058. STAT EKG completed upon arrival to ED. No STEMI as per MD Mcdonough.

## 2022-12-01 NOTE — ED PROVIDER NOTE - PROGRESS NOTE DETAILS
Case discussed with PCI on-call.  No need for acute intervention.  Recommending admission for medical management and cardiology eval.  Patient feels better, has less chest pain but is not chest pain-free.  Hospitalist made aware of admission.

## 2022-12-02 ENCOUNTER — TRANSCRIPTION ENCOUNTER (OUTPATIENT)
Age: 42
End: 2022-12-02

## 2022-12-02 VITALS
DIASTOLIC BLOOD PRESSURE: 87 MMHG | SYSTOLIC BLOOD PRESSURE: 144 MMHG | TEMPERATURE: 99 F | OXYGEN SATURATION: 99 % | RESPIRATION RATE: 16 BRPM | HEART RATE: 74 BPM

## 2022-12-02 DIAGNOSIS — I25.10 ATHEROSCLEROTIC HEART DISEASE OF NATIVE CORONARY ARTERY WITHOUT ANGINA PECTORIS: ICD-10-CM

## 2022-12-02 DIAGNOSIS — I10 ESSENTIAL (PRIMARY) HYPERTENSION: ICD-10-CM

## 2022-12-02 DIAGNOSIS — R07.9 CHEST PAIN, UNSPECIFIED: ICD-10-CM

## 2022-12-02 LAB
A1C WITH ESTIMATED AVERAGE GLUCOSE RESULT: 8.6 % — HIGH (ref 4–5.6)
ANION GAP SERPL CALC-SCNC: 1 MMOL/L — LOW (ref 5–17)
BUN SERPL-MCNC: 13 MG/DL — SIGNIFICANT CHANGE UP (ref 7–23)
CALCIUM SERPL-MCNC: 7.8 MG/DL — LOW (ref 8.5–10.1)
CHLORIDE SERPL-SCNC: 107 MMOL/L — SIGNIFICANT CHANGE UP (ref 96–108)
CHOLEST SERPL-MCNC: 203 MG/DL — HIGH
CO2 SERPL-SCNC: 31 MMOL/L — SIGNIFICANT CHANGE UP (ref 22–31)
CREAT SERPL-MCNC: 0.72 MG/DL — SIGNIFICANT CHANGE UP (ref 0.5–1.3)
EGFR: 106 ML/MIN/1.73M2 — SIGNIFICANT CHANGE UP
ESTIMATED AVERAGE GLUCOSE: 200 MG/DL — HIGH (ref 68–114)
GLUCOSE SERPL-MCNC: 126 MG/DL — HIGH (ref 70–99)
HCT VFR BLD CALC: 37.2 % — SIGNIFICANT CHANGE UP (ref 34.5–45)
HDLC SERPL-MCNC: 78 MG/DL — SIGNIFICANT CHANGE UP
HGB BLD-MCNC: 12.4 G/DL — SIGNIFICANT CHANGE UP (ref 11.5–15.5)
LIPID PNL WITH DIRECT LDL SERPL: 114 MG/DL — HIGH
MCHC RBC-ENTMCNC: 28.6 PG — SIGNIFICANT CHANGE UP (ref 27–34)
MCHC RBC-ENTMCNC: 33.3 GM/DL — SIGNIFICANT CHANGE UP (ref 32–36)
MCV RBC AUTO: 85.7 FL — SIGNIFICANT CHANGE UP (ref 80–100)
NON HDL CHOLESTEROL: 124 MG/DL — SIGNIFICANT CHANGE UP
PLATELET # BLD AUTO: 265 K/UL — SIGNIFICANT CHANGE UP (ref 150–400)
POTASSIUM SERPL-MCNC: 4.2 MMOL/L — SIGNIFICANT CHANGE UP (ref 3.5–5.3)
POTASSIUM SERPL-SCNC: 4.2 MMOL/L — SIGNIFICANT CHANGE UP (ref 3.5–5.3)
RBC # BLD: 4.34 M/UL — SIGNIFICANT CHANGE UP (ref 3.8–5.2)
RBC # FLD: 13.6 % — SIGNIFICANT CHANGE UP (ref 10.3–14.5)
SODIUM SERPL-SCNC: 139 MMOL/L — SIGNIFICANT CHANGE UP (ref 135–145)
TRIGL SERPL-MCNC: 54 MG/DL — SIGNIFICANT CHANGE UP
WBC # BLD: 2.92 K/UL — LOW (ref 3.8–10.5)
WBC # FLD AUTO: 2.92 K/UL — LOW (ref 3.8–10.5)

## 2022-12-02 PROCEDURE — 93306 TTE W/DOPPLER COMPLETE: CPT | Mod: 26

## 2022-12-02 PROCEDURE — 93018 CV STRESS TEST I&R ONLY: CPT

## 2022-12-02 PROCEDURE — 93010 ELECTROCARDIOGRAM REPORT: CPT

## 2022-12-02 PROCEDURE — 93016 CV STRESS TEST SUPVJ ONLY: CPT

## 2022-12-02 PROCEDURE — 99223 1ST HOSP IP/OBS HIGH 75: CPT | Mod: 25

## 2022-12-02 PROCEDURE — 78452 HT MUSCLE IMAGE SPECT MULT: CPT | Mod: 26

## 2022-12-02 PROCEDURE — 99239 HOSP IP/OBS DSCHRG MGMT >30: CPT

## 2022-12-02 RX ORDER — HYDROXYZINE HCL 10 MG
1 TABLET ORAL
Qty: 0 | Refills: 0 | DISCHARGE

## 2022-12-02 RX ORDER — LISINOPRIL 2.5 MG/1
1 TABLET ORAL
Qty: 0 | Refills: 0 | DISCHARGE

## 2022-12-02 RX ORDER — FLUOXETINE HCL 10 MG
1 CAPSULE ORAL
Qty: 0 | Refills: 0 | DISCHARGE

## 2022-12-02 RX ORDER — LEVOTHYROXINE SODIUM 125 MCG
1 TABLET ORAL
Qty: 0 | Refills: 0 | DISCHARGE

## 2022-12-02 RX ORDER — REGADENOSON 0.08 MG/ML
0.4 INJECTION, SOLUTION INTRAVENOUS ONCE
Refills: 0 | Status: COMPLETED | OUTPATIENT
Start: 2022-12-02 | End: 2022-12-02

## 2022-12-02 RX ORDER — LORATADINE 10 MG/1
1 TABLET ORAL
Qty: 0 | Refills: 0 | DISCHARGE

## 2022-12-02 RX ORDER — ATORVASTATIN CALCIUM 80 MG/1
1 TABLET, FILM COATED ORAL
Qty: 0 | Refills: 0 | DISCHARGE

## 2022-12-02 RX ORDER — OMEPRAZOLE 10 MG/1
1 CAPSULE, DELAYED RELEASE ORAL
Qty: 0 | Refills: 0 | DISCHARGE

## 2022-12-02 RX ORDER — INSULIN GLARGINE 100 [IU]/ML
24 INJECTION, SOLUTION SUBCUTANEOUS
Qty: 0 | Refills: 0 | DISCHARGE

## 2022-12-02 RX ORDER — ASPIRIN/CALCIUM CARB/MAGNESIUM 324 MG
1 TABLET ORAL
Qty: 0 | Refills: 0 | DISCHARGE

## 2022-12-02 RX ORDER — INSULIN LISPRO 100/ML
0 VIAL (ML) SUBCUTANEOUS
Qty: 0 | Refills: 0 | DISCHARGE

## 2022-12-02 RX ORDER — CARVEDILOL PHOSPHATE 80 MG/1
1 CAPSULE, EXTENDED RELEASE ORAL
Qty: 0 | Refills: 0 | DISCHARGE

## 2022-12-02 RX ADMIN — Medication 100 MICROGRAM(S): at 06:32

## 2022-12-02 RX ADMIN — PANTOPRAZOLE SODIUM 40 MILLIGRAM(S): 20 TABLET, DELAYED RELEASE ORAL at 06:32

## 2022-12-02 RX ADMIN — CARVEDILOL PHOSPHATE 25 MILLIGRAM(S): 80 CAPSULE, EXTENDED RELEASE ORAL at 08:23

## 2022-12-02 RX ADMIN — Medication 20 MILLIGRAM(S): at 08:24

## 2022-12-02 RX ADMIN — Medication 81 MILLIGRAM(S): at 08:23

## 2022-12-02 RX ADMIN — LISINOPRIL 10 MILLIGRAM(S): 2.5 TABLET ORAL at 08:23

## 2022-12-02 RX ADMIN — REGADENOSON 0.4 MILLIGRAM(S): 0.08 INJECTION, SOLUTION INTRAVENOUS at 09:33

## 2022-12-02 NOTE — DISCHARGE NOTE PROVIDER - NSDCCPCAREPLAN_GEN_ALL_CORE_FT
PRINCIPAL DISCHARGE DIAGNOSIS  Diagnosis: Atypical chest pain  Assessment and Plan of Treatment: all your tests were normal and do not show you had a heart attack  take all your medictions as prescribed   your were advised not to take coreg (carvedilol) if you use cocaine   follow up with cardiology - Dr. Mckinney - outpatient - next week

## 2022-12-02 NOTE — DISCHARGE NOTE PROVIDER - CARE PROVIDER_API CALL
Óscar Mckinney (MD)  Cardiovascular Disease; Internal Medicine  [address], [address]  [place] [address]  Phone: [phone]  Fax: [phone]  Follow Up Time:

## 2022-12-02 NOTE — DISCHARGE NOTE PROVIDER - HOSPITAL COURSE
42 yo female with PMH of CAD s/p stent, DM2, HTN, HLD, bipolar disorder, h/o cocaine abuse (last use in July) presented with chest pain. pt states for the past 2 days she has been having an intermittent left sided chest pain under her left breast which radiates to her back. pain is now more constant. States it started 2 days ago at rest. Pain feels worse with some exertion (states she felt it more while cleaning at her program today). Had some nausea earlier but now resolved. NO SOB or dizziness. no syncope. She has a history of stent place in a few years ago in [facility]. she does not have a local cardiologist.     # Atypical chest pain now resolved   trops neg x 3  Echo - no acute findings   Nuclear stress test - normal   CTA - no PE  follow up with cardiology outpatient     # CAD  HX of remote MI and stent - [facility]  - Continue aspirin, Coreg, and atorvastatin.    # HTN  - cont. lisinopril     # Substance abuse   used cocaine - last time in July  - on a rehab program outpatient   pt. advised not to take coreg if using cocaine     Pt. is stable for discharge.     PHYSICAL EXAM:  Vital Signs Last 24 Hrs  T(C): 37 (02 Dec 2022 13:30), Max: 37.1 (01 Dec 2022 16:15)  T(F): 98.6 (02 Dec 2022 13:30), Max: 98.7 (01 Dec 2022 16:15)  HR: 74 (02 Dec 2022 13:30) (67 - 89)  BP: 144/87 (02 Dec 2022 13:30) (134/78 - 169/90)  BP(mean): --  RR: 16 (02 Dec 2022 13:30) (16 - 18)  SpO2: 99% (02 Dec 2022 13:30) (98% - 100%)  Parameters below as of 02 Dec 2022 13:30  Patient On (Oxygen Delivery Method): room air  Constitutional: NAD, awake and alert, overweight  HEENT:  EOMI,  Pupils round, No oral cyanosis.  Pulmonary: Non-labored, breath sounds are clear bilaterally  Cardiovascular: S1 and S2, regular rate and rhythm, no Murmurs, gallops or rubs  Gastrointestinal: Bowel Sounds present, soft, nontender.   Lymph: No edema. No cervical lymphadenopathy.  Neurological: Alert, no focal deficits  Skin: No rashes.  Psych:  Mood & affect appropriate

## 2022-12-02 NOTE — DISCHARGE NOTE NURSING/CASE MANAGEMENT/SOCIAL WORK - NSDCVIVACCINE_GEN_ALL_CORE_FT
COVID-19 vaccine, vector-nr, [id number], PF, 0.5 mL ([facility]); 26-Mar-2021 13:04; Joya Miranda (RN); [facility]; [id number] (Exp. Date: 25-May-2021); IntraMuscular; Deltoid Left.; 0.5 milliLiter(s);   influenza, injectable, quadrivalent, preservative free; 11-Apr-2016 13:09; Jacob Montiel (RN); [employer]; [id number]; IntraMuscular; Deltoid Left.; 0.5 milliLiter(s); VIS (VIS Published: 07-Aug-2015, VIS Presented: 11-Apr-2016);

## 2022-12-02 NOTE — DISCHARGE NOTE PROVIDER - NSDCMRMEDTOKEN_GEN_ALL_CORE_FT
Admelog SoloStar 100 units/mL injectable solution: Use as directed as needed per sliding scale  Aspirin Enteric Coated 81 mg oral delayed release tablet: 1 tab(s) orally once a day  atorvastatin 20 mg oral tablet: 1 tab(s) orally once a day (at bedtime)  Basaglar KwikPen 100 units/mL subcutaneous solution: 24 unit(s) subcutaneously once a day (at bedtime)  Coreg 25 mg oral tablet: 1 tab(s) orally 2 times a day  FLUoxetine 20 mg oral capsule: 1 cap(s) orally once a day  levothyroxine 100 mcg (0.1 mg) oral tablet: 1 tab(s) orally once a day  lisinopril 10 mg oral tablet: 1 tab(s) orally once a day  loratadine 10 mg oral tablet: 1 tab(s) orally once a day, As Needed  omeprazole 20 mg oral delayed release capsule: 1 cap(s) orally once a day  Vistaril 50 mg oral capsule: 1 cap(s) orally once a day (at bedtime), As Needed   Admelog SoloStar 100 units/mL injectable solution: Use as directed as needed per sliding scale  Aspirin Enteric Coated 81 mg oral delayed release tablet: 1 tab(s) orally once a day  atorvastatin 20 mg oral tablet: 1 tab(s) orally once a day (at bedtime)  Basaglar KwikPen 100 units/mL subcutaneous solution: 24 unit(s) subcutaneously once a day (at bedtime)  Coreg 25 mg oral tablet: 1 tab(s) orally 2 times a day  FLUoxetine 20 mg oral capsule: 1 cap(s) orally once a day  levothyroxine 100 mcg (0.1 mg) oral tablet: 1 tab(s) orally once a day  lisinopril 10 mg oral tablet: 1 tab(s) orally 2 times a day  loratadine 10 mg oral tablet: 1 tab(s) orally once a day, As Needed  omeprazole 20 mg oral delayed release capsule: 1 cap(s) orally once a day  Vistaril 50 mg oral capsule: 1 cap(s) orally once a day (at bedtime), As Needed

## 2022-12-02 NOTE — CONSULT NOTE ADULT - PROBLEM SELECTOR RECOMMENDATION 2
She reports a history of remote MI and stent -- no records available ("[facility]).  Continue aspirin, Coreg, and atorvastatin.

## 2022-12-02 NOTE — DISCHARGE NOTE PROVIDER - ATTENDING ATTESTATION STATEMENT
I have personally seen and examined the patient. I have collaborated with and supervised the No indicators present

## 2022-12-02 NOTE — DISCHARGE NOTE PROVIDER - ATTENDING DISCHARGE PHYSICAL EXAMINATION:
PHYSICAL EXAM:    General: Well developed; well nourished; in no acute distress  Eyes: PERRLA, EOMI; conjunctiva and sclera clear  Head: Normocephalic; atraumatic  Respiratory: No wheezes, rales or rhonchi  Cardiovascular: Regular rate and rhythm. S1 and S2 Normal;   Gastrointestinal: Soft non-tender non-distended; Normal bowel sounds  Genitourinary: No  suprapubic  tenderness  Extremities: Normal range of motion, No clubbing, cyanosis or dc  Neurological: Alert and oriented x4, non focal  Musculoskeletal: Normal muscle tone, without deformities  Psychiatric: Cooperative and appropriate

## 2022-12-02 NOTE — DISCHARGE NOTE NURSING/CASE MANAGEMENT/SOCIAL WORK - NSDCPEFALRISK_GEN_ALL_CORE
For information on Fall & Injury Prevention, visit: [email or web address] OR  [email or web address] OR  https://www.cdc.gov/steadi/patient.html

## 2022-12-02 NOTE — CONSULT NOTE ADULT - SUBJECTIVE AND OBJECTIVE BOX
CHIEF COMPLAINT: Patient is a 41y old  Female who presents with a chief complaint of chest pain (01 Dec 2022 16:33)    HPI:  41 year old woman with a history of CAD, MI, coronary stent, cocaine/crack abuse (none-recent), DM, HTN, HLD, bipolar disease, and obesity who presented to the ED for the evaluation of chest pain.  She describes many hours of mild-moderate chest pain for the past few days -- diffuse & throughout precordium w/ some radiation to back, "heavy,"  exacerbated by the left lateral recumbent position (does not worsen with ambulation); no associated dyspnea.  She says that she has "no idea" what is causing it; denies similar symptoms in the past.    PAST MEDICAL & SURGICAL HISTORY:  Bipolar disorder  IDDM (insulin dependent diabetes mellitus)  Seizure  Cocaine abuse  Mild intermittent asthma without complication  Acute myocardial infarction  H/O heart artery stent  CAD (coronary artery disease)  S/P hernia repair    SOCIAL HISTORY:   Smoking: Nonsmoker  Drug Use: Past use    FAMILY HISTORY: Family history of heart disease (Mother, Grandmother)    MEDICATIONS  (STANDING):  aspirin enteric coated 81 milliGRAM(s) Oral daily  atorvastatin 20 milliGRAM(s) Oral at bedtime  carvedilol 25 milliGRAM(s) Oral every 12 hours  enoxaparin Injectable 40 milliGRAM(s) SubCutaneous every 24 hours  FLUoxetine 20 milliGRAM(s) Oral daily  glucagon  Injectable 1 milliGRAM(s) IntraMuscular once  insulin glargine Injectable (LANTUS) 24 Unit(s) SubCutaneous at bedtime  insulin lispro (ADMELOG) corrective regimen sliding scale   SubCutaneous three times a day before meals  insulin lispro (ADMELOG) corrective regimen sliding scale   SubCutaneous at bedtime  levothyroxine 100 MICROGram(s) Oral daily  lisinopril 10 milliGRAM(s) Oral daily  pantoprazole    Tablet 40 milliGRAM(s) Oral before breakfast    MEDICATIONS  (PRN):  acetaminophen     Tablet .. 650 milliGRAM(s) Oral every 6 hours PRN Mild Pain (1 - 3)  aluminum hydroxide/magnesium hydroxide/simethicone Suspension 30 milliLiter(s) Oral every 4 hours PRN Dyspepsia  dextrose Oral Gel 15 Gram(s) Oral once PRN Blood Glucose LESS THAN 70 milliGRAM(s)/deciliter  melatonin 3 milliGRAM(s) Oral at bedtime PRN Insomnia  nitroglycerin     SubLingual 0.4 milliGRAM(s) SubLingual every 5 minutes PRN Chest Pain  ondansetron Injectable 4 milliGRAM(s) IV Push every 8 hours PRN Nausea and/or Vomiting    Allergies: No Known Drug Allergies  Seafood (Swelling)  shellfish (Swelling)    REVIEW OF SYSTEMS:  CONSTITUTIONAL: No fevers or chills  Eyes: No visual changes  NECK: No pain or stiffness  RESPIRATORY: No shortness of breath  CARDIOVASCULAR: + chest pain  GASTROINTESTINAL: No abdominal pain. No nausea, vomiting, or hematemesis; No diarrhea or constipation. No melena or hematochezia.  GENITOURINARY: No dysuria, frequency or hematuria  NEUROLOGICAL: No numbness.  SKIN: No itching or rash  All other review of systems is negative unless indicated above    VITAL SIGNS:   Vital Signs Last 24 Hrs  T(C): 36.9 (01 Dec 2022 20:31), Max: 37.1 (01 Dec 2022 16:15)  T(F): 98.4 (01 Dec 2022 20:31), Max: 98.7 (01 Dec 2022 16:15)  HR: 89 (01 Dec 2022 20:31) (82 - 89)  BP: 150/75 (01 Dec 2022 20:31) (134/78 - 164/104)  RR: 18 (01 Dec 2022 20:31) (18 - 22)  SpO2: 98% (01 Dec 2022 20:31) (98% - 100%)    PHYSICAL EXAM:  Constitutional: NAD, awake and alert, overweight  HEENT:  EOMI,  Pupils round, No oral cyanosis.  Pulmonary: Non-labored, breath sounds are clear bilaterally  Cardiovascular: S1 and S2, regular rate and rhythm, no Murmurs, gallops or rubs  Gastrointestinal: Bowel Sounds present, soft, nontender.   Lymph: No edema. No cervical lymphadenopathy.  Neurological: Alert, no focal deficits  Skin: No rashes.  Psych:  Mood & affect appropriate    LABS:                        12.6   3.76  )-----------( 283      ( 01 Dec 2022 11:54 )             38.2     01 Dec 2022 14:16    140    |  110    |  11     ----------------------------<  209    3.8     |  26     |  0.76     Ca    8.1        01 Dec 2022 14:16  Mg     1.6       01 Dec 2022 14:16    TPro  6.4    /  Alb  2.6    /  TBili  0.2    /  DBili  x      /  AST  16     /  ALT  23     /  AlkPhos  42     01 Dec 2022 14:16    TroponinI hsT: 6.99, 5.67, 4.84    12 Lead ECG (12.01.22 @ 11:58):  Normal sinus rhythm  T wave abnormality, consider lateral ischemia  Abnormal ECG    CT Angio Chest Aorta w/wo IV Cont (12.01.22 @ 17:12):  No aortic dissection.  No pulmonary embolism.

## 2022-12-02 NOTE — CONSULT NOTE ADULT - PROBLEM SELECTOR RECOMMENDATION 9
Ms. Joseph does not describe typical ischemic pain and serial assays of high-sensitivity troponin are normal despite several days duration of chest pain.  ECG is abnormal but similar to past tracings.  I suspect a non-cardiac etiology for her pain but given history of MI, coronary stent, and multiple risk factors for CAD I recommend a pharmacologic nuclear stress test.  Aortic pathology and PE have been excluded with CT.

## 2022-12-02 NOTE — CHART NOTE - NSCHARTNOTEFT_GEN_A_CORE
Regadenoson Stress Test performed.  Pt tolerated well.  Baseline chest pain has no changes during and post test.   T wave abnormality, no ST deviation on EKG   To follow full report

## 2022-12-02 NOTE — CONSULT NOTE ADULT - PROBLEM SELECTOR RECOMMENDATION 3
BP has been poorly controlled since admission (?? adherent with Rx); would increase dose of lisinopril if persistent elevation of administration of today's medications.

## 2022-12-07 DIAGNOSIS — Z79.82 LONG TERM (CURRENT) USE OF ASPIRIN: ICD-10-CM

## 2022-12-07 DIAGNOSIS — E11.9 TYPE 2 DIABETES MELLITUS WITHOUT COMPLICATIONS: ICD-10-CM

## 2022-12-07 DIAGNOSIS — I25.2 OLD MYOCARDIAL INFARCTION: ICD-10-CM

## 2022-12-07 DIAGNOSIS — Z95.5 PRESENCE OF CORONARY ANGIOPLASTY IMPLANT AND GRAFT: ICD-10-CM

## 2022-12-07 DIAGNOSIS — E03.9 HYPOTHYROIDISM, UNSPECIFIED: ICD-10-CM

## 2022-12-07 DIAGNOSIS — G40.909 EPILEPSY, UNSPECIFIED, NOT INTRACTABLE, WITHOUT STATUS EPILEPTICUS: ICD-10-CM

## 2022-12-07 DIAGNOSIS — Z91.013 ALLERGY TO SEAFOOD: ICD-10-CM

## 2022-12-07 DIAGNOSIS — Z79.4 LONG TERM (CURRENT) USE OF INSULIN: ICD-10-CM

## 2022-12-07 DIAGNOSIS — E66.9 OBESITY, UNSPECIFIED: ICD-10-CM

## 2022-12-07 DIAGNOSIS — E78.5 HYPERLIPIDEMIA, UNSPECIFIED: ICD-10-CM

## 2022-12-07 DIAGNOSIS — F31.9 BIPOLAR DISORDER, UNSPECIFIED: ICD-10-CM

## 2022-12-07 DIAGNOSIS — J45.20 MILD INTERMITTENT ASTHMA, UNCOMPLICATED: ICD-10-CM

## 2022-12-07 DIAGNOSIS — R07.89 OTHER CHEST PAIN: ICD-10-CM

## 2022-12-07 DIAGNOSIS — I25.10 ATHEROSCLEROTIC HEART DISEASE OF NATIVE CORONARY ARTERY WITHOUT ANGINA PECTORIS: ICD-10-CM

## 2022-12-07 DIAGNOSIS — Z87.898 PERSONAL HISTORY OF OTHER SPECIFIED CONDITIONS: ICD-10-CM

## 2022-12-07 DIAGNOSIS — I10 ESSENTIAL (PRIMARY) HYPERTENSION: ICD-10-CM

## 2022-12-22 NOTE — ED BEHAVIORAL HEALTH ASSESSMENT NOTE - SOURCE OF INFORMATION
Done in [place]   CNI toxicity in the past   Plan:  -called [facility] and they are going to check for a liquid sirolimus 3mg formulation, new prescription faxed  -yearly transplant f/u appt recommended      Patient

## 2023-07-17 NOTE — ED ADULT NURSE REASSESSMENT NOTE - SYMPTOMS
Per Direct Screening Colonoscopy protocol, I reviewed the patient's recent history & physical. The patient meets DSC criteria.    Can be scheduled for colonoscopy with MOD with any MD.  
none
none/denies any difficulty
none

## 2023-07-25 ENCOUNTER — APPOINTMENT (OUTPATIENT)
Dept: INFECTIOUS DISEASE | Facility: CLINIC | Age: 43
End: 2023-07-25

## 2023-08-21 ENCOUNTER — EMERGENCY (EMERGENCY)
Facility: HOSPITAL | Age: 43
LOS: 1 days | Discharge: HOME | End: 2023-08-21
Attending: STUDENT IN AN ORGANIZED HEALTH CARE EDUCATION/TRAINING PROGRAM
Payer: COMMERCIAL

## 2023-08-21 VITALS
DIASTOLIC BLOOD PRESSURE: 76 MMHG | HEART RATE: 108 BPM | TEMPERATURE: 98 F | RESPIRATION RATE: 20 BRPM | OXYGEN SATURATION: 95 % | SYSTOLIC BLOOD PRESSURE: 132 MMHG

## 2023-08-21 VITALS
DIASTOLIC BLOOD PRESSURE: 98 MMHG | OXYGEN SATURATION: 96 % | SYSTOLIC BLOOD PRESSURE: 152 MMHG | HEART RATE: 90 BPM | RESPIRATION RATE: 18 BRPM

## 2023-08-21 PROCEDURE — 99284 EMERGENCY DEPT VISIT MOD MDM: CPT | Mod: 25

## 2023-08-21 RX ADMIN — Medication 40 MILLIGRAM(S): at 06:49

## 2023-08-21 NOTE — ED PROVIDER NOTE - OBJECTIVE STATEMENT
Patient presenting to ED for rash per triage note.  On evaluation patient without any complaints, states that she needs to sleep.

## 2023-08-21 NOTE — ED PROVIDER NOTE - CLINICAL SUMMARY MEDICAL DECISION MAKING FREE TEXT BOX
42-year-old female per triage note who presented for evaluation of rash on examination though is requesting to be left alone to sleep.  Attempted to examine patient's skin though she is dressed in multiple layers, will allow her to rest for some time and then reassess.

## 2023-08-21 NOTE — ED PROVIDER NOTE - NSFOLLOWUPCLINICS_GEN_ALL_ED_FT
[facility]  Dermatology  [address] [place] [address]  Phone: [phone]  Fax: [phone]  Follow Up Time: 4-6 Days    [facility]  Allergy  [address] [place] [id number]  Phone: [phone]  Fax:   Follow Up Time: 4-6 Days

## 2023-08-21 NOTE — ED ADULT NURSE NOTE - OBJECTIVE STATEMENT
Assumed care of pt at 0530 in . Pt A&Ox4 c/o a rash to her stomach/bilateral arms and legs, the pt states that she has a bump located on her left wrist that has been present for 1 year, the pt states that she has had a fever as well, pt resting comfortably showing no signs of respiratory distress or pain, the pt is calm and cooperative, pt denies N/V/D/CP/SOB

## 2023-08-21 NOTE — ED PROVIDER NOTE - PATIENT PORTAL LINK FT
You can access the FollowMyHealth Patient Portal offered by [facility] by registering at the following website: [email or web address]. By joining [employer]’s FollowMyHealth portal, you will also be able to view your health information using other applications (apps) compatible with our system.

## 2023-08-21 NOTE — ED ADULT TRIAGE NOTE - CHIEF COMPLAINT QUOTE
patient states that she has a rash to her stomach, bilateral arms and legs for a few days. subjective fevers.  also reports a bump to left wrist for about a year.

## 2023-08-21 NOTE — ED PROVIDER NOTE - PHYSICAL EXAMINATION
Gen: NAD, non-toxic, conversational  Eyes: PERRLA, EOMI   HENT: Normocephalic, atraumatic. External ears normal, no rhinorrhea, moist mucous membranes.   CV: RRR, no M/R/G  Resp: CTAB, non-labored, speaking without difficulty on room air  Abd: soft, non tender, non rigid, no guarding or rebound tenderness  Back: No CVAT bilaterally, no midline ttp  Skin: exposed skin without obvious rash   Neuro: AOx3, speech is fluent and appropriate  Psych: Mood tired, affect euthymic Gen: NAD, non-toxic, conversational  Eyes: PERRLA, EOMI   HENT: Normocephalic, atraumatic. External ears normal, no rhinorrhea, moist mucous membranes.   CV: RRR, no M/R/G  Resp: CTAB, non-labored, speaking without difficulty on room air  Abd: soft, non tender, non rigid, no guarding or rebound tenderness  Back: No CVAT bilaterally, no midline ttp  Skin: lower abdomen and upper thighs with diffuse confluencing papular rash with scattered excoriations   Neuro: AOx3, speech is fluent and appropriate  Psych: Mood tired, affect euthymic

## 2023-08-21 NOTE — ED PROVIDER NOTE - NSFOLLOWUPINSTRUCTIONS_ED_ALL_ED_FT
Rash    Take one 25mg tablet of benadryl (diphenhydramine) up to once every 4-6 hours for itchiness, rash, and other allergy symptoms.     Use the prednisone medication, 40mg a day, for the next four days. You were given your first dose here today.     Follow up with both the dermatology and allergy immunology services for evaluation of your rash.     A rash is a change in the color of the skin. A rash can also change the way your skin feels. There are many different conditions and factors that can cause a rash, most of which are not dangerous. Make sure to follow up with your primary care physician or a dermatologist as instructed by your health care provider.    SEEK IMMEDIATE MEDICAL CARE IF YOU HAVE ANY OF THE FOLLOWING SYMPTOMS: fever, blisters, a rash inside your mouth, vaginal or anal pain, or altered mental status.

## 2023-08-21 NOTE — ED PROVIDER NOTE - PROGRESS NOTE DETAILS
AMADOR Serra: patient awake now; able to show me the rash which appears like a diffuse papular rash with confluences consistent with urticarial rash; has no known allergies. Will write for prednisone, have follow up with derm and allergy.

## 2023-08-21 NOTE — ED ADULT NURSE NOTE - CAS EDN DISCHARGE ASSESSMENT
Last refill  05/23/18  Last office visit  06/18/18  Recommended  return visit unknown  Scheduled appointment none    1 time courtesy refill per MD signed protocol.       Alert and oriented to person, place and time

## 2023-09-20 ENCOUNTER — INPATIENT (INPATIENT)
Facility: HOSPITAL | Age: 43
LOS: 9 days | Discharge: HOME | DRG: 204 | End: 2023-09-30
Attending: STUDENT IN AN ORGANIZED HEALTH CARE EDUCATION/TRAINING PROGRAM | Admitting: HOSPITALIST
Payer: COMMERCIAL

## 2023-09-20 VITALS
SYSTOLIC BLOOD PRESSURE: 153 MMHG | RESPIRATION RATE: 18 BRPM | WEIGHT: 171.3 LBS | DIASTOLIC BLOOD PRESSURE: 85 MMHG | HEIGHT: 63 IN | TEMPERATURE: 98 F | HEART RATE: 123 BPM

## 2023-09-20 DIAGNOSIS — F14.20 COCAINE DEPENDENCE, UNCOMPLICATED: ICD-10-CM

## 2023-09-20 DIAGNOSIS — J18.9 PNEUMONIA, UNSPECIFIED ORGANISM: ICD-10-CM

## 2023-09-20 DIAGNOSIS — Z98.890 OTHER SPECIFIED POSTPROCEDURAL STATES: Chronic | ICD-10-CM

## 2023-09-20 LAB
AMPHET UR-MCNC: NEGATIVE — SIGNIFICANT CHANGE UP
ANION GAP SERPL CALC-SCNC: 13 MMOL/L — SIGNIFICANT CHANGE UP (ref 5–17)
ANION GAP SERPL CALC-SCNC: 14 MMOL/L — SIGNIFICANT CHANGE UP (ref 5–17)
APAP SERPL-MCNC: <3 UG/ML — LOW (ref 10–26)
APPEARANCE UR: CLEAR — SIGNIFICANT CHANGE UP
BACTERIA # UR AUTO: ABNORMAL
BARBITURATES UR SCN-MCNC: NEGATIVE — SIGNIFICANT CHANGE UP
BASE EXCESS BLDV CALC-SCNC: 4.1 MMOL/L — HIGH (ref -2–3)
BASOPHILS # BLD AUTO: 0.04 K/UL — SIGNIFICANT CHANGE UP (ref 0–0.2)
BASOPHILS NFR BLD AUTO: 0.2 % — SIGNIFICANT CHANGE UP (ref 0–2)
BENZODIAZ UR-MCNC: NEGATIVE — SIGNIFICANT CHANGE UP
BILIRUB UR-MCNC: NEGATIVE — SIGNIFICANT CHANGE UP
BUN SERPL-MCNC: 12.5 MG/DL — SIGNIFICANT CHANGE UP (ref 8–20)
BUN SERPL-MCNC: 9.7 MG/DL — SIGNIFICANT CHANGE UP (ref 8–20)
CA-I SERPL-SCNC: 1.05 MMOL/L — LOW (ref 1.15–1.33)
CALCIUM SERPL-MCNC: 8.4 MG/DL — SIGNIFICANT CHANGE UP (ref 8.4–10.5)
CALCIUM SERPL-MCNC: 8.5 MG/DL — SIGNIFICANT CHANGE UP (ref 8.4–10.5)
CHLORIDE BLDV-SCNC: 91 MMOL/L — LOW (ref 96–108)
CHLORIDE SERPL-SCNC: 84 MMOL/L — LOW (ref 96–108)
CHLORIDE SERPL-SCNC: 90 MMOL/L — LOW (ref 96–108)
CO2 SERPL-SCNC: 25 MMOL/L — SIGNIFICANT CHANGE UP (ref 22–29)
CO2 SERPL-SCNC: 26 MMOL/L — SIGNIFICANT CHANGE UP (ref 22–29)
COCAINE METAB.OTHER UR-MCNC: POSITIVE
COLOR SPEC: YELLOW — SIGNIFICANT CHANGE UP
CREAT SERPL-MCNC: 0.71 MG/DL — SIGNIFICANT CHANGE UP (ref 0.5–1.3)
CREAT SERPL-MCNC: 0.94 MG/DL — SIGNIFICANT CHANGE UP (ref 0.5–1.3)
DIFF PNL FLD: NEGATIVE — SIGNIFICANT CHANGE UP
EGFR: 107 ML/MIN/1.73M2 — SIGNIFICANT CHANGE UP
EGFR: 77 ML/MIN/1.73M2 — SIGNIFICANT CHANGE UP
EOSINOPHIL # BLD AUTO: 0.06 K/UL — SIGNIFICANT CHANGE UP (ref 0–0.5)
EOSINOPHIL NFR BLD AUTO: 0.4 % — SIGNIFICANT CHANGE UP (ref 0–6)
EPI CELLS # UR: SIGNIFICANT CHANGE UP
ETHANOL SERPL-MCNC: <10 MG/DL — SIGNIFICANT CHANGE UP (ref 0–9)
GAS PNL BLDV: 123 MMOL/L — LOW (ref 136–145)
GAS PNL BLDV: SIGNIFICANT CHANGE UP
GLUCOSE BLDC GLUCOMTR-MCNC: 326 MG/DL — HIGH (ref 70–99)
GLUCOSE BLDC GLUCOMTR-MCNC: 330 MG/DL — HIGH (ref 70–99)
GLUCOSE BLDC GLUCOMTR-MCNC: 387 MG/DL — HIGH (ref 70–99)
GLUCOSE BLDC GLUCOMTR-MCNC: 405 MG/DL — HIGH (ref 70–99)
GLUCOSE BLDC GLUCOMTR-MCNC: 457 MG/DL — CRITICAL HIGH (ref 70–99)
GLUCOSE BLDC GLUCOMTR-MCNC: 473 MG/DL — CRITICAL HIGH (ref 70–99)
GLUCOSE BLDC GLUCOMTR-MCNC: 476 MG/DL — CRITICAL HIGH (ref 70–99)
GLUCOSE BLDV-MCNC: >656 MG/DL — CRITICAL HIGH (ref 70–99)
GLUCOSE SERPL-MCNC: 452 MG/DL — CRITICAL HIGH (ref 70–99)
GLUCOSE SERPL-MCNC: 782 MG/DL — CRITICAL HIGH (ref 70–99)
GLUCOSE UR QL: 1000 MG/DL
HCG SERPL-ACNC: <4 MIU/ML — SIGNIFICANT CHANGE UP
HCO3 BLDV-SCNC: 27 MMOL/L — SIGNIFICANT CHANGE UP (ref 22–29)
HCT VFR BLD CALC: 32.8 % — LOW (ref 34.5–45)
HCT VFR BLDA CALC: 32 % — SIGNIFICANT CHANGE UP
HGB BLD CALC-MCNC: 10.5 G/DL — LOW (ref 11.7–16.1)
HGB BLD-MCNC: 10.9 G/DL — LOW (ref 11.5–15.5)
HIV 1 & 2 AB SERPL IA.RAPID: SIGNIFICANT CHANGE UP
IMM GRANULOCYTES NFR BLD AUTO: 0.7 % — SIGNIFICANT CHANGE UP (ref 0–0.9)
KETONES UR-MCNC: ABNORMAL
LACTATE BLDV-MCNC: 1.3 MMOL/L — SIGNIFICANT CHANGE UP (ref 0.5–2)
LEUKOCYTE ESTERASE UR-ACNC: ABNORMAL
LYMPHOCYTES # BLD AUTO: 0.96 K/UL — LOW (ref 1–3.3)
LYMPHOCYTES # BLD AUTO: 5.7 % — LOW (ref 13–44)
MCHC RBC-ENTMCNC: 28.3 PG — SIGNIFICANT CHANGE UP (ref 27–34)
MCHC RBC-ENTMCNC: 33.2 GM/DL — SIGNIFICANT CHANGE UP (ref 32–36)
MCV RBC AUTO: 85.2 FL — SIGNIFICANT CHANGE UP (ref 80–100)
METHADONE UR-MCNC: NEGATIVE — SIGNIFICANT CHANGE UP
MONOCYTES # BLD AUTO: 1.24 K/UL — HIGH (ref 0–0.9)
MONOCYTES NFR BLD AUTO: 7.4 % — SIGNIFICANT CHANGE UP (ref 2–14)
NEUTROPHILS # BLD AUTO: 14.33 K/UL — HIGH (ref 1.8–7.4)
NEUTROPHILS NFR BLD AUTO: 85.6 % — HIGH (ref 43–77)
NITRITE UR-MCNC: POSITIVE
OPIATES UR-MCNC: NEGATIVE — SIGNIFICANT CHANGE UP
OSMOLALITY SERPL: 299 MOSMOL/KG — SIGNIFICANT CHANGE UP (ref 275–300)
PCO2 BLDV: 36 MMHG — LOW (ref 39–42)
PCP SPEC-MCNC: SIGNIFICANT CHANGE UP
PCP UR-MCNC: NEGATIVE — SIGNIFICANT CHANGE UP
PH BLDV: 7.49 — HIGH (ref 7.32–7.43)
PH UR: 6.5 — SIGNIFICANT CHANGE UP (ref 5–8)
PLATELET # BLD AUTO: 359 K/UL — SIGNIFICANT CHANGE UP (ref 150–400)
PO2 BLDV: 178 MMHG — HIGH (ref 25–45)
POTASSIUM BLDV-SCNC: 5.2 MMOL/L — HIGH (ref 3.5–5.1)
POTASSIUM SERPL-MCNC: 4.4 MMOL/L — SIGNIFICANT CHANGE UP (ref 3.5–5.3)
POTASSIUM SERPL-MCNC: 5 MMOL/L — SIGNIFICANT CHANGE UP (ref 3.5–5.3)
POTASSIUM SERPL-SCNC: 4.4 MMOL/L — SIGNIFICANT CHANGE UP (ref 3.5–5.3)
POTASSIUM SERPL-SCNC: 5 MMOL/L — SIGNIFICANT CHANGE UP (ref 3.5–5.3)
PROT UR-MCNC: NEGATIVE — SIGNIFICANT CHANGE UP
RAPID RVP RESULT: SIGNIFICANT CHANGE UP
RBC # BLD: 3.85 M/UL — SIGNIFICANT CHANGE UP (ref 3.8–5.2)
RBC # FLD: 16.7 % — HIGH (ref 10.3–14.5)
RBC CASTS # UR COMP ASSIST: SIGNIFICANT CHANGE UP /HPF (ref 0–4)
SALICYLATES SERPL-MCNC: <0.6 MG/DL — LOW (ref 10–20)
SAO2 % BLDV: 99.7 % — SIGNIFICANT CHANGE UP
SARS-COV-2 RNA SPEC QL NAA+PROBE: SIGNIFICANT CHANGE UP
SODIUM SERPL-SCNC: 123 MMOL/L — LOW (ref 135–145)
SODIUM SERPL-SCNC: 129 MMOL/L — LOW (ref 135–145)
SP GR SPEC: 1 — LOW (ref 1.01–1.02)
THC UR QL: NEGATIVE — SIGNIFICANT CHANGE UP
UROBILINOGEN FLD QL: NEGATIVE — SIGNIFICANT CHANGE UP
WBC # BLD: 16.75 K/UL — HIGH (ref 3.8–10.5)
WBC # FLD AUTO: 16.75 K/UL — HIGH (ref 3.8–10.5)
WBC UR QL: ABNORMAL /HPF (ref 0–5)

## 2023-09-20 PROCEDURE — 71046 X-RAY EXAM CHEST 2 VIEWS: CPT | Mod: 26

## 2023-09-20 PROCEDURE — 99223 1ST HOSP IP/OBS HIGH 75: CPT

## 2023-09-20 PROCEDURE — 99232 SBSQ HOSP IP/OBS MODERATE 35: CPT

## 2023-09-20 PROCEDURE — 93010 ELECTROCARDIOGRAM REPORT: CPT

## 2023-09-20 PROCEDURE — 99291 CRITICAL CARE FIRST HOUR: CPT | Mod: 25

## 2023-09-20 PROCEDURE — 71260 CT THORAX DX C+: CPT | Mod: 26,MA

## 2023-09-20 RX ORDER — INSULIN HUMAN 100 [IU]/ML
10 INJECTION, SOLUTION SUBCUTANEOUS ONCE
Refills: 0 | Status: COMPLETED | OUTPATIENT
Start: 2023-09-20 | End: 2023-09-20

## 2023-09-20 RX ORDER — ENOXAPARIN SODIUM 100 MG/ML
40 INJECTION SUBCUTANEOUS EVERY 24 HOURS
Refills: 0 | Status: COMPLETED | OUTPATIENT
Start: 2023-09-20 | End: 2023-09-24

## 2023-09-20 RX ORDER — CEFTRIAXONE 500 MG/1
1000 INJECTION, POWDER, FOR SOLUTION INTRAMUSCULAR; INTRAVENOUS ONCE
Refills: 0 | Status: DISCONTINUED | OUTPATIENT
Start: 2023-09-20 | End: 2023-09-20

## 2023-09-20 RX ORDER — INSULIN LISPRO 100/ML
VIAL (ML) SUBCUTANEOUS
Refills: 0 | Status: DISCONTINUED | OUTPATIENT
Start: 2023-09-20 | End: 2023-09-26

## 2023-09-20 RX ORDER — LANOLIN ALCOHOL/MO/W.PET/CERES
3 CREAM (GRAM) TOPICAL AT BEDTIME
Refills: 0 | Status: DISCONTINUED | OUTPATIENT
Start: 2023-09-20 | End: 2023-09-30

## 2023-09-20 RX ORDER — RISPERIDONE 4 MG/1
0.5 TABLET ORAL AT BEDTIME
Refills: 0 | Status: DISCONTINUED | OUTPATIENT
Start: 2023-09-20 | End: 2023-09-30

## 2023-09-20 RX ORDER — DEXTROSE 50 % IN WATER 50 %
15 SYRINGE (ML) INTRAVENOUS ONCE
Refills: 0 | Status: DISCONTINUED | OUTPATIENT
Start: 2023-09-20 | End: 2023-09-30

## 2023-09-20 RX ORDER — CARVEDILOL PHOSPHATE 80 MG/1
12.5 CAPSULE, EXTENDED RELEASE ORAL EVERY 12 HOURS
Refills: 0 | Status: DISCONTINUED | OUTPATIENT
Start: 2023-09-20 | End: 2023-09-30

## 2023-09-20 RX ORDER — INSULIN LISPRO 100/ML
4 VIAL (ML) SUBCUTANEOUS ONCE
Refills: 0 | Status: COMPLETED | OUTPATIENT
Start: 2023-09-20 | End: 2023-09-21

## 2023-09-20 RX ORDER — CEPHALEXIN 500 MG
500 CAPSULE ORAL ONCE
Refills: 0 | Status: COMPLETED | OUTPATIENT
Start: 2023-09-20 | End: 2023-09-20

## 2023-09-20 RX ORDER — DEXTROSE 50 % IN WATER 50 %
25 SYRINGE (ML) INTRAVENOUS ONCE
Refills: 0 | Status: DISCONTINUED | OUTPATIENT
Start: 2023-09-20 | End: 2023-09-30

## 2023-09-20 RX ORDER — CEFTRIAXONE 500 MG/1
1000 INJECTION, POWDER, FOR SOLUTION INTRAMUSCULAR; INTRAVENOUS ONCE
Refills: 0 | Status: COMPLETED | OUTPATIENT
Start: 2023-09-20 | End: 2023-09-20

## 2023-09-20 RX ORDER — INSULIN GLARGINE 100 [IU]/ML
15 INJECTION, SOLUTION SUBCUTANEOUS ONCE
Refills: 0 | Status: COMPLETED | OUTPATIENT
Start: 2023-09-20 | End: 2023-09-20

## 2023-09-20 RX ORDER — SODIUM CHLORIDE 9 MG/ML
1000 INJECTION INTRAMUSCULAR; INTRAVENOUS; SUBCUTANEOUS
Refills: 0 | Status: DISCONTINUED | OUTPATIENT
Start: 2023-09-20 | End: 2023-09-22

## 2023-09-20 RX ORDER — SODIUM CHLORIDE 9 MG/ML
1000 INJECTION, SOLUTION INTRAVENOUS
Refills: 0 | Status: DISCONTINUED | OUTPATIENT
Start: 2023-09-20 | End: 2023-09-30

## 2023-09-20 RX ORDER — LEVOTHYROXINE SODIUM 125 MCG
100 TABLET ORAL DAILY
Refills: 0 | Status: DISCONTINUED | OUTPATIENT
Start: 2023-09-20 | End: 2023-09-30

## 2023-09-20 RX ORDER — ASPIRIN/CALCIUM CARB/MAGNESIUM 324 MG
81 TABLET ORAL DAILY
Refills: 0 | Status: DISCONTINUED | OUTPATIENT
Start: 2023-09-20 | End: 2023-09-22

## 2023-09-20 RX ORDER — INSULIN GLARGINE 100 [IU]/ML
15 INJECTION, SOLUTION SUBCUTANEOUS EVERY MORNING
Refills: 0 | Status: DISCONTINUED | OUTPATIENT
Start: 2023-09-21 | End: 2023-09-22

## 2023-09-20 RX ORDER — SODIUM CHLORIDE 9 MG/ML
2000 INJECTION, SOLUTION INTRAVENOUS ONCE
Refills: 0 | Status: COMPLETED | OUTPATIENT
Start: 2023-09-20 | End: 2023-09-20

## 2023-09-20 RX ORDER — SERTRALINE 25 MG/1
25 TABLET, FILM COATED ORAL DAILY
Refills: 0 | Status: DISCONTINUED | OUTPATIENT
Start: 2023-09-20 | End: 2023-09-30

## 2023-09-20 RX ORDER — INFLUENZA VIRUS VACCINE 15; 15; 15; 15 UG/.5ML; UG/.5ML; UG/.5ML; UG/.5ML
0.5 SUSPENSION INTRAMUSCULAR ONCE
Refills: 0 | Status: COMPLETED | OUTPATIENT
Start: 2023-09-20 | End: 2023-09-30

## 2023-09-20 RX ORDER — INSULIN LISPRO 100/ML
VIAL (ML) SUBCUTANEOUS AT BEDTIME
Refills: 0 | Status: DISCONTINUED | OUTPATIENT
Start: 2023-09-20 | End: 2023-09-22

## 2023-09-20 RX ORDER — PANTOPRAZOLE SODIUM 20 MG/1
40 TABLET, DELAYED RELEASE ORAL
Refills: 0 | Status: DISCONTINUED | OUTPATIENT
Start: 2023-09-20 | End: 2023-09-30

## 2023-09-20 RX ORDER — ATORVASTATIN CALCIUM 80 MG/1
20 TABLET, FILM COATED ORAL AT BEDTIME
Refills: 0 | Status: DISCONTINUED | OUTPATIENT
Start: 2023-09-20 | End: 2023-09-30

## 2023-09-20 RX ORDER — DEXTROSE 50 % IN WATER 50 %
12.5 SYRINGE (ML) INTRAVENOUS ONCE
Refills: 0 | Status: DISCONTINUED | OUTPATIENT
Start: 2023-09-20 | End: 2023-09-30

## 2023-09-20 RX ORDER — ACETAMINOPHEN 500 MG
650 TABLET ORAL EVERY 6 HOURS
Refills: 0 | Status: DISCONTINUED | OUTPATIENT
Start: 2023-09-20 | End: 2023-09-30

## 2023-09-20 RX ORDER — AZITHROMYCIN 500 MG/1
500 TABLET, FILM COATED ORAL ONCE
Refills: 0 | Status: COMPLETED | OUTPATIENT
Start: 2023-09-20 | End: 2023-09-20

## 2023-09-20 RX ORDER — GLUCAGON INJECTION, SOLUTION 0.5 MG/.1ML
1 INJECTION, SOLUTION SUBCUTANEOUS ONCE
Refills: 0 | Status: DISCONTINUED | OUTPATIENT
Start: 2023-09-20 | End: 2023-09-30

## 2023-09-20 RX ADMIN — INSULIN HUMAN 10 UNIT(S): 100 INJECTION, SOLUTION SUBCUTANEOUS at 09:24

## 2023-09-20 RX ADMIN — Medication 500 MILLIGRAM(S): at 02:03

## 2023-09-20 RX ADMIN — Medication 10: at 15:48

## 2023-09-20 RX ADMIN — RISPERIDONE 0.5 MILLIGRAM(S): 4 TABLET ORAL at 22:18

## 2023-09-20 RX ADMIN — SODIUM CHLORIDE 2000 MILLILITER(S): 9 INJECTION, SOLUTION INTRAVENOUS at 03:24

## 2023-09-20 RX ADMIN — SODIUM CHLORIDE 150 MILLILITER(S): 9 INJECTION INTRAMUSCULAR; INTRAVENOUS; SUBCUTANEOUS at 20:58

## 2023-09-20 RX ADMIN — INSULIN HUMAN 10 UNIT(S): 100 INJECTION, SOLUTION SUBCUTANEOUS at 03:23

## 2023-09-20 RX ADMIN — AZITHROMYCIN 500 MILLIGRAM(S): 500 TABLET, FILM COATED ORAL at 08:01

## 2023-09-20 RX ADMIN — CEFTRIAXONE 1000 MILLIGRAM(S): 500 INJECTION, POWDER, FOR SOLUTION INTRAMUSCULAR; INTRAVENOUS at 03:55

## 2023-09-20 RX ADMIN — SODIUM CHLORIDE 2000 MILLILITER(S): 9 INJECTION, SOLUTION INTRAVENOUS at 04:24

## 2023-09-20 RX ADMIN — INSULIN GLARGINE 15 UNIT(S): 100 INJECTION, SOLUTION SUBCUTANEOUS at 13:50

## 2023-09-20 RX ADMIN — SERTRALINE 25 MILLIGRAM(S): 25 TABLET, FILM COATED ORAL at 18:08

## 2023-09-20 RX ADMIN — CARVEDILOL PHOSPHATE 12.5 MILLIGRAM(S): 80 CAPSULE, EXTENDED RELEASE ORAL at 18:08

## 2023-09-20 RX ADMIN — ATORVASTATIN CALCIUM 20 MILLIGRAM(S): 80 TABLET, FILM COATED ORAL at 22:18

## 2023-09-20 RX ADMIN — Medication 81 MILLIGRAM(S): at 18:08

## 2023-09-20 RX ADMIN — Medication 650 MILLIGRAM(S): at 18:07

## 2023-09-20 RX ADMIN — SODIUM CHLORIDE 150 MILLILITER(S): 9 INJECTION INTRAMUSCULAR; INTRAVENOUS; SUBCUTANEOUS at 19:37

## 2023-09-20 RX ADMIN — Medication 650 MILLIGRAM(S): at 19:36

## 2023-09-20 RX ADMIN — PANTOPRAZOLE SODIUM 40 MILLIGRAM(S): 20 TABLET, DELAYED RELEASE ORAL at 18:08

## 2023-09-20 RX ADMIN — AZITHROMYCIN 255 MILLIGRAM(S): 500 TABLET, FILM COATED ORAL at 03:55

## 2023-09-20 RX ADMIN — SODIUM CHLORIDE 150 MILLILITER(S): 9 INJECTION INTRAMUSCULAR; INTRAVENOUS; SUBCUTANEOUS at 09:25

## 2023-09-20 RX ADMIN — Medication 100 MICROGRAM(S): at 15:48

## 2023-09-20 RX ADMIN — Medication 8: at 20:59

## 2023-09-20 NOTE — BH CONSULTATION LIAISON ASSESSMENT NOTE - HPI (INCLUDE ILLNESS QUALITY, SEVERITY, DURATION, TIMING, CONTEXT, MODIFYING FACTORS, ASSOCIATED SIGNS AND SYMPTOMS)
Patient is a 42 year old female who is homeless, unemployed, on food stamps with a psychiatric history of depression, anxiety, as well as Crack/Cocaine use disorder who is not currently in outpatient treatement and also with a history of DM, CAD, HTN, HLD who presented to the ED with auditory hallucinations and while in the ED, BG>700 and was admitted for uncontrolled diabetes with hyperglycemia.     Patient seen and found to be calm and cooperative. Patient explains how she has been staying in "different places" and using crack and states she has been very depressed, has little support and has been having voices telling her to hurt herself but doesn't want to. she reports poor sleep, poor appetite and denies any h/i as well as any symptoms of blake or AVH .     attempted to ask numbers for collateral but could not provide any numbers and denies being in contact with any family

## 2023-09-20 NOTE — ED PROVIDER NOTE - PROGRESS NOTE DETAILS
HOOD Kennedy: Results reviewed. Patient is a known DM1. Has not taken medication in approx. 1 month just because she has not wanted to. HOOD Kennedy: CXR results reviewed with patient. Denies known h/o known lung mass or recent incarcerations.

## 2023-09-20 NOTE — BH CONSULTATION LIAISON ASSESSMENT NOTE - NSBHCHARTREVIEWVS_PSY_A_CORE FT
Vital Signs Last 24 Hrs  T(C): 36.8 (20 Sep 2023 11:48), Max: 37.4 (20 Sep 2023 03:33)  T(F): 98.2 (20 Sep 2023 11:48), Max: 99.4 (20 Sep 2023 03:33)  HR: 95 (20 Sep 2023 14:20) (95 - 123)  BP: 149/90 (20 Sep 2023 14:20) (126/78 - 165/84)  BP(mean): --  RR: 18 (20 Sep 2023 14:20) (18 - 19)  SpO2: 97% (20 Sep 2023 14:20) (94% - 97%)    Parameters below as of 20 Sep 2023 14:20  Patient On (Oxygen Delivery Method): room air

## 2023-09-20 NOTE — CONSULT NOTE ADULT - SUBJECTIVE AND OBJECTIVE BOX
[facility]  INFECTIOUS DISEASES at [place] / [place] / [place]  =======================================================                               Ramakrishna Lieberman MD#   Zaire Neri MD*                             Khushi Puente MD*   Lisa Bui MD*                              Professor Emeritus:  Dr Fazal Jack MD^            Diplomates American Board of Internal Medicine & Infectious Diseases                # [place] Office - Appt - Tel  [phone] Fax [phone]                * [place] Office - Appt - Tel [phone] Fax [phone]               ^ [place] [facility] - Appt - Tel  [phone] Fax [phone]                                  Hospital Consult line:  [phone]  =======================================================      [id number]  BRUNA MARTINEZ    CC: Patient is a 42y old  Female who presents with a chief complaint of Hyperglycemia (20 Sep 2023 10:25)      42y  Female with h/o MI w/stent, Bipolar, anxiety, substance abuse. Patient presents to the ED with c/o auditory hallucinations x4 days. She also reports she stopped taking  her medications and is unable to control her sugars. No fevers or chills. No sick contacts. In the ER patient is afebrile. Leukocytosis to 16k. CT Chest with neoplasm vs PNA RUL. ID input requested.       Past Medical & Surgical Hx:  Diabetes mellitus  CAD (coronary artery disease)  HTN (hypertension)  S/P cardiac cath      Social Hx:  marijuana and cocaine user  + Smoker       FAMILY HISTORY:  DM - Mother       Allergies  No Known Drug Allergies  shellfish (Unknown)       REVIEW OF SYSTEMS:  CONSTITUTIONAL:  No Fever or chills  HEENT:  No diplopia or blurred vision.  No earache, sore throat or runny nose.  CARDIOVASCULAR:  No chest pain  RESPIRATORY:  No cough, shortness of breath  GASTROINTESTINAL:  No nausea, vomiting or diarrhea.  GENITOURINARY:  No dysuria, frequency or urgency. No Blood in urine  MUSCULOSKELETAL:  no joint aches, no muscle pain  SKIN:  No change in skin, hair or nails.  NEUROLOGIC:  No Headaches      Physical Exam:  GEN: NAD  HEENT: normocephalic and atraumatic. EOMI. PERRL.    NECK: Supple.   LUNGS: CTA B/L.  HEART: RRR  ABDOMEN: Soft, NT, ND.  +BS.    : No CVA tenderness  EXTREMITIES: Without  edema.  MSK: No joint swelling  NEUROLOGIC: No Focal Deficits   PSYCHIATRIC: Appropriate affect .  SKIN: No rash      Height (cm): 160 (09-20 @ 00:08)  Weight (kg): 77.7 (09-20 @ 00:08)  BMI (kg/m2): 30.4 (09-20 @ 00:08)  BSA (m2): 1.81 (09-20 @ 00:08)      Vitals:  T(F): 98.7 (20 Sep 2023 07:47), Max: 99.4 (20 Sep 2023 03:33)  HR: 105 (20 Sep 2023 07:47)  BP: 165/84 (20 Sep 2023 07:47)  RR: 18 (20 Sep 2023 07:47)  SpO2: 96% (20 Sep 2023 07:47) (94% - 96%)  temp max in last 48H T(F): , Max: 99.4 (09-20-23 @ 03:33)      Current Antibiotics:    Other medications:  sodium chloride 0.9%. 1000 milliLiter(s) IV Continuous <Continuous>                            10.9   16.75 )-----------( 359      ( 20 Sep 2023 01:10 )             32.8     09-20    129<L>  |  90<L>  |  9.7  ----------------------------<  452<HH>  5.0   |  26.0  |  0.71    Ca    8.5      20 Sep 2023 08:30    TPro  7.3  /  Alb  2.7<L>  /  TBili  0.2<L>  /  DBili  0.1  /  AST  12  /  ALT  11  /  AlkPhos  138<H>  09-20    RECENT CULTURES:  09-20 @ 01:00    RVP  NotDete      WBC Count: 16.75 K/uL (09-20-23 @ 01:10)    Creatinine: 0.71 mg/dL (09-20-23 @ 08:30)  Creatinine: 0.94 mg/dL (09-20-23 @ 01:10)    SARS-CoV-2: NotDetec (09-20-23 @ 01:00)        < from: CT Chest w/ IV Cont (09.20.23 @ 06:29) >  ACC: [id number] EXAM:  CT CHEST IC   ORDERED BY: ALESSIO FORTUNE     PROCEDURE DATE:  09/20/2023      INTERPRETATION:  CLINICAL INFORMATION: Mass    COMPARISON: None.    CONTRAST/COMPLICATIONS:  IV Contrast: Omnipaque 350  80 cc administered 20 cc discarded  Oral Contrast: NONE  Complications: None reported at time of study completion    PROCEDURE:  CT of the Chest was performed.  Sagittal and coronal reformats were performed.    FINDINGS:    LUNGS AND AIRWAYS: Patent central airways.  There is a 8 x 7 x 8 cm   multilobulated right upper lobe mass with irregular borders and subtle   internal areas of hypoattenuation. The mass is narrowing the second order   bronchi supplying the right upper lobe.  PLEURA: No pleural effusion.  MEDIASTINUM AND DIDI: Pathologic mediastinal, bilateral axillary, and   supraclavicular lymphadenopathy present bilaterally.  VESSELS: Within normal limits.  HEART: Heart size is normal. No pericardial effusion.  CHEST WALL AND LOWER NECK: Homogeneous attenuation of the thyroid gland.   Diffuse infiltration of the subcutaneous fat planes throughout the chest   and visualized flank  VISUALIZED UPPER ABDOMEN: Limited evaluation due to technique. Diffuse   mesenteric edema is present.  BONES: 8 mm lucent lesion abutting the the superior endplate of T6.    IMPRESSION:  8 cm right upper lobe mass with associated pathologic lymphadenopathy,   most suggestive of neoplasm. Limited evaluation of the partially   visualized upper abdomen. Further workup recommended.    8 mm lucent lesion abutting the superior endplate of T6, most suggestive   of benign vertebral body osseous hemangioma. Nuclear medicine bone scan   could be considered for further evaluation if there is concern for   osseous metastases.    --- End of Report ---  < end of copied text >

## 2023-09-20 NOTE — ED PROVIDER NOTE - OBJECTIVE STATEMENT
41 yo female PMHx MI w/stent, Bipolar, anxiety presents to ED c/o auditory hallucinations x4 days. Voices are commanding, telling patient to hurt self and others. Reports deaths of three family members in one week and states cannot "function in society;" requesting inpatient admission. Last used marijuana and cocaine last week. No recent alcohol. Noncompliant with medications three weeks ago. Has previously attempted suicide. No recent psychiatric hospitalizations; last at 10 years of age. No further complaints at this time. 41 yo female PMHx MI w/stent, Bipolar, anxiety presents to ED c/o auditory hallucinations x4 days. Voices are commanding, telling patient to hurt self and others. Reports deaths of three family members in one week and states cannot "function in society;" requesting inpatient admission. Last used marijuana and cocaine last week. No recent alcohol. Noncompliant with medications three weeks ago. Has previously attempted suicide. No recent psychiatric hospitalizations; last at 10 years of age. No further complaints at this time.  Denies chest pain, SOB. 43 yo female PMHx MI w/stent, Bipolar, anxiety presents to ED c/o auditory hallucinations x4 days. Voices are commanding, telling patient to hurt self and others. Reports deaths of three family members in one week and states cannot "function in society;" requesting inpatient admission. Last used marijuana and cocaine last week. No recent alcohol. Noncompliant with medications three weeks ago. States has not eaten in 4 days. Has previously attempted suicide. No recent psychiatric hospitalizations; last at 10 years of age. No further complaints at this time.  Denies chest pain, SOB.

## 2023-09-20 NOTE — BH CONSULTATION LIAISON ASSESSMENT NOTE - NSBHMSEBODY_PSY_A_CORE
Ice 15 mins, 3x a day  Elevate  Ace wrap applied. Pt also given wrist spica- can wear for support. Applied no neurovascular complications noted  Tylenol 650 mg a mouth every 6 hours as needed for pain  Avoid strenuous activity with left extremity    Follow-up with orthopedic if needed    Red flags reviewed, go to ER with any worsening or concerning symptoms    See DAVONTES  
Average build

## 2023-09-20 NOTE — H&P ADULT - NSHPLABSRESULTS_GEN_ALL_CORE
.  LABS:                         10.9   16.75 )-----------( 359      ( 20 Sep 2023 01:10 )             32.8         123<L>  |  84<L>  |  12.5  ----------------------------<  782<HH>  4.4   |  25.0  |  0.94    Ca    8.4      20 Sep 2023 01:10    TPro  7.3  /  Alb  2.7<L>  /  TBili  0.2<L>  /  DBili  0.1  /  AST  12  /  ALT  11  /  AlkPhos  138<H>        Urinalysis Basic - ( 20 Sep 2023 01:20 )    Color: Yellow / Appearance: Clear / S.005 / pH: x  Gluc: x / Ketone: Trace  / Bili: Negative / Urobili: Negative   Blood: x / Protein: Negative / Nitrite: Positive   Leuk Esterase: Trace / RBC: 0-2 /HPF / WBC 6-10 /HPF   Sq Epi: x / Non Sq Epi: x / Bacteria: Few            RADIOLOGY, EKG & ADDITIONAL TESTS: Reviewed.

## 2023-09-20 NOTE — ED ADULT NURSE NOTE - NSFALLUNIVINTERV_ED_ALL_ED
Bed/Stretcher in lowest position, wheels locked, appropriate side rails in place/Call bell, personal items and telephone in reach/Instruct patient to call for assistance before getting out of bed/chair/stretcher/Non-slip footwear applied when patient is off stretcher/[place] to call system/Physically safe environment - no spills, clutter or unnecessary equipment/Purposeful proactive rounding/Room/bathroom lighting operational, light cord in reach

## 2023-09-20 NOTE — H&P ADULT - HISTORY OF PRESENT ILLNESS
41 yo female with PMH of DM2, CAD, MI w/stent, HTN, HLD, Bipolar, anxiety presents to ED with c/o feeling unwell. Pt keeps falling asleep and tells me she is 'upset'. Pt reports she recently lost 3 family members within a week of each other and has been having a difficult time coping so she has been using cocaine (last use was 4 days ago). She has been hearing voices for the last 3-4 days that have been telling her to hurt herself and others. She also has not been taking any of her medications for the past few weeks and does not remember them.    Number Of Deep Sutures (Optional): 3

## 2023-09-20 NOTE — H&P ADULT - NSHPPHYSICALEXAM_GEN_ALL_CORE
CONSTITUTIONAL: Awake, alert and in no apparent distress  ENMT: Airway patent, Nasal mucosa clear. Mouth with dry mucosa.   CARDIAC: Normal rate, regular rhythm.  Heart sounds S1, S2.    RESPIRATORY: Breath sounds clear and equal bilaterally.   ABDOMINAL: Nontender to palpation. No rebound/ guarding   EXTREMITIES: No edema, cyanosis or deformity   NEUROLOGICAL: Alert and oriented, no focal deficits   PSYCH: Flat affect

## 2023-09-20 NOTE — BH CONSULTATION LIAISON ASSESSMENT NOTE - CURRENT MEDICATION
MEDICATIONS  (STANDING):  aspirin  chewable 81 milliGRAM(s) Oral daily  atorvastatin 20 milliGRAM(s) Oral at bedtime  carvedilol 12.5 milliGRAM(s) Oral every 12 hours  dextrose 5%. 1000 milliLiter(s) (100 mL/Hr) IV Continuous <Continuous>  dextrose 5%. 1000 milliLiter(s) (50 mL/Hr) IV Continuous <Continuous>  dextrose 50% Injectable 25 Gram(s) IV Push once  dextrose 50% Injectable 25 Gram(s) IV Push once  dextrose 50% Injectable 12.5 Gram(s) IV Push once  glucagon  Injectable 1 milliGRAM(s) IntraMuscular once  influenza   Vaccine 0.5 milliLiter(s) IntraMuscular once  insulin lispro (ADMELOG) corrective regimen sliding scale   SubCutaneous at bedtime  insulin lispro (ADMELOG) corrective regimen sliding scale   SubCutaneous three times a day before meals  levothyroxine 100 MICROGram(s) Oral daily  pantoprazole    Tablet 40 milliGRAM(s) Oral before breakfast  risperiDONE   Tablet 0.5 milliGRAM(s) Oral at bedtime  sertraline 25 milliGRAM(s) Oral daily  sodium chloride 0.9%. 1000 milliLiter(s) (150 mL/Hr) IV Continuous <Continuous>    MEDICATIONS  (PRN):  acetaminophen     Tablet .. 650 milliGRAM(s) Oral every 6 hours PRN Temp greater or equal to 38C (100.4F), Mild Pain (1 - 3)  dextrose Oral Gel 15 Gram(s) Oral once PRN Blood Glucose LESS THAN 70 milliGRAM(s)/deciliter  melatonin 3 milliGRAM(s) Oral at bedtime PRN Insomnia

## 2023-09-20 NOTE — BH CONSULTATION LIAISON ASSESSMENT NOTE - NSBHCHARTREVIEWLAB_PSY_A_CORE FT
Basic Metabolic Panel - STAT (09.20.23 @ 08:30)    Sodium: 129 mmol/L    Potassium: 5.0 mmol/L    Chloride: 90: Chloride reference range changed from ..10/26/2022 mmol/L    Carbon Dioxide: 26.0 mmol/L    Anion Gap: 13 mmol/L    Blood Urea Nitrogen: 9.7 mg/dL    Creatinine: 0.71 mg/dL    Glucose: 452: TYPE:(C=Critical, N=Notification, A=Abnormal) C  TESTS: GLU  DATE/TIME CALLED: 09/20/2023 10:12:36 EDT  CALLED TO: RN ASHLEY LOMBARDI  READ BACK (2 Patient Identifiers)(Y/N): Y  READ BACK VALUES (Y/N): Y  CALLED BY: _RAG/NC mg/dL    Calcium: 8.5 mg/dL    eGFR: 107: The estimated glomerular filtration rate (eGFR) is calculated using the  2021 CKD-EPI creatinine equation, which does not have a coefficient for  race and is validated in individuals 18 years of age and older (N Engl J  Med 2021; 385:0103-3903). Creatinine-based eGFR may be inaccurate in  various situations including but not limited to extremes of muscle mass,  altered dietary protein intake, or medications that affect renal tubular  creatinine secretion. mL/min/1.73m2

## 2023-09-20 NOTE — CONSULT NOTE ADULT - SUBJECTIVE AND OBJECTIVE BOX
HPI:   41 yo female PMHx MI w/stent, Bipolar, anxiety presents to ED c/o auditory hallucinations x4 days. Voices are commanding, telling patient to hurt self and others. Reports deaths of three family members in one week and states cannot "function in society;" requesting inpatient admission. Last used marijuana and cocaine last week. No recent alcohol. Noncompliant with medications three weeks ago. States has not eaten in 4 days. Has previously attempted suicide. No further complaints at this time. We were consulted for severely uncontrolled DM.    PAST MEDICAL & SURGICAL HISTORY:  Diabetes mellitus  CAD (coronary artery disease)  HTN (hypertension)    S/P cardiac cath    FAMILY HISTORY:    SOCIAL HISTORY: No etoh abuse, no smoking, no recreational drugs    REVIEW OF SYSTEMS:  Constitutional: No fever, no chills, no change in weight.  Eyes: No eye swelling,no  blurry vision, no redness, no loss of vision.  Neck: No neck pain, no change in voice.  Lungs: No shortness of breath, no wheezing, no cough  CV: No chest pain, no palpitations  GI: No nausea, no vomiting, no constipation, no diarrhea, no abdominal pain  : No urinary frequency, no blood in urine  Musculoskeletal: No muscle pain, no joint pain, no swelling.  Skin: No rash, no infections.  Neurologic: No headaches, no weakness  Endocrine: No heat intolerance, no cold intolerance  Psych: No depression, no anxiety    MEDICATIONS  (STANDING):  sodium chloride 0.9%. 1000 milliLiter(s) (150 mL/Hr) IV Continuous <Continuous>    MEDICATIONS  (PRN):  acetaminophen     Tablet .. 650 milliGRAM(s) Oral every 6 hours PRN Temp greater or equal to 38C (100.4F), Mild Pain (1 - 3)  melatonin 3 milliGRAM(s) Oral at bedtime PRN Insomnia    Allergies  No Known Drug Allergies  shellfish (Unknown)    CAPILLARY BLOOD GLUCOSE  POCT Blood Glucose.: 476 mg/dL (20 Sep 2023 10:19)    PHYSICAL EXAM:  Vital Signs Last 24 Hrs  T(C): 37.1 (20 Sep 2023 07:47), Max: 37.4 (20 Sep 2023 03:33)  T(F): 98.7 (20 Sep 2023 07:47), Max: 99.4 (20 Sep 2023 03:33)  HR: 105 (20 Sep 2023 07:47) (105 - 123)  BP: 165/84 (20 Sep 2023 07:47) (131/78 - 165/84)  BP(mean): --  RR: 18 (20 Sep 2023 07:47) (18 - 19)  SpO2: 96% (20 Sep 2023 07:47) (94% - 96%)  Parameters below as of 20 Sep 2023 07:47  Patient On (Oxygen Delivery Method): room air    General appearance: Well developed, well nourished.    Eyes: Pupils equal and reactive to light. EOM full. No exophthalmos.    Neck: Trachea midline. No thyroid enlargement.    Lungs: Normal respiratory excursion. Lungs clear.    CV: Regular cardiac rhythm. No murmur. Carotid and pedal pulses intact.    Abdomen: Soft, non tender, no organomegaly or mass.    Musculoskeletal: No cyanosis, clubbing, or edema. No pedal lesions.    Skin: Warm and moist. No rash. No acanthosis.    Neuro: Cranial nerves intact. Normal motor and sensory function. DTR's normal.    Psych: Normal affect, good judgement.            LABS:                        10.9   16.75 )-----------( 359      ( 20 Sep 2023 01:10 )             32.8     09-20    129<L>  |  90<L>  |  9.7  ----------------------------<  452<HH>  5.0   |  26.0  |  0.71    Ca    8.5      20 Sep 2023 08:30    TPro  7.3  /  Alb  2.7<L>  /  TBili  0.2<L>  /  DBili  0.1  /  AST  12  /  ALT  11  /  AlkPhos  138<H>  09-20    Urinalysis Basic - ( 20 Sep 2023 08:30 )    Color: x / Appearance: x / SG: x / pH: x  Gluc: 452 mg/dL / Ketone: x  / Bili: x / Urobili: x   Blood: x / Protein: x / Nitrite: x   Leuk Esterase: x / RBC: x / WBC x   Sq Epi: x / Non Sq Epi: x / Bacteria: x      LIVER FUNCTIONS - ( 20 Sep 2023 01:10 )  Alb: 2.7 g/dL / Pro: 7.3 g/dL / ALK PHOS: 138 U/L / ALT: 11 U/L / AST: 12 U/L / GGT: x                 CAPILLARY BLOOD GLUCOSE      RADIOLOGY & ADDITIONAL STUDIES:     HPI:   43 yo female PMHx MI w/stent, Bipolar, anxiety presents to ED c/o auditory hallucinations x4 days. Voices are commanding, telling patient to hurt self and others. Reports deaths of three family members in one week and states cannot "function in society;" requesting inpatient admission. Last used marijuana and cocaine last week. No recent alcohol. Noncompliant with medications three weeks ago. States has not eaten in 4 days. Has previously attempted suicide. No further complaints at this time. We were consulted for severely uncontrolled DM. she denied to answer questions.     PAST MEDICAL & SURGICAL HISTORY:  Diabetes mellitus  CAD (coronary artery disease)  HTN (hypertension)    S/P cardiac cath    FAMILY HISTORY: does not answer.     SOCIAL HISTORY: did not answer    REVIEW OF SYSTEMS: does not answer questions. seems sedated.    MEDICATIONS  (STANDING):  sodium chloride 0.9%. 1000 milliLiter(s) (150 mL/Hr) IV Continuous <Continuous>    MEDICATIONS  (PRN):  acetaminophen     Tablet .. 650 milliGRAM(s) Oral every 6 hours PRN Temp greater or equal to 38C (100.4F), Mild Pain (1 - 3)  melatonin 3 milliGRAM(s) Oral at bedtime PRN Insomnia    Allergies  No Known Drug Allergies  shellfish (Unknown)    CAPILLARY BLOOD GLUCOSE  POCT Blood Glucose.: 476 mg/dL (20 Sep 2023 10:19)    PHYSICAL EXAM:  Vital Signs Last 24 Hrs  T(C): 37.1 (20 Sep 2023 07:47), Max: 37.4 (20 Sep 2023 03:33)  T(F): 98.7 (20 Sep 2023 07:47), Max: 99.4 (20 Sep 2023 03:33)  HR: 105 (20 Sep 2023 07:47) (105 - 123)  BP: 165/84 (20 Sep 2023 07:47) (131/78 - 165/84)  BP(mean): --  RR: 18 (20 Sep 2023 07:47) (18 - 19)  SpO2: 96% (20 Sep 2023 07:47) (94% - 96%)  Parameters below as of 20 Sep 2023 07:47  Patient On (Oxygen Delivery Method): room air  General appearance: Well developed, well nourished.  Eyes: Pupils equal and reactive to light. EOM full.   Neck: Trachea midline. No thyroid enlargement.  Lungs: Normal respiratory excursion. Lungs clear.  CV: Regular cardiac rhythm. No murmur. Carotid and pedal pulses intact.  Abdomen: Soft, non tender, no organomegaly or mass.  Musculoskeletal: No cyanosis, clubbing, or edema.   Skin: Warm and moist. chronic macules on b/l shins   Neuro: Cranial nerves intact. Normal motor and sensory function.  Psych: Normal affect, good judgement.      LABS:                        10.9   16.75 )-----------( 359      ( 20 Sep 2023 01:10 )             32.8     09-20    129<L>  |  90<L>  |  9.7  ----------------------------<  452<HH>  5.0   |  26.0  |  0.71    Ca    8.5      20 Sep 2023 08:30    TPro  7.3  /  Alb  2.7<L>  /  TBili  0.2<L>  /  DBili  0.1  /  AST  12  /  ALT  11  /  AlkPhos  138<H>  09-20    Urinalysis Basic - ( 20 Sep 2023 08:30 )    Color: x / Appearance: x / SG: x / pH: x  Gluc: 452 mg/dL / Ketone: x  / Bili: x / Urobili: x   Blood: x / Protein: x / Nitrite: x   Leuk Esterase: x / RBC: x / WBC x   Sq Epi: x / Non Sq Epi: x / Bacteria: x    LIVER FUNCTIONS - ( 20 Sep 2023 01:10 )  Alb: 2.7 g/dL / Pro: 7.3 g/dL / ALK PHOS: 138 U/L / ALT: 11 U/L / AST: 12 U/L / GGT: x           CAPILLARY BLOOD GLUCOSE  POCT Blood Glucose.: 476 mg/dL (20 Sep 2023 10:19)  POCT Blood Glucose.: 457 mg/dL (20 Sep 2023 09:23)  POCT Blood Glucose.: >530 mg/dL (20 Sep 2023 03:29)

## 2023-09-20 NOTE — CONSULT NOTE ADULT - ASSESSMENT
43 yo female PMHx MI w/stent, Bipolar, presents to ED c/o auditory hallucinations x4 days, voices commanding to hurt self and others. She requests inpatient admission. Last used marijuana and cocaine last week. No recent alcohol. Noncompliant with medications three weeks ago. Denies chest pain, SOB.    IDDM with hyperglycemia   - BG >700 on admission   - A1c >16   - Given IV Insulin in ER  - Will check BG every 2 hours for now   - continue aggressive iv fluids.      Acute psychosis : Psych consulted      CAD with Hx of Cardiac stent: continue ASA, coreg per primary team      HTN: cont home meds      Hx of Marijuana and cocaine use        43 yo female PMHx MI w/stent, Bipolar, presents to ED c/o auditory hallucinations x4 days, voices commanding to hurt self and others. She requests inpatient admission. Last used marijuana and cocaine last week. No recent alcohol. Noncompliant with medications three weeks ago. Denies chest pain, SOB.    IDDM with hyperglycemia   - BG >700 on admission   - A1c >16   - Given IV Insulin in ER  - Will check BG every 2 hours for now   - continue aggressive iv fluids.        Acute psychosis : Psych consulted      CAD with Hx of Cardiac stent: continue ASA, coreg per primary team      HTN: cont home meds      Hx of Marijuana and cocaine use        43 yo female PMHx MI w/stent, Bipolar, presents to ED c/o auditory hallucinations x4 days, voices commanding to hurt self and others. She requests inpatient admission. Last used marijuana and cocaine last week. No recent alcohol. Noncompliant with medications three weeks ago. Denies chest pain, SOB.    IDDM with hyperglycemia   - BG >700 on admission   - A1c >16   - Given IV Insulin in ER  - Will check BG every 2 hours for now   - continue aggressive iv fluids.    - start lantus 15 u qd starting now. cont to monitor Bgs and adjust doses tomorrow  hypog protocol.     Acute psychosis : Psych consulted      CAD with Hx of Cardiac stent: continue ASA, coreg per primary team      HTN: cont home meds      Hx of Marijuana and cocaine use

## 2023-09-20 NOTE — H&P ADULT - ASSESSMENT
IDDM with hyperglycemia   - BG >700 on admission   - A1c >16   - Given IV Insulin in ER  - Will check BG every 2 hours for now and keep patient on SDU   - started IVF   - Endo consulted     Acute psychosis   - Psych consulted      CAD with Hx of Cardiac stent  - c/w ASA, coreg      HTN  - c/w home meds      Asthma  - Not in exacerbation   - Nebs prn     Hx of Marijuana and cocaine use       DVT ppx - Lovenox SQ  43 yo female with PMH of DM, CAD, MI w/stent, Bipolar, anxiety presents to ED with auditory hallucinations. In the ED, BG>700, pt to be admitted for uncontrolled diabetes with hyperglycemia.     IDDM with hyperglycemia   - Pt is supposed to be on Basaglar 24 units qhs and Admelog   - BG >700 on admission   - A1c >16   - Given IV Insulin in ER  - Will check BG every 2 hours for now and keep patient on SDU   - started IVF   - Endo consulted   - Monitor closely     Acute psychosis   - Psych consulted   - likely need inpatient Psych      CAD with Hx of Cardiac stent   - stress test in Dec 2022 was normal   - will start ASA, coreg and statin (home meds from prior)      HTN   - c/w home meds Lisinopril      Asthma   - Not in exacerbation   - Nebs prn     Hypothyroidism   - c/w synthroid      Substance abuse   - pt reports mostly Marijuana and cocaine use  - counselled on quitting        DVT ppx - Lovenox SQ     *Home meds from [facility] in Dec 202, pt reports she has not been taking any home meds for the past few weeks*  Admelog SoloStar 100 units/mL injectable solution: Use as directed as needed per sliding scale  Aspirin Enteric Coated 81 mg oral delayed release tablet: 1 tab(s) orally oncea day  atorvastatin 20 mg oral tablet: 1 tab(s) orally once a day (at bedtime)  Basaglar KwikPen 100 units/mL subcutaneous solution: 24 unit(s) subcutaneouslyonce a day (at bedtime)  Coreg 25 mg oral tablet: 1 tab(s) orally 2 times a day  FLUoxetine 20 mg oral capsule: 1 cap(s) orally once a day  levothyroxine 100 mcg (0.1 mg) oral tablet: 1 tab(s) orally once a day  lisinopril 10 mg oral tablet: 1 tab(s) orally 2 times a day  loratadine 10 mg oral tablet: 1 tab(s) orally once a day, As Needed  omeprazole 20 mg oral delayed release capsule: 1 cap(s) orally once a day  Vistaril 50 mg oral capsule: 1 cap(s) orally once a day (at bedtime), As Needed 43 yo female with PMH of DM, CAD, MI w/stent, Bipolar, anxiety presents to ED with auditory hallucinations. In the ED, BG>700, pt to be admitted for uncontrolled diabetes with hyperglycemia.     IDDM with hyperglycemia   - Pt is supposed to be on Basaglar 24 units qhs and Admelog   - BG >700 on admission   - A1c >16   - Given IV Insulin in ER  - Will check BG every 2 hours for now and keep patient on SDU   - started IVF   - Endo consulted   - Monitor closely     R lung abnormality   - CT Chest: 8 cm right upper lobe mass with associated pathologic lymphadenopathy, most suggestive of neoplasm. 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   - given leukocytosis concern for pneumonia, likely aspiration?   - received antibiotics in ER, will hold off and monitor off antibiotics for now  - procal   - ID consulted by ED     Acute psychosis   - Psych consulted   - likely need inpatient Psych      CAD with Hx of Cardiac stent   - stress test in Dec 2022 was normal   - will start ASA, coreg and statin (home meds from prior)      HTN   - c/w home meds Lisinopril      Asthma   - Not in exacerbation   - Nebs prn     Hypothyroidism   - c/w synthroid      Substance abuse   - pt reports mostly Marijuana and cocaine use  - counselled on quitting        DVT ppx - Lovenox SQ     *Home meds from [facility] in Dec 202, pt reports she has not been taking any home meds for the past few weeks*  Admelog SoloStar 100 units/mL injectable solution: Use as directed as needed per sliding scale  Aspirin Enteric Coated 81 mg oral delayed release tablet: 1 tab(s) orally oncea day  atorvastatin 20 mg oral tablet: 1 tab(s) orally once a day (at bedtime)  Basaglar KwikPen 100 units/mL subcutaneous solution: 24 unit(s) subcutaneouslyonce a day (at bedtime)  Coreg 25 mg oral tablet: 1 tab(s) orally 2 times a day  FLUoxetine 20 mg oral capsule: 1 cap(s) orally once a day  levothyroxine 100 mcg (0.1 mg) oral tablet: 1 tab(s) orally once a day  lisinopril 10 mg oral tablet: 1 tab(s) orally 2 times a day  loratadine 10 mg oral tablet: 1 tab(s) orally once a day, As Needed  omeprazole 20 mg oral delayed release capsule: 1 cap(s) orally once a day  Vistaril 50 mg oral capsule: 1 cap(s) orally once a day (at bedtime), As Needed 41 yo female with PMH of DM, CAD, MI w/stent, Bipolar, anxiety presents to ED with auditory hallucinations. In the ED, BG>700, pt to be admitted for uncontrolled diabetes with hyperglycemia.     IDDM with hyperglycemia   - Pt is supposed to be on Basaglar 24 units qhs and Admelog   - BG >700 on admission   - A1c >16   - Given IV Insulin in ER  - Will check BG every 2 hours for now and keep patient on SDU   - started IVF   - Endo consulted   - Monitor closely     R lung abnormality   - CT Chest: 8 cm right upper lobe mass with associated pathologic lymphadenopathy, most suggestive of neoplasm. 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   - given leukocytosis concern for pneumonia, likely aspiration?   - received antibiotics in ER, will hold off and monitor off antibiotics for now  - procal   - ID consulted by ED     Acute psychosis   - Psych consulted, await consult for meds   - likely need inpatient Psych      CAD with Hx of Cardiac stent   - stress test in Dec 2022 was normal   - will start ASA, coreg and statin (home meds from prior)      HTN   - Will home antiHTN meds as pt is dehydrated   - restart home Lisinopril as indicated      Asthma   - Not in exacerbation   - Nebs prn     Hypothyroidism   - check TSH/T4   - c/w synthroid      Substance abuse   - pt reports mostly Marijuana and cocaine use  - counselled on quitting        DVT ppx - Lovenox SQ     *Home meds from [facility] in Dec 202, pt reports she has not been taking any home meds for the past few weeks*  Admelog SoloStar 100 units/mL injectable solution: Use as directed as needed per sliding scale  Aspirin Enteric Coated 81 mg oral delayed release tablet: 1 tab(s) orally oncea day  atorvastatin 20 mg oral tablet: 1 tab(s) orally once a day (at bedtime)  Basaglar KwikPen 100 units/mL subcutaneous solution: 24 unit(s) subcutaneouslyonce a day (at bedtime)  Coreg 25 mg oral tablet: 1 tab(s) orally 2 times a day  FLUoxetine 20 mg oral capsule: 1 cap(s) orally once a day  levothyroxine 100 mcg (0.1 mg) oral tablet: 1 tab(s) orally once a day  lisinopril 10 mg oral tablet: 1 tab(s) orally 2 times a day  loratadine 10 mg oral tablet: 1 tab(s) orally once a day, As Needed  omeprazole 20 mg oral delayed release capsule: 1 cap(s) orally once a day  Vistaril 50 mg oral capsule: 1 cap(s) orally once a day (at bedtime), As Needed

## 2023-09-20 NOTE — H&P ADULT - NSHPREVIEWOFSYSTEMS_GEN_ALL_CORE
REVIEW OF SYSTEMS:    CONSTITUTIONAL: + weakness, no fevers or chills  EYES/ENT: No visual changes;  No vertigo or throat pain   RESPIRATORY: No cough, wheezing, hemoptysis; No shortness of breath  CARDIOVASCULAR: No chest pain or palpitations  GASTROINTESTINAL: No abdominal or epigastric pain. No nausea, vomiting  GENITOURINARY: No dysuria, frequency or hematuria  NEUROLOGICAL: No numbness or weakness  Psych: per HPI

## 2023-09-20 NOTE — ED ADULT NURSE REASSESSMENT NOTE - NS ED NURSE REASSESS COMMENT FT1
Assumed pt care at 0730.  Pt is sleeping with even, unlabored respirations.   showing 94% on room air.  Pt is responsive to light tactile stimulation.  1:1 aide at bedside.  Will reevaluate when patient is fully awake.

## 2023-09-20 NOTE — ED PROVIDER NOTE - CARE PLAN
1 Principal Discharge DX:	Right upper lobe pneumonia  Secondary Diagnosis:	Diabetes mellitus with hyperglycemia  Secondary Diagnosis:	Major depression with psychotic features

## 2023-09-20 NOTE — ED PROVIDER NOTE - PHYSICAL EXAMINATION
General: In NAD, non-toxic/well-appearing.  Skin: No rashes or lesions. Warm, dry, color normal for race.   Head: Normocephalic/atraumatic.   Eyes: Sclera anicteric, conjunctivae clear b/l. PERRLA, EOMI.   Neck: Supple, FROM.   Cardio: Rate and rhythm regular. No audible murmur.  Resp: Breath sounds vesicular, symmetrical and without rales, rhonchi or wheezing b/l.  Abd: Non-distended. Soft, non-tender. No rebound.  MSK: MAEx4. FROM.   Neuro: A&Ox3. Appears nonfocal.

## 2023-09-20 NOTE — ED ADULT NURSE NOTE - HPI (INCLUDE ILLNESS QUALITY, SEVERITY, DURATION, TIMING, CONTEXT, MODIFYING FACTORS, ASSOCIATED SIGNS AND SYMPTOMS)
Patient arrived to  in yellow gown escorted by security and PCA. Patient was medically cleared by attending from main ED. Patient was wand by security and was compliant with process, patient has history of MI w/stent, Bipolar, anxiety, patient  presents to ED c/o auditory hallucinations x 4 days. Patient also reported smoking Marijuana and cocaine. Patient belongings secured, snack given to patient.

## 2023-09-20 NOTE — ED PROVIDER NOTE - CLINICAL SUMMARY MEDICAL DECISION MAKING FREE TEXT BOX
Patient with major depression symptoms due to worsening personal stressors. However, she also has not been compliant with her diabetes medications. Patient found to have acute Pna on cxr likely contributing to symptoms. Will admit for management.

## 2023-09-20 NOTE — H&P ADULT - NSICDXPASTMEDICALHX_GEN_ALL_CORE_FT
PAST MEDICAL HISTORY:  CAD (coronary artery disease)     Diabetes mellitus     HTN (hypertension)

## 2023-09-20 NOTE — BH CONSULTATION LIAISON ASSESSMENT NOTE - SUMMARY
Patient is a 42 year old female who is homeless, unemployed, on food stamps with a psychiatric history of depression, anxiety, as well as Crack/Cocaine use disorder who is not currently in outpatient treatement and also with a history of DM, CAD, HTN, HLD who presented to the ED with auditory hallucinations and while in the ED, BG>700 and was admitted for uncontrolled diabetes with hyperglycemia.     Patient seen and currently expressing depressed mood with CAH to hurt self and requesting treatment.     Dx.   Depression w/ psychosis   r/o substance induced mood disorder/psychosis     Recs   -keep on 1 to1 observation  -start Zoloft 25mg po daily for depression   -start Risperdal 0.5mg po at bedtime for psychosis   -will follow and determined disposition

## 2023-09-20 NOTE — ED ADULT TRIAGE NOTE - CHIEF COMPLAINT QUOTE
pt states she is hearing voices x 4 days and just lost 3 family members in 1 week, can't function in society,   has bipolar & anxiety, stopped taking meds a long time ago  A&Ox3, resp wnl, feeling very overwhelmed, states had cocaine 2 days ago

## 2023-09-20 NOTE — PATIENT PROFILE ADULT - FALL HARM RISK - HARM RISK INTERVENTIONS

## 2023-09-20 NOTE — CONSULT NOTE ADULT - ASSESSMENT
42y  Female with h/o MI w/stent, Bipolar, anxiety, substance abuse. Patient presents to the ED with c/o auditory hallucinations x4 days. She also reports she stopped taking  her medications and is unable to control her sugars. No fevers or chills. No sick contacts. In the ER patient is afebrile. Leukocytosis to 16k. CT Chest with neoplasm vs PNA RUL.       neoplasm vs PNA RUL  Leukocytosis   Hallucinations       - Blood cultures pending  - RVP/COVID 19 PCR 9/20 negative   - Urine Legionella ordered   - CT Chest with RUL neoplasm vs PNA   - Would consult pulmonary   - Procalcitonin level ordered   - Monitor off antibiotics   - Follow up cultures  - Trend Fever  - Trend WBC      Thank you for allowing me to participate in the care of your patient.   Will Follow    Discussed treatment plan with: Dr Cuevas

## 2023-09-20 NOTE — BH CONSULTATION LIAISON ASSESSMENT NOTE - NSBHCHARTREVIEWINVESTIGATE_PSY_A_CORE FT
fingers/toes warm to touch/no paresthesia/no swelling/no cyanosis of extremity/capillary refill time < 2 seconds
< from: 12 Lead ECG (09.20.23 @ 00:18) >      Ventricular Rate 113 BPM    Atrial Rate 113 BPM    P-R Interval 134 ms    QRS Duration 94 ms    Q-T Interval 338 ms    QTC Calculation(Bazett) 463 ms    P Axis 72 degrees    R Axis 38 degrees    T Axis 74 degrees    Diagnosis Line Sinus tachycardia  Minimal voltage criteria for LVH, may be normal variant  Nonspecific T wave abnormality  Abnormal ECG    Confirmed by PARAG BESS ([id number]) on 9/20/2023 9:13:03 AM    < end of copied text >

## 2023-09-21 DIAGNOSIS — R91.8 OTHER NONSPECIFIC ABNORMAL FINDING OF LUNG FIELD: ICD-10-CM

## 2023-09-21 DIAGNOSIS — I10 ESSENTIAL (PRIMARY) HYPERTENSION: ICD-10-CM

## 2023-09-21 DIAGNOSIS — E11.9 TYPE 2 DIABETES MELLITUS WITHOUT COMPLICATIONS: ICD-10-CM

## 2023-09-21 DIAGNOSIS — F31.9 BIPOLAR DISORDER, UNSPECIFIED: ICD-10-CM

## 2023-09-21 DIAGNOSIS — I25.10 ATHEROSCLEROTIC HEART DISEASE OF NATIVE CORONARY ARTERY WITHOUT ANGINA PECTORIS: ICD-10-CM

## 2023-09-21 LAB
ALBUMIN SERPL ELPH-MCNC: 2.1 G/DL — LOW (ref 3.3–5.2)
ALP SERPL-CCNC: 138 U/L — HIGH (ref 40–120)
ALT FLD-CCNC: 9 U/L — SIGNIFICANT CHANGE UP
ANION GAP SERPL CALC-SCNC: 10 MMOL/L — SIGNIFICANT CHANGE UP (ref 5–17)
AST SERPL-CCNC: 14 U/L — SIGNIFICANT CHANGE UP
BASOPHILS # BLD AUTO: 0.05 K/UL — SIGNIFICANT CHANGE UP (ref 0–0.2)
BASOPHILS NFR BLD AUTO: 0.5 % — SIGNIFICANT CHANGE UP (ref 0–2)
BILIRUB SERPL-MCNC: <0.2 MG/DL — LOW (ref 0.4–2)
BUN SERPL-MCNC: 17 MG/DL — SIGNIFICANT CHANGE UP (ref 8–20)
CALCIUM SERPL-MCNC: 8.1 MG/DL — LOW (ref 8.4–10.5)
CHLORIDE SERPL-SCNC: 95 MMOL/L — LOW (ref 96–108)
CO2 SERPL-SCNC: 27 MMOL/L — SIGNIFICANT CHANGE UP (ref 22–29)
CREAT SERPL-MCNC: 0.62 MG/DL — SIGNIFICANT CHANGE UP (ref 0.5–1.3)
EGFR: 113 ML/MIN/1.73M2 — SIGNIFICANT CHANGE UP
EOSINOPHIL # BLD AUTO: 0.13 K/UL — SIGNIFICANT CHANGE UP (ref 0–0.5)
EOSINOPHIL NFR BLD AUTO: 1.4 % — SIGNIFICANT CHANGE UP (ref 0–6)
GAMMA INTERFERON BACKGROUND BLD IA-ACNC: 0.02 IU/ML — SIGNIFICANT CHANGE UP
GLUCOSE BLDC GLUCOMTR-MCNC: 267 MG/DL — HIGH (ref 70–99)
GLUCOSE BLDC GLUCOMTR-MCNC: 273 MG/DL — HIGH (ref 70–99)
GLUCOSE BLDC GLUCOMTR-MCNC: 328 MG/DL — HIGH (ref 70–99)
GLUCOSE BLDC GLUCOMTR-MCNC: 343 MG/DL — HIGH (ref 70–99)
GLUCOSE BLDC GLUCOMTR-MCNC: 372 MG/DL — HIGH (ref 70–99)
GLUCOSE BLDC GLUCOMTR-MCNC: 378 MG/DL — HIGH (ref 70–99)
GLUCOSE BLDC GLUCOMTR-MCNC: 402 MG/DL — HIGH (ref 70–99)
GLUCOSE BLDC GLUCOMTR-MCNC: 416 MG/DL — HIGH (ref 70–99)
GLUCOSE SERPL-MCNC: 296 MG/DL — HIGH (ref 70–99)
HCT VFR BLD CALC: 29.5 % — LOW (ref 34.5–45)
HGB BLD-MCNC: 9.7 G/DL — LOW (ref 11.5–15.5)
IMM GRANULOCYTES NFR BLD AUTO: 0.9 % — SIGNIFICANT CHANGE UP (ref 0–0.9)
LEGIONELLA AG UR QL: NEGATIVE — SIGNIFICANT CHANGE UP
LYMPHOCYTES # BLD AUTO: 1.45 K/UL — SIGNIFICANT CHANGE UP (ref 1–3.3)
LYMPHOCYTES # BLD AUTO: 15.7 % — SIGNIFICANT CHANGE UP (ref 13–44)
M TB IFN-G BLD-IMP: NEGATIVE — SIGNIFICANT CHANGE UP
M TB IFN-G CD4+ BCKGRND COR BLD-ACNC: 0 IU/ML — SIGNIFICANT CHANGE UP
M TB IFN-G CD4+CD8+ BCKGRND COR BLD-ACNC: 0 IU/ML — SIGNIFICANT CHANGE UP
MCHC RBC-ENTMCNC: 27.6 PG — SIGNIFICANT CHANGE UP (ref 27–34)
MCHC RBC-ENTMCNC: 32.9 GM/DL — SIGNIFICANT CHANGE UP (ref 32–36)
MCV RBC AUTO: 84 FL — SIGNIFICANT CHANGE UP (ref 80–100)
MONOCYTES # BLD AUTO: 0.8 K/UL — SIGNIFICANT CHANGE UP (ref 0–0.9)
MONOCYTES NFR BLD AUTO: 8.6 % — SIGNIFICANT CHANGE UP (ref 2–14)
NEUTROPHILS # BLD AUTO: 6.75 K/UL — SIGNIFICANT CHANGE UP (ref 1.8–7.4)
NEUTROPHILS NFR BLD AUTO: 72.9 % — SIGNIFICANT CHANGE UP (ref 43–77)
PLATELET # BLD AUTO: 344 K/UL — SIGNIFICANT CHANGE UP (ref 150–400)
POTASSIUM SERPL-MCNC: 4.6 MMOL/L — SIGNIFICANT CHANGE UP (ref 3.5–5.3)
POTASSIUM SERPL-SCNC: 4.6 MMOL/L — SIGNIFICANT CHANGE UP (ref 3.5–5.3)
PROCALCITONIN SERPL-MCNC: 0.37 NG/ML — HIGH (ref 0.02–0.1)
PROT SERPL-MCNC: 6.7 G/DL — SIGNIFICANT CHANGE UP (ref 6.6–8.7)
QUANT TB PLUS MITOGEN MINUS NIL: 0.51 IU/ML — SIGNIFICANT CHANGE UP
RBC # BLD: 3.51 M/UL — LOW (ref 3.8–5.2)
RBC # FLD: 16.6 % — HIGH (ref 10.3–14.5)
SODIUM SERPL-SCNC: 132 MMOL/L — LOW (ref 135–145)
T4 AB SER-ACNC: 8 UG/DL — SIGNIFICANT CHANGE UP (ref 4.5–12)
TSH SERPL-MCNC: 2.47 UIU/ML — SIGNIFICANT CHANGE UP (ref 0.27–4.2)
WBC # BLD: 9.26 K/UL — SIGNIFICANT CHANGE UP (ref 3.8–10.5)
WBC # FLD AUTO: 9.26 K/UL — SIGNIFICANT CHANGE UP (ref 3.8–10.5)

## 2023-09-21 PROCEDURE — 99232 SBSQ HOSP IP/OBS MODERATE 35: CPT

## 2023-09-21 PROCEDURE — 99233 SBSQ HOSP IP/OBS HIGH 50: CPT

## 2023-09-21 PROCEDURE — 99221 1ST HOSP IP/OBS SF/LOW 40: CPT

## 2023-09-21 RX ORDER — INSULIN GLARGINE 100 [IU]/ML
10 INJECTION, SOLUTION SUBCUTANEOUS AT BEDTIME
Refills: 0 | Status: DISCONTINUED | OUTPATIENT
Start: 2023-09-21 | End: 2023-09-22

## 2023-09-21 RX ORDER — INSULIN LISPRO 100/ML
4 VIAL (ML) SUBCUTANEOUS
Refills: 0 | Status: DISCONTINUED | OUTPATIENT
Start: 2023-09-21 | End: 2023-09-25

## 2023-09-21 RX ADMIN — Medication 10: at 08:53

## 2023-09-21 RX ADMIN — Medication 650 MILLIGRAM(S): at 22:11

## 2023-09-21 RX ADMIN — INSULIN GLARGINE 10 UNIT(S): 100 INJECTION, SOLUTION SUBCUTANEOUS at 22:10

## 2023-09-21 RX ADMIN — SERTRALINE 25 MILLIGRAM(S): 25 TABLET, FILM COATED ORAL at 12:37

## 2023-09-21 RX ADMIN — Medication 100 MICROGRAM(S): at 05:00

## 2023-09-21 RX ADMIN — Medication 8: at 12:37

## 2023-09-21 RX ADMIN — Medication 81 MILLIGRAM(S): at 12:37

## 2023-09-21 RX ADMIN — Medication 4 UNIT(S): at 12:36

## 2023-09-21 RX ADMIN — Medication 4 UNIT(S): at 17:09

## 2023-09-21 RX ADMIN — INSULIN GLARGINE 15 UNIT(S): 100 INJECTION, SOLUTION SUBCUTANEOUS at 08:54

## 2023-09-21 RX ADMIN — CARVEDILOL PHOSPHATE 12.5 MILLIGRAM(S): 80 CAPSULE, EXTENDED RELEASE ORAL at 06:31

## 2023-09-21 RX ADMIN — Medication 650 MILLIGRAM(S): at 16:36

## 2023-09-21 RX ADMIN — Medication 6: at 22:11

## 2023-09-21 RX ADMIN — Medication 650 MILLIGRAM(S): at 15:36

## 2023-09-21 RX ADMIN — ENOXAPARIN SODIUM 40 MILLIGRAM(S): 100 INJECTION SUBCUTANEOUS at 06:31

## 2023-09-21 RX ADMIN — Medication 3 MILLIGRAM(S): at 22:16

## 2023-09-21 RX ADMIN — RISPERIDONE 0.5 MILLIGRAM(S): 4 TABLET ORAL at 22:10

## 2023-09-21 RX ADMIN — Medication 4 UNIT(S): at 00:08

## 2023-09-21 RX ADMIN — Medication 6: at 17:10

## 2023-09-21 RX ADMIN — ATORVASTATIN CALCIUM 20 MILLIGRAM(S): 80 TABLET, FILM COATED ORAL at 22:10

## 2023-09-21 RX ADMIN — PANTOPRAZOLE SODIUM 40 MILLIGRAM(S): 20 TABLET, DELAYED RELEASE ORAL at 06:31

## 2023-09-21 RX ADMIN — CARVEDILOL PHOSPHATE 12.5 MILLIGRAM(S): 80 CAPSULE, EXTENDED RELEASE ORAL at 17:10

## 2023-09-21 NOTE — PROGRESS NOTE ADULT - NS ATTEND AMEND GEN_ALL_CORE FT
I agree with plan above and discussed with NP.  - Continue lantus 15 units this morning  - Start lantus 10 units qhs (total of 25 units daily)  - Start premeal admelog 4 units tid with meals

## 2023-09-21 NOTE — PROGRESS NOTE ADULT - ASSESSMENT
42 year old female with PMH of DM, CAD, MI w/stent, Bipolar, anxiety presents to ED with auditory hallucinations.   In the ED, BG>700, pt to be admitted for uncontrolled diabetes with hyperglycemia. Also noted to have opacity on CXR    IDDM with hyperglycemia   A1c>16  improved glycemic control though still suboptimal  - blood glucose monitoring q4 with sliding scale  - Glargine 15U q am and 10U qPM as per Endocrinology  - Premeal Lispro 4U  - Diabetic teaching  - Endocrinology follow up    R lung abnormality   CT Chest: 8 cm right upper lobe mass with associated pathologic lymphadenopathy, most suggestive of neoplasm. 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   - given leukocytosis concern for pneumonia, likely aspiration? Received antibiotics in ER, will hold off and monitor off antibiotics for now  - ID follow up noted  - Pulmonology and CTS consult    Acute psychosis   Continues to have hallucinations  - Constant observation  - Sertraline 25mg and Risperidone 0.5 mg qhs started by Psychiatry     CAD with Hx of Cardiac stent   Stress test in Dec 2022 was normal   Asymptomatic  -  ASA, coreg and statin (home meds from prior)      HTN   Improved  - Carvedilol  - Resume Lisinopril 10mg daily     Asthma   Not in exacerbation   - Nebs prn     Hypothyroidism   Normal TSH/T4   -Levothyroxine 100mcg     Substance abuse   - pt reports mostly Marijuana and cocaine use  - counselled on quitting        DVT ppx - Lovenox SQ     *Home meds from [facility] in Dec 202, pt reports she has not been taking any home meds for the past few weeks*  Admelog SoloStar 100 units/mL injectable solution: Use as directed as needed per sliding scale  Aspirin Enteric Coated 81 mg oral delayed release tablet: 1 tab(s) orally oncea day  atorvastatin 20 mg oral tablet: 1 tab(s) orally once a day (at bedtime)  Basaglar KwikPen 100 units/mL subcutaneous solution: 24 unit(s) subcutaneouslyonce a day (at bedtime)  Coreg 25 mg oral tablet: 1 tab(s) orally 2 times a day  FLUoxetine 20 mg oral capsule: 1 cap(s) orally once a day  levothyroxine 100 mcg (0.1 mg) oral tablet: 1 tab(s) orally once a day  lisinopril 10 mg oral tablet: 1 tab(s) orally 2 times a day  loratadine 10 mg oral tablet: 1 tab(s) orally once a day, As Needed  omeprazole 20 mg oral delayed release capsule: 1 cap(s) orally once a day  Vistaril 50 mg oral capsule: 1 cap(s) orally once a day (at bedtime), As Needed

## 2023-09-21 NOTE — CONSULT NOTE ADULT - PROBLEM SELECTOR RECOMMENDATION 9
CT Chest showing 8cm RUL Mass  ID following for possible infectious process, currently off ABX  Dr. Mejia to review imaging   Plan for Ion Guided Biopsy of mass, likely Monday 9/25/23 CT Chest showing 8cm RUL Mass  ID following for possible infectious process, currently off ABX  Dr. Mejia to review imaging   Plan for Ion Guided Biopsy of mass, likely Monday 9/25/23  Will need Medical and Cardiac Clearance.

## 2023-09-21 NOTE — PROGRESS NOTE ADULT - ASSESSMENT
42y  Female with h/o MI w/stent, Bipolar, anxiety, substance abuse. Patient presents to the ED with c/o auditory hallucinations x4 days. She also reports she stopped taking  her medications and is unable to control her sugars. No fevers or chills. No sick contacts. In the ER patient is afebrile. Leukocytosis to 16k. CT Chest with neoplasm vs PNA RUL.       neoplasm vs PNA RUL  Leukocytosis   Hallucinations       - Blood cultures  no growth   - RVP/COVID 19 PCR 9/20 negative   - Urine Legionella pending  - CT Chest with RUL neoplasm vs PNA   - Would consult pulmonary   - Procalcitonin level 0.37  - Monitor off antibiotics   - Follow up cultures  - Trend Fever  - Trend WBC      Will sign off. please call PRN.     Discussed treatment plan with: Dr Felix

## 2023-09-21 NOTE — CONSULT NOTE ADULT - ASSESSMENT
41 yo female with PMHx of DM2 (A1c >16.9), CAD, MI w/ stent in 2018, HTN, HLD, Asthma, Bipolar, Anxiety, Hypothyroidism, Cocaine/Marijuana use - every day use "depending on how she feels". Presents to ED with c/o feeling unwell, hearing voices for the last 3-4 days that have been telling her to hurt herself and others. On admission, patient was Hyperglycemic, states she has not been taking any of her medications for the past few weeks. CXR in ED showing RUL opacity, CT Chest showing 8cm RUL Mass. Thoracic Surgery consulted for further evaluation.

## 2023-09-21 NOTE — CONSULT NOTE ADULT - SUBJECTIVE AND OBJECTIVE BOX
SUBJECTIVE    43 yo female with PMHx of DM2 (A1c >16.9), CAD, MI w/ stent in 2018, HTN, HLD, Asthma, Bipolar, Anxiety, Hypothyroidism, Cocaine/Marijuana use - every day use "depending on how she feels". Presents to ED with c/o feeling unwell, hearing voices for the last 3-4 days that have been telling her to hurt herself and others. On admission, patient was Hyperglycemic, states she has not been taking any of her medications for the past few weeks. CXR in ED showing RUL opacity, CT Chest showing 8cm RUL Mass. Thoracic Surgery consulted for further evaluation.     Seen and evaluated bedside. Endorsing no acute complaints. Denies any chest pain, palpitations, orthopnea, dyspnea on exertion, shortness of breath, wheezing, abd pain, nausea, vomiting, constipation, lightheadedness, headaches, fevers, or chills.     LAST BLOOD THINNER--> aspirin  chewable   81 milliGRAM(s) Oral (09-21-23 @ 12:37)   81 milliGRAM(s) Oral (09-20-23 @ 18:08)    enoxaparin Injectable   40 milliGRAM(s) SubCutaneous (09-21-23 @ 06:31)        SOCIAL HISTORY   patient lives with ; stairs; assist devices  smoking history   drinking history   previous occupation     PAST MEDICAL / SURGICAL HISTORY   PAST MEDICAL & SURGICAL HISTORY:  Diabetes mellitus      CAD (coronary artery disease)      HTN (hypertension)      S/P cardiac cath        HOME MEDICATIONS  Home Medications:      ALLERGIES  Allergies    No Known Drug Allergies  shellfish (Unknown)    Intolerances        OBJECTIVE DATA    VITALS   currently in sinus rhythm  ICU Vital Signs Last 24 Hrs  T(C): 36.9 (21 Sep 2023 15:58), Max: 37.1 (21 Sep 2023 02:30)  T(F): 98.5 (21 Sep 2023 15:58), Max: 98.8 (21 Sep 2023 02:30)  HR: 93 (21 Sep 2023 15:58) (83 - 93)  BP: 145/82 (21 Sep 2023 15:58) (124/79 - 145/82)  BP(mean): --  ABP: --  ABP(mean): --  RR: 18 (21 Sep 2023 15:58) (18 - 18)  SpO2: 97% (21 Sep 2023 15:58) (95% - 97%)    O2 Parameters below as of 21 Sep 2023 15:58  Patient On (Oxygen Delivery Method): room air      General: Well appearing, laying in bed on an incline, NAD  Neurological: AOx3, no focal neurological deficits  Cardiovascular: Regular Rate/Rhythm, S1/2 auscultated, No extra heart sounds  Respiratory: CTA b/l over all lung fields, No adventitious breath sounds  Gastrointestinal: Bowel sounds present in all 4 quadrants, Abdomen is soft, non-tender, non-distended  Extremities: No peripheral edema noted, 5/5 strength and full range of motion in all 4 extremities b/l  Vascular: 2+ peripheral pulses b/l in upper and lower extremities, Radial, DP      LABS                         9.7    9.26  )-----------( 344      ( 21 Sep 2023 07:24 )             29.5     Culture - Blood (collected 20 Sep 2023 03:25)  Source: .Blood Blood  Preliminary Report (21 Sep 2023 09:01):    No growth at 24 hours    Culture - Blood (collected 20 Sep 2023 03:22)  Source: .Blood Blood  Preliminary Report (21 Sep 2023 09:01):    No growth at 24 hours    09-21    132<L>  |  95<L>  |  17.0  ----------------------------<  296<H>  4.6   |  27.0  |  0.62    Ca    8.1<L>      21 Sep 2023 07:24    TPro  6.7  /  Alb  2.1<L>  /  TBili  <0.2<L>  /  DBili  x   /  AST  14  /  ALT  9   /  AlkPhos  138<H>  09-21        INTAKE/OUTAKE  I&O's Summary    21 Sep 2023 07:01  -  21 Sep 2023 17:58  --------------------------------------------------------  IN: 480 mL / OUT: 0 mL / NET: 480 mL        IMAGING   personally reviewed imaging     < from: Xray Chest 2 Views PA/Lat (09.20.23 @ 02:20) >  IMPRESSION: Large right upper lobe mass with irregular borders noted with   suggestion of some lucencies within the mass. One should consider   neoplasm as well as an infectious process. Follow-up suggested. Remainder   of the lung is unremarkable left chest is clear. Heart is within normal   limits in its transthoracic diameter. Regional osseous structures   appropriate for age    < end of copied text >      < from: CT Chest w/ IV Cont (09.20.23 @ 06:29) >  IMPRESSION:  8 cm right upper lobe mass with associated pathologic lymphadenopathy,   most suggestive of neoplasm. Limited evaluation of the partially   visualized upper abdomen. Further workup recommended.    8 mm lucent lesion abutting the superior endplate of T6, most suggestive   of benign vertebral body osseous hemangioma. Nuclear medicine bone scan   could be considered for further evaluation if there is concern for   osseous metastases.    --- End of Report ---    < end of copied text >

## 2023-09-21 NOTE — CONSULT NOTE ADULT - PROBLEM SELECTOR RECOMMENDATION 6
Patient states she has not been taking her medication, hearing voices telling her to hurt herself/others  Psych consulted  Care per Primary Team

## 2023-09-21 NOTE — PROGRESS NOTE ADULT - SUBJECTIVE AND OBJECTIVE BOX
INTERVAL EVENTS:  Follow up diabetes management    ROS: Complains of leg cramps, endorses moderate appetite.     MEDICATIONS  (STANDING):  aspirin  chewable 81 milliGRAM(s) Oral daily  atorvastatin 20 milliGRAM(s) Oral at bedtime  carvedilol 12.5 milliGRAM(s) Oral every 12 hours  dextrose 5%. 1000 milliLiter(s) (100 mL/Hr) IV Continuous <Continuous>  dextrose 5%. 1000 milliLiter(s) (50 mL/Hr) IV Continuous <Continuous>  dextrose 50% Injectable 25 Gram(s) IV Push once  dextrose 50% Injectable 25 Gram(s) IV Push once  dextrose 50% Injectable 12.5 Gram(s) IV Push once  enoxaparin Injectable 40 milliGRAM(s) SubCutaneous every 24 hours  glucagon  Injectable 1 milliGRAM(s) IntraMuscular once  influenza   Vaccine 0.5 milliLiter(s) IntraMuscular once  insulin glargine Injectable (LANTUS) 10 Unit(s) SubCutaneous at bedtime  insulin glargine Injectable (LANTUS) 15 Unit(s) SubCutaneous every morning  insulin lispro (ADMELOG) corrective regimen sliding scale   SubCutaneous at bedtime  insulin lispro (ADMELOG) corrective regimen sliding scale   SubCutaneous three times a day before meals  insulin lispro Injectable (ADMELOG) 4 Unit(s) SubCutaneous three times a day before meals  levothyroxine 100 MICROGram(s) Oral daily  pantoprazole    Tablet 40 milliGRAM(s) Oral before breakfast  risperiDONE   Tablet 0.5 milliGRAM(s) Oral at bedtime  sertraline 25 milliGRAM(s) Oral daily  sodium chloride 0.9%. 1000 milliLiter(s) (150 mL/Hr) IV Continuous <Continuous>    MEDICATIONS  (PRN):  acetaminophen     Tablet .. 650 milliGRAM(s) Oral every 6 hours PRN Temp greater or equal to 38C (100.4F), Mild Pain (1 - 3)  dextrose Oral Gel 15 Gram(s) Oral once PRN Blood Glucose LESS THAN 70 milliGRAM(s)/deciliter  melatonin 3 milliGRAM(s) Oral at bedtime PRN Insomnia    Allergies  No Known Drug Allergies  shellfish (Unknown)    Vital Signs Last 24 Hrs  T(C): 36.8 (21 Sep 2023 08:20), Max: 37.1 (21 Sep 2023 02:30)  T(F): 98.2 (21 Sep 2023 08:20), Max: 98.8 (21 Sep 2023 02:30)  HR: 89 (21 Sep 2023 08:20) (83 - 101)  BP: 132/80 (21 Sep 2023 08:20) (120/71 - 149/90)  BP(mean): --  RR: 18 (21 Sep 2023 08:20) (18 - 18)  SpO2: 95% (21 Sep 2023 08:20) (94% - 97%)    Parameters below as of 21 Sep 2023 08:20  Patient On (Oxygen Delivery Method): room air    PHYSICAL EXAM:  General: No apparent distress  Neck: Supple, trachea midline, no thyromegaly  Respiratory: Lungs clear bilaterally, normal rate, effort  Cardiac: +S1, S2, no m/r/g  GI: +BS, soft, non tender, non distended  Extremities: No peripheral edema, no pedal lesions  Neuro: A+O X3, no tremor    LABS:                        9.7    9.26  )-----------( 344      ( 21 Sep 2023 07:24 )             29.5     09-21    132<L>  |  95<L>  |  17.0  ----------------------------<  296<H>  4.6   |  27.0  |  0.62    Ca    8.1<L>      21 Sep 2023 07:24    TPro  6.7  /  Alb  2.1<L>  /  TBili  <0.2<L>  /  DBili  x   /  AST  14  /  ALT  9   /  AlkPhos  138<H>  09-21    Urinalysis Basic - ( 21 Sep 2023 07:24 )    Color: x / Appearance: x / SG: x / pH: x  Gluc: 296 mg/dL / Ketone: x  / Bili: x / Urobili: x   Blood: x / Protein: x / Nitrite: x   Leuk Esterase: x / RBC: x / WBC x   Sq Epi: x / Non Sq Epi: x / Bacteria: x    POCT Blood Glucose.: 378 mg/dL (09-21-23 @ 08:52)  POCT Blood Glucose.: 267 mg/dL (09-21-23 @ 04:23)  POCT Blood Glucose.: 402 mg/dL (09-21-23 @ 00:11)  POCT Blood Glucose.: 330 mg/dL (09-20-23 @ 22:58)  POCT Blood Glucose.: 405 mg/dL (09-20-23 @ 20:53)  POCT Blood Glucose.: 473 mg/dL (09-20-23 @ 20:53)  POCT Blood Glucose.: 387 mg/dL (09-20-23 @ 15:38)    Thyroid Stimulating Hormone, Serum: 2.47 uIU/mL (09-21-23 @ 07:24)

## 2023-09-21 NOTE — CONSULT NOTE ADULT - PROBLEM SELECTOR RECOMMENDATION 4
Uncontrolled DM, A1c >16.9  Endocrine following  Cont. Lantus, Premeal, SSI ACHS  Care per Primary Team

## 2023-09-21 NOTE — PROGRESS NOTE ADULT - SUBJECTIVE AND OBJECTIVE BOX
HOSPITALIST PROGRESS NOTE    BRUNA MARTINEZ  [id number]  42yFemale    Patient is a 42y old  Female who presents with a chief complaint of Hyperglycemia (21 Sep 2023 10:40)      SUBJECTIVE:   Chart reviewed since last visit.  Patient seen and examined at bedside for uncontrolled diabetes, hallucination, lung lesion.  Complaining of pain in leg  still has hallucinations though improved from yesterday        OBJECTIVE:  Vital Signs Last 24 Hrs  T(C): 36.9 (21 Sep 2023 15:58), Max: 37.1 (21 Sep 2023 02:30)  T(F): 98.5 (21 Sep 2023 15:58), Max: 98.8 (21 Sep 2023 02:30)  HR: 93 (21 Sep 2023 15:58) (83 - 93)  BP: 145/82 (21 Sep 2023 15:58) (124/79 - 145/82)   RR: 18 (21 Sep 2023 15:58) (18 - 18)  SpO2: 97% (21 Sep 2023 15:58) (95% - 97%)    Parameters below as of 21 Sep 2023 15:58  Patient On (Oxygen Delivery Method): room air        PHYSICAL EXAMINATION  General: Middle aged female lying in bed, Aide at bedside  HEENT:  Anicteric sclera, poor dentition  NECK:  Supple  CVS: regular rate and rhythm S1 S2  RESP:  CTAB  GI:  Soft nondistended nontender BS+  : No suprapubic tenderness  MSK:  FROM  CNS:  AOx3. No tremors. fluent speech, follows commands  INTEG:  warm dry skin  PSYCH:  Fair mood, cooperative    MONITOR:  CAPILLARY BLOOD GLUCOSE      POCT Blood Glucose.: 273 mg/dL (21 Sep 2023 16:43)  POCT Blood Glucose.: 328 mg/dL (21 Sep 2023 12:35)  POCT Blood Glucose.: 416 mg/dL (21 Sep 2023 12:27)  POCT Blood Glucose.: 378 mg/dL (21 Sep 2023 08:52)  POCT Blood Glucose.: 267 mg/dL (21 Sep 2023 04:23)  POCT Blood Glucose.: 402 mg/dL (21 Sep 2023 00:11)  POCT Blood Glucose.: 330 mg/dL (20 Sep 2023 22:58)  POCT Blood Glucose.: 405 mg/dL (20 Sep 2023 20:53)  POCT Blood Glucose.: 473 mg/dL (20 Sep 2023 20:53)      A1C with Estimated Average Glucose Result: >16.9 % (09.20.23 @ 01:10)   I&O's Summary    21 Sep 2023 07:01  -  21 Sep 2023 16:55  --------------------------------------------------------  IN: 480 mL / OUT: 0 mL / NET: 480 mL                            9.7    9.26  )-----------( 344      ( 21 Sep 2023 07:24 )             29.5       09-21    132<L>  |  95<L>  |  17.0  ----------------------------<  296<H>  4.6   |  27.0  |  0.62    Ca    8.1<L>      21 Sep 2023 07:24    TPro  6.7  /  Alb  2.1<L>  /  TBili  <0.2<L>  /  DBili  x   /  AST  14  /  ALT  9   /  AlkPhos  138<H>  09-21        Urinalysis Basic - ( 21 Sep 2023 07:24 )    Color: x / Appearance: x / SG: x / pH: x  Gluc: 296 mg/dL / Ketone: x  / Bili: x / Urobili: x   Blood: x / Protein: x / Nitrite: x   Leuk Esterase: x / RBC: x / WBC x   Sq Epi: x / Non Sq Epi: x / Bacteria: x        Culture:    TTE:    RADIOLOGY    < from: Xray Chest 2 Views PA/Lat (09.20.23 @ 02:20) >  IMPRESSION: Large right upper lobe mass with irregular borders noted with   suggestion of some lucencies within the mass. One should consider   neoplasm as well as an infectious process. Follow-up suggested. Remainder   of the lung is unremarkable left chest is clear. Heart is within normal   limits in its transthoracic diameter. Regional osseous structures   appropriate for age    < end of copied text >        < from: CT Chest w/ IV Cont (09.20.23 @ 06:29) >  IMPRESSION:  8 cm right upper lobe mass with associated pathologic lymphadenopathy,   most suggestive of neoplasm. Limited evaluation of the partially   visualized upper abdomen. Further workup recommended.    8 mm lucent lesion abutting the superior endplate of T6, most suggestive   of benign vertebral body osseous hemangioma. Nuclear medicine bone scan   could be considered for further evaluation if there is concern for   osseous metastases.    < end of copied text >    MEDICATIONS  (STANDING):  aspirin  chewable 81 milliGRAM(s) Oral daily  atorvastatin 20 milliGRAM(s) Oral at bedtime  carvedilol 12.5 milliGRAM(s) Oral every 12 hours  dextrose 5%. 1000 milliLiter(s) (100 mL/Hr) IV Continuous <Continuous>  dextrose 5%. 1000 milliLiter(s) (50 mL/Hr) IV Continuous <Continuous>  dextrose 50% Injectable 25 Gram(s) IV Push once  dextrose 50% Injectable 25 Gram(s) IV Push once  dextrose 50% Injectable 12.5 Gram(s) IV Push once  enoxaparin Injectable 40 milliGRAM(s) SubCutaneous every 24 hours  glucagon  Injectable 1 milliGRAM(s) IntraMuscular once  influenza   Vaccine 0.5 milliLiter(s) IntraMuscular once  insulin glargine Injectable (LANTUS) 10 Unit(s) SubCutaneous at bedtime  insulin glargine Injectable (LANTUS) 15 Unit(s) SubCutaneous every morning  insulin lispro (ADMELOG) corrective regimen sliding scale   SubCutaneous three times a day before meals  insulin lispro (ADMELOG) corrective regimen sliding scale   SubCutaneous at bedtime  insulin lispro Injectable (ADMELOG) 4 Unit(s) SubCutaneous three times a day before meals  levothyroxine 100 MICROGram(s) Oral daily  pantoprazole    Tablet 40 milliGRAM(s) Oral before breakfast  risperiDONE   Tablet 0.5 milliGRAM(s) Oral at bedtime  sertraline 25 milliGRAM(s) Oral daily  sodium chloride 0.9%. 1000 milliLiter(s) (150 mL/Hr) IV Continuous <Continuous>      MEDICATIONS  (PRN):  acetaminophen     Tablet .. 650 milliGRAM(s) Oral every 6 hours PRN Temp greater or equal to 38C (100.4F), Mild Pain (1 - 3)  dextrose Oral Gel 15 Gram(s) Oral once PRN Blood Glucose LESS THAN 70 milliGRAM(s)/deciliter  melatonin 3 milliGRAM(s) Oral at bedtime PRN Insomnia

## 2023-09-21 NOTE — PROGRESS NOTE ADULT - ASSESSMENT
42F with PMHx MI w/ stent, Bipolar, presents to ED c/o auditory hallucinations x4 days, voices commanding to hurt self and others. In ED, found to be hyperglycemic in the 700s.    Consulted for diabetes management  Home diabetes meds: Was suppose to be taking insulin but has not for months  Current a1c: >16.9%    1. DM - hyperglycemic  - Continue lantus 15 units this morning  - Start lantus 10 units qhs (total of 25 units daily)  - Start premeal admelog 4 units tid with meals  - Diabetic education    2. Acute psychosis  - Psych consulted  - Maintain 1:1 for safety    3. HLD  - Continue statin

## 2023-09-21 NOTE — PROGRESS NOTE ADULT - SUBJECTIVE AND OBJECTIVE BOX
[facility]  INFECTIOUS DISEASES at [place] / [place] / [place]  =======================================================                               Ramakrishna Lieberman MD#   Zaire Neri MD*                             Khushi Puente MD*   Lisa Bui MD*                              Professor Emeritus:  Dr Fazal Jack MD^            Diplomates American Board of Internal Medicine & Infectious Diseases                # [place] Office - Appt - Tel  [phone] Fax [phone]                * [place] Office - Appt - Tel [phone] Fax [phone]                      ^[place] Office - Tel  [phone] Fax [phone]                                  Hospital Consult line:  [phone]  =======================================================    BRUNA MARTINEZ [id number]    Follow up: neoplasm vs PNA RUL    No fevers       Allergies:  No Known Drug Allergies  shellfish (Unknown)      REVIEW OF SYSTEMS:  CONSTITUTIONAL:  No Fever or chills  HEENT:  No diplopia or blurred vision.  No earache, sore throat or runny nose.  CARDIOVASCULAR:  No chest pain  RESPIRATORY:  No cough, shortness of breath  GASTROINTESTINAL:  No nausea, vomiting or diarrhea.  GENITOURINARY:  No dysuria, frequency or urgency. No Blood in urine  MUSCULOSKELETAL:  no joint aches, no muscle pain  SKIN:  No change in skin, hair or nails.  NEUROLOGIC:  No Headaches      Physical Exam:  GEN: NAD  HEENT: normocephalic and atraumatic. EOMI. PERRL.    NECK: Supple.   LUNGS: CTA B/L.  HEART: RRR  ABDOMEN: Soft, NT, ND.  +BS.    : No CVA tenderness  EXTREMITIES: Without  edema.  MSK: No joint swelling  NEUROLOGIC: No Focal Deficits   PSYCHIATRIC: Appropriate affect .  SKIN: No rash      Vitals:  T(F): 98.5 (21 Sep 2023 11:12), Max: 98.8 (21 Sep 2023 02:30)  HR: 86 (21 Sep 2023 11:12)  BP: 124/79 (21 Sep 2023 11:12)  RR: 18 (21 Sep 2023 11:12)  SpO2: 97% (21 Sep 2023 11:12) (94% - 97%)  temp max in last 48H T(F): , Max: 99.4 (09-20-23 @ 03:33)      Current Antibiotics:    Other medications:  aspirin  chewable 81 milliGRAM(s) Oral daily  atorvastatin 20 milliGRAM(s) Oral at bedtime  carvedilol 12.5 milliGRAM(s) Oral every 12 hours  dextrose 5%. 1000 milliLiter(s) IV Continuous <Continuous>  dextrose 5%. 1000 milliLiter(s) IV Continuous <Continuous>  dextrose 50% Injectable 25 Gram(s) IV Push once  dextrose 50% Injectable 25 Gram(s) IV Push once  dextrose 50% Injectable 12.5 Gram(s) IV Push once  enoxaparin Injectable 40 milliGRAM(s) SubCutaneous every 24 hours  glucagon  Injectable 1 milliGRAM(s) IntraMuscular once  influenza   Vaccine 0.5 milliLiter(s) IntraMuscular once  insulin glargine Injectable (LANTUS) 10 Unit(s) SubCutaneous at bedtime  insulin glargine Injectable (LANTUS) 15 Unit(s) SubCutaneous every morning  insulin lispro (ADMELOG) corrective regimen sliding scale   SubCutaneous at bedtime  insulin lispro (ADMELOG) corrective regimen sliding scale   SubCutaneous three times a day before meals  insulin lispro Injectable (ADMELOG) 4 Unit(s) SubCutaneous three times a day before meals  levothyroxine 100 MICROGram(s) Oral daily  pantoprazole    Tablet 40 milliGRAM(s) Oral before breakfast  risperiDONE   Tablet 0.5 milliGRAM(s) Oral at bedtime  sertraline 25 milliGRAM(s) Oral daily  sodium chloride 0.9%. 1000 milliLiter(s) IV Continuous <Continuous>                 9.7    9.26  )-----------( 344      ( 21 Sep 2023 07:24 )             29.5     09-21    132<L>  |  95<L>  |  17.0  ----------------------------<  296<H>  4.6   |  27.0  |  0.62    Ca    8.1<L>      21 Sep 2023 07:24    TPro  6.7  /  Alb  2.1<L>  /  TBili  <0.2<L>  /  DBili  x   /  AST  14  /  ALT  9   /  AlkPhos  138<H>  09-21    RECENT CULTURES:  09-20 @ 03:25 .Blood Blood     No growth at 24 hours    09-20 @ 03:22 .Blood Blood     No growth at 24 hours    09-20 @ 01:00    RVP  [facility]      WBC Count: 9.26 K/uL (09-21-23 @ 07:24)  WBC Count: 16.75 K/uL (09-20-23 @ 01:10)    Creatinine: 0.62 mg/dL (09-21-23 @ 07:24)  Creatinine: 0.71 mg/dL (09-20-23 @ 08:30)  Creatinine: 0.94 mg/dL (09-20-23 @ 01:10)    Procalcitonin, Serum: 0.37 ng/mL (09-21-23 @ 07:24)     SARS-CoV-2: Mckayla (09-20-23 @ 01:00)

## 2023-09-22 PROBLEM — Z00.00 ENCOUNTER FOR PREVENTIVE HEALTH EXAMINATION: Status: ACTIVE | Noted: 2023-09-22

## 2023-09-22 LAB
GLUCOSE BLDC GLUCOMTR-MCNC: 228 MG/DL — HIGH (ref 70–99)
GLUCOSE BLDC GLUCOMTR-MCNC: 273 MG/DL — HIGH (ref 70–99)
GLUCOSE BLDC GLUCOMTR-MCNC: 281 MG/DL — HIGH (ref 70–99)
GLUCOSE BLDC GLUCOMTR-MCNC: 318 MG/DL — HIGH (ref 70–99)
GLUCOSE BLDC GLUCOMTR-MCNC: 340 MG/DL — HIGH (ref 70–99)

## 2023-09-22 PROCEDURE — 71045 X-RAY EXAM CHEST 1 VIEW: CPT | Mod: 26

## 2023-09-22 PROCEDURE — 99233 SBSQ HOSP IP/OBS HIGH 50: CPT

## 2023-09-22 PROCEDURE — 99232 SBSQ HOSP IP/OBS MODERATE 35: CPT

## 2023-09-22 PROCEDURE — 99231 SBSQ HOSP IP/OBS SF/LOW 25: CPT

## 2023-09-22 RX ORDER — LISINOPRIL 2.5 MG/1
10 TABLET ORAL DAILY
Refills: 0 | Status: DISCONTINUED | OUTPATIENT
Start: 2023-09-22 | End: 2023-09-30

## 2023-09-22 RX ORDER — KETOROLAC TROMETHAMINE 30 MG/ML
15 SYRINGE (ML) INJECTION ONCE
Refills: 0 | Status: DISCONTINUED | OUTPATIENT
Start: 2023-09-22 | End: 2023-09-22

## 2023-09-22 RX ORDER — INSULIN GLARGINE 100 [IU]/ML
13 INJECTION, SOLUTION SUBCUTANEOUS AT BEDTIME
Refills: 0 | Status: DISCONTINUED | OUTPATIENT
Start: 2023-09-22 | End: 2023-09-25

## 2023-09-22 RX ORDER — INSULIN LISPRO 100/ML
2 VIAL (ML) SUBCUTANEOUS ONCE
Refills: 0 | Status: COMPLETED | OUTPATIENT
Start: 2023-09-22 | End: 2023-09-22

## 2023-09-22 RX ORDER — HYDRALAZINE HCL 50 MG
50 TABLET ORAL EVERY 8 HOURS
Refills: 0 | Status: DISCONTINUED | OUTPATIENT
Start: 2023-09-22 | End: 2023-09-30

## 2023-09-22 RX ORDER — INSULIN GLARGINE 100 [IU]/ML
18 INJECTION, SOLUTION SUBCUTANEOUS EVERY MORNING
Refills: 0 | Status: DISCONTINUED | OUTPATIENT
Start: 2023-09-23 | End: 2023-09-26

## 2023-09-22 RX ADMIN — Medication 650 MILLIGRAM(S): at 22:00

## 2023-09-22 RX ADMIN — CARVEDILOL PHOSPHATE 12.5 MILLIGRAM(S): 80 CAPSULE, EXTENDED RELEASE ORAL at 05:30

## 2023-09-22 RX ADMIN — INSULIN GLARGINE 13 UNIT(S): 100 INJECTION, SOLUTION SUBCUTANEOUS at 21:51

## 2023-09-22 RX ADMIN — Medication 15 MILLIGRAM(S): at 20:35

## 2023-09-22 RX ADMIN — Medication 650 MILLIGRAM(S): at 05:30

## 2023-09-22 RX ADMIN — Medication 81 MILLIGRAM(S): at 11:26

## 2023-09-22 RX ADMIN — Medication 650 MILLIGRAM(S): at 13:57

## 2023-09-22 RX ADMIN — Medication 15 MILLIGRAM(S): at 19:26

## 2023-09-22 RX ADMIN — PANTOPRAZOLE SODIUM 40 MILLIGRAM(S): 20 TABLET, DELAYED RELEASE ORAL at 05:30

## 2023-09-22 RX ADMIN — Medication 6: at 16:36

## 2023-09-22 RX ADMIN — Medication 6: at 08:16

## 2023-09-22 RX ADMIN — ATORVASTATIN CALCIUM 20 MILLIGRAM(S): 80 TABLET, FILM COATED ORAL at 21:52

## 2023-09-22 RX ADMIN — CARVEDILOL PHOSPHATE 12.5 MILLIGRAM(S): 80 CAPSULE, EXTENDED RELEASE ORAL at 17:49

## 2023-09-22 RX ADMIN — Medication 4: at 11:24

## 2023-09-22 RX ADMIN — SERTRALINE 25 MILLIGRAM(S): 25 TABLET, FILM COATED ORAL at 11:26

## 2023-09-22 RX ADMIN — Medication 50 MILLIGRAM(S): at 21:52

## 2023-09-22 RX ADMIN — Medication 4 UNIT(S): at 12:09

## 2023-09-22 RX ADMIN — Medication 4 UNIT(S): at 08:16

## 2023-09-22 RX ADMIN — Medication 100 MICROGRAM(S): at 05:31

## 2023-09-22 RX ADMIN — ENOXAPARIN SODIUM 40 MILLIGRAM(S): 100 INJECTION SUBCUTANEOUS at 05:31

## 2023-09-22 RX ADMIN — INSULIN GLARGINE 15 UNIT(S): 100 INJECTION, SOLUTION SUBCUTANEOUS at 08:18

## 2023-09-22 RX ADMIN — RISPERIDONE 0.5 MILLIGRAM(S): 4 TABLET ORAL at 21:51

## 2023-09-22 RX ADMIN — Medication 2 UNIT(S): at 03:02

## 2023-09-22 RX ADMIN — Medication 650 MILLIGRAM(S): at 21:52

## 2023-09-22 RX ADMIN — Medication 50 MILLIGRAM(S): at 17:05

## 2023-09-22 RX ADMIN — Medication 4 UNIT(S): at 16:36

## 2023-09-22 NOTE — BH CONSULTATION LIAISON PROGRESS NOTE - NSBHFUPINTERVALHXFT_PSY_A_CORE
Patient is a 42 year old female who is homeless, unemployed, on food stamps with a psychiatric history of depression, anxiety, as well as Crack/Cocaine use disorder who is not currently in outpatient treatement and also with a history of DM, CAD, HTN, HLD who presented to the ED with auditory hallucinations and while in the ED, BG>700 and was admitted for uncontrolled diabetes with hyperglycemia.     Patient seen and evaluated and found to be calm, cooperative and pleasant. patient states she is feeling better today, slept well and denies any s/h ideation or AVH. patient stating how she wants to stop drugs dn interested in going to rehab. 
Patient is a 42 year old female who is homeless, unemployed, on food stamps with a psychiatric history of depression, anxiety, as well as Crack/Cocaine use disorder who is not currently in outpatient treatement and also with a history of DM, CAD, HTN, HLD who presented to the ED with auditory hallucinations and while in the ED, BG>700 and was admitted for uncontrolled diabetes with hyperglycemia.     Patient seen and evaluated and found to be calm, cooperative and pleasant. patient states she is feeling good, states she slept well, ok appetite, denies any s/h ideation or AVH and expressing motivation to go to rehab.

## 2023-09-22 NOTE — PROGRESS NOTE ADULT - ASSESSMENT
43 yo female with PMHx of DM2 (A1c >16.9), CAD, MI w/ stent in 2018, HTN, HLD, Asthma, Bipolar, Anxiety, Hypothyroidism, Cocaine/Marijuana use - every day use "depending on how she feels". Presents to ED with c/o feeling unwell, hearing voices for the last 3-4 days that have been telling her to hurt herself and others. On admission, patient was Hyperglycemic, states she has not been taking any of her medications for the past few weeks. CXR in ED showing RUL opacity, CT Chest showing 8cm RUL Mass. Thoracic Surgery consulted for further evaluation.

## 2023-09-22 NOTE — PROGRESS NOTE ADULT - ASSESSMENT
42F with PMHx MI w/ stent, Bipolar, presents to ED c/o auditory hallucinations x4 days, voices commanding to hurt self and others. In ED, found to be hyperglycemic in the 700s.    Consulted for diabetes management  Home diabetes meds: Was suppose to be taking insulin but has not for months  Current a1c: >16.9%    1. DM - hyperglycemic  - increase lantsu to 18 u am and 13 u pm doses.   - cont admelog 4 u TID w meals.   - change to SSI to moderate     2. Acute psychosis  - Psych consulted  - Maintain 1:1 for safety    3. HLD  - Continue statin

## 2023-09-22 NOTE — PROGRESS NOTE ADULT - PROBLEM SELECTOR PLAN 1
CT Chest showing 8cm RUL Mass  ID following for possible infectious process, currently off ABX  Plan for Ion Guided Biopsy of mass on Monday, 9/25, with Dr. Mejia  Will need PreOp Medical and Cardiac Clearance  Hx CAD, MI, PCI w/ stents, HTN, DM2 with A1c >16.9 and bipolar disorder not compliant on medications  Psych cleared 1:1 sitter  C/w ASA, BB, insulin coverage

## 2023-09-22 NOTE — PROGRESS NOTE ADULT - SUBJECTIVE AND OBJECTIVE BOX
BRUNA MARTINEZ    [id number]    42y      Female    INTERVAL HPI/OVERNIGHT EVENTS: patient being seen for lung mass and poorly controlled dm2. patient seen at bedside and denies any complaints      REVIEW OF SYSTEMS:    CONSTITUTIONAL: No fever, weight loss, or fatigue  RESPIRATORY: No cough, wheezing, hemoptysis; No shortness of breath  CARDIOVASCULAR: No chest pain, palpitations  GASTROINTESTINAL: No abdominal or epigastric pain. No nausea, vomiting  NEUROLOGICAL: No headaches, memory loss, loss of strength.  MISCELLANEOUS:      Vital Signs Last 24 Hrs  T(C): 36.8 (22 Sep 2023 10:20), Max: 37 (22 Sep 2023 01:45)  T(F): 98.3 (22 Sep 2023 10:20), Max: 98.6 (22 Sep 2023 01:45)  HR: 85 (22 Sep 2023 10:20) (81 - 97)  BP: 161/92 (22 Sep 2023 10:20) (136/81 - 171/95)  BP(mean): --  RR: 18 (22 Sep 2023 10:20) (16 - 19)  SpO2: 95% (22 Sep 2023 10:20) (95% - 98%)    Parameters below as of 22 Sep 2023 10:20  Patient On (Oxygen Delivery Method): room air        PHYSICAL EXAM:    General: Middle aged female lying in bed, Aide at bedside  HEENT:  Anicteric sclera, poor dentition  NECK:  Supple  CVS: regular rate and rhythm S1 S2  RESP:  CTAB  GI:  Soft nondistended nontender BS+  : No suprapubic tenderness  MSK:  FROM  CNS:  AOx3. No tremors. fluent speech, follows commands  INTEG:  warm dry skin  PSYCH:  Fair mood, cooperative      LABS:                        9.7    9.26  )-----------( 344      ( 21 Sep 2023 07:24 )             29.5     09-21    132<L>  |  95<L>  |  17.0  ----------------------------<  296<H>  4.6   |  27.0  |  0.62    Ca    8.1<L>      21 Sep 2023 07:24    TPro  6.7  /  Alb  2.1<L>  /  TBili  <0.2<L>  /  DBili  x   /  AST  14  /  ALT  9   /  AlkPhos  138<H>  09-21      Urinalysis Basic - ( 21 Sep 2023 07:24 )    Color: x / Appearance: x / SG: x / pH: x  Gluc: 296 mg/dL / Ketone: x  / Bili: x / Urobili: x   Blood: x / Protein: x / Nitrite: x   Leuk Esterase: x / RBC: x / WBC x   Sq Epi: x / Non Sq Epi: x / Bacteria: x          MEDICATIONS  (STANDING):  aspirin  chewable 81 milliGRAM(s) Oral daily  atorvastatin 20 milliGRAM(s) Oral at bedtime  carvedilol 12.5 milliGRAM(s) Oral every 12 hours  dextrose 5%. 1000 milliLiter(s) (100 mL/Hr) IV Continuous <Continuous>  dextrose 5%. 1000 milliLiter(s) (50 mL/Hr) IV Continuous <Continuous>  dextrose 50% Injectable 25 Gram(s) IV Push once  dextrose 50% Injectable 12.5 Gram(s) IV Push once  dextrose 50% Injectable 25 Gram(s) IV Push once  enoxaparin Injectable 40 milliGRAM(s) SubCutaneous every 24 hours  glucagon  Injectable 1 milliGRAM(s) IntraMuscular once  influenza   Vaccine 0.5 milliLiter(s) IntraMuscular once  insulin glargine Injectable (LANTUS) 10 Unit(s) SubCutaneous at bedtime  insulin glargine Injectable (LANTUS) 15 Unit(s) SubCutaneous every morning  insulin lispro (ADMELOG) corrective regimen sliding scale   SubCutaneous three times a day before meals  insulin lispro (ADMELOG) corrective regimen sliding scale   SubCutaneous at bedtime  insulin lispro Injectable (ADMELOG) 4 Unit(s) SubCutaneous three times a day before meals  levothyroxine 100 MICROGram(s) Oral daily  lisinopril 10 milliGRAM(s) Oral daily  pantoprazole    Tablet 40 milliGRAM(s) Oral before breakfast  risperiDONE   Tablet 0.5 milliGRAM(s) Oral at bedtime  sertraline 25 milliGRAM(s) Oral daily    MEDICATIONS  (PRN):  acetaminophen     Tablet .. 650 milliGRAM(s) Oral every 6 hours PRN Temp greater or equal to 38C (100.4F), Mild Pain (1 - 3)  dextrose Oral Gel 15 Gram(s) Oral once PRN Blood Glucose LESS THAN 70 milliGRAM(s)/deciliter  melatonin 3 milliGRAM(s) Oral at bedtime PRN Insomnia      RADIOLOGY & ADDITIONAL TESTS:

## 2023-09-22 NOTE — CONSULT NOTE ADULT - SUBJECTIVE AND OBJECTIVE BOX
[facility]                              [address] [place] [address]                                                 PHONE: [phone]                                                 FAX: [phone]  ------------------------------------------------------------------------------------------------    Chief complaint: Preop cardiovascular evaluation, CAD    42y Female with past medical history as under presented to ED with c/o feeling unwell, hearing voices for the last 3-4 days that have been telling her to hurt herself and others. On admission, patient was Hyperglycemic, states she has not been taking any of her medications for the past few weeks. CXR in ED showing RUL opacity, CT Chest showing 8cm RUL Mass. Plan for Biopsy of mass, likely Monday 9/25/23. She reports prior h/o CAD with 1 stent  At the time of evaluation, pt reports feeling well.     PAST MEDICAL & SURGICAL HISTORY:  Diabetes mellitus      CAD (coronary artery disease)      HTN (hypertension)      S/P cardiac cath          No Known Drug Allergies  shellfish (Unknown)      Vital Signs Last 24 Hrs  T(C): 36.8 (22 Sep 2023 10:20), Max: 37 (22 Sep 2023 01:45)  T(F): 98.3 (22 Sep 2023 10:20), Max: 98.6 (22 Sep 2023 01:45)  HR: 85 (22 Sep 2023 10:20) (81 - 97)  BP: 161/92 (22 Sep 2023 10:20) (124/79 - 171/95)  BP(mean): --  RR: 18 (22 Sep 2023 10:20) (16 - 19)  SpO2: 95% (22 Sep 2023 10:20) (95% - 98%)    Parameters below as of 22 Sep 2023 10:20  Patient On (Oxygen Delivery Method): room air        RELEVENT PHYSICAL EXAM:  Neck: No obvious JVD  Cardiovascular: regular S1, S2  Respiratory: Lungs clear to auscultation; no crepitations, no wheeze  Musculoskeletal: No edema    LABS:                        9.7    9.26  )-----------( 344      ( 21 Sep 2023 07:24 )             29.5     09-21    132<L>  |  95<L>  |  17.0  ----------------------------<  296<H>  4.6   |  27.0  |  0.62    Ca    8.1<L>      21 Sep 2023 07:24    TPro  6.7  /  Alb  2.1<L>  /  TBili  <0.2<L>  /  DBili  x   /  AST  14  /  ALT  9   /  AlkPhos  138<H>  09-21            RADIOLOGY & ADDITIONAL STUDIES: (reviewed)  CXR was independently visualized/reviewed and demonstrated:     CARDIOLOGY TESTING:(reviewed)     ECG (Independent visualization): NSR with LVH    Echo in 2021  revealed normal LV systolic function and stress test revealed no evidence of any ischemia.  Left ventricular ejection fraction was 65%    MEDICATIONS:(reviewed)  Home Medications:    MEDICATIONS  (STANDING):  aspirin  chewable 81 milliGRAM(s) Oral daily  atorvastatin 20 milliGRAM(s) Oral at bedtime  carvedilol 12.5 milliGRAM(s) Oral every 12 hours  dextrose 5%. 1000 milliLiter(s) (100 mL/Hr) IV Continuous <Continuous>  dextrose 5%. 1000 milliLiter(s) (50 mL/Hr) IV Continuous <Continuous>  dextrose 50% Injectable 25 Gram(s) IV Push once  dextrose 50% Injectable 12.5 Gram(s) IV Push once  dextrose 50% Injectable 25 Gram(s) IV Push once  enoxaparin Injectable 40 milliGRAM(s) SubCutaneous every 24 hours  glucagon  Injectable 1 milliGRAM(s) IntraMuscular once  influenza   Vaccine 0.5 milliLiter(s) IntraMuscular once  insulin glargine Injectable (LANTUS) 10 Unit(s) SubCutaneous at bedtime  insulin glargine Injectable (LANTUS) 15 Unit(s) SubCutaneous every morning  insulin lispro (ADMELOG) corrective regimen sliding scale   SubCutaneous three times a day before meals  insulin lispro (ADMELOG) corrective regimen sliding scale   SubCutaneous at bedtime  insulin lispro Injectable (ADMELOG) 4 Unit(s) SubCutaneous three times a day before meals  levothyroxine 100 MICROGram(s) Oral daily  pantoprazole    Tablet 40 milliGRAM(s) Oral before breakfast  risperiDONE   Tablet 0.5 milliGRAM(s) Oral at bedtime  sertraline 25 milliGRAM(s) Oral daily  sodium chloride 0.9%. 1000 milliLiter(s) (150 mL/Hr) IV Continuous <Continuous>    MEDICATIONS  (PRN):  acetaminophen     Tablet .. 650 milliGRAM(s) Oral every 6 hours PRN Temp greater or equal to 38C (100.4F), Mild Pain (1 - 3)  dextrose Oral Gel 15 Gram(s) Oral once PRN Blood Glucose LESS THAN 70 milliGRAM(s)/deciliter  melatonin 3 milliGRAM(s) Oral at bedtime PRN Insomnia    ASSESSMENT AND PLAN:    43 yo female with PMHx of DM2 (A1c >16.9), CAD, MI w/ stent in 2018, HTN, HLD, Asthma, Bipolar, Anxiety, Hypothyroidism, Cocaine/Marijuana use - every day use "depending on how she feels". Presents to ED with c/o feeling unwell.  CT Chest showing 8cm RUL Mass. Plan for Biopsy of mass, likely Monday 9/25/23    Pre-op cardiovascular evaluation  - Echo ordered to assess LV function  - If echo unchanged from prior Echo in 2021, Can proceed for planned surgery with moderate perioperative risk without cardiac contraindications  - ASA may be held if needed prior to the procedure and restarted post procedure when safe from surgical standpoint  - Close monitoring of BP and volume status    CAD  - Continue ASA    HTN  - Continue Coreg    Dyslipidemia  - Continue statin    Plan discussed with Dr. Azevedo

## 2023-09-22 NOTE — PROGRESS NOTE ADULT - SUBJECTIVE AND OBJECTIVE BOX
SUBJECTIVE:  Patient seen and examined on follow up consult for RUL mass. Patient resting in bed and in no apparent distress. Patient denies chest pain, SOB, abdominal pain, N/V/D/C, or any other acute complaints at this time.    PAST MEDICAL & SURGICAL HISTORY:  Diabetes mellitus  CAD (coronary artery disease)  HTN (hypertension)  S/P cardiac cath    VITAL SIGNS  Vital Signs Last 24 Hrs  T(C): 36.8 (09-22-23 @ 10:20), Max: 37 (09-22-23 @ 01:45)  T(F): 98.3 (09-22-23 @ 10:20), Max: 98.6 (09-22-23 @ 01:45)  HR: 85 (09-22-23 @ 10:20) (81 - 97)  BP: 161/92 (09-22-23 @ 10:20) (136/81 - 171/95)  RR: 18 (09-22-23 @ 10:20) (16 - 19)  SpO2: 95% (09-22-23 @ 10:20) (95% - 98%)  on (O2)              MEDICATIONS  acetaminophen     Tablet .. 650 milliGRAM(s) Oral every 6 hours PRN  atorvastatin 20 milliGRAM(s) Oral at bedtime  carvedilol 12.5 milliGRAM(s) Oral every 12 hours  dextrose 5%. 1000 milliLiter(s) IV Continuous <Continuous>  dextrose 5%. 1000 milliLiter(s) IV Continuous <Continuous>  dextrose 50% Injectable 25 Gram(s) IV Push once  dextrose 50% Injectable 12.5 Gram(s) IV Push once  dextrose 50% Injectable 25 Gram(s) IV Push once  dextrose Oral Gel 15 Gram(s) Oral once PRN  enoxaparin Injectable 40 milliGRAM(s) SubCutaneous every 24 hours  glucagon  Injectable 1 milliGRAM(s) IntraMuscular once  influenza   Vaccine 0.5 milliLiter(s) IntraMuscular once  insulin glargine Injectable (LANTUS) 10 Unit(s) SubCutaneous at bedtime  insulin glargine Injectable (LANTUS) 15 Unit(s) SubCutaneous every morning  insulin lispro (ADMELOG) corrective regimen sliding scale   SubCutaneous at bedtime  insulin lispro (ADMELOG) corrective regimen sliding scale   SubCutaneous three times a day before meals  insulin lispro Injectable (ADMELOG) 4 Unit(s) SubCutaneous three times a day before meals  levothyroxine 100 MICROGram(s) Oral daily  lisinopril 10 milliGRAM(s) Oral daily  melatonin 3 milliGRAM(s) Oral at bedtime PRN  pantoprazole    Tablet 40 milliGRAM(s) Oral before breakfast  risperiDONE   Tablet 0.5 milliGRAM(s) Oral at bedtime  sertraline 25 milliGRAM(s) Oral daily      I&Os:  09-21 @ 07:01  -  09-22 @ 07:00  --------------------------------------------------------  IN: 480 mL / OUT: 0 mL / NET: 480 mL    Admit Wt: Drug Dosing Weight  Height (cm): 160 (20 Sep 2023 00:08)  Weight (kg): 77.7 (20 Sep 2023 00:08)  BMI (kg/m2): 30.4 (20 Sep 2023 00:08)  BSA (m2): 1.81 (20 Sep 2023 00:08)    LABS:  09-21    132<L>  |  95<L>  |  17.0  ----------------------------<  296<H>  4.6   |  27.0  |  0.62    Ca    8.1<L>      21 Sep 2023 07:24    TPro  6.7  /  Alb  2.1<L>  /  TBili  <0.2<L>  /  DBili  x   /  AST  14  /  ALT  9   /  AlkPhos  138<H>  09-21                                 9.7    9.26  )-----------( 344      ( 21 Sep 2023 07:24 )             29.5     DIAGNOSTICS:  All laboratory results, radiology and medications reviewed.    PHYSICAL EXAM:  General: well nourished, well developed, no acute distress  Neurology: alert and oriented x 3, nonfocal, no gross deficits  Respiratory: clear to auscultation bilaterally  CV: regular rate and rhythm, normal S1, S2  Abdomen: soft, nontender, nondistended, positive bowel sounds  Extremities: warm, well perfused. no edema + DP pulses

## 2023-09-22 NOTE — PROGRESS NOTE ADULT - ASSESSMENT
42 year old female with PMH of DM, CAD, MI w/stent, Bipolar, anxiety presents to ED with auditory hallucinations.   In the ED, BG>700, pt to be admitted for uncontrolled diabetes with hyperglycemia. Also noted to have opacity on CXR    IDDM with hyperglycemia   A1c>16  - Glargine 15U q am and 10U qPM as per Endocrinology  - Premeal Lispro 4U  - Endocrinology follow up    R lung abnormality   CT Chest: 8 cm right upper lobe mass with associated pathologic lymphadenopathy, most suggestive of neoplasm. 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   - given leukocytosis concern for pneumonia, likely aspiration? Received antibiotics in ER, will hold off and monitor off antibiotics for now  - ID follow up noted  - ct surgery consult appreciated, for bx monday  - consulted cardio, hold asa and tte ordered    Acute psychosis   cleared by psych, no need for 1:!  - Sertraline 25mg and Risperidone 0.5 mg qhs started by Psychiatry     CAD with Hx of Cardiac stent   Stress test in Dec 2022 was normal   Asymptomatic  - asa held     HTN   - Carvedilol  - Lisinopril 10mg daily     Asthma   Not in exacerbation   - Nebs prn     Hypothyroidism   Normal TSH/T4   -Levothyroxine 100mcg     Substance abuse   - pt reports mostly Marijuana and cocaine use  - counselled on quitting      dispo - patient is active

## 2023-09-22 NOTE — PROGRESS NOTE ADULT - SUBJECTIVE AND OBJECTIVE BOX
INTERVAL HPI/OVERNIGHT EVENTS:  follow-up for DM management      MEDICATIONS  (STANDING):  atorvastatin 20 milliGRAM(s) Oral at bedtime  carvedilol 12.5 milliGRAM(s) Oral every 12 hours  dextrose 5%. 1000 milliLiter(s) (100 mL/Hr) IV Continuous <Continuous>  dextrose 5%. 1000 milliLiter(s) (50 mL/Hr) IV Continuous <Continuous>  dextrose 50% Injectable 25 Gram(s) IV Push once  dextrose 50% Injectable 12.5 Gram(s) IV Push once  dextrose 50% Injectable 25 Gram(s) IV Push once  enoxaparin Injectable 40 milliGRAM(s) SubCutaneous every 24 hours  glucagon  Injectable 1 milliGRAM(s) IntraMuscular once  influenza   Vaccine 0.5 milliLiter(s) IntraMuscular once  insulin glargine Injectable (LANTUS) 10 Unit(s) SubCutaneous at bedtime  insulin glargine Injectable (LANTUS) 15 Unit(s) SubCutaneous every morning  insulin lispro (ADMELOG) corrective regimen sliding scale   SubCutaneous at bedtime  insulin lispro (ADMELOG) corrective regimen sliding scale   SubCutaneous three times a day before meals  insulin lispro Injectable (ADMELOG) 4 Unit(s) SubCutaneous three times a day before meals  levothyroxine 100 MICROGram(s) Oral daily  lisinopril 10 milliGRAM(s) Oral daily  pantoprazole    Tablet 40 milliGRAM(s) Oral before breakfast  risperiDONE   Tablet 0.5 milliGRAM(s) Oral at bedtime  sertraline 25 milliGRAM(s) Oral daily    MEDICATIONS  (PRN):  acetaminophen     Tablet .. 650 milliGRAM(s) Oral every 6 hours PRN Temp greater or equal to 38C (100.4F), Mild Pain (1 - 3)  dextrose Oral Gel 15 Gram(s) Oral once PRN Blood Glucose LESS THAN 70 milliGRAM(s)/deciliter  melatonin 3 milliGRAM(s) Oral at bedtime PRN Insomnia    Allergies  No Known Drug Allergies  shellfish (Unknown)    Review of systems: no shortness of breath, no chest pain, no headache, no nausea, no vomiting    Vital Signs Last 24 Hrs  T(C): 36.8 (22 Sep 2023 10:20), Max: 37 (22 Sep 2023 01:45)  T(F): 98.3 (22 Sep 2023 10:20), Max: 98.6 (22 Sep 2023 01:45)  HR: 85 (22 Sep 2023 10:20) (81 - 97)  BP: 161/92 (22 Sep 2023 10:20) (136/81 - 171/95)  BP(mean): --  RR: 18 (22 Sep 2023 10:20) (16 - 19)  SpO2: 95% (22 Sep 2023 10:20) (95% - 98%)    Parameters below as of 22 Sep 2023 10:20  Patient On (Oxygen Delivery Method): room air    PHYSICAL EXAM:  General: No apparent distress  Neck: Supple, trachea midline, no thyromegaly  Respiratory: Lungs clear bilaterally, normal rate, effort  Cardiac: +S1, S2, no m/r/g  GI: +BS, soft, non tender, non distended  Extremities: No peripheral edema, no pedal lesions  Neuro: A+O X3, no tremor        LABS:                        9.7    9.26  )-----------( 344      ( 21 Sep 2023 07:24 )             29.5     09-21    132<L>  |  95<L>  |  17.0  ----------------------------<  296<H>  4.6   |  27.0  |  0.62    Ca    8.1<L>      21 Sep 2023 07:24    TPro  6.7  /  Alb  2.1<L>  /  TBili  <0.2<L>  /  DBili  x   /  AST  14  /  ALT  9   /  AlkPhos  138<H>  09-21    Urinalysis Basic - ( 21 Sep 2023 07:24 )    Color: x / Appearance: x / SG: x / pH: x  Gluc: 296 mg/dL / Ketone: x  / Bili: x / Urobili: x   Blood: x / Protein: x / Nitrite: x   Leuk Esterase: x / RBC: x / WBC x   Sq Epi: x / Non Sq Epi: x / Bacteria: x    CAPILLARY BLOOD GLUCOSE  POCT Blood Glucose.: 228 mg/dL (22 Sep 2023 11:23)  POCT Blood Glucose.: 273 mg/dL (22 Sep 2023 08:08)  POCT Blood Glucose.: 340 mg/dL (22 Sep 2023 02:46)  POCT Blood Glucose.: 372 mg/dL (21 Sep 2023 22:07)  POCT Blood Glucose.: 343 mg/dL (21 Sep 2023 20:31)  POCT Blood Glucose.: 273 mg/dL (21 Sep 2023 16:43)   18w2d

## 2023-09-22 NOTE — BH CONSULTATION LIAISON PROGRESS NOTE - CURRENT MEDICATION
MEDICATIONS  (STANDING):  aspirin  chewable 81 milliGRAM(s) Oral daily  atorvastatin 20 milliGRAM(s) Oral at bedtime  carvedilol 12.5 milliGRAM(s) Oral every 12 hours  dextrose 5%. 1000 milliLiter(s) (50 mL/Hr) IV Continuous <Continuous>  dextrose 5%. 1000 milliLiter(s) (100 mL/Hr) IV Continuous <Continuous>  dextrose 50% Injectable 25 Gram(s) IV Push once  dextrose 50% Injectable 25 Gram(s) IV Push once  dextrose 50% Injectable 12.5 Gram(s) IV Push once  enoxaparin Injectable 40 milliGRAM(s) SubCutaneous every 24 hours  glucagon  Injectable 1 milliGRAM(s) IntraMuscular once  influenza   Vaccine 0.5 milliLiter(s) IntraMuscular once  insulin glargine Injectable (LANTUS) 10 Unit(s) SubCutaneous at bedtime  insulin glargine Injectable (LANTUS) 15 Unit(s) SubCutaneous every morning  insulin lispro (ADMELOG) corrective regimen sliding scale   SubCutaneous at bedtime  insulin lispro (ADMELOG) corrective regimen sliding scale   SubCutaneous three times a day before meals  insulin lispro Injectable (ADMELOG) 4 Unit(s) SubCutaneous three times a day before meals  levothyroxine 100 MICROGram(s) Oral daily  pantoprazole    Tablet 40 milliGRAM(s) Oral before breakfast  risperiDONE   Tablet 0.5 milliGRAM(s) Oral at bedtime  sertraline 25 milliGRAM(s) Oral daily  sodium chloride 0.9%. 1000 milliLiter(s) (150 mL/Hr) IV Continuous <Continuous>    MEDICATIONS  (PRN):  acetaminophen     Tablet .. 650 milliGRAM(s) Oral every 6 hours PRN Temp greater or equal to 38C (100.4F), Mild Pain (1 - 3)  dextrose Oral Gel 15 Gram(s) Oral once PRN Blood Glucose LESS THAN 70 milliGRAM(s)/deciliter  melatonin 3 milliGRAM(s) Oral at bedtime PRN Insomnia  
MEDICATIONS  (STANDING):  aspirin  chewable 81 milliGRAM(s) Oral daily  atorvastatin 20 milliGRAM(s) Oral at bedtime  carvedilol 12.5 milliGRAM(s) Oral every 12 hours  dextrose 5%. 1000 milliLiter(s) (100 mL/Hr) IV Continuous <Continuous>  dextrose 5%. 1000 milliLiter(s) (50 mL/Hr) IV Continuous <Continuous>  dextrose 50% Injectable 25 Gram(s) IV Push once  dextrose 50% Injectable 12.5 Gram(s) IV Push once  dextrose 50% Injectable 25 Gram(s) IV Push once  enoxaparin Injectable 40 milliGRAM(s) SubCutaneous every 24 hours  glucagon  Injectable 1 milliGRAM(s) IntraMuscular once  influenza   Vaccine 0.5 milliLiter(s) IntraMuscular once  insulin glargine Injectable (LANTUS) 10 Unit(s) SubCutaneous at bedtime  insulin glargine Injectable (LANTUS) 15 Unit(s) SubCutaneous every morning  insulin lispro (ADMELOG) corrective regimen sliding scale   SubCutaneous at bedtime  insulin lispro (ADMELOG) corrective regimen sliding scale   SubCutaneous three times a day before meals  insulin lispro Injectable (ADMELOG) 4 Unit(s) SubCutaneous three times a day before meals  levothyroxine 100 MICROGram(s) Oral daily  lisinopril 10 milliGRAM(s) Oral daily  pantoprazole    Tablet 40 milliGRAM(s) Oral before breakfast  risperiDONE   Tablet 0.5 milliGRAM(s) Oral at bedtime  sertraline 25 milliGRAM(s) Oral daily    MEDICATIONS  (PRN):  acetaminophen     Tablet .. 650 milliGRAM(s) Oral every 6 hours PRN Temp greater or equal to 38C (100.4F), Mild Pain (1 - 3)  dextrose Oral Gel 15 Gram(s) Oral once PRN Blood Glucose LESS THAN 70 milliGRAM(s)/deciliter  melatonin 3 milliGRAM(s) Oral at bedtime PRN Insomnia

## 2023-09-22 NOTE — BH CONSULTATION LIAISON PROGRESS NOTE - NSICDXBHPRIMARYDX_PSY_ALL_CORE
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

## 2023-09-22 NOTE — BH CONSULTATION LIAISON PROGRESS NOTE - NSBHCHARTREVIEWINVESTIGATE_PSY_A_CORE FT
< from: 12 Lead ECG (09.20.23 @ 00:18) >      Ventricular Rate 113 BPM    Atrial Rate 113 BPM    P-R Interval 134 ms    QRS Duration 94 ms    Q-T Interval 338 ms    QTC Calculation(Bazett) 463 ms    P Axis 72 degrees    R Axis 38 degrees    T Axis 74 degrees    Diagnosis Line Sinus tachycardia  Minimal voltage criteria for LVH, may be normal variant  Nonspecific T wave abnormality  Abnormal ECG    Confirmed by PARAG BESS ([id number]) on 9/20/2023 9:13:03 AM    < end of copied text >    
< from: 12 Lead ECG (09.20.23 @ 00:18) >      Ventricular Rate 113 BPM    Atrial Rate 113 BPM    P-R Interval 134 ms    QRS Duration 94 ms    Q-T Interval 338 ms    QTC Calculation(Bazett) 463 ms    P Axis 72 degrees    R Axis 38 degrees    T Axis 74 degrees    Diagnosis Line Sinus tachycardia  Minimal voltage criteria for LVH, may be normal variant  Nonspecific T wave abnormality  Abnormal ECG    Confirmed by PARAG BESS ([id number]) on 9/20/2023 9:13:03 AM    < end of copied text >

## 2023-09-22 NOTE — BH CONSULTATION LIAISON PROGRESS NOTE - NSBHASSESSMENTFT_PSY_ALL_CORE
Patient is a 42 year old female who is homeless, unemployed, on food stamps with a psychiatric history of depression, anxiety, as well as Crack/Cocaine use disorder who is not currently in outpatient treatement and also with a history of DM, CAD, HTN, HLD who presented to the ED with auditory hallucinations and while in the ED, BG>700 and was admitted for uncontrolled diabetes with hyperglycemia.     Patient seen and found with significant improvement. denying s/h ideation or AVH.     Dx.   Depression w/ psychosis   r/o substance induced mood disorder/psychosis     Recs   -Can DC 1 to1 observation  -Zoloft 25mg po daily for depression   -Risperdal 0.5mg po at bedtime for psychosis   -recommend SW prior to DC for possible drug rehab placement   
Patient is a 42 year old female who is homeless, unemployed, on food stamps with a psychiatric history of depression, anxiety, as well as Crack/Cocaine use disorder who is not currently in outpatient treatement and also with a history of DM, CAD, HTN, HLD who presented to the ED with auditory hallucinations and while in the ED, BG>700 and was admitted for uncontrolled diabetes with hyperglycemia.     Patient seen and found with significant improvement. denying s/h ideation or AVH. will continue to monitor and may clear 1 to1 by tomorrow. originally presentation may have been substance induced.     Dx.   Depression w/ psychosis   r/o substance induced mood disorder/psychosis     Recs   -keep on 1 to1 observation  -Zoloft 25mg po daily for depression   -Risperdal 0.5mg po at bedtime for psychosis   -will follow and determined disposition

## 2023-09-22 NOTE — BH CONSULTATION LIAISON PROGRESS NOTE - NSBHCHARTREVIEWVS_PSY_A_CORE FT
Vital Signs Last 24 Hrs  T(C): 36.9 (21 Sep 2023 11:12), Max: 37.1 (21 Sep 2023 02:30)  T(F): 98.5 (21 Sep 2023 11:12), Max: 98.8 (21 Sep 2023 02:30)  HR: 86 (21 Sep 2023 11:12) (83 - 99)  BP: 124/79 (21 Sep 2023 11:12) (120/71 - 132/80)  BP(mean): --  RR: 18 (21 Sep 2023 11:12) (18 - 18)  SpO2: 97% (21 Sep 2023 11:12) (94% - 97%)    Parameters below as of 21 Sep 2023 11:12  Patient On (Oxygen Delivery Method): room air    
Vital Signs Last 24 Hrs  T(C): 36.8 (22 Sep 2023 10:20), Max: 37 (22 Sep 2023 01:45)  T(F): 98.3 (22 Sep 2023 10:20), Max: 98.6 (22 Sep 2023 01:45)  HR: 85 (22 Sep 2023 10:20) (81 - 97)  BP: 161/92 (22 Sep 2023 10:20) (136/81 - 171/95)  BP(mean): --  RR: 18 (22 Sep 2023 10:20) (16 - 19)  SpO2: 95% (22 Sep 2023 10:20) (95% - 98%)    Parameters below as of 22 Sep 2023 10:20  Patient On (Oxygen Delivery Method): room air

## 2023-09-22 NOTE — BH CONSULTATION LIAISON PROGRESS NOTE - NSBHCHARTREVIEWLAB_PSY_A_CORE FT
Basic Metabolic Panel - STAT (09.20.23 @ 08:30)    Sodium: 129 mmol/L    Potassium: 5.0 mmol/L    Chloride: 90: Chloride reference range changed from ..10/26/2022 mmol/L    Carbon Dioxide: 26.0 mmol/L    Anion Gap: 13 mmol/L    Blood Urea Nitrogen: 9.7 mg/dL    Creatinine: 0.71 mg/dL    Glucose: 452: TYPE:(C=Critical, N=Notification, A=Abnormal) C  TESTS: GLU  DATE/TIME CALLED: 09/20/2023 10:12:36 EDT  CALLED TO: RN ASHLEY LOMBARDI  READ BACK (2 Patient Identifiers)(Y/N): Y  READ BACK VALUES (Y/N): Y  CALLED BY: _RAG/NC mg/dL    Calcium: 8.5 mg/dL    eGFR: 107: The estimated glomerular filtration rate (eGFR) is calculated using the  2021 CKD-EPI creatinine equation, which does not have a coefficient for  race and is validated in individuals 18 years of age and older (N Engl J  Med 2021; 385:0037-4672). Creatinine-based eGFR may be inaccurate in  various situations including but not limited to extremes of muscle mass,  altered dietary protein intake, or medications that affect renal tubular  creatinine secretion. mL/min/1.73m2  
Basic Metabolic Panel - STAT (09.20.23 @ 08:30)    Sodium: 129 mmol/L    Potassium: 5.0 mmol/L    Chloride: 90: Chloride reference range changed from ..10/26/2022 mmol/L    Carbon Dioxide: 26.0 mmol/L    Anion Gap: 13 mmol/L    Blood Urea Nitrogen: 9.7 mg/dL    Creatinine: 0.71 mg/dL    Glucose: 452: TYPE:(C=Critical, N=Notification, A=Abnormal) C  TESTS: GLU  DATE/TIME CALLED: 09/20/2023 10:12:36 EDT  CALLED TO: RN ASHLEY LOMBARDI  READ BACK (2 Patient Identifiers)(Y/N): Y  READ BACK VALUES (Y/N): Y  CALLED BY: _RAG/NC mg/dL    Calcium: 8.5 mg/dL    eGFR: 107: The estimated glomerular filtration rate (eGFR) is calculated using the  2021 CKD-EPI creatinine equation, which does not have a coefficient for  race and is validated in individuals 18 years of age and older (N Engl J  Med 2021; 385:9391-8762). Creatinine-based eGFR may be inaccurate in  various situations including but not limited to extremes of muscle mass,  altered dietary protein intake, or medications that affect renal tubular  creatinine secretion. mL/min/1.73m2

## 2023-09-23 LAB
ALBUMIN SERPL ELPH-MCNC: 2.1 G/DL — LOW (ref 3.3–5.2)
ALP SERPL-CCNC: 103 U/L — SIGNIFICANT CHANGE UP (ref 40–120)
ALT FLD-CCNC: 9 U/L — SIGNIFICANT CHANGE UP
ANION GAP SERPL CALC-SCNC: 12 MMOL/L — SIGNIFICANT CHANGE UP (ref 5–17)
AST SERPL-CCNC: 11 U/L — SIGNIFICANT CHANGE UP
BASOPHILS # BLD AUTO: 0.04 K/UL — SIGNIFICANT CHANGE UP (ref 0–0.2)
BASOPHILS NFR BLD AUTO: 0.6 % — SIGNIFICANT CHANGE UP (ref 0–2)
BILIRUB SERPL-MCNC: <0.2 MG/DL — LOW (ref 0.4–2)
BLD GP AB SCN SERPL QL: SIGNIFICANT CHANGE UP
BUN SERPL-MCNC: 20 MG/DL — SIGNIFICANT CHANGE UP (ref 8–20)
CALCIUM SERPL-MCNC: 8.1 MG/DL — LOW (ref 8.4–10.5)
CHLORIDE SERPL-SCNC: 97 MMOL/L — SIGNIFICANT CHANGE UP (ref 96–108)
CO2 SERPL-SCNC: 25 MMOL/L — SIGNIFICANT CHANGE UP (ref 22–29)
CREAT SERPL-MCNC: 0.84 MG/DL — SIGNIFICANT CHANGE UP (ref 0.5–1.3)
EGFR: 88 ML/MIN/1.73M2 — SIGNIFICANT CHANGE UP
EOSINOPHIL # BLD AUTO: 0.09 K/UL — SIGNIFICANT CHANGE UP (ref 0–0.5)
EOSINOPHIL NFR BLD AUTO: 1.3 % — SIGNIFICANT CHANGE UP (ref 0–6)
FERRITIN SERPL-MCNC: 70 NG/ML — SIGNIFICANT CHANGE UP (ref 15–150)
GLUCOSE BLDC GLUCOMTR-MCNC: 178 MG/DL — HIGH (ref 70–99)
GLUCOSE BLDC GLUCOMTR-MCNC: 202 MG/DL — HIGH (ref 70–99)
GLUCOSE BLDC GLUCOMTR-MCNC: 409 MG/DL — HIGH (ref 70–99)
GLUCOSE BLDC GLUCOMTR-MCNC: 421 MG/DL — HIGH (ref 70–99)
GLUCOSE SERPL-MCNC: 473 MG/DL — CRITICAL HIGH (ref 70–99)
HCT VFR BLD CALC: 29.9 % — LOW (ref 34.5–45)
HGB BLD-MCNC: 9.8 G/DL — LOW (ref 11.5–15.5)
IMM GRANULOCYTES NFR BLD AUTO: 0.7 % — SIGNIFICANT CHANGE UP (ref 0–0.9)
IRON SATN MFR SERPL: 16 % — SIGNIFICANT CHANGE UP (ref 14–50)
IRON SATN MFR SERPL: 40 UG/DL — SIGNIFICANT CHANGE UP (ref 37–145)
LYMPHOCYTES # BLD AUTO: 1.46 K/UL — SIGNIFICANT CHANGE UP (ref 1–3.3)
LYMPHOCYTES # BLD AUTO: 21.3 % — SIGNIFICANT CHANGE UP (ref 13–44)
MAGNESIUM SERPL-MCNC: 1.6 MG/DL — SIGNIFICANT CHANGE UP (ref 1.6–2.6)
MCHC RBC-ENTMCNC: 27.6 PG — SIGNIFICANT CHANGE UP (ref 27–34)
MCHC RBC-ENTMCNC: 32.8 GM/DL — SIGNIFICANT CHANGE UP (ref 32–36)
MCV RBC AUTO: 84.2 FL — SIGNIFICANT CHANGE UP (ref 80–100)
MONOCYTES # BLD AUTO: 0.87 K/UL — SIGNIFICANT CHANGE UP (ref 0–0.9)
MONOCYTES NFR BLD AUTO: 12.7 % — SIGNIFICANT CHANGE UP (ref 2–14)
NEUTROPHILS # BLD AUTO: 4.36 K/UL — SIGNIFICANT CHANGE UP (ref 1.8–7.4)
NEUTROPHILS NFR BLD AUTO: 63.4 % — SIGNIFICANT CHANGE UP (ref 43–77)
PLATELET # BLD AUTO: 345 K/UL — SIGNIFICANT CHANGE UP (ref 150–400)
POTASSIUM SERPL-MCNC: 4.4 MMOL/L — SIGNIFICANT CHANGE UP (ref 3.5–5.3)
POTASSIUM SERPL-SCNC: 4.4 MMOL/L — SIGNIFICANT CHANGE UP (ref 3.5–5.3)
PROT SERPL-MCNC: 6.2 G/DL — LOW (ref 6.6–8.7)
RBC # BLD: 3.55 M/UL — LOW (ref 3.8–5.2)
RBC # FLD: 16.4 % — HIGH (ref 10.3–14.5)
SODIUM SERPL-SCNC: 134 MMOL/L — LOW (ref 135–145)
TIBC SERPL-MCNC: 246 UG/DL — SIGNIFICANT CHANGE UP (ref 220–430)
TRANSFERRIN SERPL-MCNC: 172 MG/DL — LOW (ref 192–382)
WBC # BLD: 6.87 K/UL — SIGNIFICANT CHANGE UP (ref 3.8–10.5)
WBC # FLD AUTO: 6.87 K/UL — SIGNIFICANT CHANGE UP (ref 3.8–10.5)

## 2023-09-23 PROCEDURE — 99233 SBSQ HOSP IP/OBS HIGH 50: CPT

## 2023-09-23 PROCEDURE — 99232 SBSQ HOSP IP/OBS MODERATE 35: CPT

## 2023-09-23 RX ORDER — KETOROLAC TROMETHAMINE 30 MG/ML
15 SYRINGE (ML) INJECTION ONCE
Refills: 0 | Status: DISCONTINUED | OUTPATIENT
Start: 2023-09-23 | End: 2023-09-23

## 2023-09-23 RX ORDER — KETOROLAC TROMETHAMINE 30 MG/ML
15 SYRINGE (ML) INJECTION ONCE
Refills: 0 | Status: DISCONTINUED | OUTPATIENT
Start: 2023-09-23 | End: 2023-09-24

## 2023-09-23 RX ORDER — INSULIN LISPRO 100/ML
8 VIAL (ML) SUBCUTANEOUS ONCE
Refills: 0 | Status: DISCONTINUED | OUTPATIENT
Start: 2023-09-23 | End: 2023-09-23

## 2023-09-23 RX ORDER — MORPHINE SULFATE 50 MG/1
2 CAPSULE, EXTENDED RELEASE ORAL EVERY 6 HOURS
Refills: 0 | Status: DISCONTINUED | OUTPATIENT
Start: 2023-09-23 | End: 2023-09-23

## 2023-09-23 RX ADMIN — PANTOPRAZOLE SODIUM 40 MILLIGRAM(S): 20 TABLET, DELAYED RELEASE ORAL at 05:20

## 2023-09-23 RX ADMIN — Medication 4 UNIT(S): at 10:51

## 2023-09-23 RX ADMIN — Medication 50 MILLIGRAM(S): at 21:46

## 2023-09-23 RX ADMIN — Medication 15 MILLIGRAM(S): at 17:32

## 2023-09-23 RX ADMIN — Medication 12: at 07:47

## 2023-09-23 RX ADMIN — SERTRALINE 25 MILLIGRAM(S): 25 TABLET, FILM COATED ORAL at 10:50

## 2023-09-23 RX ADMIN — Medication 650 MILLIGRAM(S): at 06:41

## 2023-09-23 RX ADMIN — INSULIN GLARGINE 13 UNIT(S): 100 INJECTION, SOLUTION SUBCUTANEOUS at 21:46

## 2023-09-23 RX ADMIN — Medication 15 MILLIGRAM(S): at 11:41

## 2023-09-23 RX ADMIN — MORPHINE SULFATE 2 MILLIGRAM(S): 50 CAPSULE, EXTENDED RELEASE ORAL at 15:01

## 2023-09-23 RX ADMIN — ATORVASTATIN CALCIUM 20 MILLIGRAM(S): 80 TABLET, FILM COATED ORAL at 21:45

## 2023-09-23 RX ADMIN — CARVEDILOL PHOSPHATE 12.5 MILLIGRAM(S): 80 CAPSULE, EXTENDED RELEASE ORAL at 17:25

## 2023-09-23 RX ADMIN — ENOXAPARIN SODIUM 40 MILLIGRAM(S): 100 INJECTION SUBCUTANEOUS at 05:19

## 2023-09-23 RX ADMIN — Medication 50 MILLIGRAM(S): at 13:10

## 2023-09-23 RX ADMIN — Medication 15 MILLIGRAM(S): at 01:55

## 2023-09-23 RX ADMIN — Medication 15 MILLIGRAM(S): at 10:50

## 2023-09-23 RX ADMIN — LISINOPRIL 10 MILLIGRAM(S): 2.5 TABLET ORAL at 05:20

## 2023-09-23 RX ADMIN — Medication 4 UNIT(S): at 16:29

## 2023-09-23 RX ADMIN — Medication 15 MILLIGRAM(S): at 02:58

## 2023-09-23 RX ADMIN — MORPHINE SULFATE 2 MILLIGRAM(S): 50 CAPSULE, EXTENDED RELEASE ORAL at 20:54

## 2023-09-23 RX ADMIN — Medication 4 UNIT(S): at 07:47

## 2023-09-23 RX ADMIN — CARVEDILOL PHOSPHATE 12.5 MILLIGRAM(S): 80 CAPSULE, EXTENDED RELEASE ORAL at 05:20

## 2023-09-23 RX ADMIN — MORPHINE SULFATE 2 MILLIGRAM(S): 50 CAPSULE, EXTENDED RELEASE ORAL at 16:00

## 2023-09-23 RX ADMIN — Medication 50 MILLIGRAM(S): at 05:20

## 2023-09-23 RX ADMIN — Medication 100 MICROGRAM(S): at 05:20

## 2023-09-23 RX ADMIN — Medication 4: at 16:29

## 2023-09-23 RX ADMIN — Medication 2: at 10:50

## 2023-09-23 RX ADMIN — Medication 650 MILLIGRAM(S): at 07:48

## 2023-09-23 RX ADMIN — INSULIN GLARGINE 18 UNIT(S): 100 INJECTION, SOLUTION SUBCUTANEOUS at 07:47

## 2023-09-23 RX ADMIN — RISPERIDONE 0.5 MILLIGRAM(S): 4 TABLET ORAL at 21:54

## 2023-09-23 RX ADMIN — Medication 15 MILLIGRAM(S): at 16:32

## 2023-09-23 NOTE — CHART NOTE - NSCHARTNOTEFT_GEN_A_CORE
Called to restart IV  Pt sleeping  Pt states she would like IV started tomorrow   She is sleeping now  no IV medications due  Continue to monitor

## 2023-09-23 NOTE — PROGRESS NOTE ADULT - ASSESSMENT
42 year old female with PMH of DM, CAD, MI w/stent, Bipolar, anxiety presents to ED with auditory hallucinations.   In the ED, BG>700, pt to be admitted for uncontrolled diabetes with hyperglycemia. Also noted to have opacity on CXR    IDDM with hyperglycemia   - A1c>16  - Glargine 15U q am and 10U qPM as per Endocrinology  - Premeal Lispro 4U  - Endocrinology follow up    R lung abnormality   - CT Chest: 8 cm right upper lobe mass with associated pathologic lymphadenopathy, most suggestive of neoplasm. 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   - given leukocytosis concern for pneumonia, likely aspiration? Received antibiotics in ER, will hold off and monitor off antibiotics for now  - ID follow up noted  - ct surgery consult appreciated, for bx Monday. NPO at midnight  - consulted cardio, hold asa and tte ordered    Acute psychosis   - cleared by psych, no need for 1:1  - Sertraline 25mg and Risperidone 0.5 mg qhs started by Psychiatry     CAD with Hx of Cardiac stent   - Stress test in Dec 2022 was normal   - Asymptomatic  - asa held     HTN   - Carvedilol  - Lisinopril 10mg daily     Asthma   - Not in exacerbation   - Nebs prn     Hypothyroidism   - Normal TSH/T4   - Levothyroxine 100mcg     Substance abuse   - pt reports mostly Marijuana and cocaine use  - counselled on quitting      dispo - patient is active

## 2023-09-23 NOTE — PROGRESS NOTE ADULT - SUBJECTIVE AND OBJECTIVE BOX
[facility] Division of Hospital Medicine  Tyler Rodríguez MD    Chief Complaint:  Patient is a 42y old  Female who presents with a chief complaint of Hyperglycemia (22 Sep 2023 14:36)      SUBJECTIVE / OVERNIGHT EVENTS:  Patient seen and examined at bedside. No acute events reported overnight. Complaining of a tooth ache.     MEDICATIONS  (STANDING):  atorvastatin 20 milliGRAM(s) Oral at bedtime  carvedilol 12.5 milliGRAM(s) Oral every 12 hours  dextrose 5%. 1000 milliLiter(s) (50 mL/Hr) IV Continuous <Continuous>  dextrose 5%. 1000 milliLiter(s) (100 mL/Hr) IV Continuous <Continuous>  dextrose 50% Injectable 25 Gram(s) IV Push once  dextrose 50% Injectable 25 Gram(s) IV Push once  dextrose 50% Injectable 12.5 Gram(s) IV Push once  enoxaparin Injectable 40 milliGRAM(s) SubCutaneous every 24 hours  glucagon  Injectable 1 milliGRAM(s) IntraMuscular once  hydrALAZINE 50 milliGRAM(s) Oral every 8 hours  influenza   Vaccine 0.5 milliLiter(s) IntraMuscular once  insulin glargine Injectable (LANTUS) 13 Unit(s) SubCutaneous at bedtime  insulin glargine Injectable (LANTUS) 18 Unit(s) SubCutaneous every morning  insulin lispro (ADMELOG) corrective regimen sliding scale   SubCutaneous three times a day before meals  insulin lispro Injectable (ADMELOG) 4 Unit(s) SubCutaneous three times a day before meals  levothyroxine 100 MICROGram(s) Oral daily  lisinopril 10 milliGRAM(s) Oral daily  pantoprazole    Tablet 40 milliGRAM(s) Oral before breakfast  risperiDONE   Tablet 0.5 milliGRAM(s) Oral at bedtime  sertraline 25 milliGRAM(s) Oral daily    MEDICATIONS  (PRN):  acetaminophen     Tablet .. 650 milliGRAM(s) Oral every 6 hours PRN Temp greater or equal to 38C (100.4F), Mild Pain (1 - 3)  dextrose Oral Gel 15 Gram(s) Oral once PRN Blood Glucose LESS THAN 70 milliGRAM(s)/deciliter  melatonin 3 milliGRAM(s) Oral at bedtime PRN Insomnia        I&O's Summary    22 Sep 2023 07:01  -  23 Sep 2023 07:00  --------------------------------------------------------  IN: 600 mL / OUT: 0 mL / NET: 600 mL        PHYSICAL EXAM:  Vital Signs Last 24 Hrs  T(C): 36.8 (23 Sep 2023 04:28), Max: 36.9 (22 Sep 2023 22:20)  T(F): 98.3 (23 Sep 2023 04:28), Max: 98.5 (22 Sep 2023 22:20)  HR: 82 (23 Sep 2023 04:28) (82 - 90)  BP: 136/77 (23 Sep 2023 04:28) (136/77 - 192/109)  BP(mean): --  RR: 17 (23 Sep 2023 04:28) (17 - 18)  SpO2: 95% (23 Sep 2023 04:28) (95% - 95%)    Parameters below as of 23 Sep 2023 04:28  Patient On (Oxygen Delivery Method): room air      CONSTITUTIONAL: NAD  HEENT: NC/AT, PERRL, no JVD  RESPIRATORY: CTA bilaterally, normal effort  CARDIOVASCULAR: RRR, S1/S2+, no m/g/r  ABDOMEN: Nontender to palpation, normoactive bowel sounds, no rebound/guarding  MUSCULOSKELETAL: No edema, cyanosis or deformities.  PSYCH: Calm, affect appropriate.  NEUROLOGY: Awake, alert, no focal neurological deficits.   SKIN: No rashes; no palpable lesions  VASC: Distal pulses palpable    LABS:                        9.8    6.87  )-----------( 345      ( 23 Sep 2023 04:38 )             29.9     09-23    134<L>  |  97  |  20.0  ----------------------------<  473<HH>  4.4   |  25.0  |  0.84    Ca    8.1<L>      23 Sep 2023 04:38  Mg     1.6     09-23    TPro  6.2<L>  /  Alb  2.1<L>  /  TBili  <0.2<L>  /  DBili  x   /  AST  11  /  ALT  9   /  AlkPhos  103  09-23          Urinalysis Basic - ( 23 Sep 2023 04:38 )    Color: x / Appearance: x / SG: x / pH: x  Gluc: 473 mg/dL / Ketone: x  / Bili: x / Urobili: x   Blood: x / Protein: x / Nitrite: x   Leuk Esterase: x / RBC: x / WBC x   Sq Epi: x / Non Sq Epi: x / Bacteria: x        CAPILLARY BLOOD GLUCOSE      POCT Blood Glucose.: 421 mg/dL (23 Sep 2023 07:46)  POCT Blood Glucose.: 409 mg/dL (23 Sep 2023 06:59)  POCT Blood Glucose.: 318 mg/dL (22 Sep 2023 21:48)  POCT Blood Glucose.: 281 mg/dL (22 Sep 2023 16:33)  POCT Blood Glucose.: 228 mg/dL (22 Sep 2023 11:23)        RADIOLOGY & ADDITIONAL TESTS:  Results Reviewed:   Imaging Personally Reviewed:  Electrocardiogram Personally Reviewed:

## 2023-09-23 NOTE — PROVIDER CONTACT NOTE (CRITICAL VALUE NOTIFICATION) - ACTION/TREATMENT ORDERED:
Recheck BG in one hour
As per Xiao, take repeat blood glucose level & report back for further action / treatment.

## 2023-09-23 NOTE — PROGRESS NOTE ADULT - ASSESSMENT
2F with PMHx MI w/ stent, Bipolar, presents to ED c/o auditory hallucinations x4 days, voices commanding to hurt self and others. In ED, found to be hyperglycemic in the 700s.    Consulted for diabetes management  Home diabetes meds: Was suppose to be taking insulin but has not for months  Current a1c: >16.9%    1. DM - hyperglycemic  - increase lantsu to 22 u am and 17 u pm   increase admelog to 6 u TID w meals.    - cont admelog 4 u TID w meals.   - change to SSI to moderate     2. Acute psychosis  - Psych consulted  - Maintain 1:1 for safety    3. HLD  - Continue statin

## 2023-09-23 NOTE — PROGRESS NOTE ADULT - SUBJECTIVE AND OBJECTIVE BOX
Subjective:  "He;llo"  Pt verbalizes understanding plan for biopsy Monday      V/S  T(C): 36.8 (09-23-23 @ 11:00), Max: 36.9 (09-22-23 @ 22:20)  HR: 89 (09-23-23 @ 11:00) (82 - 90)  BP: 155/95 (09-23-23 @ 11:00) (136/77 - 192/109)  RR: 17 (09-23-23 @ 11:00) (17 - 18)  SpO2: 97% (09-23-23 @ 11:00) (95% - 97%)    I&O's Detail    22 Sep 2023 07:01  -  23 Sep 2023 07:00  --------------------------------------------------------  IN:    Oral Fluid: 600 mL  Total IN: 600 mL    OUT:  Total OUT: 0 mL    Total NET: 600 mL      23 Sep 2023 07:01  -  23 Sep 2023 16:38  --------------------------------------------------------  IN:    Oral Fluid: 500 mL  Total IN: 500 mL    OUT:    Voided (mL): 400 mL  Total OUT: 400 mL    Total NET: 100 mL          09-22-23 @ 07:01  -  09-23-23 @ 07:00  --------------------------------------------------------  IN: 600 mL / OUT: 0 mL / NET: 600 mL    09-23-23 @ 07:01  -  09-23-23 @ 16:38  --------------------------------------------------------  IN: 500 mL / OUT: 400 mL / NET: 100 mL      MEDICATIONS  (STANDING):  atorvastatin 20 milliGRAM(s) Oral at bedtime  carvedilol 12.5 milliGRAM(s) Oral every 12 hours  dextrose 5%. 1000 milliLiter(s) (50 mL/Hr) IV Continuous <Continuous>  dextrose 5%. 1000 milliLiter(s) (100 mL/Hr) IV Continuous <Continuous>  dextrose 50% Injectable 25 Gram(s) IV Push once  dextrose 50% Injectable 25 Gram(s) IV Push once  dextrose 50% Injectable 12.5 Gram(s) IV Push once  enoxaparin Injectable 40 milliGRAM(s) SubCutaneous every 24 hours  glucagon  Injectable 1 milliGRAM(s) IntraMuscular once  hydrALAZINE 50 milliGRAM(s) Oral every 8 hours  influenza   Vaccine 0.5 milliLiter(s) IntraMuscular once  insulin glargine Injectable (LANTUS) 13 Unit(s) SubCutaneous at bedtime  insulin glargine Injectable (LANTUS) 18 Unit(s) SubCutaneous every morning  insulin lispro (ADMELOG) corrective regimen sliding scale   SubCutaneous three times a day before meals  insulin lispro Injectable (ADMELOG) 4 Unit(s) SubCutaneous three times a day before meals  levothyroxine 100 MICROGram(s) Oral daily  lisinopril 10 milliGRAM(s) Oral daily  pantoprazole    Tablet 40 milliGRAM(s) Oral before breakfast  risperiDONE   Tablet 0.5 milliGRAM(s) Oral at bedtime  sertraline 25 milliGRAM(s) Oral daily      09-23    134<L>  |  97  |  20.0  ----------------------------<  473<HH>  4.4   |  25.0  |  0.84    Ca    8.1<L>      23 Sep 2023 04:38  Mg     1.6     09-23    TPro  6.2<L>  /  Alb  2.1<L>  /  TBili  <0.2<L>  /  DBili  x   /  AST  11  /  ALT  9   /  AlkPhos  103  09-23                               9.8    6.87  )-----------( 345      ( 23 Sep 2023 04:38 )             29.9                 CAPILLARY BLOOD GLUCOSE      POCT Blood Glucose.: 202 mg/dL (23 Sep 2023 16:25)  POCT Blood Glucose.: 178 mg/dL (23 Sep 2023 10:49)  POCT Blood Glucose.: 421 mg/dL (23 Sep 2023 07:46)  POCT Blood Glucose.: 409 mg/dL (23 Sep 2023 06:59)  POCT Blood Glucose.: 318 mg/dL (22 Sep 2023 21:48)           CXR:      Physical Exam:  General: well nourished, well developed, no acute distress  Neurology: alert and oriented x 3, nonfocal, no gross deficits  Respiratory: clear to auscultation bilaterally  CV: regular rate and rhythm, normal S1, S2  Abdomen: soft, nontender, nondistended, positive bowel sounds  Extremities: warm, well perfused. no edema + DP pulses        PAST MEDICAL & SURGICAL HISTORY:  Diabetes mellitus      CAD (coronary artery disease)      HTN (hypertension)      S/P cardiac cath

## 2023-09-23 NOTE — PROGRESS NOTE ADULT - SUBJECTIVE AND OBJECTIVE BOX
INTERVAL HPI/OVERNIGHT EVENTS:  follow-up for DM management    MEDICATIONS  (STANDING):  atorvastatin 20 milliGRAM(s) Oral at bedtime  carvedilol 12.5 milliGRAM(s) Oral every 12 hours  dextrose 5%. 1000 milliLiter(s) (100 mL/Hr) IV Continuous <Continuous>  dextrose 5%. 1000 milliLiter(s) (50 mL/Hr) IV Continuous <Continuous>  dextrose 50% Injectable 25 Gram(s) IV Push once  dextrose 50% Injectable 25 Gram(s) IV Push once  dextrose 50% Injectable 12.5 Gram(s) IV Push once  enoxaparin Injectable 40 milliGRAM(s) SubCutaneous every 24 hours  glucagon  Injectable 1 milliGRAM(s) IntraMuscular once  hydrALAZINE 50 milliGRAM(s) Oral every 8 hours  influenza   Vaccine 0.5 milliLiter(s) IntraMuscular once  insulin glargine Injectable (LANTUS) 13 Unit(s) SubCutaneous at bedtime  insulin glargine Injectable (LANTUS) 18 Unit(s) SubCutaneous every morning  insulin lispro (ADMELOG) corrective regimen sliding scale   SubCutaneous three times a day before meals  insulin lispro Injectable (ADMELOG) 4 Unit(s) SubCutaneous three times a day before meals  levothyroxine 100 MICROGram(s) Oral daily  lisinopril 10 milliGRAM(s) Oral daily  pantoprazole    Tablet 40 milliGRAM(s) Oral before breakfast  risperiDONE   Tablet 0.5 milliGRAM(s) Oral at bedtime  sertraline 25 milliGRAM(s) Oral daily    MEDICATIONS  (PRN):  acetaminophen     Tablet .. 650 milliGRAM(s) Oral every 6 hours PRN Temp greater or equal to 38C (100.4F), Mild Pain (1 - 3)  dextrose Oral Gel 15 Gram(s) Oral once PRN Blood Glucose LESS THAN 70 milliGRAM(s)/deciliter  melatonin 3 milliGRAM(s) Oral at bedtime PRN Insomnia  morphine  - Injectable 2 milliGRAM(s) IV Push every 6 hours PRN Severe Pain (7 - 10)      Allergies  No Known Drug Allergies  shellfish (Unknown)    Review of systems: no shortness of breath, no chest pain, no headache, no nausea, no vomiting    Vital Signs Last 24 Hrs  T(C): 36.8 (23 Sep 2023 11:00), Max: 36.9 (22 Sep 2023 22:20)  T(F): 98.3 (23 Sep 2023 11:00), Max: 98.5 (22 Sep 2023 22:20)  HR: 89 (23 Sep 2023 11:00) (82 - 90)  BP: 155/95 (23 Sep 2023 11:00) (136/77 - 192/109)  BP(mean): --  RR: 17 (23 Sep 2023 11:00) (17 - 18)  SpO2: 97% (23 Sep 2023 11:00) (95% - 97%)    Parameters below as of 23 Sep 2023 11:00  Patient On (Oxygen Delivery Method): room air    PHYSICAL EXAM:  General: No apparent distress  Neck: Supple, trachea midline, no thyromegaly  Respiratory: Lungs clear bilaterally, normal rate, effort  Cardiac: +S1, S2, no m/r/g  GI: +BS, soft, non tender, non distended  Extremities: No peripheral edema, no pedal lesions  Neuro: A+O X3, no tremor      LABS:                        9.8    6.87  )-----------( 345      ( 23 Sep 2023 04:38 )             29.9     09-23    134<L>  |  97  |  20.0  ----------------------------<  473<HH>  4.4   |  25.0  |  0.84    Ca    8.1<L>      23 Sep 2023 04:38  Mg     1.6     09-23    TPro  6.2<L>  /  Alb  2.1<L>  /  TBili  <0.2<L>  /  DBili  x   /  AST  11  /  ALT  9   /  AlkPhos  103  09-23    Urinalysis Basic - ( 23 Sep 2023 04:38 )    Color: x / Appearance: x / SG: x / pH: x  Gluc: 473 mg/dL / Ketone: x  / Bili: x / Urobili: x   Blood: x / Protein: x / Nitrite: x   Leuk Esterase: x / RBC: x / WBC x   Sq Epi: x / Non Sq Epi: x / Bacteria: x          CAPILLARY BLOOD GLUCOSE      RADIOLOGY & ADDITIONAL TESTS:

## 2023-09-24 LAB
ABO RH CONFIRMATION: SIGNIFICANT CHANGE UP
ALBUMIN SERPL ELPH-MCNC: 2.5 G/DL — LOW (ref 3.3–5.2)
ALP SERPL-CCNC: 113 U/L — SIGNIFICANT CHANGE UP (ref 40–120)
ALT FLD-CCNC: 11 U/L — SIGNIFICANT CHANGE UP
ANION GAP SERPL CALC-SCNC: 13 MMOL/L — SIGNIFICANT CHANGE UP (ref 5–17)
AST SERPL-CCNC: 17 U/L — SIGNIFICANT CHANGE UP
BILIRUB SERPL-MCNC: <0.2 MG/DL — LOW (ref 0.4–2)
BUN SERPL-MCNC: 23.2 MG/DL — HIGH (ref 8–20)
CALCIUM SERPL-MCNC: 8.4 MG/DL — SIGNIFICANT CHANGE UP (ref 8.4–10.5)
CHLORIDE SERPL-SCNC: 96 MMOL/L — SIGNIFICANT CHANGE UP (ref 96–108)
CO2 SERPL-SCNC: 24 MMOL/L — SIGNIFICANT CHANGE UP (ref 22–29)
CREAT SERPL-MCNC: 0.78 MG/DL — SIGNIFICANT CHANGE UP (ref 0.5–1.3)
EGFR: 96 ML/MIN/1.73M2 — SIGNIFICANT CHANGE UP
GLUCOSE BLDC GLUCOMTR-MCNC: 210 MG/DL — HIGH (ref 70–99)
GLUCOSE BLDC GLUCOMTR-MCNC: 267 MG/DL — HIGH (ref 70–99)
GLUCOSE BLDC GLUCOMTR-MCNC: 299 MG/DL — HIGH (ref 70–99)
GLUCOSE BLDC GLUCOMTR-MCNC: 315 MG/DL — HIGH (ref 70–99)
GLUCOSE SERPL-MCNC: 230 MG/DL — HIGH (ref 70–99)
HCT VFR BLD CALC: 32.7 % — LOW (ref 34.5–45)
HGB BLD-MCNC: 10.7 G/DL — LOW (ref 11.5–15.5)
MCHC RBC-ENTMCNC: 27.8 PG — SIGNIFICANT CHANGE UP (ref 27–34)
MCHC RBC-ENTMCNC: 32.7 GM/DL — SIGNIFICANT CHANGE UP (ref 32–36)
MCV RBC AUTO: 84.9 FL — SIGNIFICANT CHANGE UP (ref 80–100)
PLATELET # BLD AUTO: 418 K/UL — HIGH (ref 150–400)
POTASSIUM SERPL-MCNC: 4.7 MMOL/L — SIGNIFICANT CHANGE UP (ref 3.5–5.3)
POTASSIUM SERPL-SCNC: 4.7 MMOL/L — SIGNIFICANT CHANGE UP (ref 3.5–5.3)
PROT SERPL-MCNC: 6.7 G/DL — SIGNIFICANT CHANGE UP (ref 6.6–8.7)
RBC # BLD: 3.85 M/UL — SIGNIFICANT CHANGE UP (ref 3.8–5.2)
RBC # FLD: 16.3 % — HIGH (ref 10.3–14.5)
SODIUM SERPL-SCNC: 133 MMOL/L — LOW (ref 135–145)
WBC # BLD: 8.64 K/UL — SIGNIFICANT CHANGE UP (ref 3.8–10.5)
WBC # FLD AUTO: 8.64 K/UL — SIGNIFICANT CHANGE UP (ref 3.8–10.5)

## 2023-09-24 PROCEDURE — 99231 SBSQ HOSP IP/OBS SF/LOW 25: CPT

## 2023-09-24 PROCEDURE — 99233 SBSQ HOSP IP/OBS HIGH 50: CPT

## 2023-09-24 RX ORDER — KETOROLAC TROMETHAMINE 30 MG/ML
15 SYRINGE (ML) INJECTION ONCE
Refills: 0 | Status: DISCONTINUED | OUTPATIENT
Start: 2023-09-24 | End: 2023-09-24

## 2023-09-24 RX ORDER — INSULIN LISPRO 100/ML
1 VIAL (ML) SUBCUTANEOUS ONCE
Refills: 0 | Status: COMPLETED | OUTPATIENT
Start: 2023-09-24 | End: 2023-09-24

## 2023-09-24 RX ADMIN — ENOXAPARIN SODIUM 40 MILLIGRAM(S): 100 INJECTION SUBCUTANEOUS at 05:41

## 2023-09-24 RX ADMIN — INSULIN GLARGINE 18 UNIT(S): 100 INJECTION, SOLUTION SUBCUTANEOUS at 08:13

## 2023-09-24 RX ADMIN — Medication 100 MICROGRAM(S): at 05:42

## 2023-09-24 RX ADMIN — Medication 8: at 11:51

## 2023-09-24 RX ADMIN — CARVEDILOL PHOSPHATE 12.5 MILLIGRAM(S): 80 CAPSULE, EXTENDED RELEASE ORAL at 17:53

## 2023-09-24 RX ADMIN — Medication 50 MILLIGRAM(S): at 14:11

## 2023-09-24 RX ADMIN — PANTOPRAZOLE SODIUM 40 MILLIGRAM(S): 20 TABLET, DELAYED RELEASE ORAL at 08:11

## 2023-09-24 RX ADMIN — Medication 650 MILLIGRAM(S): at 14:10

## 2023-09-24 RX ADMIN — Medication 650 MILLIGRAM(S): at 15:10

## 2023-09-24 RX ADMIN — Medication 4 UNIT(S): at 08:11

## 2023-09-24 RX ADMIN — Medication 15 MILLIGRAM(S): at 22:33

## 2023-09-24 RX ADMIN — Medication 15 MILLIGRAM(S): at 21:33

## 2023-09-24 RX ADMIN — Medication 50 MILLIGRAM(S): at 21:22

## 2023-09-24 RX ADMIN — SERTRALINE 25 MILLIGRAM(S): 25 TABLET, FILM COATED ORAL at 12:06

## 2023-09-24 RX ADMIN — Medication 15 MILLIGRAM(S): at 18:02

## 2023-09-24 RX ADMIN — Medication 6: at 08:10

## 2023-09-24 RX ADMIN — LISINOPRIL 10 MILLIGRAM(S): 2.5 TABLET ORAL at 05:42

## 2023-09-24 RX ADMIN — Medication 4 UNIT(S): at 16:26

## 2023-09-24 RX ADMIN — Medication 4 UNIT(S): at 11:52

## 2023-09-24 RX ADMIN — Medication 15 MILLIGRAM(S): at 08:11

## 2023-09-24 RX ADMIN — ATORVASTATIN CALCIUM 20 MILLIGRAM(S): 80 TABLET, FILM COATED ORAL at 21:23

## 2023-09-24 RX ADMIN — Medication 15 MILLIGRAM(S): at 09:11

## 2023-09-24 RX ADMIN — RISPERIDONE 0.5 MILLIGRAM(S): 4 TABLET ORAL at 21:23

## 2023-09-24 RX ADMIN — CARVEDILOL PHOSPHATE 12.5 MILLIGRAM(S): 80 CAPSULE, EXTENDED RELEASE ORAL at 05:42

## 2023-09-24 RX ADMIN — Medication 4: at 16:25

## 2023-09-24 RX ADMIN — Medication 50 MILLIGRAM(S): at 05:42

## 2023-09-24 RX ADMIN — INSULIN GLARGINE 13 UNIT(S): 100 INJECTION, SOLUTION SUBCUTANEOUS at 21:23

## 2023-09-24 RX ADMIN — Medication 1 UNIT(S): at 22:19

## 2023-09-24 NOTE — PROGRESS NOTE ADULT - PROBLEM SELECTOR PLAN 1
CT Chest showing 8cm RUL Mass  ID following for possible infectious process, currently off ABX  Plan for Ion Guided Biopsy of mass on Monday, 9/25, with Dr. Mejia  PreOp Medical and Cardiac Clearance pending, TTE pending.   NPO after midnight.   DC Lovenox at midnight.  Hx CAD, MI, PCI w/ stents, HTN, DM2 with A1c >16.9 and bipolar disorder not compliant on medications  Psych cleared 1:1 sitter  C/w ASA, BB, insulin coverage CT Chest showing 8cm RUL Mass  ID following for possible infectious process, currently off ABX  Plan for Ion Guided Biopsy of mass on Monday, 9/25, with Dr. Mejia  TTE performed, unchanged from prior. Cleared by medicine and cardiology for procedure tomorrow.   NPO after midnight.   DC Lovenox at midnight.  Hx CAD, MI, PCI w/ stents, HTN, DM2 with A1c >16.9 and bipolar disorder not compliant on medications  Psych cleared 1:1 sitter  C/w ASA, BB, insulin coverage

## 2023-09-24 NOTE — PROGRESS NOTE ADULT - SUBJECTIVE AND OBJECTIVE BOX
[facility]                              [address] [address]                                                 PHONE: [phone]                                                 FAX: [phone]  ------------------------------------------------------------------------------------------------    Chief complaint: Preop cardiovascular evaluation, CAD    42y Female with past medical history as under presented to ED with c/o feeling unwell, hearing voices for the last 3-4 days that have been telling her to hurt herself and others. On admission, patient was Hyperglycemic, states she has not been taking any of her medications for the past few weeks. CXR in ED showing RUL opacity, CT Chest showing 8cm RUL Mass. Plan for Biopsy of mass, likely Monday 9/25/23. She reports prior h/o CAD with 1 stent  At the time of evaluation, pt reports feeling well.       RECENT EVENTS    For OR tomorrow    < from: TTE Echo Complete w/o Contrast w/ Doppler (09.24.23 @ 10:30) >  Summary:   1. Left ventricular ejection fraction, by visual estimation, is 60 to   65%.   2. Normal global left ventricular systolic function.   3. Mildly enlarged left atrium.   4. Normal right atrial size.   5. Mild mitral annular calcification.   6. Mild mitral valve regurgitation.   7. Mild thickening of the anterior and posterior mitral valve leaflets.   8. Mild tricuspid regurgitation.   9. Sclerotic aortic valve with normal opening.    MD Gabriel Electronically signed on 9/24/2023 at 11:45:46 AM    < end of copied text >    PAST MEDICAL & SURGICAL HISTORY:  Diabetes mellitus      CAD (coronary artery disease)      HTN (hypertension)      S/P cardiac cath          No Known Drug Allergies  shellfish (Unknown)      Vital Signs Last 24 Hrs  T(C): 36.8 (22 Sep 2023 10:20), Max: 37 (22 Sep 2023 01:45)  T(F): 98.3 (22 Sep 2023 10:20), Max: 98.6 (22 Sep 2023 01:45)  HR: 85 (22 Sep 2023 10:20) (81 - 97)  BP: 161/92 (22 Sep 2023 10:20) (124/79 - 171/95)  BP(mean): --  RR: 18 (22 Sep 2023 10:20) (16 - 19)  SpO2: 95% (22 Sep 2023 10:20) (95% - 98%)    Parameters below as of 22 Sep 2023 10:20  Patient On (Oxygen Delivery Method): room air        RELEVENT PHYSICAL EXAM:  Neck: No obvious JVD  Cardiovascular: regular S1, S2  Respiratory: Lungs clear to auscultation; no crepitations, no wheeze  Musculoskeletal: No edema    LABS:                        9.7    9.26  )-----------( 344      ( 21 Sep 2023 07:24 )             29.5     09-21    132<L>  |  95<L>  |  17.0  ----------------------------<  296<H>  4.6   |  27.0  |  0.62    Ca    8.1<L>      21 Sep 2023 07:24    TPro  6.7  /  Alb  2.1<L>  /  TBili  <0.2<L>  /  DBili  x   /  AST  14  /  ALT  9   /  AlkPhos  138<H>  09-21            RADIOLOGY & ADDITIONAL STUDIES: (reviewed)  CXR was independently visualized/reviewed and demonstrated:     CARDIOLOGY TESTING:(reviewed)     ECG (Independent visualization): NSR with LVH    Echo in 2021  revealed normal LV systolic function and stress test revealed no evidence of any ischemia.  Left ventricular ejection fraction was 65%    MEDICATIONS:(reviewed)  Home Medications:    MEDICATIONS  (STANDING):  aspirin  chewable 81 milliGRAM(s) Oral daily  atorvastatin 20 milliGRAM(s) Oral at bedtime  carvedilol 12.5 milliGRAM(s) Oral every 12 hours  dextrose 5%. 1000 milliLiter(s) (100 mL/Hr) IV Continuous <Continuous>  dextrose 5%. 1000 milliLiter(s) (50 mL/Hr) IV Continuous <Continuous>  dextrose 50% Injectable 25 Gram(s) IV Push once  dextrose 50% Injectable 12.5 Gram(s) IV Push once  dextrose 50% Injectable 25 Gram(s) IV Push once  enoxaparin Injectable 40 milliGRAM(s) SubCutaneous every 24 hours  glucagon  Injectable 1 milliGRAM(s) IntraMuscular once  influenza   Vaccine 0.5 milliLiter(s) IntraMuscular once  insulin glargine Injectable (LANTUS) 10 Unit(s) SubCutaneous at bedtime  insulin glargine Injectable (LANTUS) 15 Unit(s) SubCutaneous every morning  insulin lispro (ADMELOG) corrective regimen sliding scale   SubCutaneous three times a day before meals  insulin lispro (ADMELOG) corrective regimen sliding scale   SubCutaneous at bedtime  insulin lispro Injectable (ADMELOG) 4 Unit(s) SubCutaneous three times a day before meals  levothyroxine 100 MICROGram(s) Oral daily  pantoprazole    Tablet 40 milliGRAM(s) Oral before breakfast  risperiDONE   Tablet 0.5 milliGRAM(s) Oral at bedtime  sertraline 25 milliGRAM(s) Oral daily  sodium chloride 0.9%. 1000 milliLiter(s) (150 mL/Hr) IV Continuous <Continuous>    MEDICATIONS  (PRN):  acetaminophen     Tablet .. 650 milliGRAM(s) Oral every 6 hours PRN Temp greater or equal to 38C (100.4F), Mild Pain (1 - 3)  dextrose Oral Gel 15 Gram(s) Oral once PRN Blood Glucose LESS THAN 70 milliGRAM(s)/deciliter  melatonin 3 milliGRAM(s) Oral at bedtime PRN Insomnia    ASSESSMENT AND PLAN:    41 yo female with PMHx of DM2 (A1c >16.9), CAD, MI w/ stent in 2018, HTN, HLD, Asthma, Bipolar, Anxiety, Hypothyroidism, Cocaine/Marijuana use - every day use "depending on how she feels". Presents to ED with c/o feeling unwell.  CT Chest showing 8cm RUL Mass. Plan for Biopsy of mass, likely Monday 9/25/23    Pre-op cardiovascular evaluation    - There is no absolute cardiac contraindications to proceed with surgery tomorrow with moderate perioperative risk  - ASA may be held if needed prior to the procedure and restarted post procedure when safe from surgical standpoint  - Close monitoring of BP and volume status    CAD    - Continue ASA    HTN    - Continue Coreg    Dyslipidemia    - Continue statin

## 2023-09-24 NOTE — PROGRESS NOTE ADULT - SUBJECTIVE AND OBJECTIVE BOX
Subjective:  Patient seen and examined for RUL mass, plan for ION guided biopsy tomorrow 9/25 with Dr. Mejia. Patient resting in bed / OOB to chair and in no apparent distress. Patient denies chest pain, SOB, abdominal pain, N/V/D/C, or any other acute complaints at this time.    PAST MEDICAL & SURGICAL HISTORY:  Diabetes mellitus    CAD (coronary artery disease)    HTN (hypertension)    S/P cardiac cath       VITAL SIGNS  Vital Signs Last 24 Hrs  T(C): 37.1 (09-24-23 @ 08:21), Max: 37.2 (09-23-23 @ 17:20)  T(F): 98.7 (09-24-23 @ 08:21), Max: 99 (09-23-23 @ 17:20)  HR: 92 (09-24-23 @ 08:21) (88 - 92)  BP: 153/88 (09-24-23 @ 08:21) (152/76 - 160/95)  RR: 17 (09-24-23 @ 08:21) (16 - 17)  SpO2: 97% (09-24-23 @ 08:21) (97% - 98%)  on (O2)              MEDICATIONS  acetaminophen     Tablet .. 650 milliGRAM(s) Oral every 6 hours PRN  atorvastatin 20 milliGRAM(s) Oral at bedtime  carvedilol 12.5 milliGRAM(s) Oral every 12 hours  dextrose 5%. 1000 milliLiter(s) IV Continuous <Continuous>  dextrose 5%. 1000 milliLiter(s) IV Continuous <Continuous>  dextrose 50% Injectable 25 Gram(s) IV Push once  dextrose 50% Injectable 12.5 Gram(s) IV Push once  dextrose 50% Injectable 25 Gram(s) IV Push once  dextrose Oral Gel 15 Gram(s) Oral once PRN  glucagon  Injectable 1 milliGRAM(s) IntraMuscular once  hydrALAZINE 50 milliGRAM(s) Oral every 8 hours  influenza   Vaccine 0.5 milliLiter(s) IntraMuscular once  insulin glargine Injectable (LANTUS) 13 Unit(s) SubCutaneous at bedtime  insulin glargine Injectable (LANTUS) 18 Unit(s) SubCutaneous every morning  insulin lispro (ADMELOG) corrective regimen sliding scale   SubCutaneous three times a day before meals  insulin lispro Injectable (ADMELOG) 4 Unit(s) SubCutaneous three times a day before meals  ketorolac   Injectable 15 milliGRAM(s) IntraMuscular once PRN  levothyroxine 100 MICROGram(s) Oral daily  lisinopril 10 milliGRAM(s) Oral daily  melatonin 3 milliGRAM(s) Oral at bedtime PRN  morphine  - Injectable 2 milliGRAM(s) IV Push every 6 hours PRN  pantoprazole    Tablet 40 milliGRAM(s) Oral before breakfast  risperiDONE   Tablet 0.5 milliGRAM(s) Oral at bedtime  sertraline 25 milliGRAM(s) Oral daily        09-23 @ 07:01  -  09-24 @ 07:00  --------------------------------------------------------  IN: 725 mL / OUT: 550 mL / NET: 175 mL      Weights:  Daily     Admit Wt: Drug Dosing Weight  Height (cm): 160 (20 Sep 2023 00:08)  Weight (kg): 77.7 (20 Sep 2023 00:08)  BMI (kg/m2): 30.4 (20 Sep 2023 00:08)  BSA (m2): 1.81 (20 Sep 2023 00:08)    LABS  09-24    133<L>  |  96  |  23.2<H>  ----------------------------<  230<H>  4.7   |  24.0  |  0.78    Ca    8.4      24 Sep 2023 04:56  Mg     1.6     09-23    TPro  6.7  /  Alb  2.5<L>  /  TBili  <0.2<L>  /  DBili  x   /  AST  17  /  ALT  11  /  AlkPhos  113  09-24                                 10.7   8.64  )-----------( 418      ( 24 Sep 2023 04:56 )             32.7            Bilirubin Total: <0.2 mg/dL (09-24 @ 04:56)    CAPILLARY BLOOD GLUCOSE      POCT Blood Glucose.: 267 mg/dL (24 Sep 2023 08:09)  POCT Blood Glucose.: 202 mg/dL (23 Sep 2023 16:25)  POCT Blood Glucose.: 178 mg/dL (23 Sep 2023 10:49)           DIAGNOSTICS:  All laboratory results, radiology and medications reviewed.    < from: CT Chest w/ IV Cont (09.20.23 @ 06:29) >    IMPRESSION:  8 cm right upper lobe mass with associated pathologic lymphadenopathy,   most suggestive of neoplasm. Limited evaluation of the partially   visualized upper abdomen. Further workup recommended.    8 mm lucent lesion abutting the superior endplate of T6, most suggestive   of benign vertebral body osseous hemangioma. Nuclear medicine bone scan   could be considered for further evaluation if there is concern for   osseous metastases.    --- End of Report ---            RADHA REESE MD; Attending Radiologist  This document has been electronically signed. Sep 20 2023  6:42AM    < end of copied text >      PHYSICAL EXAM  General: *well nourished, well developed, no acute distress  Neurology: *alert and oriented x 3, nonfocal, no gross deficits  Respiratory: clear to auscultation bilaterally*  CV: regular rate and rhythm, normal S1, S2*  Abdomen: soft, nontender, nondistended, positive bowel sounds, last bowel movement   Extremities: warm, well perfused. no edema*. + DP pulses       no no Subjective:  Patient seen and examined for RUL mass, plan for ION guided biopsy tomorrow 9/25 with Dr. Mejia. Patient resting in bed, complaining of left upper tooth pain. Pt reports her tooth broke earlier this week while she was eating and she has been experiencing pain. Pt reports she has been getting Toradol for the pain. Pt denies fevers, chills. Patient denies chest pain, SOB, abdominal pain, N/V/D/C.     All other ROS negative x 10.     PAST MEDICAL & SURGICAL HISTORY:  Diabetes mellitus    CAD (coronary artery disease)    HTN (hypertension)    S/P cardiac cath     VITAL SIGNS  Vital Signs Last 24 Hrs  T(C): 37.1 (09-24-23 @ 08:21), Max: 37.2 (09-23-23 @ 17:20)  T(F): 98.7 (09-24-23 @ 08:21), Max: 99 (09-23-23 @ 17:20)  HR: 92 (09-24-23 @ 08:21) (88 - 92)  BP: 153/88 (09-24-23 @ 08:21) (152/76 - 160/95)  RR: 17 (09-24-23 @ 08:21) (16 - 17)  SpO2: 97% (09-24-23 @ 08:21) (97% - 98%)  on (O2)              MEDICATIONS  acetaminophen     Tablet .. 650 milliGRAM(s) Oral every 6 hours PRN  atorvastatin 20 milliGRAM(s) Oral at bedtime  carvedilol 12.5 milliGRAM(s) Oral every 12 hours  dextrose 5%. 1000 milliLiter(s) IV Continuous <Continuous>  dextrose 5%. 1000 milliLiter(s) IV Continuous <Continuous>  dextrose 50% Injectable 25 Gram(s) IV Push once  dextrose 50% Injectable 12.5 Gram(s) IV Push once  dextrose 50% Injectable 25 Gram(s) IV Push once  dextrose Oral Gel 15 Gram(s) Oral once PRN  glucagon  Injectable 1 milliGRAM(s) IntraMuscular once  hydrALAZINE 50 milliGRAM(s) Oral every 8 hours  influenza   Vaccine 0.5 milliLiter(s) IntraMuscular once  insulin glargine Injectable (LANTUS) 13 Unit(s) SubCutaneous at bedtime  insulin glargine Injectable (LANTUS) 18 Unit(s) SubCutaneous every morning  insulin lispro (ADMELOG) corrective regimen sliding scale   SubCutaneous three times a day before meals  insulin lispro Injectable (ADMELOG) 4 Unit(s) SubCutaneous three times a day before meals  ketorolac   Injectable 15 milliGRAM(s) IntraMuscular once PRN  levothyroxine 100 MICROGram(s) Oral daily  lisinopril 10 milliGRAM(s) Oral daily  melatonin 3 milliGRAM(s) Oral at bedtime PRN  morphine  - Injectable 2 milliGRAM(s) IV Push every 6 hours PRN  pantoprazole    Tablet 40 milliGRAM(s) Oral before breakfast  risperiDONE   Tablet 0.5 milliGRAM(s) Oral at bedtime  sertraline 25 milliGRAM(s) Oral daily        09-23 @ 07:01  -  09-24 @ 07:00  --------------------------------------------------------  IN: 725 mL / OUT: 550 mL / NET: 175 mL      Weights:  Daily     Admit Wt: Drug Dosing Weight  Height (cm): 160 (20 Sep 2023 00:08)  Weight (kg): 77.7 (20 Sep 2023 00:08)  BMI (kg/m2): 30.4 (20 Sep 2023 00:08)  BSA (m2): 1.81 (20 Sep 2023 00:08)    LABS  09-24    133<L>  |  96  |  23.2<H>  ----------------------------<  230<H>  4.7   |  24.0  |  0.78    Ca    8.4      24 Sep 2023 04:56  Mg     1.6     09-23    TPro  6.7  /  Alb  2.5<L>  /  TBili  <0.2<L>  /  DBili  x   /  AST  17  /  ALT  11  /  AlkPhos  113  09-24                                 10.7   8.64  )-----------( 418      ( 24 Sep 2023 04:56 )             32.7            Bilirubin Total: <0.2 mg/dL (09-24 @ 04:56)    CAPILLARY BLOOD GLUCOSE      POCT Blood Glucose.: 267 mg/dL (24 Sep 2023 08:09)  POCT Blood Glucose.: 202 mg/dL (23 Sep 2023 16:25)  POCT Blood Glucose.: 178 mg/dL (23 Sep 2023 10:49)           DIAGNOSTICS:  All laboratory results, radiology and medications reviewed.    < from: CT Chest w/ IV Cont (09.20.23 @ 06:29) >    IMPRESSION:  8 cm right upper lobe mass with associated pathologic lymphadenopathy,   most suggestive of neoplasm. Limited evaluation of the partially   visualized upper abdomen. Further workup recommended.    8 mm lucent lesion abutting the superior endplate of T6, most suggestive   of benign vertebral body osseous hemangioma. Nuclear medicine bone scan   could be considered for further evaluation if there is concern for   osseous metastases.    --- End of Report ---            RADHA REESE MD; Attending Radiologist  This document has been electronically signed. Sep 20 2023  6:42AM    < end of copied text >      PHYSICAL EXAM  General: alert, no acute distress  Neurology: alert and oriented x 3, nonfocal, no gross deficits, normal affect  Mouth: left upper back tooth missing, no broken teeth   Respiratory: clear to auscultation bilaterally, no wheezing  CV: RRR, normal S1, S2  Abdomen: soft, nontender, nondistended, + bowel sounds, last bowel movement   Extremities: warm, well perfused. no edema,+ DP pulses

## 2023-09-24 NOTE — PROGRESS NOTE ADULT - SUBJECTIVE AND OBJECTIVE BOX
[facility] Division of Hospital Medicine  Tyler Rodríguez MD    Chief Complaint:  Patient is a 42y old  Female who presents with a chief complaint of Hyperglycemia (24 Sep 2023 09:45)      SUBJECTIVE / OVERNIGHT EVENTS:  Patient seen and examined at bedside. No acute events reported overnight. No new complaints.    MEDICATIONS  (STANDING):  atorvastatin 20 milliGRAM(s) Oral at bedtime  carvedilol 12.5 milliGRAM(s) Oral every 12 hours  dextrose 5%. 1000 milliLiter(s) (50 mL/Hr) IV Continuous <Continuous>  dextrose 5%. 1000 milliLiter(s) (100 mL/Hr) IV Continuous <Continuous>  dextrose 50% Injectable 25 Gram(s) IV Push once  dextrose 50% Injectable 25 Gram(s) IV Push once  dextrose 50% Injectable 12.5 Gram(s) IV Push once  glucagon  Injectable 1 milliGRAM(s) IntraMuscular once  hydrALAZINE 50 milliGRAM(s) Oral every 8 hours  influenza   Vaccine 0.5 milliLiter(s) IntraMuscular once  insulin glargine Injectable (LANTUS) 13 Unit(s) SubCutaneous at bedtime  insulin glargine Injectable (LANTUS) 18 Unit(s) SubCutaneous every morning  insulin lispro (ADMELOG) corrective regimen sliding scale   SubCutaneous three times a day before meals  insulin lispro Injectable (ADMELOG) 4 Unit(s) SubCutaneous three times a day before meals  levothyroxine 100 MICROGram(s) Oral daily  lisinopril 10 milliGRAM(s) Oral daily  pantoprazole    Tablet 40 milliGRAM(s) Oral before breakfast  risperiDONE   Tablet 0.5 milliGRAM(s) Oral at bedtime  sertraline 25 milliGRAM(s) Oral daily    MEDICATIONS  (PRN):  acetaminophen     Tablet .. 650 milliGRAM(s) Oral every 6 hours PRN Temp greater or equal to 38C (100.4F), Mild Pain (1 - 3)  dextrose Oral Gel 15 Gram(s) Oral once PRN Blood Glucose LESS THAN 70 milliGRAM(s)/deciliter  ketorolac   Injectable 15 milliGRAM(s) IntraMuscular once PRN Moderate Pain (4 - 6)  melatonin 3 milliGRAM(s) Oral at bedtime PRN Insomnia  morphine  - Injectable 2 milliGRAM(s) IV Push every 6 hours PRN Severe Pain (7 - 10)        I&O's Summary    23 Sep 2023 07:01  -  24 Sep 2023 07:00  --------------------------------------------------------  IN: 725 mL / OUT: 550 mL / NET: 175 mL        PHYSICAL EXAM:  Vital Signs Last 24 Hrs  T(C): 37.1 (24 Sep 2023 08:21), Max: 37.2 (23 Sep 2023 17:20)  T(F): 98.7 (24 Sep 2023 08:21), Max: 99 (23 Sep 2023 17:20)  HR: 92 (24 Sep 2023 08:21) (88 - 92)  BP: 153/88 (24 Sep 2023 08:21) (152/76 - 160/95)  BP(mean): --  RR: 17 (24 Sep 2023 08:21) (16 - 17)  SpO2: 97% (24 Sep 2023 08:21) (97% - 98%)    Parameters below as of 24 Sep 2023 08:21  Patient On (Oxygen Delivery Method): room air            CONSTITUTIONAL: NAD  HEENT: NC/AT, PERRL, no JVD  RESPIRATORY: CTA bilaterally, normal effort  CARDIOVASCULAR: RRR, S1/S2+, no m/g/r  ABDOMEN: Nontender to palpation, normoactive bowel sounds, no rebound/guarding  MUSCULOSKELETAL: No edema, cyanosis or deformities.  PSYCH: Calm, affect appropriate.  NEUROLOGY: Awake, alert, no focal neurological deficits.   SKIN: No rashes; no palpable lesions  VASC: Distal pulses palpable    LABS:                        10.7   8.64  )-----------( 418      ( 24 Sep 2023 04:56 )             32.7     09-24    133<L>  |  96  |  23.2<H>  ----------------------------<  230<H>  4.7   |  24.0  |  0.78    Ca    8.4      24 Sep 2023 04:56  Mg     1.6     09-23    TPro  6.7  /  Alb  2.5<L>  /  TBili  <0.2<L>  /  DBili  x   /  AST  17  /  ALT  11  /  AlkPhos  113  09-24          Urinalysis Basic - ( 24 Sep 2023 04:56 )    Color: x / Appearance: x / SG: x / pH: x  Gluc: 230 mg/dL / Ketone: x  / Bili: x / Urobili: x   Blood: x / Protein: x / Nitrite: x   Leuk Esterase: x / RBC: x / WBC x   Sq Epi: x / Non Sq Epi: x / Bacteria: x        CAPILLARY BLOOD GLUCOSE      POCT Blood Glucose.: 267 mg/dL (24 Sep 2023 08:09)  POCT Blood Glucose.: 202 mg/dL (23 Sep 2023 16:25)        RADIOLOGY & ADDITIONAL TESTS:  Results Reviewed:   Imaging Personally Reviewed:  Electrocardiogram Personally Reviewed:

## 2023-09-25 LAB
CULTURE RESULTS: SIGNIFICANT CHANGE UP
CULTURE RESULTS: SIGNIFICANT CHANGE UP
GLUCOSE BLDC GLUCOMTR-MCNC: 187 MG/DL — HIGH (ref 70–99)
GLUCOSE BLDC GLUCOMTR-MCNC: 268 MG/DL — HIGH (ref 70–99)
GLUCOSE BLDC GLUCOMTR-MCNC: 277 MG/DL — HIGH (ref 70–99)
GLUCOSE BLDC GLUCOMTR-MCNC: 314 MG/DL — HIGH (ref 70–99)
SPECIMEN SOURCE: SIGNIFICANT CHANGE UP
SPECIMEN SOURCE: SIGNIFICANT CHANGE UP

## 2023-09-25 PROCEDURE — 99233 SBSQ HOSP IP/OBS HIGH 50: CPT

## 2023-09-25 PROCEDURE — 99231 SBSQ HOSP IP/OBS SF/LOW 25: CPT

## 2023-09-25 PROCEDURE — 93010 ELECTROCARDIOGRAM REPORT: CPT

## 2023-09-25 RX ORDER — KETOROLAC TROMETHAMINE 30 MG/ML
15 SYRINGE (ML) INJECTION ONCE
Refills: 0 | Status: DISCONTINUED | OUTPATIENT
Start: 2023-09-25 | End: 2023-09-25

## 2023-09-25 RX ORDER — ACETAMINOPHEN 500 MG
1000 TABLET ORAL ONCE
Refills: 0 | Status: COMPLETED | OUTPATIENT
Start: 2023-09-25 | End: 2023-09-25

## 2023-09-25 RX ORDER — INSULIN LISPRO 100/ML
12 VIAL (ML) SUBCUTANEOUS
Refills: 0 | Status: DISCONTINUED | OUTPATIENT
Start: 2023-09-25 | End: 2023-09-28

## 2023-09-25 RX ORDER — INSULIN GLARGINE 100 [IU]/ML
20 INJECTION, SOLUTION SUBCUTANEOUS AT BEDTIME
Refills: 0 | Status: DISCONTINUED | OUTPATIENT
Start: 2023-09-25 | End: 2023-09-26

## 2023-09-25 RX ADMIN — Medication 400 MILLIGRAM(S): at 01:55

## 2023-09-25 RX ADMIN — PANTOPRAZOLE SODIUM 40 MILLIGRAM(S): 20 TABLET, DELAYED RELEASE ORAL at 05:20

## 2023-09-25 RX ADMIN — LISINOPRIL 10 MILLIGRAM(S): 2.5 TABLET ORAL at 05:20

## 2023-09-25 RX ADMIN — INSULIN GLARGINE 20 UNIT(S): 100 INJECTION, SOLUTION SUBCUTANEOUS at 21:08

## 2023-09-25 RX ADMIN — ATORVASTATIN CALCIUM 20 MILLIGRAM(S): 80 TABLET, FILM COATED ORAL at 21:09

## 2023-09-25 RX ADMIN — Medication 6: at 17:11

## 2023-09-25 RX ADMIN — Medication 1000 MILLIGRAM(S): at 21:04

## 2023-09-25 RX ADMIN — RISPERIDONE 0.5 MILLIGRAM(S): 4 TABLET ORAL at 21:08

## 2023-09-25 RX ADMIN — Medication 8: at 08:16

## 2023-09-25 RX ADMIN — Medication 50 MILLIGRAM(S): at 21:08

## 2023-09-25 RX ADMIN — SERTRALINE 25 MILLIGRAM(S): 25 TABLET, FILM COATED ORAL at 12:13

## 2023-09-25 RX ADMIN — INSULIN GLARGINE 18 UNIT(S): 100 INJECTION, SOLUTION SUBCUTANEOUS at 09:48

## 2023-09-25 RX ADMIN — Medication 100 MICROGRAM(S): at 05:20

## 2023-09-25 RX ADMIN — Medication 400 MILLIGRAM(S): at 20:04

## 2023-09-25 RX ADMIN — Medication 50 MILLIGRAM(S): at 14:18

## 2023-09-25 RX ADMIN — Medication 400 MILLIGRAM(S): at 15:24

## 2023-09-25 RX ADMIN — Medication 2: at 12:13

## 2023-09-25 RX ADMIN — Medication 15 MILLIGRAM(S): at 06:47

## 2023-09-25 RX ADMIN — CARVEDILOL PHOSPHATE 12.5 MILLIGRAM(S): 80 CAPSULE, EXTENDED RELEASE ORAL at 17:11

## 2023-09-25 RX ADMIN — Medication 50 MILLIGRAM(S): at 05:20

## 2023-09-25 RX ADMIN — Medication 12 UNIT(S): at 17:12

## 2023-09-25 RX ADMIN — Medication 1000 MILLIGRAM(S): at 01:15

## 2023-09-25 RX ADMIN — CARVEDILOL PHOSPHATE 12.5 MILLIGRAM(S): 80 CAPSULE, EXTENDED RELEASE ORAL at 05:20

## 2023-09-25 RX ADMIN — Medication 1000 MILLIGRAM(S): at 02:55

## 2023-09-25 RX ADMIN — Medication 1 TABLET(S): at 14:18

## 2023-09-25 RX ADMIN — Medication 15 MILLIGRAM(S): at 05:47

## 2023-09-25 NOTE — PROGRESS NOTE ADULT - ASSESSMENT
42F with PMHx MI w/ stent, Bipolar, presents to ED c/o auditory hallucinations x4 days, voices commanding to hurt self and others. In ED, found to be hyperglycemic in the 700s.    Consulted for diabetes management  Home diabetes meds: Was suppose to be taking insulin but has not for months  Current a1c: >16.9%    1. T2DM - hyperglycemic  - C/w Lantus 18 units in AM  - Increase Lantus to 20 units in PM  - Increase Admelog to 12 units TID AC, hold if NPO  - C/w SSI    2. Hypothyroidism  - TSH 2.47  - C.w Synthroid 100 mcg    3. Acute psychosis  - Cleared by Psych  - C/w Sertraline 25mg and Risperidone 0.5 mg    4. HLD  - Continue statin    5. Tooth Pain/ Left sided facial swelling  - Augmentin started by primary team    6. 8 cm RUL mass  - Eventual Lung bx planned

## 2023-09-25 NOTE — PROGRESS NOTE ADULT - SUBJECTIVE AND OBJECTIVE BOX
Subjective:  Patient seen and examined for RUL mass, ION guided pleural biopsy with Dr. Mejia cancelled and rescheduled due to tooth infection. Patient resting in bed, complaining of left upper tooth pain. Pt denies fevers, chills. Patient denies chest pain, SOB, abdominal pain, N/V/D/C.   All other ROS negative x 10.     PAST MEDICAL & SURGICAL HISTORY:  Diabetes mellitus  CAD (coronary artery disease)  HTN (hypertension)  S/P cardiac cath     VITAL SIGNS  Vital Signs Last 24 Hrs  T(C): 37.2 (25 Sep 2023 16:29), Max: 37.2 (25 Sep 2023 16:29)  T(F): 99 (25 Sep 2023 16:29), Max: 99 (25 Sep 2023 16:29)  HR: 89 (25 Sep 2023 16:29) (84 - 98)  BP: 155/88 (25 Sep 2023 16:29) (131/83 - 155/88)  BP(mean): --  RR: 18 (25 Sep 2023 16:29) (18 - 18)  SpO2: 98% (25 Sep 2023 16:29) (96% - 98%)    Parameters below as of 25 Sep 2023 11:16  Patient On (Oxygen Delivery Method): room air    MEDICATIONS  (STANDING):  amoxicillin  875 milliGRAM(s)/clavulanate 1 Tablet(s) Oral two times a day  atorvastatin 20 milliGRAM(s) Oral at bedtime  carvedilol 12.5 milliGRAM(s) Oral every 12 hours  dextrose 5%. 1000 milliLiter(s) (50 mL/Hr) IV Continuous <Continuous>  dextrose 5%. 1000 milliLiter(s) (100 mL/Hr) IV Continuous <Continuous>  dextrose 50% Injectable 25 Gram(s) IV Push once  dextrose 50% Injectable 25 Gram(s) IV Push once  dextrose 50% Injectable 12.5 Gram(s) IV Push once  glucagon  Injectable 1 milliGRAM(s) IntraMuscular once  hydrALAZINE 50 milliGRAM(s) Oral every 8 hours  influenza   Vaccine 0.5 milliLiter(s) IntraMuscular once  insulin glargine Injectable (LANTUS) 20 Unit(s) SubCutaneous at bedtime  insulin glargine Injectable (LANTUS) 18 Unit(s) SubCutaneous every morning  insulin lispro (ADMELOG) corrective regimen sliding scale   SubCutaneous three times a day before meals  insulin lispro Injectable (ADMELOG) 12 Unit(s) SubCutaneous three times a day before meals  levothyroxine 100 MICROGram(s) Oral daily  lisinopril 10 milliGRAM(s) Oral daily  pantoprazole    Tablet 40 milliGRAM(s) Oral before breakfast  risperiDONE   Tablet 0.5 milliGRAM(s) Oral at bedtime  sertraline 25 milliGRAM(s) Oral daily    MEDICATIONS  (PRN):  acetaminophen     Tablet .. 650 milliGRAM(s) Oral every 6 hours PRN Temp greater or equal to 38C (100.4F), Mild Pain (1 - 3)  dextrose Oral Gel 15 Gram(s) Oral once PRN Blood Glucose LESS THAN 70 milliGRAM(s)/deciliter  melatonin 3 milliGRAM(s) Oral at bedtime PRN Insomnia  morphine  - Injectable 2 milliGRAM(s) IV Push every 6 hours PRN Severe Pain (7 - 10)    I&O's  09-23 @ 07:01  -  09-24 @ 07:00  --------------------------------------------------------  IN: 725 mL / OUT: 550 mL / NET: 175 mL      Admit Wt: Drug Dosing Weight  Height (cm): 160 (20 Sep 2023 00:08)  Weight (kg): 77.7 (20 Sep 2023 00:08)  BMI (kg/m2): 30.4 (20 Sep 2023 00:08)  BSA (m2): 1.81 (20 Sep 2023 00:08)    LABS:                        10.7   8.64  )-----------( 418      ( 24 Sep 2023 04:56 )             32.7   09-24    133<L>  |  96  |  23.2<H>  ----------------------------<  230<H>  4.7   |  24.0  |  0.78    Ca    8.4      24 Sep 2023 04:56    TPro  6.7  /  Alb  2.5<L>  /  TBili  <0.2<L>  /  DBili  x   /  AST  17  /  ALT  11  /  AlkPhos  113  09-24    DIAGNOSTICS:  All laboratory results, radiology and medications reviewed.  CT Chest w/ IV Cont (09.20.23 @ 06:29)    IMPRESSION:  8 cm right upper lobe mass with associated pathologic lymphadenopathy,   most suggestive of neoplasm. Limited evaluation of the partially   visualized upper abdomen. Further workup recommended.    8 mm lucent lesion abutting the superior endplate of T6, most suggestive   of benign vertebral body osseous hemangioma. Nuclear medicine bone scan   could be considered for further evaluation if there is concern for   osseous metastases.    PHYSICAL EXAM  General: alert, no acute distress  Neurology: alert and oriented x 3, nonfocal, no gross deficits, normal affect  Mouth: left upper back tooth missing, no broken teeth   Respiratory: clear to auscultation bilaterally, no wheezing  CV: RRR, normal S1, S2  Abdomen: soft, nontender, nondistended, + bowel sounds, last bowel movement   Extremities: warm, well perfused. no edema,+ DP pulses

## 2023-09-25 NOTE — PROGRESS NOTE ADULT - ASSESSMENT
42 year old female with PMH of DM, CAD, MI w/stent, Bipolar, anxiety presents to ED with auditory hallucinations.   In the ED, BG>700, pt to be admitted for uncontrolled diabetes with hyperglycemia. Also noted to have opacity on CXR    IDDM with hyperglycemia   - A1c>16  - Glargine 15U q am and 10U qPM as per Endocrinology  - Premeal Lispro 4U  - Endocrinology follow up    R lung abnormality   - CT Chest: 8 cm right upper lobe mass with associated pathologic lymphadenopathy, most suggestive of neoplasm. 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   - given leukocytosis concern for pneumonia, likely aspiration? Received antibiotics in ER, will hold off and monitor off antibiotics for now  - ID follow up noted  - NPO for biopsy today    Tooth pain  - Pain control  - Start Augmentin    Acute psychosis   - cleared by psych, no need for 1:1  - Sertraline 25mg and Risperidone 0.5 mg qhs started by Psychiatry     CAD with Hx of Cardiac stent   - Stress test in Dec 2022 was normal   - Asymptomatic  - asa held     HTN   - Carvedilol  - Lisinopril 10mg daily     Asthma   - Not in exacerbation   - Nebs prn     Hypothyroidism   - Normal TSH/T4   - Levothyroxine 100mcg     Substance abuse   - pt reports mostly Marijuana and cocaine use  - counselled on quitting      dispo - patient is active

## 2023-09-25 NOTE — PROGRESS NOTE ADULT - NS ATTEND AMEND GEN_ALL_CORE FT
plan discussed with NP and changes incorporated in the note.    agree with the plan above  patient seen and examined plan discussed with NP and changes incorporated in the note.    agree with the plan above  patient seen and examined  likely hyperglycemia related to active infection  patient with facial asymmetry for which the left side of the face is edematous  abx per primary team

## 2023-09-25 NOTE — PROGRESS NOTE ADULT - PROBLEM SELECTOR PLAN 1
CT Chest showing 8cm RUL Mass  ID following for possible infectious process, currently off ABX  Plan for Ion Guided Biopsy of mass on Monday, 9/25, with Dr. Mejia  TTE performed, unchanged from prior. Cleared by medicine and cardiology for procedure tomorrow.   Biopsy rescheduled for Friday  If patient to be discharged home, PST testing is complete for patient to be same day admit for Friday  C/w Augmentin for tooth pain and r/o infection  Okay to resume DVT ppx Lovenox  Hx CAD, MI, PCI w/ stents, HTN, DM2 with A1c >16.9 and bipolar disorder not compliant on medications  Psych cleared 1:1 sitter  C/w ASA, BB, insulin coverage

## 2023-09-25 NOTE — PROGRESS NOTE ADULT - SUBJECTIVE AND OBJECTIVE BOX
[facility] Division of Hospital Medicine  Tyler Rodríguez MD    Chief Complaint:  Patient is a 42y old  Female who presents with a chief complaint of Hyperglycemia (24 Sep 2023 13:10)      SUBJECTIVE / OVERNIGHT EVENTS:  Patient seen and examined at bedside. No acute events reported overnight. No new complaints.    MEDICATIONS  (STANDING):  amoxicillin  875 milliGRAM(s)/clavulanate 1 Tablet(s) Oral two times a day  atorvastatin 20 milliGRAM(s) Oral at bedtime  carvedilol 12.5 milliGRAM(s) Oral every 12 hours  dextrose 5%. 1000 milliLiter(s) (100 mL/Hr) IV Continuous <Continuous>  dextrose 5%. 1000 milliLiter(s) (50 mL/Hr) IV Continuous <Continuous>  dextrose 50% Injectable 25 Gram(s) IV Push once  dextrose 50% Injectable 12.5 Gram(s) IV Push once  dextrose 50% Injectable 25 Gram(s) IV Push once  glucagon  Injectable 1 milliGRAM(s) IntraMuscular once  hydrALAZINE 50 milliGRAM(s) Oral every 8 hours  influenza   Vaccine 0.5 milliLiter(s) IntraMuscular once  insulin glargine Injectable (LANTUS) 13 Unit(s) SubCutaneous at bedtime  insulin glargine Injectable (LANTUS) 18 Unit(s) SubCutaneous every morning  insulin lispro (ADMELOG) corrective regimen sliding scale   SubCutaneous three times a day before meals  insulin lispro Injectable (ADMELOG) 4 Unit(s) SubCutaneous three times a day before meals  levothyroxine 100 MICROGram(s) Oral daily  lisinopril 10 milliGRAM(s) Oral daily  pantoprazole    Tablet 40 milliGRAM(s) Oral before breakfast  risperiDONE   Tablet 0.5 milliGRAM(s) Oral at bedtime  sertraline 25 milliGRAM(s) Oral daily    MEDICATIONS  (PRN):  acetaminophen     Tablet .. 650 milliGRAM(s) Oral every 6 hours PRN Temp greater or equal to 38C (100.4F), Mild Pain (1 - 3)  dextrose Oral Gel 15 Gram(s) Oral once PRN Blood Glucose LESS THAN 70 milliGRAM(s)/deciliter  melatonin 3 milliGRAM(s) Oral at bedtime PRN Insomnia  morphine  - Injectable 2 milliGRAM(s) IV Push every 6 hours PRN Severe Pain (7 - 10)        I&O's Summary      PHYSICAL EXAM:  Vital Signs Last 24 Hrs  T(C): 36.8 (25 Sep 2023 03:34), Max: 36.8 (24 Sep 2023 17:19)  T(F): 98.2 (25 Sep 2023 03:34), Max: 98.3 (24 Sep 2023 17:19)  HR: 98 (25 Sep 2023 03:34) (94 - 105)  BP: 143/86 (25 Sep 2023 03:34) (138/74 - 151/78)  BP(mean): --  RR: 18 (25 Sep 2023 03:34) (18 - 18)  SpO2: 96% (25 Sep 2023 03:34) (94% - 96%)    Parameters below as of 25 Sep 2023 03:34  Patient On (Oxygen Delivery Method): room air            CONSTITUTIONAL: NAD  HEENT: NC/AT, PERRL, no JVD  RESPIRATORY: CTA bilaterally, normal effort  CARDIOVASCULAR: RRR, S1/S2+, no m/g/r  ABDOMEN: Nontender to palpation, normoactive bowel sounds, no rebound/guarding  MUSCULOSKELETAL: No edema, cyanosis or deformities.  PSYCH: Calm, affect appropriate.  NEUROLOGY: Awake, alert, no focal neurological deficits.   SKIN: No rashes; no palpable lesions  VASC: Distal pulses palpable    LABS:                        10.7   8.64  )-----------( 418      ( 24 Sep 2023 04:56 )             32.7     09-24    133<L>  |  96  |  23.2<H>  ----------------------------<  230<H>  4.7   |  24.0  |  0.78    Ca    8.4      24 Sep 2023 04:56    TPro  6.7  /  Alb  2.5<L>  /  TBili  <0.2<L>  /  DBili  x   /  AST  17  /  ALT  11  /  AlkPhos  113  09-24          Urinalysis Basic - ( 24 Sep 2023 04:56 )    Color: x / Appearance: x / SG: x / pH: x  Gluc: 230 mg/dL / Ketone: x  / Bili: x / Urobili: x   Blood: x / Protein: x / Nitrite: x   Leuk Esterase: x / RBC: x / WBC x   Sq Epi: x / Non Sq Epi: x / Bacteria: x        CAPILLARY BLOOD GLUCOSE      POCT Blood Glucose.: 314 mg/dL (25 Sep 2023 08:05)  POCT Blood Glucose.: 299 mg/dL (24 Sep 2023 21:22)  POCT Blood Glucose.: 210 mg/dL (24 Sep 2023 16:23)  POCT Blood Glucose.: 315 mg/dL (24 Sep 2023 11:50)        RADIOLOGY & ADDITIONAL TESTS:  Results Reviewed:   Imaging Personally Reviewed:  Electrocardiogram Personally Reviewed:

## 2023-09-25 NOTE — CHART NOTE - NSCHARTNOTEFT_GEN_A_CORE
Ofirmev 1,000mg IVPB ord. for pt's L. tooth pain. with relief. Pt. seen and evaluated, started on augmentin today for same. VSS. Pt. is stable at present. Nsg to continue to monitor pt. progress. Ofirmev 1,000mg IVPB ord. for pt's L. tooth pain, with relief. Pt. seen and evaluated, started on Augmentin today for same. VSS. Pt. is stable at present. Nsg to continue to monitor pt. progress.

## 2023-09-25 NOTE — PROGRESS NOTE ADULT - SUBJECTIVE AND OBJECTIVE BOX
INTERVAL  EVENTS:  Follow up for Diabetes    ROS: Patient denies chest pain, SOB, abd pain, N/V. C/o left sided facial/tooth pain. Endorses good appetite     MEDICATIONS  (STANDING):  acetaminophen   IVPB .. 1000 milliGRAM(s) IV Intermittent once  amoxicillin  875 milliGRAM(s)/clavulanate 1 Tablet(s) Oral two times a day  atorvastatin 20 milliGRAM(s) Oral at bedtime  carvedilol 12.5 milliGRAM(s) Oral every 12 hours  dextrose 5%. 1000 milliLiter(s) (100 mL/Hr) IV Continuous <Continuous>  dextrose 5%. 1000 milliLiter(s) (50 mL/Hr) IV Continuous <Continuous>  dextrose 50% Injectable 25 Gram(s) IV Push once  dextrose 50% Injectable 25 Gram(s) IV Push once  dextrose 50% Injectable 12.5 Gram(s) IV Push once  glucagon  Injectable 1 milliGRAM(s) IntraMuscular once  hydrALAZINE 50 milliGRAM(s) Oral every 8 hours  influenza   Vaccine 0.5 milliLiter(s) IntraMuscular once  insulin glargine Injectable (LANTUS) 18 Unit(s) SubCutaneous every morning  insulin glargine Injectable (LANTUS) 20 Unit(s) SubCutaneous at bedtime  insulin lispro (ADMELOG) corrective regimen sliding scale   SubCutaneous three times a day before meals  insulin lispro Injectable (ADMELOG) 12 Unit(s) SubCutaneous three times a day before meals  levothyroxine 100 MICROGram(s) Oral daily  lisinopril 10 milliGRAM(s) Oral daily  pantoprazole    Tablet 40 milliGRAM(s) Oral before breakfast  risperiDONE   Tablet 0.5 milliGRAM(s) Oral at bedtime  sertraline 25 milliGRAM(s) Oral daily    MEDICATIONS  (PRN):  acetaminophen     Tablet .. 650 milliGRAM(s) Oral every 6 hours PRN Temp greater or equal to 38C (100.4F), Mild Pain (1 - 3)  dextrose Oral Gel 15 Gram(s) Oral once PRN Blood Glucose LESS THAN 70 milliGRAM(s)/deciliter  melatonin 3 milliGRAM(s) Oral at bedtime PRN Insomnia  morphine  - Injectable 2 milliGRAM(s) IV Push every 6 hours PRN Severe Pain (7 - 10)      Allergies  No Known Drug Allergies  shellfish (Unknown)      Vital Signs Last 24 Hrs  T(C): 37.1 (25 Sep 2023 11:16), Max: 37.1 (25 Sep 2023 11:16)  T(F): 98.8 (25 Sep 2023 11:16), Max: 98.8 (25 Sep 2023 11:16)  HR: 84 (25 Sep 2023 11:16) (84 - 98)  BP: 131/83 (25 Sep 2023 11:16) (131/83 - 144/89)  BP(mean): --  RR: 18 (25 Sep 2023 11:16) (18 - 18)  SpO2: 98% (25 Sep 2023 11:16) (94% - 98%)    Parameters below as of 25 Sep 2023 11:16  Patient On (Oxygen Delivery Method): room air        PHYSICAL EXAM:  GENERAL: NAD, + left sided facial swelling  NECK: Supple; trachea midline  CHEST/LUNG: Clear to auscultation bilaterally; No wheezes  HEART: Regular rate and rhythm; No murmurs, rubs, or gallops  ABDOMEN: Soft, Nontender, Nondistended; Bowel sounds present  NEURO: AAOx3, no tremor  EXTREMITIES:  2+ Peripheral Pulses, + BL LE edema      LABS:                        10.7   8.64  )-----------( 418      ( 24 Sep 2023 04:56 )             32.7     09-24    133<L>  |  96  |  23.2<H>  ----------------------------<  230<H>  4.7   |  24.0  |  0.78    Ca    8.4      24 Sep 2023 04:56    TPro  6.7  /  Alb  2.5<L>  /  TBili  <0.2<L>  /  DBili  x   /  AST  17  /  ALT  11  /  AlkPhos  113  09-24    Urinalysis Basic - ( 24 Sep 2023 04:56 )    Color: x / Appearance: x / SG: x / pH: x  Gluc: 230 mg/dL / Ketone: x  / Bili: x / Urobili: x   Blood: x / Protein: x / Nitrite: x   Leuk Esterase: x / RBC: x / WBC x   Sq Epi: x / Non Sq Epi: x / Bacteria: x          POCT Blood Glucose.: 187 mg/dL (09-25-23 @ 11:40)  POCT Blood Glucose.: 314 mg/dL (09-25-23 @ 08:05)  POCT Blood Glucose.: 299 mg/dL (09-24-23 @ 21:22)  POCT Blood Glucose.: 210 mg/dL (09-24-23 @ 16:23)        Thyroid Stimulating Hormone, Serum: 2.47 uIU/mL (09-21-23 @ 07:24)

## 2023-09-26 LAB
ALBUMIN SERPL ELPH-MCNC: 2.1 G/DL — LOW (ref 3.3–5.2)
ALP SERPL-CCNC: 91 U/L — SIGNIFICANT CHANGE UP (ref 40–120)
ALT FLD-CCNC: 15 U/L — SIGNIFICANT CHANGE UP
ANION GAP SERPL CALC-SCNC: 10 MMOL/L — SIGNIFICANT CHANGE UP (ref 5–17)
AST SERPL-CCNC: 20 U/L — SIGNIFICANT CHANGE UP
BILIRUB SERPL-MCNC: <0.2 MG/DL — LOW (ref 0.4–2)
BUN SERPL-MCNC: 18.6 MG/DL — SIGNIFICANT CHANGE UP (ref 8–20)
CALCIUM SERPL-MCNC: 8.1 MG/DL — LOW (ref 8.4–10.5)
CHLORIDE SERPL-SCNC: 101 MMOL/L — SIGNIFICANT CHANGE UP (ref 96–108)
CO2 SERPL-SCNC: 24 MMOL/L — SIGNIFICANT CHANGE UP (ref 22–29)
CREAT SERPL-MCNC: 0.68 MG/DL — SIGNIFICANT CHANGE UP (ref 0.5–1.3)
EGFR: 110 ML/MIN/1.73M2 — SIGNIFICANT CHANGE UP
GLUCOSE BLDC GLUCOMTR-MCNC: 116 MG/DL — HIGH (ref 70–99)
GLUCOSE BLDC GLUCOMTR-MCNC: 117 MG/DL — HIGH (ref 70–99)
GLUCOSE BLDC GLUCOMTR-MCNC: 218 MG/DL — HIGH (ref 70–99)
GLUCOSE BLDC GLUCOMTR-MCNC: 228 MG/DL — HIGH (ref 70–99)
GLUCOSE SERPL-MCNC: 223 MG/DL — HIGH (ref 70–99)
HCT VFR BLD CALC: 28.1 % — LOW (ref 34.5–45)
HGB BLD-MCNC: 9 G/DL — LOW (ref 11.5–15.5)
MCHC RBC-ENTMCNC: 27.5 PG — SIGNIFICANT CHANGE UP (ref 27–34)
MCHC RBC-ENTMCNC: 32 GM/DL — SIGNIFICANT CHANGE UP (ref 32–36)
MCV RBC AUTO: 85.9 FL — SIGNIFICANT CHANGE UP (ref 80–100)
PLATELET # BLD AUTO: 422 K/UL — HIGH (ref 150–400)
POTASSIUM SERPL-MCNC: 4.6 MMOL/L — SIGNIFICANT CHANGE UP (ref 3.5–5.3)
POTASSIUM SERPL-SCNC: 4.6 MMOL/L — SIGNIFICANT CHANGE UP (ref 3.5–5.3)
PROT SERPL-MCNC: 5.9 G/DL — LOW (ref 6.6–8.7)
RBC # BLD: 3.27 M/UL — LOW (ref 3.8–5.2)
RBC # FLD: 16.6 % — HIGH (ref 10.3–14.5)
SODIUM SERPL-SCNC: 135 MMOL/L — SIGNIFICANT CHANGE UP (ref 135–145)
WBC # BLD: 10.38 K/UL — SIGNIFICANT CHANGE UP (ref 3.8–10.5)
WBC # FLD AUTO: 10.38 K/UL — SIGNIFICANT CHANGE UP (ref 3.8–10.5)

## 2023-09-26 PROCEDURE — 99231 SBSQ HOSP IP/OBS SF/LOW 25: CPT

## 2023-09-26 PROCEDURE — 99232 SBSQ HOSP IP/OBS MODERATE 35: CPT

## 2023-09-26 RX ORDER — INSULIN LISPRO 100/ML
VIAL (ML) SUBCUTANEOUS
Refills: 0 | Status: DISCONTINUED | OUTPATIENT
Start: 2023-09-26 | End: 2023-09-30

## 2023-09-26 RX ORDER — INSULIN GLARGINE 100 [IU]/ML
10 INJECTION, SOLUTION SUBCUTANEOUS EVERY MORNING
Refills: 0 | Status: DISCONTINUED | OUTPATIENT
Start: 2023-09-27 | End: 2023-09-27

## 2023-09-26 RX ORDER — INSULIN GLARGINE 100 [IU]/ML
18 INJECTION, SOLUTION SUBCUTANEOUS AT BEDTIME
Refills: 0 | Status: DISCONTINUED | OUTPATIENT
Start: 2023-09-26 | End: 2023-09-27

## 2023-09-26 RX ADMIN — Medication 1 TABLET(S): at 17:06

## 2023-09-26 RX ADMIN — RISPERIDONE 0.5 MILLIGRAM(S): 4 TABLET ORAL at 21:44

## 2023-09-26 RX ADMIN — PANTOPRAZOLE SODIUM 40 MILLIGRAM(S): 20 TABLET, DELAYED RELEASE ORAL at 05:29

## 2023-09-26 RX ADMIN — Medication 12 UNIT(S): at 11:52

## 2023-09-26 RX ADMIN — Medication 50 MILLIGRAM(S): at 05:29

## 2023-09-26 RX ADMIN — INSULIN GLARGINE 18 UNIT(S): 100 INJECTION, SOLUTION SUBCUTANEOUS at 21:45

## 2023-09-26 RX ADMIN — Medication 12 UNIT(S): at 17:07

## 2023-09-26 RX ADMIN — Medication 12 UNIT(S): at 08:15

## 2023-09-26 RX ADMIN — Medication 50 MILLIGRAM(S): at 13:42

## 2023-09-26 RX ADMIN — CARVEDILOL PHOSPHATE 12.5 MILLIGRAM(S): 80 CAPSULE, EXTENDED RELEASE ORAL at 05:29

## 2023-09-26 RX ADMIN — LISINOPRIL 10 MILLIGRAM(S): 2.5 TABLET ORAL at 05:29

## 2023-09-26 RX ADMIN — Medication 1 TABLET(S): at 05:29

## 2023-09-26 RX ADMIN — CARVEDILOL PHOSPHATE 12.5 MILLIGRAM(S): 80 CAPSULE, EXTENDED RELEASE ORAL at 17:06

## 2023-09-26 RX ADMIN — INSULIN GLARGINE 18 UNIT(S): 100 INJECTION, SOLUTION SUBCUTANEOUS at 08:14

## 2023-09-26 RX ADMIN — Medication 100 MICROGRAM(S): at 05:29

## 2023-09-26 RX ADMIN — ATORVASTATIN CALCIUM 20 MILLIGRAM(S): 80 TABLET, FILM COATED ORAL at 21:44

## 2023-09-26 RX ADMIN — Medication 4: at 08:15

## 2023-09-26 RX ADMIN — SERTRALINE 25 MILLIGRAM(S): 25 TABLET, FILM COATED ORAL at 11:51

## 2023-09-26 NOTE — PROGRESS NOTE ADULT - PROBLEM SELECTOR PLAN 1
CT Chest showing 8cm RUL Mass  ID following for possible infectious process, currently off ABX  Plan for Ion Guided Biopsy of mass on Monday, 9/25, with Dr. Mejia  TTE performed, unchanged from prior. Cleared by medicine and cardiology for procedure  Biopsy rescheduled for Friday 9/29  If patient to be discharged home, PST testing is complete for patient to be same day admit for Friday  C/w Augmentin for tooth pain and r/o infection  Okay to resume DVT ppx Lovenox (hold dose day of procedure)  Hx CAD, MI, PCI w/ stents, HTN, DM2 with A1c >16.9 and bipolar disorder not compliant on medications  Psych cleared need for 1:1 sitter  C/w ASA, BB, insulin coverage CT Chest showing 8cm RUL Mass  ID following for possible infectious process, currently off ABX  Ion Guided Biopsy of mass on Friday 9/29, with Dr. Mejia  TTE performed, unchanged from prior. Cleared by medicine and cardiology for procedure  If patient to be discharged home, PST testing is complete for patient to be same day admit for Friday  C/w Augmentin for tooth pain and r/o infection  Okay to resume DVT ppx Lovenox (hold dose day of procedure)  Hx CAD, MI, PCI w/ stents, HTN, DM2 with A1c >16.9 and bipolar disorder not compliant on medications  Psych cleared need for 1:1 sitter  C/w ASA, BB, insulin coverage

## 2023-09-26 NOTE — PROGRESS NOTE ADULT - ASSESSMENT
42F with PMHx MI w/ stent, Bipolar, presents to ED c/o auditory hallucinations x4 days, voices commanding to hurt self and others. In ED, found to be hyperglycemic in the 700s.    Consulted for diabetes management  Home diabetes meds: Was suppose to be taking insulin but has not for months  Current a1c: >16.9%    1. T2DM - Blood glucoses improved  - Goal to get patient on daily dose of Lantus in qhs to increase likelihood of adherence   - Decrease Lantus to 10 units in AM  - BG have improved- will decrease Lantus to 18 units in PM  - C/w Admelog 12 units TID AC, hold if NPO  - Change MSSI to LSSI    2. Hypothyroidism  - TSH 2.47  - C.w Synthroid 100 mcg    3. Acute psychosis  - Cleared by Psych  - C/w Sertraline 25mg and Risperidone 0.5 mg    4. HLD  - Continue statin    5. Tooth Pain/ Left sided facial swelling  - Augmentin started by primary team    6. 8 cm RUL mass  - Eventual Lung bx planned 42F with PMHx T2DM, non compliant, MI w/ stent, Bipolar, presents to ED c/o auditory hallucinations x4 days, voices commanding to hurt self and others. In ED, found to be hyperglycemic in the 700s.    Consulted for diabetes management  Home diabetes meds: Was suppose to be taking insulin but has not for months  Current a1c: >16.9%    1. T2DM - Blood glucoses improved  - Goal to get patient on daily dose of Lantus qhs to improve adherence   - Decrease Lantus to 10 units in AM  - BG have improved- will decrease Lantus to 18 units in PM  - C/w Admelog 12 units TID AC, hold if NPO  - Change MSSI to LSSI    2. Hypothyroidism  - TSH 2.47  - C.w Synthroid 100 mcg    3. Acute psychosis  - Cleared by Psych  - C/w Sertraline 25mg and Risperidone 0.5 mg    4. HLD  - Continue statin    5. Tooth Pain/ Left sided facial swelling  - Augmentin started by primary team    6. 8 cm RUL mass  - Eventual Lung bx planned

## 2023-09-26 NOTE — PROGRESS NOTE ADULT - SUBJECTIVE AND OBJECTIVE BOX
[facility] Division of Hospital Medicine  Tyler Rodríguez MD    Chief Complaint:  Patient is a 42y old  Female who presents with a chief complaint of Hyperglycemia (26 Sep 2023 09:15)      SUBJECTIVE / OVERNIGHT EVENTS:  Patient seen and examined at bedside. No acute events reported overnight. Tooth pain improved.    MEDICATIONS  (STANDING):  amoxicillin  875 milliGRAM(s)/clavulanate 1 Tablet(s) Oral two times a day  atorvastatin 20 milliGRAM(s) Oral at bedtime  carvedilol 12.5 milliGRAM(s) Oral every 12 hours  dextrose 5%. 1000 milliLiter(s) (100 mL/Hr) IV Continuous <Continuous>  dextrose 5%. 1000 milliLiter(s) (50 mL/Hr) IV Continuous <Continuous>  dextrose 50% Injectable 25 Gram(s) IV Push once  dextrose 50% Injectable 12.5 Gram(s) IV Push once  dextrose 50% Injectable 25 Gram(s) IV Push once  glucagon  Injectable 1 milliGRAM(s) IntraMuscular once  hydrALAZINE 50 milliGRAM(s) Oral every 8 hours  influenza   Vaccine 0.5 milliLiter(s) IntraMuscular once  insulin glargine Injectable (LANTUS) 18 Unit(s) SubCutaneous every morning  insulin glargine Injectable (LANTUS) 20 Unit(s) SubCutaneous at bedtime  insulin lispro (ADMELOG) corrective regimen sliding scale   SubCutaneous three times a day before meals  insulin lispro Injectable (ADMELOG) 12 Unit(s) SubCutaneous three times a day before meals  levothyroxine 100 MICROGram(s) Oral daily  lisinopril 10 milliGRAM(s) Oral daily  pantoprazole    Tablet 40 milliGRAM(s) Oral before breakfast  risperiDONE   Tablet 0.5 milliGRAM(s) Oral at bedtime  sertraline 25 milliGRAM(s) Oral daily    MEDICATIONS  (PRN):  acetaminophen     Tablet .. 650 milliGRAM(s) Oral every 6 hours PRN Temp greater or equal to 38C (100.4F), Mild Pain (1 - 3)  dextrose Oral Gel 15 Gram(s) Oral once PRN Blood Glucose LESS THAN 70 milliGRAM(s)/deciliter  melatonin 3 milliGRAM(s) Oral at bedtime PRN Insomnia  morphine  - Injectable 2 milliGRAM(s) IV Push every 6 hours PRN Severe Pain (7 - 10)        I&O's Summary      PHYSICAL EXAM:  Vital Signs Last 24 Hrs  T(C): 37.1 (26 Sep 2023 04:15), Max: 37.2 (25 Sep 2023 16:29)  T(F): 98.7 (26 Sep 2023 04:15), Max: 99 (25 Sep 2023 16:29)  HR: 99 (26 Sep 2023 04:15) (84 - 105)  BP: 118/63 (26 Sep 2023 04:15) (118/63 - 155/88)  BP(mean): --  RR: 18 (26 Sep 2023 04:15) (17 - 18)  SpO2: 97% (26 Sep 2023 04:15) (96% - 98%)    Parameters below as of 26 Sep 2023 04:15  Patient On (Oxygen Delivery Method): room air            CONSTITUTIONAL: NAD  HEENT: L facial swelling improved  RESPIRATORY: CTA bilaterally, normal effort  CARDIOVASCULAR: RRR, S1/S2+, no m/g/r  ABDOMEN: Nontender to palpation, normoactive bowel sounds, no rebound/guarding  MUSCULOSKELETAL: No edema, cyanosis or deformities.  PSYCH: Calm, affect appropriate.  NEUROLOGY: Awake, alert, no focal neurological deficits.   SKIN: No rashes; no palpable lesions  VASC: Distal pulses palpable    LABS:                        9.0    10.38 )-----------( 422      ( 26 Sep 2023 04:56 )             28.1     09-26    135  |  101  |  18.6  ----------------------------<  223<H>  4.6   |  24.0  |  0.68    Ca    8.1<L>      26 Sep 2023 04:56    TPro  5.9<L>  /  Alb  2.1<L>  /  TBili  <0.2<L>  /  DBili  x   /  AST  20  /  ALT  15  /  AlkPhos  91  09-26          Urinalysis Basic - ( 26 Sep 2023 04:56 )    Color: x / Appearance: x / SG: x / pH: x  Gluc: 223 mg/dL / Ketone: x  / Bili: x / Urobili: x   Blood: x / Protein: x / Nitrite: x   Leuk Esterase: x / RBC: x / WBC x   Sq Epi: x / Non Sq Epi: x / Bacteria: x        CAPILLARY BLOOD GLUCOSE      POCT Blood Glucose.: 218 mg/dL (26 Sep 2023 07:59)  POCT Blood Glucose.: 277 mg/dL (25 Sep 2023 21:07)  POCT Blood Glucose.: 268 mg/dL (25 Sep 2023 17:06)  POCT Blood Glucose.: 187 mg/dL (25 Sep 2023 11:40)        RADIOLOGY & ADDITIONAL TESTS:  Results Reviewed:   Imaging Personally Reviewed:  Electrocardiogram Personally Reviewed:

## 2023-09-26 NOTE — PROGRESS NOTE ADULT - ASSESSMENT
42 year old female with PMH of DM, CAD, MI w/stent, Bipolar, anxiety presents to ED with auditory hallucinations.   In the ED, BG>700, pt to be admitted for uncontrolled diabetes with hyperglycemia. Also noted to have opacity on CXR    IDDM with hyperglycemia   - A1c>16  - Lantus 20u QHS, 18u qAM,   - Admelog 12 TIDAC, SS  - Endocrinology following    R lung abnormality   - CT Chest: 8 cm right upper lobe mass with associated pathologic lymphadenopathy, most suggestive of neoplasm. 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   - given leukocytosis concern for pneumonia, likely aspiration? Received antibiotics in ER, will hold off and monitor off antibiotics for now  - ID follow up noted  - Biopsy deferred due to tooth pain yesterday. Rescheduled for Friday    Tooth pain  - Pain control  - Swelling/pain improved  - Continue Augmentin    Acute psychosis   - cleared by psych, no need for 1:1  - Sertraline 25mg and Risperidone 0.5 mg qhs started by Psychiatry     CAD with Hx of Cardiac stent   - Stress test in Dec 2022 was normal   - Asymptomatic  - asa held     HTN   - Carvedilol  - Lisinopril 10mg daily     Asthma   - Not in exacerbation   - Nebs prn     Hypothyroidism   - Normal TSH/T4   - Levothyroxine 100mcg     Substance abuse   - pt reports mostly Marijuana and cocaine use  - counselled on quitting      dispo - patient is active

## 2023-09-26 NOTE — PROGRESS NOTE ADULT - SUBJECTIVE AND OBJECTIVE BOX
INTERVAL HPI/OVERNIGHT EVENTS:  Follow up for diabetes management    ROS: Patient denies chest pain, SOB, abd pain, N/V.    MEDICATIONS  (STANDING):  amoxicillin  875 milliGRAM(s)/clavulanate 1 Tablet(s) Oral two times a day  atorvastatin 20 milliGRAM(s) Oral at bedtime  carvedilol 12.5 milliGRAM(s) Oral every 12 hours  dextrose 5%. 1000 milliLiter(s) (100 mL/Hr) IV Continuous <Continuous>  dextrose 5%. 1000 milliLiter(s) (50 mL/Hr) IV Continuous <Continuous>  dextrose 50% Injectable 25 Gram(s) IV Push once  dextrose 50% Injectable 25 Gram(s) IV Push once  dextrose 50% Injectable 12.5 Gram(s) IV Push once  glucagon  Injectable 1 milliGRAM(s) IntraMuscular once  hydrALAZINE 50 milliGRAM(s) Oral every 8 hours  influenza   Vaccine 0.5 milliLiter(s) IntraMuscular once  insulin glargine Injectable (LANTUS) 20 Unit(s) SubCutaneous at bedtime  insulin glargine Injectable (LANTUS) 18 Unit(s) SubCutaneous every morning  insulin lispro (ADMELOG) corrective regimen sliding scale   SubCutaneous three times a day before meals  insulin lispro Injectable (ADMELOG) 12 Unit(s) SubCutaneous three times a day before meals  levothyroxine 100 MICROGram(s) Oral daily  lisinopril 10 milliGRAM(s) Oral daily  pantoprazole    Tablet 40 milliGRAM(s) Oral before breakfast  risperiDONE   Tablet 0.5 milliGRAM(s) Oral at bedtime  sertraline 25 milliGRAM(s) Oral daily    MEDICATIONS  (PRN):  acetaminophen     Tablet .. 650 milliGRAM(s) Oral every 6 hours PRN Temp greater or equal to 38C (100.4F), Mild Pain (1 - 3)  dextrose Oral Gel 15 Gram(s) Oral once PRN Blood Glucose LESS THAN 70 milliGRAM(s)/deciliter  melatonin 3 milliGRAM(s) Oral at bedtime PRN Insomnia  morphine  - Injectable 2 milliGRAM(s) IV Push every 6 hours PRN Severe Pain (7 - 10)      Allergies  No Known Drug Allergies  shellfish (Unknown)        Vital Signs Last 24 Hrs  T(C): 36.9 (26 Sep 2023 10:29), Max: 37.2 (25 Sep 2023 16:29)  T(F): 98.4 (26 Sep 2023 10:29), Max: 99 (25 Sep 2023 16:29)  HR: 94 (26 Sep 2023 10:29) (89 - 105)  BP: 102/60 (26 Sep 2023 10:29) (102/60 - 155/88)  BP(mean): --  RR: 18 (26 Sep 2023 10:29) (17 - 18)  SpO2: 96% (26 Sep 2023 10:29) (96% - 98%)    Parameters below as of 26 Sep 2023 10:29  Patient On (Oxygen Delivery Method): room air      PHYSICAL EXAM:  GENERAL: NAD, comfortable  HEENT: Supple; trachea midline; left facial swelling improving  CHEST/LUNG: Clear to auscultation bilaterally; No wheezes  HEART: Regular rate and rhythm; No murmurs, rubs, or gallops  ABDOMEN: Soft, Nontender, Nondistended; Bowel sounds present  NEURO: AAOx3, no tremor  EXTREMITIES:  2+ Peripheral Pulses, + BL LE edema        LABS:                        9.0    10.38 )-----------( 422      ( 26 Sep 2023 04:56 )             28.1     09-26    135  |  101  |  18.6  ----------------------------<  223<H>  4.6   |  24.0  |  0.68    Ca    8.1<L>      26 Sep 2023 04:56    TPro  5.9<L>  /  Alb  2.1<L>  /  TBili  <0.2<L>  /  DBili  x   /  AST  20  /  ALT  15  /  AlkPhos  91  09-26    Urinalysis Basic - ( 26 Sep 2023 04:56 )    Color: x / Appearance: x / SG: x / pH: x  Gluc: 223 mg/dL / Ketone: x  / Bili: x / Urobili: x   Blood: x / Protein: x / Nitrite: x   Leuk Esterase: x / RBC: x / WBC x   Sq Epi: x / Non Sq Epi: x / Bacteria: x          POCT Blood Glucose.: 117 mg/dL (09-26-23 @ 11:49)  POCT Blood Glucose.: 218 mg/dL (09-26-23 @ 07:59)  POCT Blood Glucose.: 277 mg/dL (09-25-23 @ 21:07)  POCT Blood Glucose.: 268 mg/dL (09-25-23 @ 17:06)        Thyroid Stimulating Hormone, Serum: 2.47 uIU/mL (09-21-23 @ 07:24)

## 2023-09-26 NOTE — PROGRESS NOTE ADULT - NS ATTEND AMEND GEN_ALL_CORE FT
plan discussed with NP and changes incorporated in the note.    agree with the plan above  agree with insulin changes. will attempt to change to once a day basal dosing

## 2023-09-26 NOTE — PROGRESS NOTE ADULT - SUBJECTIVE AND OBJECTIVE BOX
Pertinent events of the past 24 hours: No acute events reported     SUBJECTIVE:    PAST MEDICAL & SURGICAL HISTORY:  Diabetes mellitus  CAD (coronary artery disease)  HTN (hypertension)  S/P cardiac cath  Bipolar disorder    VITAL SIGNS  Vital Signs Last 24 Hrs  T(C): 37.1 (09-26-23 @ 04:15), Max: 37.2 (09-25-23 @ 16:29)  T(F): 98.7 (09-26-23 @ 04:15), Max: 99 (09-25-23 @ 16:29)  HR: 99 (09-26-23 @ 04:15) (84 - 105)  BP: 118/63 (09-26-23 @ 04:15) (118/63 - 155/88)  RR: 18 (09-26-23 @ 04:15) (17 - 18)  SpO2: 97% (09-26-23 @ 04:15) (96% - 98%)  on (O2)              MEDICATIONS  acetaminophen     Tablet .. 650 milliGRAM(s) Oral every 6 hours PRN  amoxicillin  875 milliGRAM(s)/clavulanate 1 Tablet(s) Oral two times a day  atorvastatin 20 milliGRAM(s) Oral at bedtime  carvedilol 12.5 milliGRAM(s) Oral every 12 hours  dextrose 5%. 1000 milliLiter(s) IV Continuous <Continuous>  dextrose 5%. 1000 milliLiter(s) IV Continuous <Continuous>  dextrose 50% Injectable 25 Gram(s) IV Push once  dextrose 50% Injectable 12.5 Gram(s) IV Push once  dextrose 50% Injectable 25 Gram(s) IV Push once  dextrose Oral Gel 15 Gram(s) Oral once PRN  glucagon  Injectable 1 milliGRAM(s) IntraMuscular once  hydrALAZINE 50 milliGRAM(s) Oral every 8 hours  influenza   Vaccine 0.5 milliLiter(s) IntraMuscular once  insulin glargine Injectable (LANTUS) 20 Unit(s) SubCutaneous at bedtime  insulin glargine Injectable (LANTUS) 18 Unit(s) SubCutaneous every morning  insulin lispro (ADMELOG) corrective regimen sliding scale   SubCutaneous three times a day before meals  insulin lispro Injectable (ADMELOG) 12 Unit(s) SubCutaneous three times a day before meals  levothyroxine 100 MICROGram(s) Oral daily  lisinopril 10 milliGRAM(s) Oral daily  melatonin 3 milliGRAM(s) Oral at bedtime PRN  morphine  - Injectable 2 milliGRAM(s) IV Push every 6 hours PRN  pantoprazole    Tablet 40 milliGRAM(s) Oral before breakfast  risperiDONE   Tablet 0.5 milliGRAM(s) Oral at bedtime  sertraline 25 milliGRAM(s) Oral daily    Weights:  Admit Wt: Drug Dosing Weight  Height (cm): 160 (20 Sep 2023 00:08)  Weight (kg): 77.7 (20 Sep 2023 00:08)  BMI (kg/m2): 30.4 (20 Sep 2023 00:08)  BSA (m2): 1.81 (20 Sep 2023 00:08)    LABS:  09-26    135  |  101  |  18.6  ----------------------------<  223<H>  4.6   |  24.0  |  0.68    Ca    8.1<L>      26 Sep 2023 04:56    TPro  5.9<L>  /  Alb  2.1<L>  /  TBili  <0.2<L>  /  DBili  x   /  AST  20  /  ALT  15  /  AlkPhos  91  09-26               9.0    10.38 )-----------( 422      ( 26 Sep 2023 04:56 )             28.1          Bilirubin Total: <0.2 mg/dL (09-26 @ 04:56)    DIAGNOSTICS:  All laboratory results, radiology and medications reviewed.    Xray Chest 1 View- PORTABLE-Routine (Xray Chest 1 View- PORTABLE-Routine in AM.) (09.22.23 @ 04:53)  Frontal expiratory view of the chest shows the heart to be normal in   size. The lungs show similar right upper lobe mass and there is no   evidence of pneumothorax nor pleural effusion.    IMPRESSION:  Similar right mass.    CT Chest w/ IV Cont (09.20.23 @ 06:29)  LUNGS AND AIRWAYS: Patent central airways.  There is a 8 x 7 x 8 cm   multilobulated right upper lobe mass with irregular borders and subtle   internal areas of hypoattenuation. The mass is narrowing the second order   bronchi supplying the right upper lobe.  PLEURA: No pleural effusion.  MEDIASTINUM AND DIDI: Pathologic mediastinal, bilateral axillary, and   supraclavicular lymphadenopathy present bilaterally.  VESSELS: Within normal limits.  HEART: Heart size is normal. No pericardial effusion.  CHEST WALL AND LOWER NECK: Homogeneous attenuation of the thyroid gland.   Diffuse infiltration of the subcutaneous fat planes throughout the chest   and visualized flank  VISUALIZED UPPER ABDOMEN: Limited evaluation due to technique. Diffuse   mesenteric edema is present.  BONES: 8 mm lucent lesion abutting the the superior endplate of T6.    IMPRESSION:  8 cm right upper lobe mass with associated pathologic lymphadenopathy,   most suggestive of neoplasm. Limited evaluation of the partially   visualized upper abdomen. Further workup recommended.    8 mm lucent lesion abutting the superior endplate of T6, most suggestive   of benign vertebral body osseous hemangioma. Nuclear medicine bone scan   could be considered for further evaluation if there is concern for   osseous metastases.    PHYSICAL EXAM:       Pertinent events of the past 24 hours: No acute events reported     SUBJECTIVE:  Patient seen and examined on consult for RUL mass. Patient resting in bed and in no apparent distress. Patient denies chest pain, SOB, abdominal pain, N/V/D/C, or any other acute complaints at this time. Tooth pain has resolved. Discussed with patient plans for biopsy this Friday.     PAST MEDICAL & SURGICAL HISTORY:  Diabetes mellitus  CAD (coronary artery disease)  HTN (hypertension)  S/P cardiac cath  Bipolar disorder    VITAL SIGNS  Vital Signs Last 24 Hrs  T(C): 37.1 (09-26-23 @ 04:15), Max: 37.2 (09-25-23 @ 16:29)  T(F): 98.7 (09-26-23 @ 04:15), Max: 99 (09-25-23 @ 16:29)  HR: 99 (09-26-23 @ 04:15) (84 - 105)  BP: 118/63 (09-26-23 @ 04:15) (118/63 - 155/88)  RR: 18 (09-26-23 @ 04:15) (17 - 18)  SpO2: 97% (09-26-23 @ 04:15) (96% - 98%)  on (O2)              MEDICATIONS  acetaminophen     Tablet .. 650 milliGRAM(s) Oral every 6 hours PRN  amoxicillin  875 milliGRAM(s)/clavulanate 1 Tablet(s) Oral two times a day  atorvastatin 20 milliGRAM(s) Oral at bedtime  carvedilol 12.5 milliGRAM(s) Oral every 12 hours  dextrose 5%. 1000 milliLiter(s) IV Continuous <Continuous>  dextrose 5%. 1000 milliLiter(s) IV Continuous <Continuous>  dextrose 50% Injectable 25 Gram(s) IV Push once  dextrose 50% Injectable 12.5 Gram(s) IV Push once  dextrose 50% Injectable 25 Gram(s) IV Push once  dextrose Oral Gel 15 Gram(s) Oral once PRN  glucagon  Injectable 1 milliGRAM(s) IntraMuscular once  hydrALAZINE 50 milliGRAM(s) Oral every 8 hours  influenza   Vaccine 0.5 milliLiter(s) IntraMuscular once  insulin glargine Injectable (LANTUS) 20 Unit(s) SubCutaneous at bedtime  insulin glargine Injectable (LANTUS) 18 Unit(s) SubCutaneous every morning  insulin lispro (ADMELOG) corrective regimen sliding scale   SubCutaneous three times a day before meals  insulin lispro Injectable (ADMELOG) 12 Unit(s) SubCutaneous three times a day before meals  levothyroxine 100 MICROGram(s) Oral daily  lisinopril 10 milliGRAM(s) Oral daily  melatonin 3 milliGRAM(s) Oral at bedtime PRN  morphine  - Injectable 2 milliGRAM(s) IV Push every 6 hours PRN  pantoprazole    Tablet 40 milliGRAM(s) Oral before breakfast  risperiDONE   Tablet 0.5 milliGRAM(s) Oral at bedtime  sertraline 25 milliGRAM(s) Oral daily    Weights:  Admit Wt: Drug Dosing Weight  Height (cm): 160 (20 Sep 2023 00:08)  Weight (kg): 77.7 (20 Sep 2023 00:08)  BMI (kg/m2): 30.4 (20 Sep 2023 00:08)  BSA (m2): 1.81 (20 Sep 2023 00:08)    LABS:  09-26    135  |  101  |  18.6  ----------------------------<  223<H>  4.6   |  24.0  |  0.68    Ca    8.1<L>      26 Sep 2023 04:56    TPro  5.9<L>  /  Alb  2.1<L>  /  TBili  <0.2<L>  /  DBili  x   /  AST  20  /  ALT  15  /  AlkPhos  91  09-26               9.0    10.38 )-----------( 422      ( 26 Sep 2023 04:56 )             28.1          Bilirubin Total: <0.2 mg/dL (09-26 @ 04:56)    DIAGNOSTICS:  All laboratory results, radiology and medications reviewed.    Xray Chest 1 View- PORTABLE-Routine (Xray Chest 1 View- PORTABLE-Routine in AM.) (09.22.23 @ 04:53)  Frontal expiratory view of the chest shows the heart to be normal in   size. The lungs show similar right upper lobe mass and there is no   evidence of pneumothorax nor pleural effusion.    IMPRESSION:  Similar right mass.    CT Chest w/ IV Cont (09.20.23 @ 06:29)  LUNGS AND AIRWAYS: Patent central airways.  There is a 8 x 7 x 8 cm   multilobulated right upper lobe mass with irregular borders and subtle   internal areas of hypoattenuation. The mass is narrowing the second order   bronchi supplying the right upper lobe.  PLEURA: No pleural effusion.  MEDIASTINUM AND DIDI: Pathologic mediastinal, bilateral axillary, and   supraclavicular lymphadenopathy present bilaterally.  VESSELS: Within normal limits.  HEART: Heart size is normal. No pericardial effusion.  CHEST WALL AND LOWER NECK: Homogeneous attenuation of the thyroid gland.   Diffuse infiltration of the subcutaneous fat planes throughout the chest   and visualized flank  VISUALIZED UPPER ABDOMEN: Limited evaluation due to technique. Diffuse   mesenteric edema is present.  BONES: 8 mm lucent lesion abutting the the superior endplate of T6.    IMPRESSION:  8 cm right upper lobe mass with associated pathologic lymphadenopathy,   most suggestive of neoplasm. Limited evaluation of the partially   visualized upper abdomen. Further workup recommended.    8 mm lucent lesion abutting the superior endplate of T6, most suggestive   of benign vertebral body osseous hemangioma. Nuclear medicine bone scan   could be considered for further evaluation if there is concern for   osseous metastases.    PHYSICAL EXAM:  General: alert, no acute distress  Neurology: alert and oriented x 3, nonfocal, no gross deficits, normal affect  Mouth: left upper back tooth missing, no broken teeth   Respiratory: clear to auscultation bilaterally, no wheezing  CV: RRR, normal S1, S2  Abdomen: soft, nontender, nondistended, + bowel sounds, last bowel movement   Extremities: warm, well perfused. no edema,+ DP pulses

## 2023-09-27 LAB
BASOPHILS # BLD AUTO: 0.04 K/UL — SIGNIFICANT CHANGE UP (ref 0–0.2)
BASOPHILS NFR BLD AUTO: 0.6 % — SIGNIFICANT CHANGE UP (ref 0–2)
EOSINOPHIL # BLD AUTO: 0.15 K/UL — SIGNIFICANT CHANGE UP (ref 0–0.5)
EOSINOPHIL NFR BLD AUTO: 2.2 % — SIGNIFICANT CHANGE UP (ref 0–6)
GLUCOSE BLDC GLUCOMTR-MCNC: 128 MG/DL — HIGH (ref 70–99)
GLUCOSE BLDC GLUCOMTR-MCNC: 184 MG/DL — HIGH (ref 70–99)
GLUCOSE BLDC GLUCOMTR-MCNC: 227 MG/DL — HIGH (ref 70–99)
GLUCOSE BLDC GLUCOMTR-MCNC: 293 MG/DL — HIGH (ref 70–99)
HCT VFR BLD CALC: 27.9 % — LOW (ref 34.5–45)
HGB BLD-MCNC: 9.1 G/DL — LOW (ref 11.5–15.5)
IMM GRANULOCYTES NFR BLD AUTO: 0.4 % — SIGNIFICANT CHANGE UP (ref 0–0.9)
LYMPHOCYTES # BLD AUTO: 1.73 K/UL — SIGNIFICANT CHANGE UP (ref 1–3.3)
LYMPHOCYTES # BLD AUTO: 25.3 % — SIGNIFICANT CHANGE UP (ref 13–44)
MCHC RBC-ENTMCNC: 28.2 PG — SIGNIFICANT CHANGE UP (ref 27–34)
MCHC RBC-ENTMCNC: 32.6 GM/DL — SIGNIFICANT CHANGE UP (ref 32–36)
MCV RBC AUTO: 86.4 FL — SIGNIFICANT CHANGE UP (ref 80–100)
MONOCYTES # BLD AUTO: 0.95 K/UL — HIGH (ref 0–0.9)
MONOCYTES NFR BLD AUTO: 13.9 % — SIGNIFICANT CHANGE UP (ref 2–14)
NEUTROPHILS # BLD AUTO: 3.95 K/UL — SIGNIFICANT CHANGE UP (ref 1.8–7.4)
NEUTROPHILS NFR BLD AUTO: 57.6 % — SIGNIFICANT CHANGE UP (ref 43–77)
PLATELET # BLD AUTO: 406 K/UL — HIGH (ref 150–400)
RBC # BLD: 3.23 M/UL — LOW (ref 3.8–5.2)
RBC # FLD: 16.8 % — HIGH (ref 10.3–14.5)
WBC # BLD: 6.85 K/UL — SIGNIFICANT CHANGE UP (ref 3.8–10.5)
WBC # FLD AUTO: 6.85 K/UL — SIGNIFICANT CHANGE UP (ref 3.8–10.5)

## 2023-09-27 PROCEDURE — 99231 SBSQ HOSP IP/OBS SF/LOW 25: CPT

## 2023-09-27 PROCEDURE — 99232 SBSQ HOSP IP/OBS MODERATE 35: CPT

## 2023-09-27 RX ORDER — INSULIN GLARGINE 100 [IU]/ML
4 INJECTION, SOLUTION SUBCUTANEOUS EVERY MORNING
Refills: 0 | Status: DISCONTINUED | OUTPATIENT
Start: 2023-09-28 | End: 2023-09-28

## 2023-09-27 RX ORDER — INSULIN GLARGINE 100 [IU]/ML
24 INJECTION, SOLUTION SUBCUTANEOUS AT BEDTIME
Refills: 0 | Status: DISCONTINUED | OUTPATIENT
Start: 2023-09-27 | End: 2023-09-28

## 2023-09-27 RX ADMIN — INSULIN GLARGINE 10 UNIT(S): 100 INJECTION, SOLUTION SUBCUTANEOUS at 08:37

## 2023-09-27 RX ADMIN — CARVEDILOL PHOSPHATE 12.5 MILLIGRAM(S): 80 CAPSULE, EXTENDED RELEASE ORAL at 16:48

## 2023-09-27 RX ADMIN — Medication 50 MILLIGRAM(S): at 21:51

## 2023-09-27 RX ADMIN — Medication 12 UNIT(S): at 16:46

## 2023-09-27 RX ADMIN — Medication 50 MILLIGRAM(S): at 05:14

## 2023-09-27 RX ADMIN — SERTRALINE 25 MILLIGRAM(S): 25 TABLET, FILM COATED ORAL at 12:58

## 2023-09-27 RX ADMIN — INSULIN GLARGINE 24 UNIT(S): 100 INJECTION, SOLUTION SUBCUTANEOUS at 21:51

## 2023-09-27 RX ADMIN — ATORVASTATIN CALCIUM 20 MILLIGRAM(S): 80 TABLET, FILM COATED ORAL at 21:51

## 2023-09-27 RX ADMIN — Medication 2: at 11:05

## 2023-09-27 RX ADMIN — PANTOPRAZOLE SODIUM 40 MILLIGRAM(S): 20 TABLET, DELAYED RELEASE ORAL at 08:09

## 2023-09-27 RX ADMIN — Medication 1: at 16:46

## 2023-09-27 RX ADMIN — LISINOPRIL 10 MILLIGRAM(S): 2.5 TABLET ORAL at 05:14

## 2023-09-27 RX ADMIN — CARVEDILOL PHOSPHATE 12.5 MILLIGRAM(S): 80 CAPSULE, EXTENDED RELEASE ORAL at 05:14

## 2023-09-27 RX ADMIN — Medication 1 TABLET(S): at 16:48

## 2023-09-27 RX ADMIN — Medication 3: at 08:08

## 2023-09-27 RX ADMIN — Medication 12 UNIT(S): at 11:04

## 2023-09-27 RX ADMIN — Medication 12 UNIT(S): at 08:08

## 2023-09-27 RX ADMIN — Medication 1 TABLET(S): at 05:13

## 2023-09-27 RX ADMIN — Medication 50 MILLIGRAM(S): at 12:58

## 2023-09-27 RX ADMIN — RISPERIDONE 0.5 MILLIGRAM(S): 4 TABLET ORAL at 21:51

## 2023-09-27 RX ADMIN — Medication 100 MICROGRAM(S): at 05:14

## 2023-09-27 NOTE — PROGRESS NOTE ADULT - SUBJECTIVE AND OBJECTIVE BOX
INTERVAL HPI/OVERNIGHT EVENTS:  Follow up for diabetes    ROS: Patient denies chest pain, SOB, abd pain, N/V.    MEDICATIONS  (STANDING):  amoxicillin  875 milliGRAM(s)/clavulanate 1 Tablet(s) Oral two times a day  atorvastatin 20 milliGRAM(s) Oral at bedtime  carvedilol 12.5 milliGRAM(s) Oral every 12 hours  dextrose 5%. 1000 milliLiter(s) (100 mL/Hr) IV Continuous <Continuous>  dextrose 5%. 1000 milliLiter(s) (50 mL/Hr) IV Continuous <Continuous>  dextrose 50% Injectable 25 Gram(s) IV Push once  dextrose 50% Injectable 12.5 Gram(s) IV Push once  dextrose 50% Injectable 25 Gram(s) IV Push once  glucagon  Injectable 1 milliGRAM(s) IntraMuscular once  hydrALAZINE 50 milliGRAM(s) Oral every 8 hours  influenza   Vaccine 0.5 milliLiter(s) IntraMuscular once  insulin glargine Injectable (LANTUS) 10 Unit(s) SubCutaneous every morning  insulin glargine Injectable (LANTUS) 18 Unit(s) SubCutaneous at bedtime  insulin lispro (ADMELOG) corrective regimen sliding scale   SubCutaneous three times a day before meals  insulin lispro Injectable (ADMELOG) 12 Unit(s) SubCutaneous three times a day before meals  levothyroxine 100 MICROGram(s) Oral daily  lisinopril 10 milliGRAM(s) Oral daily  pantoprazole    Tablet 40 milliGRAM(s) Oral before breakfast  risperiDONE   Tablet 0.5 milliGRAM(s) Oral at bedtime  sertraline 25 milliGRAM(s) Oral daily    MEDICATIONS  (PRN):  acetaminophen     Tablet .. 650 milliGRAM(s) Oral every 6 hours PRN Temp greater or equal to 38C (100.4F), Mild Pain (1 - 3)  dextrose Oral Gel 15 Gram(s) Oral once PRN Blood Glucose LESS THAN 70 milliGRAM(s)/deciliter  melatonin 3 milliGRAM(s) Oral at bedtime PRN Insomnia  morphine  - Injectable 2 milliGRAM(s) IV Push every 6 hours PRN Severe Pain (7 - 10)      Allergies  No Known Drug Allergies  shellfish (Unknown)      Vital Signs Last 24 Hrs  T(C): 36.8 (27 Sep 2023 11:22), Max: 36.8 (26 Sep 2023 21:47)  T(F): 98.2 (27 Sep 2023 11:22), Max: 98.3 (26 Sep 2023 21:47)  HR: 95 (27 Sep 2023 11:22) (68 - 95)  BP: 100/61 (27 Sep 2023 11:22) (100/61 - 120/74)  BP(mean): --  RR: 18 (27 Sep 2023 11:22) (18 - 18)  SpO2: 94% (27 Sep 2023 11:22) (94% - 99%)    Parameters below as of 27 Sep 2023 11:22  Patient On (Oxygen Delivery Method): room air        PHYSICAL EXAM:  GENERAL: NAD, L sided facial swelling improving  NECK: Supple; trachea midline  CHEST/LUNG: Clear to auscultation bilaterally; No wheezes  HEART: Regular rate and rhythm; No murmurs, rubs, or gallops  ABDOMEN: Soft, Nontender, Nondistended; Bowel sounds present  NEURO: AAOx3, no tremor  EXTREMITIES:  2+ Peripheral Pulses, + BL LE edema        LABS:                        9.1    6.85  )-----------( 406      ( 27 Sep 2023 05:05 )             27.9     09-26    135  |  101  |  18.6  ----------------------------<  223<H>  4.6   |  24.0  |  0.68    Ca    8.1<L>      26 Sep 2023 04:56    TPro  5.9<L>  /  Alb  2.1<L>  /  TBili  <0.2<L>  /  DBili  x   /  AST  20  /  ALT  15  /  AlkPhos  91  09-26    Urinalysis Basic - ( 26 Sep 2023 04:56 )    Color: x / Appearance: x / SG: x / pH: x  Gluc: 223 mg/dL / Ketone: x  / Bili: x / Urobili: x   Blood: x / Protein: x / Nitrite: x   Leuk Esterase: x / RBC: x / WBC x   Sq Epi: x / Non Sq Epi: x / Bacteria: x          POCT Blood Glucose.: 227 mg/dL (09-27-23 @ 11:02)  POCT Blood Glucose.: 293 mg/dL (09-27-23 @ 08:07)  POCT Blood Glucose.: 228 mg/dL (09-26-23 @ 21:43)  POCT Blood Glucose.: 116 mg/dL (09-26-23 @ 16:53)        Thyroid Stimulating Hormone, Serum: 2.47 uIU/mL (09-21-23 @ 07:24)

## 2023-09-27 NOTE — PROGRESS NOTE ADULT - SUBJECTIVE AND OBJECTIVE BOX
[facility] Division of Hospital Medicine  Tyler Rodríguez MD    Chief Complaint:  Patient is a 42y old  Female who presents with a chief complaint of Hyperglycemia (27 Sep 2023 08:43)      SUBJECTIVE / OVERNIGHT EVENTS:  Patient seen and examined at bedside. No acute events reported overnight. No new complaints.    MEDICATIONS  (STANDING):  amoxicillin  875 milliGRAM(s)/clavulanate 1 Tablet(s) Oral two times a day  atorvastatin 20 milliGRAM(s) Oral at bedtime  carvedilol 12.5 milliGRAM(s) Oral every 12 hours  dextrose 5%. 1000 milliLiter(s) (50 mL/Hr) IV Continuous <Continuous>  dextrose 5%. 1000 milliLiter(s) (100 mL/Hr) IV Continuous <Continuous>  dextrose 50% Injectable 25 Gram(s) IV Push once  dextrose 50% Injectable 25 Gram(s) IV Push once  dextrose 50% Injectable 12.5 Gram(s) IV Push once  glucagon  Injectable 1 milliGRAM(s) IntraMuscular once  hydrALAZINE 50 milliGRAM(s) Oral every 8 hours  influenza   Vaccine 0.5 milliLiter(s) IntraMuscular once  insulin glargine Injectable (LANTUS) 18 Unit(s) SubCutaneous at bedtime  insulin glargine Injectable (LANTUS) 10 Unit(s) SubCutaneous every morning  insulin lispro (ADMELOG) corrective regimen sliding scale   SubCutaneous three times a day before meals  insulin lispro Injectable (ADMELOG) 12 Unit(s) SubCutaneous three times a day before meals  levothyroxine 100 MICROGram(s) Oral daily  lisinopril 10 milliGRAM(s) Oral daily  pantoprazole    Tablet 40 milliGRAM(s) Oral before breakfast  risperiDONE   Tablet 0.5 milliGRAM(s) Oral at bedtime  sertraline 25 milliGRAM(s) Oral daily    MEDICATIONS  (PRN):  acetaminophen     Tablet .. 650 milliGRAM(s) Oral every 6 hours PRN Temp greater or equal to 38C (100.4F), Mild Pain (1 - 3)  dextrose Oral Gel 15 Gram(s) Oral once PRN Blood Glucose LESS THAN 70 milliGRAM(s)/deciliter  melatonin 3 milliGRAM(s) Oral at bedtime PRN Insomnia  morphine  - Injectable 2 milliGRAM(s) IV Push every 6 hours PRN Severe Pain (7 - 10)        I&O's Summary      PHYSICAL EXAM:  Vital Signs Last 24 Hrs  T(C): 36.8 (27 Sep 2023 04:28), Max: 36.8 (26 Sep 2023 21:47)  T(F): 98.3 (27 Sep 2023 04:28), Max: 98.3 (26 Sep 2023 21:47)  HR: 68 (27 Sep 2023 04:28) (68 - 85)  BP: 120/74 (27 Sep 2023 04:28) (100/70 - 120/74)  BP(mean): --  RR: 18 (27 Sep 2023 04:28) (18 - 18)  SpO2: 97% (27 Sep 2023 04:28) (95% - 99%)    Parameters below as of 27 Sep 2023 04:28  Patient On (Oxygen Delivery Method): room air            CONSTITUTIONAL: NAD  HEENT: NC/AT, PERRL, no JVD  RESPIRATORY: CTA bilaterally, normal effort  CARDIOVASCULAR: RRR, S1/S2+, no m/g/r  ABDOMEN: Nontender to palpation, normoactive bowel sounds, no rebound/guarding  MUSCULOSKELETAL: No edema, cyanosis or deformities.  PSYCH: Calm, affect appropriate.  NEUROLOGY: Awake, alert, no focal neurological deficits.   SKIN: No rashes; no palpable lesions  VASC: Distal pulses palpable    LABS:                        9.1    6.85  )-----------( 406      ( 27 Sep 2023 05:05 )             27.9     09-26    135  |  101  |  18.6  ----------------------------<  223<H>  4.6   |  24.0  |  0.68    Ca    8.1<L>      26 Sep 2023 04:56    TPro  5.9<L>  /  Alb  2.1<L>  /  TBili  <0.2<L>  /  DBili  x   /  AST  20  /  ALT  15  /  AlkPhos  91  09-26          Urinalysis Basic - ( 26 Sep 2023 04:56 )    Color: x / Appearance: x / SG: x / pH: x  Gluc: 223 mg/dL / Ketone: x  / Bili: x / Urobili: x   Blood: x / Protein: x / Nitrite: x   Leuk Esterase: x / RBC: x / WBC x   Sq Epi: x / Non Sq Epi: x / Bacteria: x        CAPILLARY BLOOD GLUCOSE      POCT Blood Glucose.: 227 mg/dL (27 Sep 2023 11:02)  POCT Blood Glucose.: 293 mg/dL (27 Sep 2023 08:07)  POCT Blood Glucose.: 228 mg/dL (26 Sep 2023 21:43)  POCT Blood Glucose.: 116 mg/dL (26 Sep 2023 16:53)  POCT Blood Glucose.: 117 mg/dL (26 Sep 2023 11:49)        RADIOLOGY & ADDITIONAL TESTS:  Results Reviewed:   Imaging Personally Reviewed:  Electrocardiogram Personally Reviewed:

## 2023-09-27 NOTE — PROGRESS NOTE ADULT - ASSESSMENT
42 year old female with PMH of DM, CAD, MI w/stent, Bipolar, anxiety presents to ED with auditory hallucinations.   In the ED, BG>700, pt to be admitted for uncontrolled diabetes with hyperglycemia. Also noted to have opacity on CXR    IDDM with hyperglycemia   - A1c>16  - Lantus 20u QHS, 18u qAM,   - Admelog 12 TIDAC, SS  - Endocrinology following    R lung abnormality   - CT Chest: 8 cm right upper lobe mass with associated pathologic lymphadenopathy, most suggestive of neoplasm. 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   - given leukocytosis concern for pneumonia, likely aspiration? Received antibiotics in ER, will hold off and monitor off antibiotics for now  - ID follow up noted  - Biopsy deferred due to tooth pain on Monday. Rescheduled for Friday    Tooth pain  - Pain control  - Swelling/pain improved  - Continue Augmentin    Acute psychosis   - cleared by psych, no need for 1:1  - Sertraline 25mg and Risperidone 0.5 mg qhs started by Psychiatry     CAD with Hx of Cardiac stent   - Stress test in Dec 2022 was normal   - Asymptomatic  - asa held     HTN   - Carvedilol  - Lisinopril 10mg daily     Asthma   - Not in exacerbation   - Nebs prn     Hypothyroidism   - Normal TSH/T4   - Levothyroxine 100mcg     Substance abuse   - pt reports mostly Marijuana and cocaine use  - counselled on quitting      dispo - patient is active

## 2023-09-27 NOTE — PROGRESS NOTE ADULT - SUBJECTIVE AND OBJECTIVE BOX
Subjective:  Patient seen and examined for RUL mass, plan for ION biopsy 9/29. Patient resting in bed / OOB to chair and in no apparent distress. Patient denies chest pain, SOB, abdominal pain, N/V/D/C, or any other acute complaints at this time.      PAST MEDICAL & SURGICAL HISTORY:  Diabetes mellitus    CAD (coronary artery disease)    HTN (hypertension)    S/P cardiac cath      VITAL SIGNS  Vital Signs Last 24 Hrs  T(C): 36.8 (09-27-23 @ 04:28), Max: 36.9 (09-26-23 @ 10:29)  T(F): 98.3 (09-27-23 @ 04:28), Max: 98.4 (09-26-23 @ 10:29)  HR: 68 (09-27-23 @ 04:28) (68 - 94)  BP: 120/74 (09-27-23 @ 04:28) (100/70 - 120/74)  RR: 18 (09-27-23 @ 04:28) (18 - 18)  SpO2: 97% (09-27-23 @ 04:28) (95% - 99%)  on (O2)              MEDICATIONS  acetaminophen     Tablet .. 650 milliGRAM(s) Oral every 6 hours PRN  amoxicillin  875 milliGRAM(s)/clavulanate 1 Tablet(s) Oral two times a day  atorvastatin 20 milliGRAM(s) Oral at bedtime  carvedilol 12.5 milliGRAM(s) Oral every 12 hours  dextrose 5%. 1000 milliLiter(s) IV Continuous <Continuous>  dextrose 5%. 1000 milliLiter(s) IV Continuous <Continuous>  dextrose 50% Injectable 25 Gram(s) IV Push once  dextrose 50% Injectable 25 Gram(s) IV Push once  dextrose 50% Injectable 12.5 Gram(s) IV Push once  dextrose Oral Gel 15 Gram(s) Oral once PRN  glucagon  Injectable 1 milliGRAM(s) IntraMuscular once  hydrALAZINE 50 milliGRAM(s) Oral every 8 hours  influenza   Vaccine 0.5 milliLiter(s) IntraMuscular once  insulin glargine Injectable (LANTUS) 18 Unit(s) SubCutaneous at bedtime  insulin glargine Injectable (LANTUS) 10 Unit(s) SubCutaneous every morning  insulin lispro (ADMELOG) corrective regimen sliding scale   SubCutaneous three times a day before meals  insulin lispro Injectable (ADMELOG) 12 Unit(s) SubCutaneous three times a day before meals  levothyroxine 100 MICROGram(s) Oral daily  lisinopril 10 milliGRAM(s) Oral daily  melatonin 3 milliGRAM(s) Oral at bedtime PRN  morphine  - Injectable 2 milliGRAM(s) IV Push every 6 hours PRN  pantoprazole    Tablet 40 milliGRAM(s) Oral before breakfast  risperiDONE   Tablet 0.5 milliGRAM(s) Oral at bedtime  sertraline 25 milliGRAM(s) Oral daily    Weights  Daily   Admit Wt: Drug Dosing Weight  Height (cm): 160 (20 Sep 2023 00:08)  Weight (kg): 77.7 (20 Sep 2023 00:08)  BMI (kg/m2): 30.4 (20 Sep 2023 00:08)  BSA (m2): 1.81 (20 Sep 2023 00:08)    LABS  09-26    135  |  101  |  18.6  ----------------------------<  223<H>  4.6   |  24.0  |  0.68    Ca    8.1<L>      26 Sep 2023 04:56    TPro  5.9<L>  /  Alb  2.1<L>  /  TBili  <0.2<L>  /  DBili  x   /  AST  20  /  ALT  15  /  AlkPhos  91  09-26                                 9.1    6.85  )-----------( 406      ( 27 Sep 2023 05:05 )             27.9              CAPILLARY BLOOD GLUCOSE      POCT Blood Glucose.: 293 mg/dL (27 Sep 2023 08:07)  POCT Blood Glucose.: 228 mg/dL (26 Sep 2023 21:43)  POCT Blood Glucose.: 116 mg/dL (26 Sep 2023 16:53)  POCT Blood Glucose.: 117 mg/dL (26 Sep 2023 11:49)           DIAGNOSTICS:  All laboratory results, radiology and medications reviewed.    < from: CT Chest w/ IV Cont (09.20.23 @ 06:29) >    IMPRESSION:  8 cm right upper lobe mass with associated pathologic lymphadenopathy,   most suggestive of neoplasm. Limited evaluation of the partially   visualized upper abdomen. Further workup recommended.    8 mm lucent lesion abutting the superior endplate of T6, most suggestive   of benign vertebral body osseous hemangioma. Nuclear medicine bone scan   could be considered for further evaluation if there is concern for   osseous metastases.    --- End of Report ---    < end of copied text >      PHYSICAL EXAM  General: *well nourished, well developed, no acute distress  Neurology: *alert and oriented x 3, nonfocal, no gross deficits  Respiratory: clear to auscultation bilaterally*  CV: regular rate and rhythm, normal S1, S2*  Abdomen: soft, nontender, nondistended, positive bowel sounds, last bowel movement   Extremities: warm, well perfused. no edema*. + DP pulses       Subjective:  Patient seen and examined for RUL mass, plan for ION biopsy 9/29. Patient resting in bed, no acute distress. Pt with no new complaints at this time. She reports her tooth pain has improved and is feeling better. Patient denies chest pain, SOB, abdominal pain, N/V/D/C, or any other acute complaints at this time.      PAST MEDICAL & SURGICAL HISTORY:  Diabetes mellitus    CAD (coronary artery disease)    HTN (hypertension)    S/P cardiac cath      VITAL SIGNS  Vital Signs Last 24 Hrs  T(C): 36.8 (09-27-23 @ 04:28), Max: 36.9 (09-26-23 @ 10:29)  T(F): 98.3 (09-27-23 @ 04:28), Max: 98.4 (09-26-23 @ 10:29)  HR: 68 (09-27-23 @ 04:28) (68 - 94)  BP: 120/74 (09-27-23 @ 04:28) (100/70 - 120/74)  RR: 18 (09-27-23 @ 04:28) (18 - 18)  SpO2: 97% (09-27-23 @ 04:28) (95% - 99%)  on (O2)              MEDICATIONS  acetaminophen     Tablet .. 650 milliGRAM(s) Oral every 6 hours PRN  amoxicillin  875 milliGRAM(s)/clavulanate 1 Tablet(s) Oral two times a day  atorvastatin 20 milliGRAM(s) Oral at bedtime  carvedilol 12.5 milliGRAM(s) Oral every 12 hours  dextrose 5%. 1000 milliLiter(s) IV Continuous <Continuous>  dextrose 5%. 1000 milliLiter(s) IV Continuous <Continuous>  dextrose 50% Injectable 25 Gram(s) IV Push once  dextrose 50% Injectable 25 Gram(s) IV Push once  dextrose 50% Injectable 12.5 Gram(s) IV Push once  dextrose Oral Gel 15 Gram(s) Oral once PRN  glucagon  Injectable 1 milliGRAM(s) IntraMuscular once  hydrALAZINE 50 milliGRAM(s) Oral every 8 hours  influenza   Vaccine 0.5 milliLiter(s) IntraMuscular once  insulin glargine Injectable (LANTUS) 18 Unit(s) SubCutaneous at bedtime  insulin glargine Injectable (LANTUS) 10 Unit(s) SubCutaneous every morning  insulin lispro (ADMELOG) corrective regimen sliding scale   SubCutaneous three times a day before meals  insulin lispro Injectable (ADMELOG) 12 Unit(s) SubCutaneous three times a day before meals  levothyroxine 100 MICROGram(s) Oral daily  lisinopril 10 milliGRAM(s) Oral daily  melatonin 3 milliGRAM(s) Oral at bedtime PRN  morphine  - Injectable 2 milliGRAM(s) IV Push every 6 hours PRN  pantoprazole    Tablet 40 milliGRAM(s) Oral before breakfast  risperiDONE   Tablet 0.5 milliGRAM(s) Oral at bedtime  sertraline 25 milliGRAM(s) Oral daily    Weights  Daily   Admit Wt: Drug Dosing Weight  Height (cm): 160 (20 Sep 2023 00:08)  Weight (kg): 77.7 (20 Sep 2023 00:08)  BMI (kg/m2): 30.4 (20 Sep 2023 00:08)  BSA (m2): 1.81 (20 Sep 2023 00:08)    LABS  09-26    135  |  101  |  18.6  ----------------------------<  223<H>  4.6   |  24.0  |  0.68    Ca    8.1<L>      26 Sep 2023 04:56    TPro  5.9<L>  /  Alb  2.1<L>  /  TBili  <0.2<L>  /  DBili  x   /  AST  20  /  ALT  15  /  AlkPhos  91  09-26                                 9.1    6.85  )-----------( 406      ( 27 Sep 2023 05:05 )             27.9              CAPILLARY BLOOD GLUCOSE      POCT Blood Glucose.: 293 mg/dL (27 Sep 2023 08:07)  POCT Blood Glucose.: 228 mg/dL (26 Sep 2023 21:43)  POCT Blood Glucose.: 116 mg/dL (26 Sep 2023 16:53)  POCT Blood Glucose.: 117 mg/dL (26 Sep 2023 11:49)           DIAGNOSTICS:  All laboratory results, radiology and medications reviewed.    < from: CT Chest w/ IV Cont (09.20.23 @ 06:29) >    IMPRESSION:  8 cm right upper lobe mass with associated pathologic lymphadenopathy,   most suggestive of neoplasm. Limited evaluation of the partially   visualized upper abdomen. Further workup recommended.    8 mm lucent lesion abutting the superior endplate of T6, most suggestive   of benign vertebral body osseous hemangioma. Nuclear medicine bone scan   could be considered for further evaluation if there is concern for   osseous metastases.    --- End of Report ---    < end of copied text >      PHYSICAL EXAM  General: alert, no acute distress  Neurology: alert and oriented x 3, nonfocal, no gross deficits, normal affect  Mouth: left upper back tooth missing, no broken teeth   Respiratory: clear to auscultation bilaterally, no wheezing  CV: RRR, normal S1, S2  Abdomen: soft, nontender, nondistended, + bowel sounds, last bowel movement   Extremities: warm, well perfused. no edema,+ DP pulses

## 2023-09-27 NOTE — PROGRESS NOTE ADULT - PROBLEM SELECTOR PLAN 1
CT Chest showing 8cm RUL Mass  ID following for possible infectious process, currently off ABX  Ion Guided Biopsy of mass on Friday 9/29, with Dr. Mejia  TTE performed, unchanged from prior. Cleared by medicine and cardiology for procedure  If patient to be discharged home, PST testing is complete for patient to be same day admit for Friday  C/w Augmentin for tooth pain and r/o infection  Okay to resume DVT ppx Lovenox (hold dose day of procedure)  Hx CAD, MI, PCI w/ stents, HTN, DM2 with A1c >16.9 and bipolar disorder not compliant on medications  Psych cleared need for 1:1 sitter  C/w ASA, BB, insulin coverage  Thoracic surgery to follow.   Plan discussed with Dr. Mejia during AM rounds.

## 2023-09-27 NOTE — PROGRESS NOTE ADULT - ASSESSMENT
42F with PMHx T2DM, non compliant, MI w/ stent, Bipolar, presents to ED c/o auditory hallucinations x4 days, voices commanding to hurt self and others. In ED, found to be hyperglycemic in the 700s.    Consulted for diabetes management  Home diabetes meds: Was suppose to be taking insulin but has not for months  Current a1c: >16.9%    1. T2DM - Blood glucoses improved  - Goal to get patient on daily dose of Lantus qhs to improve adherence   - Decrease Lantus to 4 units in AM tomorrow  - Will increase Lantus to 24 units qhs tonight  - C/w Admelog 12 units TID AC, hold if NPO  - C/w LSSI    2. Hypothyroidism  - TSH 2.47  - C.w Synthroid 100 mcg    3. Acute psychosis  - Cleared by Psych  - C/w Sertraline 25mg and Risperidone 0.5 mg    4. HLD  - Continue statin    5. Tooth Pain/ Left sided facial swelling  - Augmentin started by primary team    6. 8 cm RUL mass  - Eventual Lung bx planned 42F with PMHx T2DM, non compliant, MI w/ stent, Bipolar, presents to ED c/o auditory hallucinations x4 days, voices commanding to hurt self and others. In ED, found to be hyperglycemic in the 700s.    Consulted for diabetes management  Home diabetes meds: Was suppose to be taking insulin but has not for months  Current a1c: >16.9%    1. T2DM - Blood glucoses improved, remain above goal  - Goal to get patient on daily dose of Lantus qhs to improve adherence   - Decrease Lantus to 4 units in AM tomorrow  - Will increase Lantus to 24 units qhs tonight  - C/w Admelog 12 units TID AC, hold if NPO  - C/w LSSI    2. Hypothyroidism  - TSH 2.47  - C.w Synthroid 100 mcg    3. Acute psychosis  - Cleared by Psych  - C/w Sertraline 25mg and Risperidone 0.5 mg    4. HLD  - Continue statin    5. Tooth Pain/ Left sided facial swelling  - Augmentin started by primary team    6. 8 cm RUL mass  - Eventual Lung bx planned 42F with PMHx T2DM, non compliant, MI w/ stent, Bipolar, presents to ED c/o auditory hallucinations x4 days, voices commanding to hurt self and others. In ED, found to be hyperglycemic in the 700s.    Consulted for diabetes management  Home diabetes meds: Was suppose to be taking insulin but has not for months  Current a1c: >16.9%    1. T2DM - Blood glucoses improved, remain above goal  - Goal to transition to daily dose of Lantus qhs to improve adherence   - Decrease Lantus to 4 units in AM tomorrow  - Will increase Lantus to 24 units qhs tonight  - C/w Admelog 12 units TID AC, hold if NPO  - C/w LSSI    2. Hypothyroidism  - TSH 2.47  - C.w Synthroid 100 mcg    3. Acute psychosis  - Cleared by Psych  - C/w Sertraline 25mg and Risperidone 0.5 mg    4. HLD  - Continue statin    5. Tooth Pain/ Left sided facial swelling  - Augmentin started by primary team    6. 8 cm RUL mass  - Eventual Lung bx planned

## 2023-09-27 NOTE — PROGRESS NOTE ADULT - NS ATTEND AMEND GEN_ALL_CORE FT
plan discussed with NP and changes incorporated in the note.    agree with the plan above  will attempt to change to once a day basal insulin  expect some variability in glycemic control

## 2023-09-28 LAB
ALBUMIN SERPL ELPH-MCNC: 2.7 G/DL — LOW (ref 3.3–5.2)
ALP SERPL-CCNC: 96 U/L — SIGNIFICANT CHANGE UP (ref 40–120)
ALT FLD-CCNC: 27 U/L — SIGNIFICANT CHANGE UP
ANION GAP SERPL CALC-SCNC: 12 MMOL/L — SIGNIFICANT CHANGE UP (ref 5–17)
AST SERPL-CCNC: 39 U/L — HIGH
BILIRUB SERPL-MCNC: <0.2 MG/DL — LOW (ref 0.4–2)
BLD GP AB SCN SERPL QL: SIGNIFICANT CHANGE UP
BUN SERPL-MCNC: 12 MG/DL — SIGNIFICANT CHANGE UP (ref 8–20)
CALCIUM SERPL-MCNC: 8.5 MG/DL — SIGNIFICANT CHANGE UP (ref 8.4–10.5)
CHLORIDE SERPL-SCNC: 98 MMOL/L — SIGNIFICANT CHANGE UP (ref 96–108)
CO2 SERPL-SCNC: 22 MMOL/L — SIGNIFICANT CHANGE UP (ref 22–29)
CREAT SERPL-MCNC: 0.69 MG/DL — SIGNIFICANT CHANGE UP (ref 0.5–1.3)
EGFR: 110 ML/MIN/1.73M2 — SIGNIFICANT CHANGE UP
GLUCOSE BLDC GLUCOMTR-MCNC: 106 MG/DL — HIGH (ref 70–99)
GLUCOSE BLDC GLUCOMTR-MCNC: 192 MG/DL — HIGH (ref 70–99)
GLUCOSE BLDC GLUCOMTR-MCNC: 258 MG/DL — HIGH (ref 70–99)
GLUCOSE BLDC GLUCOMTR-MCNC: 260 MG/DL — HIGH (ref 70–99)
GLUCOSE SERPL-MCNC: 149 MG/DL — HIGH (ref 70–99)
HCT VFR BLD CALC: 30.6 % — LOW (ref 34.5–45)
HGB BLD-MCNC: 9.4 G/DL — LOW (ref 11.5–15.5)
MCHC RBC-ENTMCNC: 27.1 PG — SIGNIFICANT CHANGE UP (ref 27–34)
MCHC RBC-ENTMCNC: 30.7 GM/DL — LOW (ref 32–36)
MCV RBC AUTO: 88.2 FL — SIGNIFICANT CHANGE UP (ref 80–100)
PLATELET # BLD AUTO: 369 K/UL — SIGNIFICANT CHANGE UP (ref 150–400)
POTASSIUM SERPL-MCNC: 4.2 MMOL/L — SIGNIFICANT CHANGE UP (ref 3.5–5.3)
POTASSIUM SERPL-SCNC: 4.2 MMOL/L — SIGNIFICANT CHANGE UP (ref 3.5–5.3)
PROT SERPL-MCNC: 6.7 G/DL — SIGNIFICANT CHANGE UP (ref 6.6–8.7)
RBC # BLD: 3.47 M/UL — LOW (ref 3.8–5.2)
RBC # FLD: 16.9 % — HIGH (ref 10.3–14.5)
SODIUM SERPL-SCNC: 132 MMOL/L — LOW (ref 135–145)
WBC # BLD: 6.46 K/UL — SIGNIFICANT CHANGE UP (ref 3.8–10.5)
WBC # FLD AUTO: 6.46 K/UL — SIGNIFICANT CHANGE UP (ref 3.8–10.5)

## 2023-09-28 PROCEDURE — 99232 SBSQ HOSP IP/OBS MODERATE 35: CPT

## 2023-09-28 PROCEDURE — 99231 SBSQ HOSP IP/OBS SF/LOW 25: CPT

## 2023-09-28 RX ORDER — INSULIN LISPRO 100/ML
8 VIAL (ML) SUBCUTANEOUS
Refills: 0 | Status: DISCONTINUED | OUTPATIENT
Start: 2023-09-28 | End: 2023-09-29

## 2023-09-28 RX ORDER — INSULIN GLARGINE 100 [IU]/ML
18 INJECTION, SOLUTION SUBCUTANEOUS AT BEDTIME
Refills: 0 | Status: DISCONTINUED | OUTPATIENT
Start: 2023-09-28 | End: 2023-09-29

## 2023-09-28 RX ADMIN — Medication 50 MILLIGRAM(S): at 21:35

## 2023-09-28 RX ADMIN — INSULIN GLARGINE 18 UNIT(S): 100 INJECTION, SOLUTION SUBCUTANEOUS at 21:35

## 2023-09-28 RX ADMIN — CARVEDILOL PHOSPHATE 12.5 MILLIGRAM(S): 80 CAPSULE, EXTENDED RELEASE ORAL at 05:19

## 2023-09-28 RX ADMIN — Medication 50 MILLIGRAM(S): at 05:19

## 2023-09-28 RX ADMIN — RISPERIDONE 0.5 MILLIGRAM(S): 4 TABLET ORAL at 21:35

## 2023-09-28 RX ADMIN — Medication 1 TABLET(S): at 05:20

## 2023-09-28 RX ADMIN — ATORVASTATIN CALCIUM 20 MILLIGRAM(S): 80 TABLET, FILM COATED ORAL at 21:34

## 2023-09-28 RX ADMIN — Medication 12 UNIT(S): at 07:52

## 2023-09-28 RX ADMIN — Medication 1 TABLET(S): at 17:18

## 2023-09-28 RX ADMIN — Medication 1: at 07:52

## 2023-09-28 RX ADMIN — Medication 12 UNIT(S): at 11:56

## 2023-09-28 RX ADMIN — Medication 8 UNIT(S): at 16:52

## 2023-09-28 RX ADMIN — CARVEDILOL PHOSPHATE 12.5 MILLIGRAM(S): 80 CAPSULE, EXTENDED RELEASE ORAL at 17:18

## 2023-09-28 RX ADMIN — PANTOPRAZOLE SODIUM 40 MILLIGRAM(S): 20 TABLET, DELAYED RELEASE ORAL at 05:20

## 2023-09-28 RX ADMIN — Medication 50 MILLIGRAM(S): at 13:55

## 2023-09-28 RX ADMIN — SERTRALINE 25 MILLIGRAM(S): 25 TABLET, FILM COATED ORAL at 11:59

## 2023-09-28 RX ADMIN — Medication 100 MICROGRAM(S): at 05:19

## 2023-09-28 RX ADMIN — INSULIN GLARGINE 4 UNIT(S): 100 INJECTION, SOLUTION SUBCUTANEOUS at 08:13

## 2023-09-28 RX ADMIN — Medication 3: at 16:51

## 2023-09-28 RX ADMIN — LISINOPRIL 10 MILLIGRAM(S): 2.5 TABLET ORAL at 05:19

## 2023-09-28 NOTE — PROGRESS NOTE ADULT - PROBLEM SELECTOR PLAN 1
CT Chest showing 8cm RUL Mass  ID following for possible infectious process, currently off ABX  Ion Guided Biopsy of mass on Friday 9/29, with Dr. Mejia  TTE performed, unchanged from prior. Cleared by medicine and cardiology for procedure  C/w Augmentin for tooth pain and r/o infection  Okay to resume DVT ppx Lovenox (hold dose day of procedure- ordered)  Hx CAD, MI, PCI w/ stents, HTN, DM2 with A1c >16.9 and bipolar disorder not compliant on medications  Psych cleared need for 1:1 sitter  C/w ASA, BB, insulin coverage  Thoracic surgery to follow.   Plan discussed with Dr. Mejia during AM rounds.

## 2023-09-28 NOTE — PROGRESS NOTE ADULT - SUBJECTIVE AND OBJECTIVE BOX
[facility] Division of Hospital Medicine  Tyler Rodríguez MD    Chief Complaint:  Patient is a 42y old  Female who presents with a chief complaint of Hyperglycemia (27 Sep 2023 14:50)      SUBJECTIVE / OVERNIGHT EVENTS:  Patient seen and examined at bedside. No acute events reported overnight. No new complaints.    MEDICATIONS  (STANDING):  amoxicillin  875 milliGRAM(s)/clavulanate 1 Tablet(s) Oral two times a day  atorvastatin 20 milliGRAM(s) Oral at bedtime  carvedilol 12.5 milliGRAM(s) Oral every 12 hours  dextrose 5%. 1000 milliLiter(s) (100 mL/Hr) IV Continuous <Continuous>  dextrose 5%. 1000 milliLiter(s) (50 mL/Hr) IV Continuous <Continuous>  dextrose 50% Injectable 25 Gram(s) IV Push once  dextrose 50% Injectable 12.5 Gram(s) IV Push once  dextrose 50% Injectable 25 Gram(s) IV Push once  glucagon  Injectable 1 milliGRAM(s) IntraMuscular once  hydrALAZINE 50 milliGRAM(s) Oral every 8 hours  influenza   Vaccine 0.5 milliLiter(s) IntraMuscular once  insulin glargine Injectable (LANTUS) 24 Unit(s) SubCutaneous at bedtime  insulin glargine Injectable (LANTUS) 4 Unit(s) SubCutaneous every morning  insulin lispro (ADMELOG) corrective regimen sliding scale   SubCutaneous three times a day before meals  insulin lispro Injectable (ADMELOG) 12 Unit(s) SubCutaneous three times a day before meals  levothyroxine 100 MICROGram(s) Oral daily  lisinopril 10 milliGRAM(s) Oral daily  pantoprazole    Tablet 40 milliGRAM(s) Oral before breakfast  risperiDONE   Tablet 0.5 milliGRAM(s) Oral at bedtime  sertraline 25 milliGRAM(s) Oral daily    MEDICATIONS  (PRN):  acetaminophen     Tablet .. 650 milliGRAM(s) Oral every 6 hours PRN Temp greater or equal to 38C (100.4F), Mild Pain (1 - 3)  dextrose Oral Gel 15 Gram(s) Oral once PRN Blood Glucose LESS THAN 70 milliGRAM(s)/deciliter  melatonin 3 milliGRAM(s) Oral at bedtime PRN Insomnia  morphine  - Injectable 2 milliGRAM(s) IV Push every 6 hours PRN Severe Pain (7 - 10)        I&O's Summary    28 Sep 2023 07:01  -  28 Sep 2023 11:01  --------------------------------------------------------  IN: 320 mL / OUT: 0 mL / NET: 320 mL        PHYSICAL EXAM:  Vital Signs Last 24 Hrs  T(C): 36.7 (28 Sep 2023 05:06), Max: 37.2 (27 Sep 2023 16:42)  T(F): 98.1 (28 Sep 2023 05:06), Max: 98.9 (27 Sep 2023 16:42)  HR: 81 (28 Sep 2023 05:06) (81 - 95)  BP: 126/73 (28 Sep 2023 05:06) (100/61 - 136/80)  BP(mean): --  RR: 18 (28 Sep 2023 05:06) (18 - 18)  SpO2: 97% (28 Sep 2023 05:06) (94% - 97%)    Parameters below as of 28 Sep 2023 05:06  Patient On (Oxygen Delivery Method): room air            CONSTITUTIONAL: NAD  HEENT: NC/AT, PERRL, no JVD  RESPIRATORY: CTA bilaterally, normal effort  CARDIOVASCULAR: RRR, S1/S2+, no m/g/r  ABDOMEN: Nontender to palpation, normoactive bowel sounds, no rebound/guarding  MUSCULOSKELETAL: No edema, cyanosis or deformities.  PSYCH: Calm, affect appropriate.  NEUROLOGY: Awake, alert, no focal neurological deficits.   SKIN: No rashes; no palpable lesions  VASC: Distal pulses palpable    LABS:                        9.4    6.46  )-----------( 369      ( 28 Sep 2023 05:04 )             30.6     09-28    132<L>  |  98  |  12.0  ----------------------------<  149<H>  4.2   |  22.0  |  0.69    Ca    8.5      28 Sep 2023 05:04    TPro  6.7  /  Alb  2.7<L>  /  TBili  <0.2<L>  /  DBili  x   /  AST  39<H>  /  ALT  27  /  AlkPhos  96  09-28          Urinalysis Basic - ( 28 Sep 2023 05:04 )    Color: x / Appearance: x / SG: x / pH: x  Gluc: 149 mg/dL / Ketone: x  / Bili: x / Urobili: x   Blood: x / Protein: x / Nitrite: x   Leuk Esterase: x / RBC: x / WBC x   Sq Epi: x / Non Sq Epi: x / Bacteria: x        CAPILLARY BLOOD GLUCOSE      POCT Blood Glucose.: 192 mg/dL (28 Sep 2023 07:51)  POCT Blood Glucose.: 128 mg/dL (27 Sep 2023 21:50)  POCT Blood Glucose.: 184 mg/dL (27 Sep 2023 16:45)  POCT Blood Glucose.: 227 mg/dL (27 Sep 2023 11:02)        RADIOLOGY & ADDITIONAL TESTS:  Results Reviewed:   Imaging Personally Reviewed:  Electrocardiogram Personally Reviewed:

## 2023-09-28 NOTE — PROGRESS NOTE ADULT - SUBJECTIVE AND OBJECTIVE BOX
INTERVAL HPI/OVERNIGHT EVENTS:  Follow up for diabetes    ROS: Patient denies chest pain, SOB, abd pain, N/V, or any other complaints. All remainder ROS negative.    MEDICATIONS  (STANDING):  amoxicillin  875 milliGRAM(s)/clavulanate 1 Tablet(s) Oral two times a day  atorvastatin 20 milliGRAM(s) Oral at bedtime  carvedilol 12.5 milliGRAM(s) Oral every 12 hours  dextrose 5%. 1000 milliLiter(s) (100 mL/Hr) IV Continuous <Continuous>  dextrose 5%. 1000 milliLiter(s) (50 mL/Hr) IV Continuous <Continuous>  dextrose 50% Injectable 25 Gram(s) IV Push once  dextrose 50% Injectable 12.5 Gram(s) IV Push once  dextrose 50% Injectable 25 Gram(s) IV Push once  glucagon  Injectable 1 milliGRAM(s) IntraMuscular once  hydrALAZINE 50 milliGRAM(s) Oral every 8 hours  influenza   Vaccine 0.5 milliLiter(s) IntraMuscular once  insulin glargine Injectable (LANTUS) 24 Unit(s) SubCutaneous at bedtime  insulin glargine Injectable (LANTUS) 4 Unit(s) SubCutaneous every morning  insulin lispro (ADMELOG) corrective regimen sliding scale   SubCutaneous three times a day before meals  insulin lispro Injectable (ADMELOG) 12 Unit(s) SubCutaneous three times a day before meals  levothyroxine 100 MICROGram(s) Oral daily  lisinopril 10 milliGRAM(s) Oral daily  pantoprazole    Tablet 40 milliGRAM(s) Oral before breakfast  risperiDONE   Tablet 0.5 milliGRAM(s) Oral at bedtime  sertraline 25 milliGRAM(s) Oral daily    MEDICATIONS  (PRN):  acetaminophen     Tablet .. 650 milliGRAM(s) Oral every 6 hours PRN Temp greater or equal to 38C (100.4F), Mild Pain (1 - 3)  dextrose Oral Gel 15 Gram(s) Oral once PRN Blood Glucose LESS THAN 70 milliGRAM(s)/deciliter  melatonin 3 milliGRAM(s) Oral at bedtime PRN Insomnia  morphine  - Injectable 2 milliGRAM(s) IV Push every 6 hours PRN Severe Pain (7 - 10)      Allergies  No Known Drug Allergies  shellfish (Unknown)      Vital Signs Last 24 Hrs  T(C): 37.2 (28 Sep 2023 11:03), Max: 37.2 (27 Sep 2023 16:42)  T(F): 98.9 (28 Sep 2023 11:03), Max: 98.9 (27 Sep 2023 16:42)  HR: 92 (28 Sep 2023 13:50) (81 - 92)  BP: 122/76 (28 Sep 2023 13:50) (115/64 - 136/80)  BP(mean): --  RR: 18 (28 Sep 2023 11:03) (18 - 18)  SpO2: 98% (28 Sep 2023 11:03) (95% - 98%)    Parameters below as of 28 Sep 2023 11:03  Patient On (Oxygen Delivery Method): room air        PHYSICAL EXAM:  GENERAL: NAD, comfortable, facial swelling improved  NECK: Supple; trachea midline  CHEST/LUNG: Clear to auscultation bilaterally; No wheezes  HEART: Regular rate and rhythm; No murmurs, rubs, or gallops  ABDOMEN: Soft, Nontender, Nondistended; Bowel sounds present  NEURO: AAOx3, no tremor  EXTREMITIES:  2+ Peripheral Pulses, + mild le edema      LABS:                        9.4    6.46  )-----------( 369      ( 28 Sep 2023 05:04 )             30.6     09-28    132<L>  |  98  |  12.0  ----------------------------<  149<H>  4.2   |  22.0  |  0.69    Ca    8.5      28 Sep 2023 05:04    TPro  6.7  /  Alb  2.7<L>  /  TBili  <0.2<L>  /  DBili  x   /  AST  39<H>  /  ALT  27  /  AlkPhos  96  09-28    Urinalysis Basic - ( 28 Sep 2023 05:04 )    Color: x / Appearance: x / SG: x / pH: x  Gluc: 149 mg/dL / Ketone: x  / Bili: x / Urobili: x   Blood: x / Protein: x / Nitrite: x   Leuk Esterase: x / RBC: x / WBC x   Sq Epi: x / Non Sq Epi: x / Bacteria: x          POCT Blood Glucose.: 106 mg/dL (09-28-23 @ 11:54)  POCT Blood Glucose.: 192 mg/dL (09-28-23 @ 07:51)  POCT Blood Glucose.: 128 mg/dL (09-27-23 @ 21:50)  POCT Blood Glucose.: 184 mg/dL (09-27-23 @ 16:45)        Thyroid Stimulating Hormone, Serum: 2.47 uIU/mL (09-21-23 @ 07:24)

## 2023-09-28 NOTE — DIETITIAN INITIAL EVALUATION ADULT - OTHER INFO
43 yo female with PMH of DM2, CAD, MI w/stent, HTN, HLD, Bipolar, anxiety presents to ED with c/o feeling unwell. Pt keeps falling asleep and tells me she is 'upset'. Pt reports she recently lost 3 family members within a week of each other and has been having a difficult time coping so she has been using cocaine (last use was 4 days ago). She has been hearing voices for the last 3-4 days that have been telling her to hurt herself and others. She also has not been taking any of her medications for the past few weeks and does not remember them.

## 2023-09-28 NOTE — DIETITIAN INITIAL EVALUATION ADULT - PERTINENT MEDS FT
MEDICATIONS  (STANDING):  amoxicillin  875 milliGRAM(s)/clavulanate 1 Tablet(s) Oral two times a day  atorvastatin 20 milliGRAM(s) Oral at bedtime  carvedilol 12.5 milliGRAM(s) Oral every 12 hours  dextrose 5%. 1000 milliLiter(s) (100 mL/Hr) IV Continuous <Continuous>  dextrose 5%. 1000 milliLiter(s) (50 mL/Hr) IV Continuous <Continuous>  dextrose 50% Injectable 25 Gram(s) IV Push once  dextrose 50% Injectable 25 Gram(s) IV Push once  dextrose 50% Injectable 12.5 Gram(s) IV Push once  glucagon  Injectable 1 milliGRAM(s) IntraMuscular once  hydrALAZINE 50 milliGRAM(s) Oral every 8 hours  influenza   Vaccine 0.5 milliLiter(s) IntraMuscular once  insulin glargine Injectable (LANTUS) 4 Unit(s) SubCutaneous every morning  insulin glargine Injectable (LANTUS) 24 Unit(s) SubCutaneous at bedtime  insulin lispro (ADMELOG) corrective regimen sliding scale   SubCutaneous three times a day before meals  insulin lispro Injectable (ADMELOG) 12 Unit(s) SubCutaneous three times a day before meals  levothyroxine 100 MICROGram(s) Oral daily  lisinopril 10 milliGRAM(s) Oral daily  pantoprazole    Tablet 40 milliGRAM(s) Oral before breakfast  risperiDONE   Tablet 0.5 milliGRAM(s) Oral at bedtime  sertraline 25 milliGRAM(s) Oral daily    MEDICATIONS  (PRN):  acetaminophen     Tablet .. 650 milliGRAM(s) Oral every 6 hours PRN Temp greater or equal to 38C (100.4F), Mild Pain (1 - 3)  dextrose Oral Gel 15 Gram(s) Oral once PRN Blood Glucose LESS THAN 70 milliGRAM(s)/deciliter  melatonin 3 milliGRAM(s) Oral at bedtime PRN Insomnia  morphine  - Injectable 2 milliGRAM(s) IV Push every 6 hours PRN Severe Pain (7 - 10)

## 2023-09-28 NOTE — DIETITIAN INITIAL EVALUATION ADULT - NS FNS DIET ORDER
Diet, NPO:   NPO for Procedure/Test     NPO Start Date: 28-Sep-2023,   NPO Start Time: 23:59  Except Medications (09-27-23 @ 11:46)  Diet, Consistent Carbohydrate w/Evening Snack (09-20-23 @ 02:55)

## 2023-09-28 NOTE — PROGRESS NOTE ADULT - ASSESSMENT
42F with PMHx T2DM, non compliant, MI w/ stent, Bipolar, presents to ED c/o auditory hallucinations x4 days, voices commanding to hurt self and others. In ED, found to be hyperglycemic in the 700s.    Consulted for diabetes management  Home diabetes meds: Was suppose to be taking insulin but has not for months  Current a1c: >16.9%    1. T2DM - Blood glucoses improved- low normal at lunch  - Goal to transition to daily dose of Lantus qhs to improve adherence   - Decrease Lantus to 18 units qhs tonight while NPO  - Decrease Admelog to 8 units TID AC, hold if NPO  - C/w LSSI    2. Hypothyroidism  - TSH 2.47  - C.w Synthroid 100 mcg    3. Acute psychosis  - Cleared by Psych  - C/w Sertraline 25mg and Risperidone 0.5 mg    4. HLD  - Continue statin    5. Tooth Pain/ Left sided facial swelling  - Augmentin started by primary team    6. 8 cm RUL mass  - Lung bx planned for tomorrow 9/29

## 2023-09-28 NOTE — DIETITIAN INITIAL EVALUATION ADULT - PERTINENT LABORATORY DATA
09-28    132<L>  |  98  |  12.0  ----------------------------<  149<H>  4.2   |  22.0  |  0.69    Ca    8.5      28 Sep 2023 05:04    TPro  6.7  /  Alb  2.7<L>  /  TBili  <0.2<L>  /  DBili  x   /  AST  39<H>  /  ALT  27  /  AlkPhos  96  09-28  POCT Blood Glucose.: 106 mg/dL (09-28-23 @ 11:54)  A1C with Estimated Average Glucose Result: >16.9 % (09-20-23 @ 01:10)

## 2023-09-28 NOTE — DIETITIAN INITIAL EVALUATION ADULT - ORAL INTAKE PTA/DIET HISTORY
Pt reports generally having a small appetite, more often drinks her calories (coffee, juice, etc). Decreased intake in house 2/2 not liking food offered, family is bringing foods from home. Pt states UBW ~150-160lbs has remained consistent, unclear accuracy of admission weight, will continue to monitor. Discussed importance of adequate protein-energy intake, pairing CHO with protein/fat to improve BG control, MyPlate guidelines, increasing fiber intake. Pt receptive and understanding, states that she checks BG 4x/day. Hgb A1c Feb 2023 6.5% per Pt. RD to remain available.

## 2023-09-28 NOTE — PROGRESS NOTE ADULT - SUBJECTIVE AND OBJECTIVE BOX
Subjective: Patient seen and assessed for RUL mass. Patient states " ." At time of exam,    Pertinent events of the past 24 hours: Uneventful, recovering as expected    VITAL SIGNS  Vital Signs Last 24 Hrs  T(C): 37.2 (09-28-23 @ 11:03), Max: 37.2 (09-27-23 @ 16:42)  T(F): 98.9 (09-28-23 @ 11:03), Max: 98.9 (09-27-23 @ 16:42)  HR: 92 (09-28-23 @ 13:50) (81 - 92)  BP: 122/76 (09-28-23 @ 13:50) (115/64 - 136/80)  RR: 18 (09-28-23 @ 11:03) (18 - 18)  SpO2: 98% (09-28-23 @ 11:03) (95% - 98%)  on (O2)              MEDICATIONS  acetaminophen     Tablet .. 650 milliGRAM(s) Oral every 6 hours PRN  amoxicillin  875 milliGRAM(s)/clavulanate 1 Tablet(s) Oral two times a day  atorvastatin 20 milliGRAM(s) Oral at bedtime  carvedilol 12.5 milliGRAM(s) Oral every 12 hours  hydrALAZINE 50 milliGRAM(s) Oral every 8 hours  influenza   Vaccine 0.5 milliLiter(s) IntraMuscular once  insulin glargine Injectable (LANTUS) 24 Unit(s) SubCutaneous at bedtime  insulin glargine Injectable (LANTUS) 4 Unit(s) SubCutaneous every morning  insulin lispro (ADMELOG) corrective regimen sliding scale   SubCutaneous three times a day before meals  insulin lispro Injectable (ADMELOG) 12 Unit(s) SubCutaneous three times a day before meals  levothyroxine 100 MICROGram(s) Oral daily  lisinopril 10 milliGRAM(s) Oral daily  melatonin 3 milliGRAM(s) Oral at bedtime PRN  morphine  - Injectable 2 milliGRAM(s) IV Push every 6 hours PRN  pantoprazole    Tablet 40 milliGRAM(s) Oral before breakfast  risperiDONE   Tablet 0.5 milliGRAM(s) Oral at bedtime  sertraline 25 milliGRAM(s) Oral daily    PHYSICAL EXAM  .physical    Intake and Output  09-28 @ 07:01  -  09-28 @ 14:16  --------------------------------------------------------  IN: 600 mL / OUT: 0 mL / NET: 600 mL    Weights:  Daily     Daily   Admit Wt: Drug Dosing Weight  Height (cm): 160 (20 Sep 2023 00:08)  Weight (kg): 77.7 (20 Sep 2023 00:08)  BMI (kg/m2): 30.4 (20 Sep 2023 00:08)  BSA (m2): 1.81 (20 Sep 2023 00:08)    All laboratory results, radiology and medications reviewed.    LABS  09-28    132<L>  |  98  |  12.0  ----------------------------<  149<H>  4.2   |  22.0  |  0.69    Ca    8.5      28 Sep 2023 05:04    TPro  6.7  /  Alb  2.7<L>  /  TBili  <0.2<L>  /  DBili  x   /  AST  39<H>  /  ALT  27  /  AlkPhos  96  09-28                                 9.4    6.46  )-----------( 369      ( 28 Sep 2023 05:04 )             30.6            Bilirubin Total: <0.2 mg/dL (09-28 @ 05:04)    CAPILLARY BLOOD GLUCOSE  POCT Blood Glucose.: 106 mg/dL (28 Sep 2023 11:54)  POCT Blood Glucose.: 192 mg/dL (28 Sep 2023 07:51)  POCT Blood Glucose.: 128 mg/dL (27 Sep 2023 21:50)  POCT Blood Glucose.: 184 mg/dL (27 Sep 2023 16:45)           < from: Xray Chest 1 View- PORTABLE-Routine (Xray Chest 1 View- PORTABLE-Routine in AM.) (09.22.23 @ 04:53) >    INTERPRETATION:  Chest one view    HISTORY: Preop    COMPARISON STUDY: 9/20/2023    Frontal expiratory view of the chest shows the heart to be normal in   size. The lungs show similar right upper lobe mass and there is no   evidence of pneumothorax nor pleural effusion.    IMPRESSION:  Similar right mass.    < end of copied text >       Subjective: Patient seen and assessed for RUL mass. Patient states "I just want to get this over with." At time of exam, patient resting in bed in NAD, Pt denies chest pain, palpitations, dizziness, headache, shortness of breath, abdominal pain or N/V/D/C.     Pertinent events of the past 24 hours: Uneventful, recovering as expected    VITAL SIGNS  Vital Signs Last 24 Hrs  T(C): 37.2 (09-28-23 @ 11:03), Max: 37.2 (09-27-23 @ 16:42)  T(F): 98.9 (09-28-23 @ 11:03), Max: 98.9 (09-27-23 @ 16:42)  HR: 92 (09-28-23 @ 13:50) (81 - 92)  BP: 122/76 (09-28-23 @ 13:50) (115/64 - 136/80)  RR: 18 (09-28-23 @ 11:03) (18 - 18)  SpO2: 98% (09-28-23 @ 11:03) (95% - 98%)  on (O2)              MEDICATIONS  acetaminophen     Tablet .. 650 milliGRAM(s) Oral every 6 hours PRN  amoxicillin  875 milliGRAM(s)/clavulanate 1 Tablet(s) Oral two times a day  atorvastatin 20 milliGRAM(s) Oral at bedtime  carvedilol 12.5 milliGRAM(s) Oral every 12 hours  hydrALAZINE 50 milliGRAM(s) Oral every 8 hours  influenza   Vaccine 0.5 milliLiter(s) IntraMuscular once  insulin glargine Injectable (LANTUS) 24 Unit(s) SubCutaneous at bedtime  insulin glargine Injectable (LANTUS) 4 Unit(s) SubCutaneous every morning  insulin lispro (ADMELOG) corrective regimen sliding scale   SubCutaneous three times a day before meals  insulin lispro Injectable (ADMELOG) 12 Unit(s) SubCutaneous three times a day before meals  levothyroxine 100 MICROGram(s) Oral daily  lisinopril 10 milliGRAM(s) Oral daily  melatonin 3 milliGRAM(s) Oral at bedtime PRN  morphine  - Injectable 2 milliGRAM(s) IV Push every 6 hours PRN  pantoprazole    Tablet 40 milliGRAM(s) Oral before breakfast  risperiDONE   Tablet 0.5 milliGRAM(s) Oral at bedtime  sertraline 25 milliGRAM(s) Oral daily    PHYSICAL EXAM  Constitutional: NAD  Neuro: A+O x 3, non-focal, speech clear and intact  CV: regular rate, regular rhythm, +S1S2, no murmurs or rub  Pulm/chest: lung sounds CTA and equal bilaterally, no accessory muscle use noted  Abd: +BS, soft, NT, ND  Ext: MEJIA x 4, no C/C/E  Skin: warm, well perfused  Psych: calm, appropriate affect    Intake and Output  09-28 @ 07:01  -  09-28 @ 14:16  --------------------------------------------------------  IN: 600 mL / OUT: 0 mL / NET: 600 mL    Weights:  Daily     Daily   Admit Wt: Drug Dosing Weight  Height (cm): 160 (20 Sep 2023 00:08)  Weight (kg): 77.7 (20 Sep 2023 00:08)  BMI (kg/m2): 30.4 (20 Sep 2023 00:08)  BSA (m2): 1.81 (20 Sep 2023 00:08)    All laboratory results, radiology and medications reviewed.    LABS  09-28    132<L>  |  98  |  12.0  ----------------------------<  149<H>  4.2   |  22.0  |  0.69    Ca    8.5      28 Sep 2023 05:04    TPro  6.7  /  Alb  2.7<L>  /  TBili  <0.2<L>  /  DBili  x   /  AST  39<H>  /  ALT  27  /  AlkPhos  96  09-28                                 9.4    6.46  )-----------( 369      ( 28 Sep 2023 05:04 )             30.6            Bilirubin Total: <0.2 mg/dL (09-28 @ 05:04)    CAPILLARY BLOOD GLUCOSE  POCT Blood Glucose.: 106 mg/dL (28 Sep 2023 11:54)  POCT Blood Glucose.: 192 mg/dL (28 Sep 2023 07:51)  POCT Blood Glucose.: 128 mg/dL (27 Sep 2023 21:50)  POCT Blood Glucose.: 184 mg/dL (27 Sep 2023 16:45)           < from: Xray Chest 1 View- PORTABLE-Routine (Xray Chest 1 View- PORTABLE-Routine in AM.) (09.22.23 @ 04:53) >    INTERPRETATION:  Chest one view    HISTORY: Preop    COMPARISON STUDY: 9/20/2023    Frontal expiratory view of the chest shows the heart to be normal in   size. The lungs show similar right upper lobe mass and there is no   evidence of pneumothorax nor pleural effusion.    IMPRESSION:  Similar right mass.    < end of copied text >

## 2023-09-28 NOTE — PROGRESS NOTE ADULT - NS ATTEND AMEND GEN_ALL_CORE FT
plan discussed with NP and changes incorporated in the note.    agree with the plan above  improving control, probably related to improving infection  getting biopsy tomorrow, aim for only bedtime lantus tomorrow (rather than bid dosing)

## 2023-09-28 NOTE — PROGRESS NOTE ADULT - ASSESSMENT
42 year old female with PMH of DM, CAD, MI w/stent, Bipolar, anxiety presents to ED with auditory hallucinations.   In the ED, BG>700, pt to be admitted for uncontrolled diabetes with hyperglycemia. Also noted to have opacity on CXR    IDDM with hyperglycemia   - A1c>16  - Lantus 24u QHS, 4u qAM,   - Admelog 12 TIDAC, SS  - Endocrinology following    R lung abnormality   - CT Chest: 8 cm right upper lobe mass with associated pathologic lymphadenopathy, most suggestive of neoplasm. 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   - given leukocytosis concern for pneumonia, likely aspiration? Received antibiotics in ER, will hold off and monitor off antibiotics for now  - ID follow up noted  - Biopsy deferred due to tooth pain on Monday. Rescheduled for Friday. NPO at midnight.     Tooth pain  - Pain control  - Swelling/pain resolved  - Continue Augmentin    Acute psychosis   - cleared by psych, no need for 1:1  - Sertraline 25mg and Risperidone 0.5 mg qhs started by Psychiatry     CAD with Hx of Cardiac stent   - Stress test in Dec 2022 was normal   - Asymptomatic  - asa held     HTN   - Carvedilol  - Lisinopril 10mg daily     Asthma   - Not in exacerbation   - Nebs prn     Hypothyroidism   - Normal TSH/T4   - Levothyroxine 100mcg     Substance abuse   - pt reports mostly Marijuana and cocaine use  - counselled on quitting      dispo - patient is active

## 2023-09-28 NOTE — PROGRESS NOTE ADULT - PROBLEM SELECTOR PROBLEM 1
Mass of upper lobe of right lung

## 2023-09-29 ENCOUNTER — APPOINTMENT (OUTPATIENT)
Dept: THORACIC SURGERY | Facility: HOSPITAL | Age: 43
End: 2023-09-29

## 2023-09-29 LAB
ANION GAP SERPL CALC-SCNC: 11 MMOL/L — SIGNIFICANT CHANGE UP (ref 5–17)
APTT BLD: 34.6 SEC — SIGNIFICANT CHANGE UP (ref 24.5–35.6)
BUN SERPL-MCNC: 13.7 MG/DL — SIGNIFICANT CHANGE UP (ref 8–20)
CALCIUM SERPL-MCNC: 8.2 MG/DL — LOW (ref 8.4–10.5)
CHLORIDE SERPL-SCNC: 102 MMOL/L — SIGNIFICANT CHANGE UP (ref 96–108)
CO2 SERPL-SCNC: 22 MMOL/L — SIGNIFICANT CHANGE UP (ref 22–29)
CREAT SERPL-MCNC: 0.68 MG/DL — SIGNIFICANT CHANGE UP (ref 0.5–1.3)
EGFR: 110 ML/MIN/1.73M2 — SIGNIFICANT CHANGE UP
GLUCOSE BLDC GLUCOMTR-MCNC: 117 MG/DL — HIGH (ref 70–99)
GLUCOSE BLDC GLUCOMTR-MCNC: 158 MG/DL — HIGH (ref 70–99)
GLUCOSE BLDC GLUCOMTR-MCNC: 181 MG/DL — HIGH (ref 70–99)
GLUCOSE BLDC GLUCOMTR-MCNC: 293 MG/DL — HIGH (ref 70–99)
GLUCOSE SERPL-MCNC: 286 MG/DL — HIGH (ref 70–99)
HCT VFR BLD CALC: 29.2 % — LOW (ref 34.5–45)
HGB BLD-MCNC: 9.1 G/DL — LOW (ref 11.5–15.5)
INR BLD: 0.93 RATIO — SIGNIFICANT CHANGE UP (ref 0.85–1.18)
MCHC RBC-ENTMCNC: 27.5 PG — SIGNIFICANT CHANGE UP (ref 27–34)
MCHC RBC-ENTMCNC: 31.2 GM/DL — LOW (ref 32–36)
MCV RBC AUTO: 88.2 FL — SIGNIFICANT CHANGE UP (ref 80–100)
PLATELET # BLD AUTO: 442 K/UL — HIGH (ref 150–400)
POTASSIUM SERPL-MCNC: 4.4 MMOL/L — SIGNIFICANT CHANGE UP (ref 3.5–5.3)
POTASSIUM SERPL-SCNC: 4.4 MMOL/L — SIGNIFICANT CHANGE UP (ref 3.5–5.3)
PROTHROM AB SERPL-ACNC: 10.3 SEC — SIGNIFICANT CHANGE UP (ref 9.5–13)
RBC # BLD: 3.31 M/UL — LOW (ref 3.8–5.2)
RBC # FLD: 16.6 % — HIGH (ref 10.3–14.5)
SODIUM SERPL-SCNC: 135 MMOL/L — SIGNIFICANT CHANGE UP (ref 135–145)
WBC # BLD: 6.33 K/UL — SIGNIFICANT CHANGE UP (ref 3.8–10.5)
WBC # FLD AUTO: 6.33 K/UL — SIGNIFICANT CHANGE UP (ref 3.8–10.5)

## 2023-09-29 PROCEDURE — 99232 SBSQ HOSP IP/OBS MODERATE 35: CPT

## 2023-09-29 RX ORDER — INSULIN LISPRO 100/ML
10 VIAL (ML) SUBCUTANEOUS
Refills: 0 | Status: DISCONTINUED | OUTPATIENT
Start: 2023-09-29 | End: 2023-09-30

## 2023-09-29 RX ORDER — INSULIN GLARGINE 100 [IU]/ML
30 INJECTION, SOLUTION SUBCUTANEOUS AT BEDTIME
Refills: 0 | Status: DISCONTINUED | OUTPATIENT
Start: 2023-09-29 | End: 2023-09-30

## 2023-09-29 RX ADMIN — Medication 100 MICROGRAM(S): at 05:32

## 2023-09-29 RX ADMIN — CARVEDILOL PHOSPHATE 12.5 MILLIGRAM(S): 80 CAPSULE, EXTENDED RELEASE ORAL at 17:45

## 2023-09-29 RX ADMIN — Medication 1 TABLET(S): at 05:32

## 2023-09-29 RX ADMIN — Medication 1: at 16:57

## 2023-09-29 RX ADMIN — INSULIN GLARGINE 30 UNIT(S): 100 INJECTION, SOLUTION SUBCUTANEOUS at 22:10

## 2023-09-29 RX ADMIN — Medication 1 TABLET(S): at 17:45

## 2023-09-29 RX ADMIN — Medication 10 UNIT(S): at 16:57

## 2023-09-29 RX ADMIN — ATORVASTATIN CALCIUM 20 MILLIGRAM(S): 80 TABLET, FILM COATED ORAL at 22:10

## 2023-09-29 RX ADMIN — CARVEDILOL PHOSPHATE 12.5 MILLIGRAM(S): 80 CAPSULE, EXTENDED RELEASE ORAL at 05:32

## 2023-09-29 RX ADMIN — Medication 3: at 08:16

## 2023-09-29 RX ADMIN — Medication 50 MILLIGRAM(S): at 05:33

## 2023-09-29 RX ADMIN — Medication 8 UNIT(S): at 08:16

## 2023-09-29 RX ADMIN — SERTRALINE 25 MILLIGRAM(S): 25 TABLET, FILM COATED ORAL at 12:35

## 2023-09-29 RX ADMIN — RISPERIDONE 0.5 MILLIGRAM(S): 4 TABLET ORAL at 22:10

## 2023-09-29 RX ADMIN — Medication 50 MILLIGRAM(S): at 13:00

## 2023-09-29 RX ADMIN — Medication 8 UNIT(S): at 12:34

## 2023-09-29 RX ADMIN — LISINOPRIL 10 MILLIGRAM(S): 2.5 TABLET ORAL at 05:32

## 2023-09-29 RX ADMIN — Medication 50 MILLIGRAM(S): at 22:10

## 2023-09-29 NOTE — PROGRESS NOTE ADULT - REASON FOR ADMISSION
Hyperglycemia

## 2023-09-29 NOTE — PROGRESS NOTE ADULT - ASSESSMENT
42 year old female with PMH of DM, CAD, MI w/stent, Bipolar, anxiety presents to ED with auditory hallucinations.   In the ED, BG>700, pt to be admitted for uncontrolled diabetes with hyperglycemia. Also noted to have opacity on CXR    IDDM with hyperglycemia   - A1c>16  - Lantus 18u QHS, Admelog 8 TIDAC  - SS/FS  - Endocrinology following    R lung abnormality   - CT Chest: 8 cm right upper lobe mass with associated pathologic lymphadenopathy, most suggestive of neoplasm. 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   - given leukocytosis concern for pneumonia, likely aspiration? Received antibiotics in ER, will hold off and monitor off antibiotics for now  - ID follow up noted  - NPO for biopsy today.     Tooth pain  - Pain control  - Swelling/pain resolved  - Continue Augmentin, would treat for 10 days total.     Acute psychosis   - cleared by psych, no need for 1:1  - Sertraline 25mg and Risperidone 0.5 mg qhs started by Psychiatry     CAD with Hx of Cardiac stent   - Stress test in Dec 2022 was normal   - Asymptomatic  - asa held for biopsy     HTN   - Carvedilol  - Lisinopril 10mg daily     Asthma   - Not in exacerbation   - Nebs prn     Hypothyroidism   - Normal TSH/T4   - Levothyroxine 100mcg     Substance abuse   - pt reports mostly Marijuana and cocaine use  - counselled on quitting      Dispo: For biopsy today. D/c planning.

## 2023-09-29 NOTE — PROGRESS NOTE ADULT - NS ATTEND AMEND GEN_ALL_CORE FT
plan discussed with NP and changes incorporated in the note.    agree with the plan above  change insulin doses as above

## 2023-09-29 NOTE — PROGRESS NOTE ADULT - SUBJECTIVE AND OBJECTIVE BOX
[facility] Division of Hospital Medicine  Tyler Rodríguez MD    Chief Complaint:  Patient is a 42y old  Female who presents with a chief complaint of Hyperglycemia (28 Sep 2023 15:16)      SUBJECTIVE / OVERNIGHT EVENTS:  Patient seen and examined at bedside. No acute events reported overnight. No new complaints.    MEDICATIONS  (STANDING):  amoxicillin  875 milliGRAM(s)/clavulanate 1 Tablet(s) Oral two times a day  atorvastatin 20 milliGRAM(s) Oral at bedtime  carvedilol 12.5 milliGRAM(s) Oral every 12 hours  dextrose 5%. 1000 milliLiter(s) (100 mL/Hr) IV Continuous <Continuous>  dextrose 5%. 1000 milliLiter(s) (50 mL/Hr) IV Continuous <Continuous>  dextrose 50% Injectable 25 Gram(s) IV Push once  dextrose 50% Injectable 25 Gram(s) IV Push once  dextrose 50% Injectable 12.5 Gram(s) IV Push once  glucagon  Injectable 1 milliGRAM(s) IntraMuscular once  hydrALAZINE 50 milliGRAM(s) Oral every 8 hours  influenza   Vaccine 0.5 milliLiter(s) IntraMuscular once  insulin glargine Injectable (LANTUS) 18 Unit(s) SubCutaneous at bedtime  insulin lispro (ADMELOG) corrective regimen sliding scale   SubCutaneous three times a day before meals  insulin lispro Injectable (ADMELOG) 8 Unit(s) SubCutaneous three times a day before meals  levothyroxine 100 MICROGram(s) Oral daily  lisinopril 10 milliGRAM(s) Oral daily  pantoprazole    Tablet 40 milliGRAM(s) Oral before breakfast  risperiDONE   Tablet 0.5 milliGRAM(s) Oral at bedtime  sertraline 25 milliGRAM(s) Oral daily    MEDICATIONS  (PRN):  acetaminophen     Tablet .. 650 milliGRAM(s) Oral every 6 hours PRN Temp greater or equal to 38C (100.4F), Mild Pain (1 - 3)  dextrose Oral Gel 15 Gram(s) Oral once PRN Blood Glucose LESS THAN 70 milliGRAM(s)/deciliter  melatonin 3 milliGRAM(s) Oral at bedtime PRN Insomnia  morphine  - Injectable 2 milliGRAM(s) IV Push every 6 hours PRN Severe Pain (7 - 10)        I&O's Summary    28 Sep 2023 07:01  -  29 Sep 2023 07:00  --------------------------------------------------------  IN: 1060 mL / OUT: 0 mL / NET: 1060 mL        PHYSICAL EXAM:  Vital Signs Last 24 Hrs  T(C): 36.7 (29 Sep 2023 10:53), Max: 37.3 (28 Sep 2023 16:30)  T(F): 98 (29 Sep 2023 10:53), Max: 99.2 (28 Sep 2023 16:30)  HR: 79 (29 Sep 2023 10:53) (79 - 100)  BP: 108/68 (29 Sep 2023 10:53) (108/68 - 154/85)  BP(mean): --  RR: 18 (29 Sep 2023 10:53) (18 - 18)  SpO2: 94% (29 Sep 2023 10:53) (94% - 97%)    Parameters below as of 29 Sep 2023 10:53  Patient On (Oxygen Delivery Method): room air            CONSTITUTIONAL: NAD  HEENT: NC/AT, PERRL, no JVD  RESPIRATORY: CTA bilaterally, normal effort  CARDIOVASCULAR: RRR, S1/S2+, no m/g/r  ABDOMEN: Nontender to palpation, normoactive bowel sounds, no rebound/guarding  MUSCULOSKELETAL: No edema, cyanosis or deformities.  PSYCH: Calm, affect appropriate.  NEUROLOGY: Awake, alert, no focal neurological deficits.   SKIN: No rashes; no palpable lesions  VASC: Distal pulses palpable    LABS:                        9.1    6.33  )-----------( 442      ( 29 Sep 2023 06:25 )             29.2     09-29    135  |  102  |  13.7  ----------------------------<  286<H>  4.4   |  22.0  |  0.68    Ca    8.2<L>      29 Sep 2023 06:25    TPro  6.7  /  Alb  2.7<L>  /  TBili  <0.2<L>  /  DBili  x   /  AST  39<H>  /  ALT  27  /  AlkPhos  96  09-28    PT/INR - ( 29 Sep 2023 06:25 )   PT: 10.3 sec;   INR: 0.93 ratio         PTT - ( 29 Sep 2023 06:25 )  PTT:34.6 sec      Urinalysis Basic - ( 29 Sep 2023 06:25 )    Color: x / Appearance: x / SG: x / pH: x  Gluc: 286 mg/dL / Ketone: x  / Bili: x / Urobili: x   Blood: x / Protein: x / Nitrite: x   Leuk Esterase: x / RBC: x / WBC x   Sq Epi: x / Non Sq Epi: x / Bacteria: x        CAPILLARY BLOOD GLUCOSE      POCT Blood Glucose.: 293 mg/dL (29 Sep 2023 08:11)  POCT Blood Glucose.: 260 mg/dL (28 Sep 2023 21:34)  POCT Blood Glucose.: 258 mg/dL (28 Sep 2023 16:48)  POCT Blood Glucose.: 106 mg/dL (28 Sep 2023 11:54)        RADIOLOGY & ADDITIONAL TESTS:  Results Reviewed:   Imaging Personally Reviewed:  Electrocardiogram Personally Reviewed:

## 2023-09-29 NOTE — CHART NOTE - NSCHARTNOTEFT_GEN_A_CORE
patient refusing EBUS today   recommend outpatient follow up  please include the following in discharge instructions: Please call the thoracic surgery office at [phone] to make your follow up appointment with Dr. Mejia. The thoracic surgery office is located at [facility], first floor. Take a left at the end of the lobby until the end of that suarez (past the elevator bank). Make a left and the office is on your right across from the elevators.   Will sign off at this time  Above plan discussed with Dr. Mejia

## 2023-09-29 NOTE — PROGRESS NOTE ADULT - SUBJECTIVE AND OBJECTIVE BOX
INTERVAL HPI/OVERNIGHT EVENTS: Did not go for lung bx today- patient was nervous and did not want to proceed  Follow up for diabetes management    ROS: Patient denies chest pain, SOB, abd pain, N/V.    MEDICATIONS  (STANDING):  amoxicillin  875 milliGRAM(s)/clavulanate 1 Tablet(s) Oral two times a day  atorvastatin 20 milliGRAM(s) Oral at bedtime  carvedilol 12.5 milliGRAM(s) Oral every 12 hours  dextrose 5%. 1000 milliLiter(s) (100 mL/Hr) IV Continuous <Continuous>  dextrose 5%. 1000 milliLiter(s) (50 mL/Hr) IV Continuous <Continuous>  dextrose 50% Injectable 25 Gram(s) IV Push once  dextrose 50% Injectable 25 Gram(s) IV Push once  dextrose 50% Injectable 12.5 Gram(s) IV Push once  glucagon  Injectable 1 milliGRAM(s) IntraMuscular once  hydrALAZINE 50 milliGRAM(s) Oral every 8 hours  influenza   Vaccine 0.5 milliLiter(s) IntraMuscular once  insulin glargine Injectable (LANTUS) 18 Unit(s) SubCutaneous at bedtime  insulin lispro (ADMELOG) corrective regimen sliding scale   SubCutaneous three times a day before meals  insulin lispro Injectable (ADMELOG) 8 Unit(s) SubCutaneous three times a day before meals  levothyroxine 100 MICROGram(s) Oral daily  lisinopril 10 milliGRAM(s) Oral daily  pantoprazole    Tablet 40 milliGRAM(s) Oral before breakfast  risperiDONE   Tablet 0.5 milliGRAM(s) Oral at bedtime  sertraline 25 milliGRAM(s) Oral daily    MEDICATIONS  (PRN):  acetaminophen     Tablet .. 650 milliGRAM(s) Oral every 6 hours PRN Temp greater or equal to 38C (100.4F), Mild Pain (1 - 3)  dextrose Oral Gel 15 Gram(s) Oral once PRN Blood Glucose LESS THAN 70 milliGRAM(s)/deciliter  melatonin 3 milliGRAM(s) Oral at bedtime PRN Insomnia  morphine  - Injectable 2 milliGRAM(s) IV Push every 6 hours PRN Severe Pain (7 - 10)      Allergies  No Known Drug Allergies  shellfish (Unknown)      Vital Signs Last 24 Hrs  T(C): 36.7 (29 Sep 2023 10:53), Max: 37.3 (28 Sep 2023 16:30)  T(F): 98 (29 Sep 2023 10:53), Max: 99.2 (28 Sep 2023 16:30)  HR: 89 (29 Sep 2023 12:44) (79 - 100)  BP: 143/79 (29 Sep 2023 12:44) (108/68 - 154/85)  BP(mean): 100 (29 Sep 2023 12:44) (100 - 100)  RR: 18 (29 Sep 2023 10:53) (18 - 18)  SpO2: 94% (29 Sep 2023 10:53) (94% - 97%)    Parameters below as of 29 Sep 2023 10:53  Patient On (Oxygen Delivery Method): room air        PHYSICAL EXAM:  GENERAL: NAD, comfortable  NECK: Supple; trachea midline  CHEST/LUNG: Clear to auscultation bilaterally; No wheezes  HEART: Regular rate and rhythm; No murmurs, rubs, or gallops  ABDOMEN: Soft, Nontender, Nondistended; Bowel sounds present  NEURO: AAOx3, no tremor  EXTREMITIES:  2+ Peripheral Pulses, mild bl le edema        LABS:                        9.1    6.33  )-----------( 442      ( 29 Sep 2023 06:25 )             29.2     09-29    135  |  102  |  13.7  ----------------------------<  286<H>  4.4   |  22.0  |  0.68    Ca    8.2<L>      29 Sep 2023 06:25    TPro  6.7  /  Alb  2.7<L>  /  TBili  <0.2<L>  /  DBili  x   /  AST  39<H>  /  ALT  27  /  AlkPhos  96  09-28    Urinalysis Basic - ( 29 Sep 2023 06:25 )    Color: x / Appearance: x / SG: x / pH: x  Gluc: 286 mg/dL / Ketone: x  / Bili: x / Urobili: x   Blood: x / Protein: x / Nitrite: x   Leuk Esterase: x / RBC: x / WBC x   Sq Epi: x / Non Sq Epi: x / Bacteria: x          POCT Blood Glucose.: 117 mg/dL (09-29-23 @ 12:30)  POCT Blood Glucose.: 293 mg/dL (09-29-23 @ 08:11)  POCT Blood Glucose.: 260 mg/dL (09-28-23 @ 21:34)  POCT Blood Glucose.: 258 mg/dL (09-28-23 @ 16:48)        Thyroid Stimulating Hormone, Serum: 2.47 uIU/mL (09-21-23 @ 07:24)   INTERVAL HPI/OVERNIGHT EVENTS: Did not go for lung bx today- patient was nervous and did not want to proceed  Follow up for diabetes management    ROS: Patient denies chest pain, SOB, abd pain, N/V.    MEDICATIONS  (STANDING):  amoxicillin  875 milliGRAM(s)/clavulanate 1 Tablet(s) Oral two times a day  atorvastatin 20 milliGRAM(s) Oral at bedtime  carvedilol 12.5 milliGRAM(s) Oral every 12 hours  dextrose 5%. 1000 milliLiter(s) (100 mL/Hr) IV Continuous <Continuous>  dextrose 5%. 1000 milliLiter(s) (50 mL/Hr) IV Continuous <Continuous>  dextrose 50% Injectable 25 Gram(s) IV Push once  dextrose 50% Injectable 25 Gram(s) IV Push once  dextrose 50% Injectable 12.5 Gram(s) IV Push once  glucagon  Injectable 1 milliGRAM(s) IntraMuscular once  hydrALAZINE 50 milliGRAM(s) Oral every 8 hours  influenza   Vaccine 0.5 milliLiter(s) IntraMuscular once  insulin glargine Injectable (LANTUS) 18 Unit(s) SubCutaneous at bedtime  insulin lispro (ADMELOG) corrective regimen sliding scale   SubCutaneous three times a day before meals  insulin lispro Injectable (ADMELOG) 8 Unit(s) SubCutaneous three times a day before meals  levothyroxine 100 MICROGram(s) Oral daily  lisinopril 10 milliGRAM(s) Oral daily  pantoprazole    Tablet 40 milliGRAM(s) Oral before breakfast  risperiDONE   Tablet 0.5 milliGRAM(s) Oral at bedtime  sertraline 25 milliGRAM(s) Oral daily    MEDICATIONS  (PRN):  acetaminophen     Tablet .. 650 milliGRAM(s) Oral every 6 hours PRN Temp greater or equal to 38C (100.4F), Mild Pain (1 - 3)  dextrose Oral Gel 15 Gram(s) Oral once PRN Blood Glucose LESS THAN 70 milliGRAM(s)/deciliter  melatonin 3 milliGRAM(s) Oral at bedtime PRN Insomnia  morphine  - Injectable 2 milliGRAM(s) IV Push every 6 hours PRN Severe Pain (7 - 10)      Allergies  No Known Drug Allergies  shellfish (Unknown)      Vital Signs Last 24 Hrs  T(C): 36.7 (29 Sep 2023 10:53), Max: 37.3 (28 Sep 2023 16:30)  T(F): 98 (29 Sep 2023 10:53), Max: 99.2 (28 Sep 2023 16:30)  HR: 89 (29 Sep 2023 12:44) (79 - 100)  BP: 143/79 (29 Sep 2023 12:44) (108/68 - 154/85)  BP(mean): 100 (29 Sep 2023 12:44) (100 - 100)  RR: 18 (29 Sep 2023 10:53) (18 - 18)  SpO2: 94% (29 Sep 2023 10:53) (94% - 97%)    Parameters below as of 29 Sep 2023 10:53  Patient On (Oxygen Delivery Method): room air        PHYSICAL EXAM:  GENERAL: NAD, comfortable, left sided facial swelling improved  NECK: Supple; trachea midline  CHEST/LUNG: Clear to auscultation bilaterally; No wheezes  HEART: Regular rate and rhythm; No murmurs, rubs, or gallops  ABDOMEN: Soft, Nontender, Nondistended; Bowel sounds present  NEURO: AAOx3, no tremor  EXTREMITIES:  2+ Peripheral Pulses, mild bl le edema        LABS:                        9.1    6.33  )-----------( 442      ( 29 Sep 2023 06:25 )             29.2     09-29    135  |  102  |  13.7  ----------------------------<  286<H>  4.4   |  22.0  |  0.68    Ca    8.2<L>      29 Sep 2023 06:25    TPro  6.7  /  Alb  2.7<L>  /  TBili  <0.2<L>  /  DBili  x   /  AST  39<H>  /  ALT  27  /  AlkPhos  96  09-28    Urinalysis Basic - ( 29 Sep 2023 06:25 )    Color: x / Appearance: x / SG: x / pH: x  Gluc: 286 mg/dL / Ketone: x  / Bili: x / Urobili: x   Blood: x / Protein: x / Nitrite: x   Leuk Esterase: x / RBC: x / WBC x   Sq Epi: x / Non Sq Epi: x / Bacteria: x          POCT Blood Glucose.: 117 mg/dL (09-29-23 @ 12:30)  POCT Blood Glucose.: 293 mg/dL (09-29-23 @ 08:11)  POCT Blood Glucose.: 260 mg/dL (09-28-23 @ 21:34)  POCT Blood Glucose.: 258 mg/dL (09-28-23 @ 16:48)        Thyroid Stimulating Hormone, Serum: 2.47 uIU/mL (09-21-23 @ 07:24)

## 2023-09-29 NOTE — PROGRESS NOTE ADULT - NS ATTEND OPT1 GEN_ALL_CORE
I independently performed the documented:
I independently performed the documented:
I attest my time as attending is greater than 50% of the total combined time spent on qualifying patient care activities by the PA/NP and attending.
I independently performed the documented:

## 2023-09-29 NOTE — PROGRESS NOTE ADULT - ASSESSMENT
42F with PMHx T2DM, non compliant, MI w/ stent, Bipolar, presents to ED c/o auditory hallucinations x4 days, voices commanding to hurt self and others. In ED, found to be hyperglycemic in the 700s.    Consulted for diabetes management  Home diabetes meds: Was suppose to be taking insulin but has not for months  Current a1c: >16.9%    1. T2DM   - Blood glucoses improved overall- low normal at lunch but was given premeal and SSI for breakfast while NPO  - Increase Lantus to 30 units qhs tonight  - Increase Admelog to 10 units TID AC  - C/w LSSI    2. Hypothyroidism  - TSH 2.47  - C.w Synthroid 100 mcg    3. Acute psychosis  - Cleared by Psych  - C/w Sertraline 25mg and Risperidone 0.5 mg    4. HLD  - Continue statin    5. Tooth Pain/ Left sided facial swelling  - Augmentin started by primary team    6. 8 cm RUL mass  - Lung bx was scheduled for today 9/29- pt did not go through, she was nervous/scared 42F with PMHx T2DM, non compliant, MI w/ stent, Bipolar, presents to ED c/o auditory hallucinations x4 days, voices commanding to hurt self and others. In ED, found to be hyperglycemic in the 700s.    Consulted for diabetes management  Home diabetes meds: Was suppose to be taking insulin but has not for months  Current a1c: >16.9%    1. T2DM   - Blood glucoses improved overall- low normal at lunch but was given premeal and SSI for breakfast while NPO  - Increase Lantus to 30 units qhs tonight  - Increase Admelog to 10 units TID AC  - C/w LSSI    2. Hypothyroidism  - TSH 2.47  - C.w Synthroid 100 mcg    3. Acute psychosis  - Cleared by Psych  - C/w Sertraline 25mg and Risperidone 0.5 mg    4. HLD  - Continue statin    5. Tooth Pain/ Left sided facial swelling  - on abx    6. 8 cm RUL mass  - Lung bx was scheduled for today 9/29- pt did not go through, she was nervous/scared

## 2023-09-29 NOTE — PROGRESS NOTE ADULT - PROVIDER SPECIALTY LIST ADULT
Endocrinology
Hospitalist
Hospitalist
Cardiology
Endocrinology
Hospitalist
Hospitalist
Thoracic Surgery
Thoracic Surgery
Endocrinology
Hospitalist
Infectious Disease
Thoracic Surgery
Endocrinology
Hospitalist
Internal Medicine
CT Surgery
Thoracic Surgery

## 2023-09-30 ENCOUNTER — TRANSCRIPTION ENCOUNTER (OUTPATIENT)
Age: 43
End: 2023-09-30

## 2023-09-30 VITALS
DIASTOLIC BLOOD PRESSURE: 68 MMHG | OXYGEN SATURATION: 98 % | TEMPERATURE: 98 F | RESPIRATION RATE: 18 BRPM | HEART RATE: 97 BPM | SYSTOLIC BLOOD PRESSURE: 130 MMHG

## 2023-09-30 LAB
GLUCOSE BLDC GLUCOMTR-MCNC: 109 MG/DL — HIGH (ref 70–99)
GLUCOSE BLDC GLUCOMTR-MCNC: 230 MG/DL — HIGH (ref 70–99)

## 2023-09-30 PROCEDURE — 99239 HOSP IP/OBS DSCHRG MGMT >30: CPT

## 2023-09-30 RX ORDER — SERTRALINE 25 MG/1
1 TABLET, FILM COATED ORAL
Qty: 30 | Refills: 0
Start: 2023-09-30 | End: 2023-10-29

## 2023-09-30 RX ORDER — INSULIN LISPRO 100/ML
10 VIAL (ML) SUBCUTANEOUS
Qty: 3 | Refills: 0
Start: 2023-09-30 | End: 2023-10-29

## 2023-09-30 RX ORDER — LEVOTHYROXINE SODIUM 125 MCG
1 TABLET ORAL
Qty: 30 | Refills: 0
Start: 2023-09-30 | End: 2023-10-29

## 2023-09-30 RX ORDER — RISPERIDONE 4 MG/1
1 TABLET ORAL
Qty: 30 | Refills: 0
Start: 2023-09-30 | End: 2023-10-29

## 2023-09-30 RX ORDER — ATORVASTATIN CALCIUM 80 MG/1
1 TABLET, FILM COATED ORAL
Qty: 30 | Refills: 0
Start: 2023-09-30 | End: 2023-10-29

## 2023-09-30 RX ORDER — INSULIN GLARGINE 100 [IU]/ML
30 INJECTION, SOLUTION SUBCUTANEOUS
Qty: 1 | Refills: 0
Start: 2023-09-30 | End: 2023-10-29

## 2023-09-30 RX ORDER — LISINOPRIL 2.5 MG/1
1 TABLET ORAL
Qty: 30 | Refills: 0
Start: 2023-09-30 | End: 2023-10-29

## 2023-09-30 RX ORDER — CARVEDILOL PHOSPHATE 80 MG/1
1 CAPSULE, EXTENDED RELEASE ORAL
Qty: 60 | Refills: 0
Start: 2023-09-30 | End: 2023-10-29

## 2023-09-30 RX ORDER — PANTOPRAZOLE SODIUM 20 MG/1
1 TABLET, DELAYED RELEASE ORAL
Qty: 30 | Refills: 0
Start: 2023-09-30 | End: 2023-10-29

## 2023-09-30 RX ORDER — HYDRALAZINE HCL 50 MG
1 TABLET ORAL
Qty: 90 | Refills: 0
Start: 2023-09-30 | End: 2023-10-29

## 2023-09-30 RX ADMIN — Medication 10 UNIT(S): at 12:06

## 2023-09-30 RX ADMIN — Medication 100 MICROGRAM(S): at 05:16

## 2023-09-30 RX ADMIN — Medication 10 UNIT(S): at 08:10

## 2023-09-30 RX ADMIN — Medication 50 MILLIGRAM(S): at 13:38

## 2023-09-30 RX ADMIN — CARVEDILOL PHOSPHATE 12.5 MILLIGRAM(S): 80 CAPSULE, EXTENDED RELEASE ORAL at 05:16

## 2023-09-30 RX ADMIN — Medication 1 TABLET(S): at 05:16

## 2023-09-30 RX ADMIN — Medication 50 MILLIGRAM(S): at 05:16

## 2023-09-30 RX ADMIN — SERTRALINE 25 MILLIGRAM(S): 25 TABLET, FILM COATED ORAL at 13:38

## 2023-09-30 RX ADMIN — INFLUENZA VIRUS VACCINE 0.5 MILLILITER(S): 15; 15; 15; 15 SUSPENSION INTRAMUSCULAR at 13:36

## 2023-09-30 RX ADMIN — Medication 2: at 08:08

## 2023-09-30 RX ADMIN — LISINOPRIL 10 MILLIGRAM(S): 2.5 TABLET ORAL at 05:16

## 2023-09-30 RX ADMIN — PANTOPRAZOLE SODIUM 40 MILLIGRAM(S): 20 TABLET, DELAYED RELEASE ORAL at 05:17

## 2023-09-30 NOTE — DISCHARGE NOTE PROVIDER - NSDCMRMEDTOKEN_GEN_ALL_CORE_FT
Admelog pen needles: Use three times a day with admelog pen  Admelog SoloStar 100 units/mL injectable solution: 10 unit(s) injectable 3 times a day (before meals)  amoxicillin-clavulanate 875 mg-125 mg oral tablet: 1 tab(s) orally 2 times a day  atorvastatin 20 mg oral tablet: 1 tab(s) orally once a day (at bedtime)  carvedilol 12.5 mg oral tablet: 1 tab(s) orally every 12 hours  hydrALAZINE 50 mg oral tablet: 1 tab(s) orally every 8 hours  Lantus pen needles: Use once a night w/ lantus pen  Lantus Solostar Pen 100 units/mL subcutaneous solution: 30 unit(s) subcutaneous once a day (at bedtime)  levothyroxine 100 mcg (0.1 mg) oral tablet: 1 tab(s) orally once a day  lisinopril 10 mg oral tablet: 1 tab(s) orally once a day  pantoprazole 40 mg oral delayed release tablet: 1 tab(s) orally once a day (before a meal)  risperiDONE 0.5 mg oral tablet: 1 tab(s) orally once a day (at bedtime)  sertraline 25 mg oral tablet: 1 tab(s) orally once a day

## 2023-09-30 NOTE — DISCHARGE NOTE NURSING/CASE MANAGEMENT/SOCIAL WORK - NSDCPEFALRISK_GEN_ALL_CORE
For information on Fall & Injury Prevention, visit: [email or web address] OR  [email or web address] OR  https://www.cdc.gov/steadi/patient.html

## 2023-09-30 NOTE — DISCHARGE NOTE PROVIDER - NSDCCPCAREPLAN_GEN_ALL_CORE_FT
PRINCIPAL DISCHARGE DIAGNOSIS  Diagnosis: Right upper lobe pneumonia  Assessment and Plan of Treatment: Less likely PNA, abnomality requiring further investigation, requires an Endobronchial Ultrasound outpatient  Please call the thoracic surgery office at [phone] to make your follow up appointment with Dr. Mejia. The thoracic surgery office is located at [facility], first floor. Take a left at the end of the lobby until the end of that suarez (past the elevator bank). Make a left and the office is on your right across from the elevators      SECONDARY DISCHARGE DIAGNOSES  Diagnosis: Diabetes mellitus with hyperglycemia  Assessment and Plan of Treatment: Take insulin as prescribed, follow up with your outpatient primary care doctor within 1 week of discharge    Diagnosis: Major depression with psychotic features  Assessment and Plan of Treatment: Take all medications as prescribed, please follow up with outpatient psychiatrist

## 2023-09-30 NOTE — DISCHARGE NOTE NURSING/CASE MANAGEMENT/SOCIAL WORK - NSDCVIVACCINE_GEN_ALL_CORE_FT
influenza, injectable, quadrivalent, preservative free; 30-Sep-2023 13:36; Patty Magallon (RN); [employer]; [id number] (Exp. Date: 29-Jun-2024); IntraMuscular; Deltoid Left.; 0.5 milliLiter(s); VIS (VIS Published: 06-Aug-2021, VIS Presented: 30-Sep-2023);

## 2023-09-30 NOTE — DISCHARGE NOTE PROVIDER - HOSPITAL COURSE
42 year old female with PMH of DM, CAD, MI w/stent, Bipolar, anxiety here for auditory hallucinations and elevated blood sugar. Hallucinations resolved, cleared by psychiatry, blood sugar improved. Patient clinically improved deemed fit for DC home w/ outpatient EBUS for R lung abnormality.

## 2023-09-30 NOTE — DISCHARGE NOTE PROVIDER - ATTENDING DISCHARGE PHYSICAL EXAMINATION:
CONSTITUTIONAL: NAD  HEENT: NC/AT, PERRL, no JVD  RESPIRATORY: CTA bilaterally, normal effort  CARDIOVASCULAR: RRR, S1/S2+, no m/g/r  ABDOMEN: Nontender to palpation, normoactive bowel sounds, no rebound/guarding  MUSCULOSKELETAL: No edema, cyanosis or deformities.  PSYCH: Calm, affect appropriate.  NEUROLOGY: Awake, alert, no focal neurological deficits.   SKIN: No rashes; no palpable lesions  VASC: Distal pulses palpable

## 2023-09-30 NOTE — DISCHARGE NOTE NURSING/CASE MANAGEMENT/SOCIAL WORK - PATIENT PORTAL LINK FT
You can access the FollowMyHealth Patient Portal offered by [facility] by registering at the following website: [email or web address]. By joining [facility]’s FollowMyHealth portal, you will also be able to view your health information using other applications (apps) compatible with our system.

## 2023-10-25 NOTE — ED BEHAVIORAL HEALTH ASSESSMENT NOTE - NS ED BHA BG INFO INPATIENT PROVIDER CONTACTED YN
Writer left a detailed message reminding patient to stop taking all blood thinning medications and to bring a two-wheeled walker to the hospital with them. Patient reminded to drink their high carbohydrate drink at the time the pre-surgical team instructs when they call, also reminded to start using a new toothbrush 2-3 days prior to surgery. Patient received TJA information and Prehab was not attended. Patient was reminded to complete the detailed shower the night before their procedure with the soap that was provided and to have clean bedding. Patient was then advised the next phone call they will receive will be from the hospital surgical department to advise them of what time to arrive. Patient also advised that writer will be calling them a few days after they get released from the hospital to check on them and ask a few questions. Patient encouraged to call with any further questions or concerns.   Yes

## 2023-12-07 NOTE — ED BEHAVIORAL HEALTH ASSESSMENT NOTE - RISK ASSESSMENT
Ensure Plus BID/pureed/supplement (specify)
high risk due to numerous risk factors and few if any protective factors

## 2023-12-11 NOTE — BEHAVIORAL HEALTH ASSESSMENT NOTE - GROOMING
You can hold your water pill but also check BP regularly and if it rises >130-140/80-90 range then please restart Fair

## 2024-01-01 NOTE — ED ADULT NURSE NOTE - NSFALLRISKASMTTYPE_ED_ALL_ED
[FreeTextEntry1] : Age: 39 days Date of Procedure: 08/15/24 Status/Procedure: bilateral ear molding for correction of ear deformity.
Initial (On Arrival)

## 2024-01-16 NOTE — ED BEHAVIORAL HEALTH ASSESSMENT NOTE - NS ED BHA BILLING ATTENDING WO NP TRAINEE
"Pt calling, yesterday her BS dropped to 45 and she had to use her rescue medication which is Baqsimi intranasal powder.  She was able to regulate her BS, but today she states that her nasal passages are feeling very irritated from the Baqsimi.  Pt states that they burn and feel inflamed.  Pt has called endocrine but not heard anything from them yet.  Pt will try some ibuprofen or tylenol and saline nasal spray for the irritation.  Any other suggestions that Dr. Cantu would have?         Reason for Disposition   Caller has NON-URGENT medicine question about med that PCP or specialist prescribed and triager unable to answer question    Answer Assessment - Initial Assessment Questions  1. NAME of MEDICATION: \"What medicine are you calling about?\"      Baqsimi   2. QUESTION: \"What is your question?\" (e.g., medication refill, side effect)      Side effect   3. PRESCRIBING HCP: \"Who prescribed it?\" Reason: if prescribed by specialist, call should be referred to that group.     Monica Valentin with endocrine  4. SYMPTOMS: \"Do you have any symptoms?\"      Burning sensation in nasal passages  5. SEVERITY: If symptoms are present, ask \"Are they mild, moderate or severe?\"      Moderate   6. PREGNANCY:  \"Is there any chance that you are pregnant?\" \"When was your last menstrual period?\"      no    Protocols used: Medication Question Call-ADULT-[place]    "
Nasal saline spray may help the irritation
[id number]

## 2024-01-28 PROBLEM — I25.10 ATHEROSCLEROTIC HEART DISEASE OF NATIVE CORONARY ARTERY WITHOUT ANGINA PECTORIS: Chronic | Status: ACTIVE | Noted: 2022-12-01

## 2024-01-28 PROBLEM — Z95.5 PRESENCE OF CORONARY ANGIOPLASTY IMPLANT AND GRAFT: Chronic | Status: ACTIVE | Noted: 2022-12-01

## 2024-01-28 PROBLEM — I21.9 ACUTE MYOCARDIAL INFARCTION, UNSPECIFIED: Chronic | Status: ACTIVE | Noted: 2022-12-01

## 2024-02-21 NOTE — DIETITIAN INITIAL EVALUATION ADULT - NSICDXPASTSURGICALHX_GEN_ALL_CORE_FT
Please fax order for left mammogram to [facility]   
PAST SURGICAL HISTORY:  S/P cardiac cath

## 2024-04-23 NOTE — ED BEHAVIORAL HEALTH ASSESSMENT NOTE - ESTIMATED INTELLIGENCE
OB Vaginal Delivery Note    Velma Wright MRN# [id number]   Age: 28 year old YOB: 1995       GA: 40w1d  GP:   Labor Complications: None   EBL:   mL  Delivery QBL: 606 mL  Delivery Type: Vaginal, Spontaneous   ROM to Delivery Time: (Delivered) Hours: 9 Minutes: 59  Salkum Weight:     1 Minute 5 Minute 10 Minute   Apgar Totals: 9   9        EDGAR ALONSO H;LINDA LUANN BULLOCK     Delivery Details:  Velma Wright, a 28 year old  female delivered a viable infant with apgars of 9  and 9 . Delivery was via vaginal, spontaneous  to a sterile field under epidural  anesthesia. Infant delivered in vertex  left  occiput  anterior  position. Anterior and posterior shoulders delivered without difficulty. The cord was clamped, cut twice and 3 vessels  were noted. Cord blood was obtained in routine fashion with the following disposition: lab .      Cord complications: none   Placenta delivered at 2024  3:41 AM . Placental disposition was Hospital disposal . Patient with 500 ml clot behind placenta. Fundal massage performed and fundus found to be firm.     Episiotomy: none    Perineum, vagina, cervix were inspected, and the following lacerations were noted:   Perineal lacerations: none                  Excellent hemostasis was noted. Needle count correct. Infant and patient in delivery room in good and stable condition.        Adriana Sergio [[id number]]      Labor Event Times      Latent labor onset date/time: 2024 193    Active labor onset date: 24 Onset time:  7:30 PM   Dilation complete date: 24 Complete time:  2:02 AM   Start pushing date/time: 2024 0115          Labor Events     labor?: No   steroids: None  Labor Type: Induction/Cervical ripening  Predominate monitoring during 1st stage: continuous electronic fetal monitoring       Rupture date/time: 24 1725   Rupture type: Artificial Rupture of Membranes  Fluid color: Clear  Fluid odor:  Normal     Induction: Oxytocin  Induction date/time: 4/22/24 0903    Cervical ripening date/time:      Indications for induction: Elective     Augmentation: AROM  Indications for augmentation: Ineffective Contraction Pattern       Delivery/Placenta Date and Time      Delivery Date: 4/23/24 Delivery Time:  3:24 AM   Placenta Date/Time: 4/23/2024  3:41 AM  Delivering clinician: Ruth Louis MD   Other personnel present at delivery:  Provider Role   Jorge Urena RN Hesse, Tracy L, RN              Vaginal Counts       Initial count performed by 2 team members:  Two Team Members   Dr. Heriberto arellano         Needles Suture Needles Sponges (RETIRED) Instruments   Initial counts 0 0 5    Added to count       Relief counts 0 0 0    Final counts 0 0 5            Placed during labor Accounted for at the end of labor   FSE NA NA   IUPC NA NA   Cervidil NA NA                  Final count performed by 2 team members:  Two Team Members   Dr. Abhishek arellano RN      Final count correct?: Yes  Pre-Birth Team Brief: Complete  Post-Birth Team Debrief: Complete       Apgars    Living status: Living   1 Minute 5 Minute 10 Minute 15 Minute 20 Minute   Skin color: 1  1       Heart rate: 2  2       Reflex irritability: 2  2       Muscle tone: 2  2       Respiratory effort: 2  2       Total: 9  9       Apgars assigned by: JENARO PATIÑO       Cord      Vessels: 3 Vessels    Cord Complications: None               Cord Blood Disposition: Lab    Gases Sent?: No    Delayed cord clamping?: Yes    Cord Clamping Delay (seconds):  seconds    Stem cell collection?: No           Labor Events and Shoulder Dystocia    Fetal Tracing Prior to Delivery: Category 1  Shoulder dystocia present?: Neg       Delivery (Maternal) (Provider to Complete) (000366)    Episiotomy: None  Perineal lacerations: None    Repair suture: None  Genital tract inspection done: Pos       Blood Loss  Mother: Velma Wright #[id number]     Start of Mother's  Information      Delivery Blood Loss  04/22/24 1930 - 04/23/24 0358      Delivery QBL (mL) Hospital Encounter 606 mL    Total  606 mL               End of Mother's Information  Mother: Velma Wright #[id number]                Delivery - Provider to Complete (346211)    Delivering clinician: Ruth Louis MD  Delivery Type (Choose the 1 that will go to the Birth History): Vaginal, Spontaneous                         Other personnel:  Provider Role   Jorge Urena, Guillermina Craig RN                     Placenta    Date/Time: 4/23/2024  3:41 AM  Removal: Spontaneous  Disposition: Hospital disposal             Anesthesia    Method: Epidural  Cervical dilation at placement: 0-3                    Presentation and Position    Presentation: Vertex    Position: Left Occiput Anterior                     Ruth Louis MD     Average

## 2024-06-06 NOTE — ED ADULT NURSE NOTE - CAS EDN DISCHARGE INTERVENTIONS
Population Health Chart Review & Patient Outreach Details      Additional [facility] Health Notes:               Updates Requested / Reviewed:      Updated Care Coordination Note, Care Everywhere, and Immunizations Reconciliation Completed or Queried: [place]         Health Maintenance Topics Overdue:      [facility] Score: 1     Colon Cancer Screening                       Health Maintenance Topic(s) Outreach Outcomes & Actions Taken:    Colorectal Cancer Screening - Outreach Outcomes & Actions Taken  : Reminded Patient to Complete Already Received Home Test     n/a

## 2024-06-22 NOTE — ED BEHAVIORAL HEALTH ASSESSMENT NOTE - GROOMING
A baseline PSG study was performed on [place]. The following was explained to the pt prior to the study: the time to bed and wake time, the set-up process timeframe and the purpose of each sensor, the reason (if sensors fall off) the technician will need to enter the room during the night, the possibility of the tech fitting a PAP mask on pt for treatment in the middle of the night, and how to call out for assistance during the night. A post-study letter was handed to the pt in the morning.   
Fair

## 2024-07-01 PROBLEM — E11.9 TYPE 2 DIABETES MELLITUS WITHOUT COMPLICATIONS: Chronic | Status: ACTIVE | Noted: 2023-09-20

## 2024-07-01 PROBLEM — I25.10 ATHEROSCLEROTIC HEART DISEASE OF NATIVE CORONARY ARTERY WITHOUT ANGINA PECTORIS: Chronic | Status: ACTIVE | Noted: 2023-09-20

## 2024-07-01 PROBLEM — I10 ESSENTIAL (PRIMARY) HYPERTENSION: Chronic | Status: ACTIVE | Noted: 2023-09-20

## 2024-07-18 ENCOUNTER — APPOINTMENT (OUTPATIENT)
Dept: CARDIOLOGY | Facility: CLINIC | Age: 44
End: 2024-07-18

## 2024-07-28 NOTE — ED BEHAVIORAL HEALTH ASSESSMENT NOTE - AFFECT RANGE
----- Message from Feliciano Marmolejo CNP sent at 7/22/2024  6:04 PM CDT -----  Please notify the patient of results arthritic changes no fracture.  Follow-up as planned.  
LVM with my information and reason for call. No specific results were left on voicemail. Informed patient someone from the [facility] department will attempt to call again at a later time.     
Constricted

## 2024-08-13 ENCOUNTER — RESULT REVIEW (OUTPATIENT)
Age: 44
End: 2024-08-13

## 2024-08-16 ENCOUNTER — TRANSCRIPTION ENCOUNTER (OUTPATIENT)
Age: 44
End: 2024-08-16

## 2024-08-31 ENCOUNTER — INPATIENT (INPATIENT)
Facility: HOSPITAL | Age: 44
LOS: 4 days | Discharge: HOME | DRG: 189 | End: 2024-09-05
Attending: STUDENT IN AN ORGANIZED HEALTH CARE EDUCATION/TRAINING PROGRAM | Admitting: INTERNAL MEDICINE
Payer: MEDICAID

## 2024-08-31 VITALS
RESPIRATION RATE: 24 BRPM | TEMPERATURE: 99 F | DIASTOLIC BLOOD PRESSURE: 78 MMHG | HEIGHT: 63 IN | OXYGEN SATURATION: 82 % | WEIGHT: 215.17 LBS | SYSTOLIC BLOOD PRESSURE: 150 MMHG | HEART RATE: 123 BPM

## 2024-08-31 DIAGNOSIS — Z98.890 OTHER SPECIFIED POSTPROCEDURAL STATES: Chronic | ICD-10-CM

## 2024-08-31 LAB
ALBUMIN SERPL ELPH-MCNC: 3.3 G/DL — SIGNIFICANT CHANGE UP (ref 3.3–5.2)
ALP SERPL-CCNC: 52 U/L — SIGNIFICANT CHANGE UP (ref 40–120)
ALT FLD-CCNC: 20 U/L — SIGNIFICANT CHANGE UP
ANION GAP SERPL CALC-SCNC: 14 MMOL/L — SIGNIFICANT CHANGE UP (ref 5–17)
APTT BLD: 31.2 SEC — SIGNIFICANT CHANGE UP (ref 24.5–35.6)
AST SERPL-CCNC: 33 U/L — HIGH
BASOPHILS # BLD AUTO: 0.03 K/UL — SIGNIFICANT CHANGE UP (ref 0–0.2)
BASOPHILS NFR BLD AUTO: 0.2 % — SIGNIFICANT CHANGE UP (ref 0–2)
BILIRUB SERPL-MCNC: 0.3 MG/DL — LOW (ref 0.4–2)
BUN SERPL-MCNC: 24.9 MG/DL — HIGH (ref 8–20)
CALCIUM SERPL-MCNC: 8.9 MG/DL — SIGNIFICANT CHANGE UP (ref 8.4–10.5)
CHLORIDE SERPL-SCNC: 98 MMOL/L — SIGNIFICANT CHANGE UP (ref 96–108)
CO2 SERPL-SCNC: 24 MMOL/L — SIGNIFICANT CHANGE UP (ref 22–29)
CREAT SERPL-MCNC: 1.08 MG/DL — SIGNIFICANT CHANGE UP (ref 0.5–1.3)
EGFR: 65 ML/MIN/1.73M2 — SIGNIFICANT CHANGE UP
EOSINOPHIL # BLD AUTO: 0.04 K/UL — SIGNIFICANT CHANGE UP (ref 0–0.5)
EOSINOPHIL NFR BLD AUTO: 0.3 % — SIGNIFICANT CHANGE UP (ref 0–6)
GAS PNL BLDV: SIGNIFICANT CHANGE UP
GLUCOSE SERPL-MCNC: 423 MG/DL — HIGH (ref 70–99)
HCG SERPL-ACNC: <4 MIU/ML — SIGNIFICANT CHANGE UP
HCT VFR BLD CALC: 35.2 % — SIGNIFICANT CHANGE UP (ref 34.5–45)
HGB BLD-MCNC: 11.8 G/DL — SIGNIFICANT CHANGE UP (ref 11.5–15.5)
IMM GRANULOCYTES NFR BLD AUTO: 0.5 % — SIGNIFICANT CHANGE UP (ref 0–0.9)
INR BLD: 0.95 RATIO — SIGNIFICANT CHANGE UP (ref 0.85–1.18)
LACTATE BLDV-MCNC: 2.6 MMOL/L — HIGH (ref 0.5–2)
LYMPHOCYTES # BLD AUTO: 0.77 K/UL — LOW (ref 1–3.3)
LYMPHOCYTES # BLD AUTO: 5 % — LOW (ref 13–44)
MCHC RBC-ENTMCNC: 28.7 PG — SIGNIFICANT CHANGE UP (ref 27–34)
MCHC RBC-ENTMCNC: 33.5 GM/DL — SIGNIFICANT CHANGE UP (ref 32–36)
MCV RBC AUTO: 85.6 FL — SIGNIFICANT CHANGE UP (ref 80–100)
MONOCYTES # BLD AUTO: 0.82 K/UL — SIGNIFICANT CHANGE UP (ref 0–0.9)
MONOCYTES NFR BLD AUTO: 5.3 % — SIGNIFICANT CHANGE UP (ref 2–14)
NEUTROPHILS # BLD AUTO: 13.8 K/UL — HIGH (ref 1.8–7.4)
NEUTROPHILS NFR BLD AUTO: 88.7 % — HIGH (ref 43–77)
NT-PROBNP SERPL-SCNC: 678 PG/ML — HIGH (ref 0–300)
PLATELET # BLD AUTO: 245 K/UL — SIGNIFICANT CHANGE UP (ref 150–400)
POTASSIUM SERPL-MCNC: 5.1 MMOL/L — SIGNIFICANT CHANGE UP (ref 3.5–5.3)
POTASSIUM SERPL-SCNC: 5.1 MMOL/L — SIGNIFICANT CHANGE UP (ref 3.5–5.3)
PROT SERPL-MCNC: 6.7 G/DL — SIGNIFICANT CHANGE UP (ref 6.6–8.7)
PROTHROM AB SERPL-ACNC: 10.6 SEC — SIGNIFICANT CHANGE UP (ref 9.5–13)
RAPID RVP RESULT: SIGNIFICANT CHANGE UP
RBC # BLD: 4.11 M/UL — SIGNIFICANT CHANGE UP (ref 3.8–5.2)
RBC # FLD: 14 % — SIGNIFICANT CHANGE UP (ref 10.3–14.5)
SARS-COV-2 RNA SPEC QL NAA+PROBE: SIGNIFICANT CHANGE UP
SODIUM SERPL-SCNC: 136 MMOL/L — SIGNIFICANT CHANGE UP (ref 135–145)
TROPONIN T, HIGH SENSITIVITY RESULT: 12 NG/L — SIGNIFICANT CHANGE UP (ref 0–51)
TROPONIN T, HIGH SENSITIVITY RESULT: 13 NG/L — SIGNIFICANT CHANGE UP (ref 0–51)
WBC # BLD: 15.54 K/UL — HIGH (ref 3.8–10.5)
WBC # FLD AUTO: 15.54 K/UL — HIGH (ref 3.8–10.5)

## 2024-08-31 PROCEDURE — 99291 CRITICAL CARE FIRST HOUR: CPT

## 2024-08-31 PROCEDURE — 71045 X-RAY EXAM CHEST 1 VIEW: CPT | Mod: 26

## 2024-08-31 PROCEDURE — 93010 ELECTROCARDIOGRAM REPORT: CPT

## 2024-08-31 RX ORDER — VANCOMYCIN/0.9 % SOD CHLORIDE 1.75G/25
1000 PLASTIC BAG, INJECTION (ML) INTRAVENOUS ONCE
Refills: 0 | Status: COMPLETED | OUTPATIENT
Start: 2024-08-31 | End: 2024-08-31

## 2024-08-31 RX ORDER — METHYLPREDNISOLONE 4 MG
40 TABLET ORAL ONCE
Refills: 0 | Status: COMPLETED | OUTPATIENT
Start: 2024-08-31 | End: 2024-08-31

## 2024-08-31 RX ORDER — SODIUM CHLORIDE 9 MG/ML
1600 INJECTION INTRAMUSCULAR; INTRAVENOUS; SUBCUTANEOUS ONCE
Refills: 0 | Status: COMPLETED | OUTPATIENT
Start: 2024-08-31 | End: 2024-08-31

## 2024-08-31 RX ORDER — ACETAMINOPHEN 325 MG/1
650 TABLET ORAL ONCE
Refills: 0 | Status: COMPLETED | OUTPATIENT
Start: 2024-08-31 | End: 2024-08-31

## 2024-08-31 RX ORDER — IPRATROPIUM BROMIDE AND ALBUTEROL SULFATE .5; 3 MG/3ML; MG/3ML
3 SOLUTION RESPIRATORY (INHALATION)
Refills: 0 | Status: COMPLETED | OUTPATIENT
Start: 2024-08-31 | End: 2024-08-31

## 2024-08-31 RX ORDER — NITROGLYCERIN 0.2MG/HR
0.4 PATCH, TRANSDERMAL 24 HOURS TRANSDERMAL ONCE
Refills: 0 | Status: COMPLETED | OUTPATIENT
Start: 2024-08-31 | End: 2024-08-31

## 2024-08-31 RX ORDER — PIPERACILLIN SODIUM AND TAZOBACTAM SODIUM 3; .375 G/15ML; G/15ML
3.38 INJECTION, POWDER, FOR SOLUTION INTRAVENOUS ONCE
Refills: 0 | Status: COMPLETED | OUTPATIENT
Start: 2024-08-31 | End: 2024-08-31

## 2024-08-31 RX ADMIN — PIPERACILLIN SODIUM AND TAZOBACTAM SODIUM 3.38 GRAM(S): 3; .375 INJECTION, POWDER, FOR SOLUTION INTRAVENOUS at 21:30

## 2024-08-31 RX ADMIN — Medication 40 MILLIGRAM(S): at 21:03

## 2024-08-31 RX ADMIN — IPRATROPIUM BROMIDE AND ALBUTEROL SULFATE 3 MILLILITER(S): .5; 3 SOLUTION RESPIRATORY (INHALATION) at 21:06

## 2024-08-31 RX ADMIN — SODIUM CHLORIDE 1600 MILLILITER(S): 9 INJECTION INTRAMUSCULAR; INTRAVENOUS; SUBCUTANEOUS at 21:00

## 2024-08-31 RX ADMIN — ACETAMINOPHEN 650 MILLIGRAM(S): 325 TABLET ORAL at 22:00

## 2024-08-31 RX ADMIN — Medication 250 MILLIGRAM(S): at 22:56

## 2024-08-31 RX ADMIN — IPRATROPIUM BROMIDE AND ALBUTEROL SULFATE 3 MILLILITER(S): .5; 3 SOLUTION RESPIRATORY (INHALATION) at 20:45

## 2024-08-31 RX ADMIN — ACETAMINOPHEN 650 MILLIGRAM(S): 325 TABLET ORAL at 21:02

## 2024-08-31 RX ADMIN — Medication 0.4 MILLIGRAM(S): at 23:53

## 2024-08-31 RX ADMIN — SODIUM CHLORIDE 1600 MILLILITER(S): 9 INJECTION INTRAMUSCULAR; INTRAVENOUS; SUBCUTANEOUS at 22:55

## 2024-08-31 RX ADMIN — PIPERACILLIN SODIUM AND TAZOBACTAM SODIUM 200 GRAM(S): 3; .375 INJECTION, POWDER, FOR SOLUTION INTRAVENOUS at 21:03

## 2024-08-31 RX ADMIN — IPRATROPIUM BROMIDE AND ALBUTEROL SULFATE 3 MILLILITER(S): .5; 3 SOLUTION RESPIRATORY (INHALATION) at 21:00

## 2024-08-31 NOTE — ED PROVIDER NOTE - OBJECTIVE STATEMENT
43-year-old female history of asthma COPD, CHF, recent admission at [place] also for pneumonia presents with acute onset SOB as well as subjective fever earlier this evening.  Patient states she used her inhaler at home with minimal relief.  Took ibuprofen earlier tonight.  States she has minimal leg swelling at this time which is her baseline.  Denies N/V, abdominal pain 43-year-old female history of asthma COPD, CHF, IDDM, recent admission at [place] also for pneumonia presents with acute onset SOB as well as subjective fever earlier this evening.  Patient states she used her inhaler at home with minimal relief.  Took ibuprofen earlier tonight.  States she has minimal leg swelling at this time which is her baseline.  Denies N/V, abdominal pain

## 2024-08-31 NOTE — ED ADULT NURSE NOTE - NSFALLUNIVINTERV_ED_ALL_ED
Bed/Stretcher in lowest position, wheels locked, appropriate side rails in place/Call bell, personal items and telephone in reach/Instruct patient to call for assistance before getting out of bed/chair/stretcher/Non-slip footwear applied when patient is off stretcher/[place] to call system/Physically safe environment - no spills, clutter or unnecessary equipment/Purposeful proactive rounding/Room/bathroom lighting operational, light cord in reach

## 2024-08-31 NOTE — ED ADULT TRIAGE NOTE - CHIEF COMPLAINT QUOTE
[Patient presents to ED with c/o chest pressure and difficulty breathing after having sex with boyfriend at about 1530.  Patient with h/o one stent placed in 2018, only on ASA 81mg.  Patient not able to talk in complete sentences.  Patient transferred to critical

## 2024-08-31 NOTE — ED PROVIDER NOTE - ATTENDING CONTRIBUTION TO CARE
I have personally provided ___35 minutes of critical care time exclusive of time spent on separately billable procedures. Time includes review of laboratory data, radiology results, discussion with consultants, and monitoring for potential decompensation. Interventions were performed as documented above    Patient with recent admission to outside hospital for hypoxic respiratory failure, pneumonia and/or CHF and/or COPD exacerbation.  Presents with acutely worsening shortness of breath over the last 48 hours.  Initially hypoxic, now requiring supplemental O2.  On exam moderate increased work of breathing, diffuse crackles and rhonchi throughout all lung fields.  Low-grade fever noted.  Abdomen benign.  No rash.  Protecting airway.  Labs and imaging reviewed, consistent with diffuse pulmonary infiltrates.  Patient ultimately escalated to BiPAP with good clinical improvement.  Discussed with medical ICU, who accepts the admission.  Patient pending CT angio of the chest at this time.

## 2024-08-31 NOTE — ED ADULT NURSE NOTE - CHIEF COMPLAINT QUOTE
Patient presents to ED with c/o chest pressure and difficulty breathing after having sex with boyfriend at about 1530.  Patient with h/o one stent placed in 2018, only on ASA 81mg.  Patient not able to talk in complete sentences.  Patient transferred to critical

## 2024-08-31 NOTE — ED ADULT NURSE REASSESSMENT NOTE - NS ED NURSE REASSESS COMMENT FT1
c/o of chest pain, sublingual nitro given. Pt tachypneic placed on bipap at 50% 6EPAP, 12 IPAP, Rate 12. Will continue to monitor

## 2024-08-31 NOTE — ED PROVIDER NOTE - NSICDXFAMILYHX_GEN_ALL_CORE_FT
FAMILY HISTORY:  Mother  Still living? Unknown  Family history of acute heart failure, Age at diagnosis: Age Unknown  FH: type 2 diabetes, Age at diagnosis: Age Unknown

## 2024-08-31 NOTE — ED PROVIDER NOTE - PHYSICAL EXAMINATION
General: Awake, alert, lying in bed in NAD  HEENT: Normocephalic, atraumatic. No scleral icterus or conjunctival injection. EOMI. Moist mucous membranes. Oropharynx clear.   Neck:. Soft and supple.  Cardiac:  tachycardic but regular,Peripheral pulses 2+ and symmetric. No LE edema.  Resp:  bibasilar rhonchi, tachypneic, speaking several words at a time  Abd: Soft, non-tender, non-distended. No guarding, rebound, or rigidity.  Back: Spine midline and non-tender.   Skin: No rashes, abrasions, or lacerations.  Neuro: AO x 4. Moves all extremities symmetrically. Motor strength and sensation grossly intact.  Psych: Appropriate mood and affect

## 2024-08-31 NOTE — ED PROVIDER NOTE - NSICDXPASTMEDICALHX_GEN_ALL_CORE_FT
PAST MEDICAL HISTORY:  Acute myocardial infarction     Bipolar disorder     CAD (coronary artery disease)     CAD (coronary artery disease)     CAD (coronary artery disease)     Cocaine abuse     Diabetes mellitus     H/O heart artery stent     HTN (hypertension)     IDDM (insulin dependent diabetes mellitus)     Mild intermittent asthma without complication     Seizure     T2DM (type 2 diabetes mellitus)

## 2024-08-31 NOTE — ED PROVIDER NOTE - CLINICAL SUMMARY MEDICAL DECISION MAKING FREE TEXT BOX
43-year-old female history asthma, COPD, CAD, CHF, recent admission obtain the hospital for asthma, COPD, CHF exacerbation with pneumonia presents with acute onset subjective fever as well as shortness of breath.  On arrival to ED,  Patient noted to be tachycardic, hypoxic to mid 80s on room air which corrected with 2 L nasal cannula.  Borderline febrile orally, refused rectal temp.  Will evaluate for sepsis as etiology of symptoms, suspect source is pulmonary given bibasilar rhonchi.  Given recent hospitalization, also will obtain CT PE study.  Broad-spectrum coverage

## 2024-09-01 DIAGNOSIS — J96.01 ACUTE RESPIRATORY FAILURE WITH HYPOXIA: ICD-10-CM

## 2024-09-01 PROBLEM — I25.10 ATHEROSCLEROTIC HEART DISEASE OF NATIVE CORONARY ARTERY WITHOUT ANGINA PECTORIS: Chronic | Status: ACTIVE | Noted: 2024-08-12

## 2024-09-01 PROBLEM — E11.9 TYPE 2 DIABETES MELLITUS WITHOUT COMPLICATIONS: Chronic | Status: ACTIVE | Noted: 2024-08-12

## 2024-09-01 LAB
ALBUMIN SERPL ELPH-MCNC: 3 G/DL — LOW (ref 3.3–5.2)
ALBUMIN SERPL ELPH-MCNC: 3.3 G/DL — SIGNIFICANT CHANGE UP (ref 3.3–5.2)
ALP SERPL-CCNC: 43 U/L — SIGNIFICANT CHANGE UP (ref 40–120)
ALP SERPL-CCNC: 52 U/L — SIGNIFICANT CHANGE UP (ref 40–120)
ALT FLD-CCNC: 16 U/L — SIGNIFICANT CHANGE UP
ALT FLD-CCNC: 20 U/L — SIGNIFICANT CHANGE UP
AMPHET UR-MCNC: NEGATIVE — SIGNIFICANT CHANGE UP
ANION GAP SERPL CALC-SCNC: 14 MMOL/L — SIGNIFICANT CHANGE UP (ref 5–17)
ANION GAP SERPL CALC-SCNC: 16 MMOL/L — SIGNIFICANT CHANGE UP (ref 5–17)
ANISOCYTOSIS BLD QL: SLIGHT — SIGNIFICANT CHANGE UP
APPEARANCE UR: CLEAR — SIGNIFICANT CHANGE UP
AST SERPL-CCNC: 24 U/L — SIGNIFICANT CHANGE UP
AST SERPL-CCNC: 28 U/L — SIGNIFICANT CHANGE UP
BARBITURATES UR SCN-MCNC: NEGATIVE — SIGNIFICANT CHANGE UP
BASE EXCESS BLDA CALC-SCNC: 0.8 MMOL/L — SIGNIFICANT CHANGE UP (ref -2–3)
BASOPHILS # BLD AUTO: 0 K/UL — SIGNIFICANT CHANGE UP (ref 0–0.2)
BASOPHILS # BLD AUTO: 0.01 K/UL — SIGNIFICANT CHANGE UP (ref 0–0.2)
BASOPHILS NFR BLD AUTO: 0 % — SIGNIFICANT CHANGE UP (ref 0–2)
BASOPHILS NFR BLD AUTO: 0.1 % — SIGNIFICANT CHANGE UP (ref 0–2)
BENZODIAZ UR-MCNC: NEGATIVE — SIGNIFICANT CHANGE UP
BILIRUB SERPL-MCNC: 0.4 MG/DL — SIGNIFICANT CHANGE UP (ref 0.4–2)
BILIRUB SERPL-MCNC: 0.5 MG/DL — SIGNIFICANT CHANGE UP (ref 0.4–2)
BILIRUB UR-MCNC: NEGATIVE — SIGNIFICANT CHANGE UP
BLOOD GAS COMMENTS ARTERIAL: SIGNIFICANT CHANGE UP
BUN SERPL-MCNC: 26.8 MG/DL — HIGH (ref 8–20)
BUN SERPL-MCNC: 27.8 MG/DL — HIGH (ref 8–20)
CALCIUM SERPL-MCNC: 8.1 MG/DL — LOW (ref 8.4–10.5)
CALCIUM SERPL-MCNC: 8.3 MG/DL — LOW (ref 8.4–10.5)
CCP IGG SERPL-ACNC: <8 U/ML — SIGNIFICANT CHANGE UP
CHLORIDE SERPL-SCNC: 93 MMOL/L — LOW (ref 96–108)
CHLORIDE SERPL-SCNC: 98 MMOL/L — SIGNIFICANT CHANGE UP (ref 96–108)
CO2 SERPL-SCNC: 21 MMOL/L — LOW (ref 22–29)
CO2 SERPL-SCNC: 25 MMOL/L — SIGNIFICANT CHANGE UP (ref 22–29)
COCAINE METAB.OTHER UR-MCNC: NEGATIVE — SIGNIFICANT CHANGE UP
COLOR SPEC: YELLOW — SIGNIFICANT CHANGE UP
CREAT SERPL-MCNC: 1.04 MG/DL — SIGNIFICANT CHANGE UP (ref 0.5–1.3)
CREAT SERPL-MCNC: 1.13 MG/DL — SIGNIFICANT CHANGE UP (ref 0.5–1.3)
CRP SERPL-MCNC: 46 MG/L — HIGH
DIFF PNL FLD: NEGATIVE — SIGNIFICANT CHANGE UP
DSDNA AB SER-ACNC: <1 IU/ML — SIGNIFICANT CHANGE UP
EGFR: 61 ML/MIN/1.73M2 — SIGNIFICANT CHANGE UP
EGFR: 68 ML/MIN/1.73M2 — SIGNIFICANT CHANGE UP
ENA SCL70 AB SER-ACNC: <0.2 AI — SIGNIFICANT CHANGE UP
EOSINOPHIL # BLD AUTO: 0 K/UL — SIGNIFICANT CHANGE UP (ref 0–0.5)
EOSINOPHIL # BLD AUTO: 0 K/UL — SIGNIFICANT CHANGE UP (ref 0–0.5)
EOSINOPHIL NFR BLD AUTO: 0 % — SIGNIFICANT CHANGE UP (ref 0–6)
EOSINOPHIL NFR BLD AUTO: 0 % — SIGNIFICANT CHANGE UP (ref 0–6)
FENTANYL UR QL SCN: NEGATIVE — SIGNIFICANT CHANGE UP
GAS PNL BLDA: SIGNIFICANT CHANGE UP
GLUCOSE BLDC GLUCOMTR-MCNC: 175 MG/DL — HIGH (ref 70–99)
GLUCOSE BLDC GLUCOMTR-MCNC: 191 MG/DL — HIGH (ref 70–99)
GLUCOSE BLDC GLUCOMTR-MCNC: 194 MG/DL — HIGH (ref 70–99)
GLUCOSE BLDC GLUCOMTR-MCNC: 243 MG/DL — HIGH (ref 70–99)
GLUCOSE BLDC GLUCOMTR-MCNC: 257 MG/DL — HIGH (ref 70–99)
GLUCOSE BLDC GLUCOMTR-MCNC: 266 MG/DL — HIGH (ref 70–99)
GLUCOSE BLDC GLUCOMTR-MCNC: 276 MG/DL — HIGH (ref 70–99)
GLUCOSE BLDC GLUCOMTR-MCNC: 291 MG/DL — HIGH (ref 70–99)
GLUCOSE BLDC GLUCOMTR-MCNC: 334 MG/DL — HIGH (ref 70–99)
GLUCOSE BLDC GLUCOMTR-MCNC: 374 MG/DL — HIGH (ref 70–99)
GLUCOSE BLDC GLUCOMTR-MCNC: 428 MG/DL — HIGH (ref 70–99)
GLUCOSE BLDC GLUCOMTR-MCNC: 435 MG/DL — HIGH (ref 70–99)
GLUCOSE BLDC GLUCOMTR-MCNC: 441 MG/DL — HIGH (ref 70–99)
GLUCOSE BLDC GLUCOMTR-MCNC: 469 MG/DL — CRITICAL HIGH (ref 70–99)
GLUCOSE BLDC GLUCOMTR-MCNC: 491 MG/DL — CRITICAL HIGH (ref 70–99)
GLUCOSE BLDC GLUCOMTR-MCNC: 507 MG/DL — CRITICAL HIGH (ref 70–99)
GLUCOSE BLDC GLUCOMTR-MCNC: 541 MG/DL — CRITICAL HIGH (ref 70–99)
GLUCOSE SERPL-MCNC: 243 MG/DL — HIGH (ref 70–99)
GLUCOSE SERPL-MCNC: 474 MG/DL — CRITICAL HIGH (ref 70–99)
GLUCOSE UR QL: >=1000 MG/DL
HAV IGM SER-ACNC: SIGNIFICANT CHANGE UP
HBV CORE IGM SER-ACNC: SIGNIFICANT CHANGE UP
HBV SURFACE AG SER-ACNC: SIGNIFICANT CHANGE UP
HCO3 BLDA-SCNC: 25 MMOL/L — SIGNIFICANT CHANGE UP (ref 21–28)
HCT VFR BLD CALC: 33.5 % — LOW (ref 34.5–45)
HCT VFR BLD CALC: 36.4 % — SIGNIFICANT CHANGE UP (ref 34.5–45)
HCV AB S/CO SERPL IA: 0.18 S/CO — SIGNIFICANT CHANGE UP (ref 0–0.99)
HCV AB SERPL-IMP: SIGNIFICANT CHANGE UP
HGB BLD-MCNC: 11.3 G/DL — LOW (ref 11.5–15.5)
HGB BLD-MCNC: 12.2 G/DL — SIGNIFICANT CHANGE UP (ref 11.5–15.5)
HOROWITZ INDEX BLDA+IHG-RTO: SIGNIFICANT CHANGE UP
IMM GRANULOCYTES NFR BLD AUTO: 0.3 % — SIGNIFICANT CHANGE UP (ref 0–0.9)
KETONES UR-MCNC: NEGATIVE MG/DL — SIGNIFICANT CHANGE UP
LACTATE SERPL-SCNC: 2.4 MMOL/L — HIGH (ref 0.5–2)
LDH SERPL L TO P-CCNC: 457 U/L — HIGH (ref 98–192)
LEGIONELLA AG UR QL: NEGATIVE — SIGNIFICANT CHANGE UP
LEUKOCYTE ESTERASE UR-ACNC: NEGATIVE — SIGNIFICANT CHANGE UP
LYMPHOCYTES # BLD AUTO: 0.32 K/UL — LOW (ref 1–3.3)
LYMPHOCYTES # BLD AUTO: 1.08 K/UL — SIGNIFICANT CHANGE UP (ref 1–3.3)
LYMPHOCYTES # BLD AUTO: 1.7 % — LOW (ref 13–44)
LYMPHOCYTES # BLD AUTO: 7.1 % — LOW (ref 13–44)
MACROCYTES BLD QL: SLIGHT — SIGNIFICANT CHANGE UP
MAGNESIUM SERPL-MCNC: 1.5 MG/DL — LOW (ref 1.6–2.6)
MAGNESIUM SERPL-MCNC: 2 MG/DL — SIGNIFICANT CHANGE UP (ref 1.6–2.6)
MANUAL SMEAR VERIFICATION: SIGNIFICANT CHANGE UP
MCHC RBC-ENTMCNC: 28.2 PG — SIGNIFICANT CHANGE UP (ref 27–34)
MCHC RBC-ENTMCNC: 28.5 PG — SIGNIFICANT CHANGE UP (ref 27–34)
MCHC RBC-ENTMCNC: 33.5 GM/DL — SIGNIFICANT CHANGE UP (ref 32–36)
MCHC RBC-ENTMCNC: 33.7 GM/DL — SIGNIFICANT CHANGE UP (ref 32–36)
MCV RBC AUTO: 83.5 FL — SIGNIFICANT CHANGE UP (ref 80–100)
MCV RBC AUTO: 85 FL — SIGNIFICANT CHANGE UP (ref 80–100)
METHADONE UR-MCNC: NEGATIVE — SIGNIFICANT CHANGE UP
MONOCYTES # BLD AUTO: 0 K/UL — SIGNIFICANT CHANGE UP (ref 0–0.9)
MONOCYTES # BLD AUTO: 0.72 K/UL — SIGNIFICANT CHANGE UP (ref 0–0.9)
MONOCYTES NFR BLD AUTO: 0 % — LOW (ref 2–14)
MONOCYTES NFR BLD AUTO: 4.7 % — SIGNIFICANT CHANGE UP (ref 2–14)
MRSA PCR RESULT.: SIGNIFICANT CHANGE UP
NEUTROPHILS # BLD AUTO: 13.36 K/UL — HIGH (ref 1.8–7.4)
NEUTROPHILS # BLD AUTO: 18.3 K/UL — HIGH (ref 1.8–7.4)
NEUTROPHILS NFR BLD AUTO: 87.8 % — HIGH (ref 43–77)
NEUTROPHILS NFR BLD AUTO: 98.3 % — HIGH (ref 43–77)
NITRITE UR-MCNC: NEGATIVE — SIGNIFICANT CHANGE UP
OPIATES UR-MCNC: NEGATIVE — SIGNIFICANT CHANGE UP
PCO2 BLDA: 39 MMHG — SIGNIFICANT CHANGE UP (ref 32–45)
PCP SPEC-MCNC: SIGNIFICANT CHANGE UP
PCP UR-MCNC: NEGATIVE — SIGNIFICANT CHANGE UP
PH BLDA: 7.42 — SIGNIFICANT CHANGE UP (ref 7.35–7.45)
PH UR: 6 — SIGNIFICANT CHANGE UP (ref 5–8)
PHOSPHATE SERPL-MCNC: 2.5 MG/DL — SIGNIFICANT CHANGE UP (ref 2.4–4.7)
PHOSPHATE SERPL-MCNC: 4.1 MG/DL — SIGNIFICANT CHANGE UP (ref 2.4–4.7)
PLAT MORPH BLD: NORMAL — SIGNIFICANT CHANGE UP
PLATELET # BLD AUTO: 179 K/UL — SIGNIFICANT CHANGE UP (ref 150–400)
PLATELET # BLD AUTO: 230 K/UL — SIGNIFICANT CHANGE UP (ref 150–400)
PO2 BLDA: 62 MMHG — LOW (ref 83–108)
POIKILOCYTOSIS BLD QL AUTO: SLIGHT — SIGNIFICANT CHANGE UP
POLYCHROMASIA BLD QL SMEAR: SIGNIFICANT CHANGE UP
POTASSIUM SERPL-MCNC: 4.3 MMOL/L — SIGNIFICANT CHANGE UP (ref 3.5–5.3)
POTASSIUM SERPL-MCNC: 5.2 MMOL/L — SIGNIFICANT CHANGE UP (ref 3.5–5.3)
POTASSIUM SERPL-SCNC: 4.3 MMOL/L — SIGNIFICANT CHANGE UP (ref 3.5–5.3)
POTASSIUM SERPL-SCNC: 5.2 MMOL/L — SIGNIFICANT CHANGE UP (ref 3.5–5.3)
PROCALCITONIN SERPL-MCNC: 0.89 NG/ML — HIGH (ref 0.02–0.1)
PROT SERPL-MCNC: 6.3 G/DL — LOW (ref 6.6–8.7)
PROT SERPL-MCNC: 6.6 G/DL — SIGNIFICANT CHANGE UP (ref 6.6–8.7)
PROT UR-MCNC: SIGNIFICANT CHANGE UP MG/DL
RBC # BLD: 4.01 M/UL — SIGNIFICANT CHANGE UP (ref 3.8–5.2)
RBC # BLD: 4.28 M/UL — SIGNIFICANT CHANGE UP (ref 3.8–5.2)
RBC # FLD: 14.3 % — SIGNIFICANT CHANGE UP (ref 10.3–14.5)
RBC # FLD: 14.4 % — SIGNIFICANT CHANGE UP (ref 10.3–14.5)
RBC BLD AUTO: ABNORMAL
RF+CCP IGG SER-IMP: NEGATIVE — SIGNIFICANT CHANGE UP
RHEUMATOID FACT SERPL-ACNC: 13 IU/ML — SIGNIFICANT CHANGE UP (ref 0–13)
S AUREUS DNA NOSE QL NAA+PROBE: SIGNIFICANT CHANGE UP
S PNEUM AG UR QL: NEGATIVE — SIGNIFICANT CHANGE UP
SAO2 % BLDA: 92.8 % — LOW (ref 94–98)
SODIUM SERPL-SCNC: 130 MMOL/L — LOW (ref 135–145)
SODIUM SERPL-SCNC: 136 MMOL/L — SIGNIFICANT CHANGE UP (ref 135–145)
SP GR SPEC: 1.02 — SIGNIFICANT CHANGE UP (ref 1–1.03)
THC UR QL: NEGATIVE — SIGNIFICANT CHANGE UP
TROPONIN T, HIGH SENSITIVITY RESULT: 10 NG/L — SIGNIFICANT CHANGE UP (ref 0–51)
UROBILINOGEN FLD QL: 0.2 MG/DL — SIGNIFICANT CHANGE UP (ref 0.2–1)
WBC # BLD: 15.22 K/UL — HIGH (ref 3.8–10.5)
WBC # BLD: 18.62 K/UL — HIGH (ref 3.8–10.5)
WBC # FLD AUTO: 15.22 K/UL — HIGH (ref 3.8–10.5)
WBC # FLD AUTO: 18.62 K/UL — HIGH (ref 3.8–10.5)

## 2024-09-01 PROCEDURE — 71275 CT ANGIOGRAPHY CHEST: CPT | Mod: 26

## 2024-09-01 PROCEDURE — 99223 1ST HOSP IP/OBS HIGH 75: CPT

## 2024-09-01 PROCEDURE — 99233 SBSQ HOSP IP/OBS HIGH 50: CPT | Mod: GC

## 2024-09-01 RX ORDER — DEXTROSE 15 G/33 G
12.5 GEL IN PACKET (GRAM) ORAL ONCE
Refills: 0 | Status: DISCONTINUED | OUTPATIENT
Start: 2024-09-01 | End: 2024-09-05

## 2024-09-01 RX ORDER — FUROSEMIDE 40 MG
60 TABLET ORAL ONCE
Refills: 0 | Status: COMPLETED | OUTPATIENT
Start: 2024-09-01 | End: 2024-09-01

## 2024-09-01 RX ORDER — INSULIN REGULAR, HUMAN 100/ML (3)
5 INSULIN PEN (ML) SUBCUTANEOUS ONCE
Refills: 0 | Status: COMPLETED | OUTPATIENT
Start: 2024-09-01 | End: 2024-09-01

## 2024-09-01 RX ORDER — LEVOTHYROXINE SODIUM 100 MCG
100 TABLET ORAL DAILY
Refills: 0 | Status: DISCONTINUED | OUTPATIENT
Start: 2024-09-01 | End: 2024-09-05

## 2024-09-01 RX ORDER — METHYLPREDNISOLONE 4 MG
40 TABLET ORAL EVERY 6 HOURS
Refills: 0 | Status: DISCONTINUED | OUTPATIENT
Start: 2024-09-01 | End: 2024-09-01

## 2024-09-01 RX ORDER — VANCOMYCIN/0.9 % SOD CHLORIDE 1.75G/25
1500 PLASTIC BAG, INJECTION (ML) INTRAVENOUS EVERY 12 HOURS
Refills: 0 | Status: DISCONTINUED | OUTPATIENT
Start: 2024-09-01 | End: 2024-09-01

## 2024-09-01 RX ORDER — CARVEDILOL 6.25 MG/1
12.5 TABLET ORAL EVERY 12 HOURS
Refills: 0 | Status: DISCONTINUED | OUTPATIENT
Start: 2024-09-01 | End: 2024-09-01

## 2024-09-01 RX ORDER — CARVEDILOL 6.25 MG/1
25 TABLET ORAL
Refills: 0 | Status: DISCONTINUED | OUTPATIENT
Start: 2024-09-01 | End: 2024-09-05

## 2024-09-01 RX ORDER — FUROSEMIDE 40 MG
40 TABLET ORAL
Refills: 0 | Status: DISCONTINUED | OUTPATIENT
Start: 2024-09-01 | End: 2024-09-02

## 2024-09-01 RX ORDER — ASPIRIN 81 MG
81 TABLET, DELAYED RELEASE (ENTERIC COATED) ORAL DAILY
Refills: 0 | Status: DISCONTINUED | OUTPATIENT
Start: 2024-09-01 | End: 2024-09-05

## 2024-09-01 RX ORDER — LISINOPRIL 10 MG/1
10 TABLET ORAL DAILY
Refills: 0 | Status: DISCONTINUED | OUTPATIENT
Start: 2024-09-01 | End: 2024-09-03

## 2024-09-01 RX ORDER — GLUCAGON INJECTION, SOLUTION 1 MG/.2ML
1 INJECTION, SOLUTION SUBCUTANEOUS ONCE
Refills: 0 | Status: DISCONTINUED | OUTPATIENT
Start: 2024-09-01 | End: 2024-09-05

## 2024-09-01 RX ORDER — VANCOMYCIN/0.9 % SOD CHLORIDE 1.75G/25
1000 PLASTIC BAG, INJECTION (ML) INTRAVENOUS EVERY 12 HOURS
Refills: 0 | Status: DISCONTINUED | OUTPATIENT
Start: 2024-09-01 | End: 2024-09-01

## 2024-09-01 RX ORDER — GABAPENTIN 100 MG
400 CAPSULE ORAL EVERY 8 HOURS
Refills: 0 | Status: DISCONTINUED | OUTPATIENT
Start: 2024-09-01 | End: 2024-09-05

## 2024-09-01 RX ORDER — LISINOPRIL 10 MG/1
10 TABLET ORAL DAILY
Refills: 0 | Status: DISCONTINUED | OUTPATIENT
Start: 2024-09-01 | End: 2024-09-01

## 2024-09-01 RX ORDER — HEPARIN SODIUM,BOVINE 1000/ML
5000 VIAL (ML) INJECTION EVERY 8 HOURS
Refills: 0 | Status: DISCONTINUED | OUTPATIENT
Start: 2024-09-01 | End: 2024-09-05

## 2024-09-01 RX ORDER — INSULIN GLARGINE 100 [IU]/ML
15 INJECTION, SOLUTION SUBCUTANEOUS AT BEDTIME
Refills: 0 | Status: DISCONTINUED | OUTPATIENT
Start: 2024-09-01 | End: 2024-09-01

## 2024-09-01 RX ORDER — HYDRALAZINE HCL 50 MG
5 TABLET ORAL ONCE
Refills: 0 | Status: COMPLETED | OUTPATIENT
Start: 2024-09-01 | End: 2024-09-01

## 2024-09-01 RX ORDER — INSULIN GLARGINE 100 [IU]/ML
30 INJECTION, SOLUTION SUBCUTANEOUS AT BEDTIME
Refills: 0 | Status: DISCONTINUED | OUTPATIENT
Start: 2024-09-02 | End: 2024-09-02

## 2024-09-01 RX ORDER — INSULIN REGULAR, HUMAN 100/ML (3)
4 INSULIN PEN (ML) SUBCUTANEOUS
Qty: 100 | Refills: 0 | Status: DISCONTINUED | OUTPATIENT
Start: 2024-09-01 | End: 2024-09-01

## 2024-09-01 RX ORDER — INSULIN GLARGINE 100 [IU]/ML
25 INJECTION, SOLUTION SUBCUTANEOUS AT BEDTIME
Refills: 0 | Status: DISCONTINUED | OUTPATIENT
Start: 2024-09-01 | End: 2024-09-01

## 2024-09-01 RX ORDER — HYDROXYZINE HCL 25 MG
50 TABLET ORAL AT BEDTIME
Refills: 0 | Status: DISCONTINUED | OUTPATIENT
Start: 2024-09-01 | End: 2024-09-05

## 2024-09-01 RX ORDER — CHLORHEXIDINE GLUCONATE 40 MG/ML
1 SOLUTION TOPICAL
Refills: 0 | Status: DISCONTINUED | OUTPATIENT
Start: 2024-09-01 | End: 2024-09-05

## 2024-09-01 RX ORDER — DEXTROSE 15 G/33 G
25 GEL IN PACKET (GRAM) ORAL ONCE
Refills: 0 | Status: DISCONTINUED | OUTPATIENT
Start: 2024-09-01 | End: 2024-09-01

## 2024-09-01 RX ORDER — AZITHROMYCIN 500 MG/1
500 TABLET, FILM COATED ORAL EVERY 24 HOURS
Refills: 0 | Status: DISCONTINUED | OUTPATIENT
Start: 2024-09-02 | End: 2024-09-02

## 2024-09-01 RX ORDER — DEXTROSE 15 G/33 G
50 GEL IN PACKET (GRAM) ORAL
Refills: 0 | Status: DISCONTINUED | OUTPATIENT
Start: 2024-09-01 | End: 2024-09-01

## 2024-09-01 RX ORDER — PIPERACILLIN SODIUM AND TAZOBACTAM SODIUM 3; .375 G/15ML; G/15ML
3.38 INJECTION, POWDER, FOR SOLUTION INTRAVENOUS EVERY 8 HOURS
Refills: 0 | Status: DISCONTINUED | OUTPATIENT
Start: 2024-09-01 | End: 2024-09-03

## 2024-09-01 RX ORDER — MIRTAZAPINE 30 MG
7.5 TABLET ORAL DAILY
Refills: 0 | Status: DISCONTINUED | OUTPATIENT
Start: 2024-09-01 | End: 2024-09-05

## 2024-09-01 RX ORDER — METHYLPREDNISOLONE 4 MG
40 TABLET ORAL EVERY 8 HOURS
Refills: 0 | Status: DISCONTINUED | OUTPATIENT
Start: 2024-09-01 | End: 2024-09-02

## 2024-09-01 RX ORDER — HYDRALAZINE HCL 50 MG
50 TABLET ORAL EVERY 8 HOURS
Refills: 0 | Status: DISCONTINUED | OUTPATIENT
Start: 2024-09-01 | End: 2024-09-01

## 2024-09-01 RX ORDER — TIOTROPIUM BROMIDE INHALATION SPRAY 3.12 UG/1
2 SPRAY, METERED RESPIRATORY (INHALATION) DAILY
Refills: 0 | Status: DISCONTINUED | OUTPATIENT
Start: 2024-09-01 | End: 2024-09-05

## 2024-09-01 RX ORDER — PANTOPRAZOLE SODIUM 40 MG
40 TABLET, DELAYED RELEASE (ENTERIC COATED) ORAL
Refills: 0 | Status: DISCONTINUED | OUTPATIENT
Start: 2024-09-01 | End: 2024-09-05

## 2024-09-01 RX ORDER — BUDESONIDE AND FORMOTEROL FUMARATE 80; 4.5 UG/1; UG/1
2 AEROSOL, METERED RESPIRATORY (INHALATION)
Refills: 0 | Status: DISCONTINUED | OUTPATIENT
Start: 2024-09-01 | End: 2024-09-05

## 2024-09-01 RX ORDER — AZITHROMYCIN 500 MG/1
TABLET, FILM COATED ORAL
Refills: 0 | Status: DISCONTINUED | OUTPATIENT
Start: 2024-09-01 | End: 2024-09-02

## 2024-09-01 RX ORDER — CARVEDILOL 6.25 MG/1
25 TABLET ORAL EVERY 12 HOURS
Refills: 0 | Status: DISCONTINUED | OUTPATIENT
Start: 2024-09-01 | End: 2024-09-01

## 2024-09-01 RX ORDER — DEXTROSE 15 G/33 G
15 GEL IN PACKET (GRAM) ORAL ONCE
Refills: 0 | Status: DISCONTINUED | OUTPATIENT
Start: 2024-09-01 | End: 2024-09-05

## 2024-09-01 RX ORDER — INSULIN GLARGINE 100 [IU]/ML
30 INJECTION, SOLUTION SUBCUTANEOUS ONCE
Refills: 0 | Status: COMPLETED | OUTPATIENT
Start: 2024-09-01 | End: 2024-09-01

## 2024-09-01 RX ORDER — PSYLLIUM HUSK 3.4 G/6.5G
1 GRANULE ORAL DAILY
Refills: 0 | Status: DISCONTINUED | OUTPATIENT
Start: 2024-09-01 | End: 2024-09-05

## 2024-09-01 RX ORDER — AZITHROMYCIN 500 MG/1
500 TABLET, FILM COATED ORAL ONCE
Refills: 0 | Status: COMPLETED | OUTPATIENT
Start: 2024-09-01 | End: 2024-09-01

## 2024-09-01 RX ORDER — DEXTROSE 15 G/33 G
25 GEL IN PACKET (GRAM) ORAL
Refills: 0 | Status: DISCONTINUED | OUTPATIENT
Start: 2024-09-01 | End: 2024-09-05

## 2024-09-01 RX ADMIN — CARVEDILOL 25 MILLIGRAM(S): 6.25 TABLET ORAL at 08:51

## 2024-09-01 RX ADMIN — Medication 40 MILLIGRAM(S): at 06:29

## 2024-09-01 RX ADMIN — AZITHROMYCIN 500 MILLIGRAM(S): 500 TABLET, FILM COATED ORAL at 02:54

## 2024-09-01 RX ADMIN — Medication 5 UNIT(S): at 23:31

## 2024-09-01 RX ADMIN — BUDESONIDE AND FORMOTEROL FUMARATE 2 PUFF(S): 80; 4.5 AEROSOL, METERED RESPIRATORY (INHALATION) at 09:04

## 2024-09-01 RX ADMIN — Medication 400 MILLIGRAM(S): at 06:28

## 2024-09-01 RX ADMIN — Medication 81 MILLIGRAM(S): at 12:43

## 2024-09-01 RX ADMIN — Medication 5 UNIT(S): at 05:10

## 2024-09-01 RX ADMIN — Medication 60 MILLIGRAM(S): at 02:02

## 2024-09-01 RX ADMIN — Medication 400 MILLIGRAM(S): at 14:59

## 2024-09-01 RX ADMIN — CARVEDILOL 25 MILLIGRAM(S): 6.25 TABLET ORAL at 20:08

## 2024-09-01 RX ADMIN — AZITHROMYCIN 255 MILLIGRAM(S): 500 TABLET, FILM COATED ORAL at 02:05

## 2024-09-01 RX ADMIN — Medication 25 GRAM(S): at 05:11

## 2024-09-01 RX ADMIN — CHLORHEXIDINE GLUCONATE 1 APPLICATION(S): 40 SOLUTION TOPICAL at 05:12

## 2024-09-01 RX ADMIN — INSULIN GLARGINE 30 UNIT(S): 100 INJECTION, SOLUTION SUBCUTANEOUS at 17:02

## 2024-09-01 RX ADMIN — Medication 5 UNIT(S): at 02:49

## 2024-09-01 RX ADMIN — PIPERACILLIN SODIUM AND TAZOBACTAM SODIUM 25 GRAM(S): 3; .375 INJECTION, POWDER, FOR SOLUTION INTRAVENOUS at 12:43

## 2024-09-01 RX ADMIN — Medication 5 UNIT(S): at 22:09

## 2024-09-01 RX ADMIN — Medication 400 MILLIGRAM(S): at 21:15

## 2024-09-01 RX ADMIN — LISINOPRIL 10 MILLIGRAM(S): 10 TABLET ORAL at 06:29

## 2024-09-01 RX ADMIN — Medication 5 MILLIGRAM(S): at 02:05

## 2024-09-01 RX ADMIN — Medication 4 UNIT(S)/HR: at 08:19

## 2024-09-01 RX ADMIN — Medication 40 MILLIGRAM(S): at 20:08

## 2024-09-01 RX ADMIN — Medication 100 MICROGRAM(S): at 06:28

## 2024-09-01 RX ADMIN — Medication 5000 UNIT(S): at 14:59

## 2024-09-01 RX ADMIN — Medication 40 MILLIGRAM(S): at 05:10

## 2024-09-01 RX ADMIN — Medication 1000 MILLIGRAM(S): at 00:00

## 2024-09-01 RX ADMIN — TIOTROPIUM BROMIDE INHALATION SPRAY 2 PUFF(S): 3.12 SPRAY, METERED RESPIRATORY (INHALATION) at 09:04

## 2024-09-01 RX ADMIN — Medication 20 MILLIGRAM(S): at 21:15

## 2024-09-01 RX ADMIN — Medication 40 MILLIGRAM(S): at 14:59

## 2024-09-01 RX ADMIN — Medication 40 MILLIGRAM(S): at 12:42

## 2024-09-01 RX ADMIN — PIPERACILLIN SODIUM AND TAZOBACTAM SODIUM 25 GRAM(S): 3; .375 INJECTION, POWDER, FOR SOLUTION INTRAVENOUS at 20:09

## 2024-09-01 RX ADMIN — Medication 5 MILLIGRAM(S): at 21:15

## 2024-09-01 RX ADMIN — BUDESONIDE AND FORMOTEROL FUMARATE 2 PUFF(S): 80; 4.5 AEROSOL, METERED RESPIRATORY (INHALATION) at 20:08

## 2024-09-01 RX ADMIN — PIPERACILLIN SODIUM AND TAZOBACTAM SODIUM 25 GRAM(S): 3; .375 INJECTION, POWDER, FOR SOLUTION INTRAVENOUS at 05:11

## 2024-09-01 RX ADMIN — Medication 5000 UNIT(S): at 05:10

## 2024-09-01 RX ADMIN — INSULIN GLARGINE 15 UNIT(S): 100 INJECTION, SOLUTION SUBCUTANEOUS at 05:27

## 2024-09-01 RX ADMIN — Medication 12: at 22:06

## 2024-09-01 RX ADMIN — Medication 7.5 MILLIGRAM(S): at 12:43

## 2024-09-01 RX ADMIN — Medication 5000 UNIT(S): at 21:16

## 2024-09-01 NOTE — H&P ADULT - TIME BILLING
review of chart notes, prior hospital course, labs, independent review of serial CT scans, bedside assessment and multiple reassessments, discussion with ED/MICU PA/RN and documentation

## 2024-09-01 NOTE — H&P ADULT - ASSESSMENT
43F active smoker with hx of CAD s/p PCI 2018, HFrEF (EF45%-50%), hx of asthma/copd not on home O2, DM, hx of crack cocaine use presented to the hospital with respiratory distress and chest discomfort with recent admission to [place] for same found to have bilateral diffuse alveolar opacities of unclear etiology now with recurrent respiratory failure w/ persistent infiltrates possibly secondary to underlying infectious/inflammatory process vs fluid overload     CNS   monitor neuro checks   Utox neg   c/w mirtazepine   c/w hydroxyzine prn   c/w gabapentin     Pulm   hypoxic respiratory failure   unclear etiology; possibly infectious vs inflammatory vs hypersensitivity vs fluid overload vs multifactorial   will attempt transition from NIV to high flow nasal cannula post diuresis   wean supplemental O2 as tolerated for goal sat >90%   c/w empiric steroids for now   will check autoimmune/hypersensitivity panel   c/w Spiriva/Symbicort   CT chest angio neg for PE   may require bronchoscopy and possibly biopsy pending clinical course     CV   Atypical chest pain   possibly related to pleurisy vs MSK; r/o cardiac etiology   troponins neg x 3; EKG w/ TWI similar to prior   f/u repeat Echo; bedside w/o large effusion, mildly reduced LV function and normal appearing RV   HTN   c/w Coreg   c/w Lisinopril   CAD/HFrEF  c/w ASA  c/w Lipitor   c/w coreg   c/w lasix 40 BID for now     ID   concern for sepsis   c/w empiric vanc/zosyn/azithro   obtain sputum cx if able   f/u MRSA PCR, RVP, mycoplasma pcr, urinary Ag   f/u LDH/fungitell/aspergillus   f/u blood cx     Endocrine   DM w/ uncontrolled hyperglycemia   will give regular insulin and start Lantus 15u while NPO   if persistently elevated, will start insulin gtt given ongoing steroids     Full code   DVT ppx   PPI ppx

## 2024-09-01 NOTE — PROGRESS NOTE ADULT - SUBJECTIVE AND OBJECTIVE BOX
Patient is a 43y old  Female who presents with a chief complaint of respiratory distress (01 Sep 2024 05:16)      BRIEF HOSPITAL COURSE:   43F active smoker with hx of CAD s/p PCI 2018, HFrEF (EF45%-50%), hx of asthma/copd not on home O2, DM, hx of crack cocaine use presented to the hospital with respiratory distress and chest discomfort after intercourse with her boyfriend. Pt was recently admitted to [facility] for dyspnea/chest pain 8/11-8/16 and was found to have diffuse alveolar opacities with subpleural sparing. Pt was diuresed, treated with empiric abx and treated with steroid course with symptomatic improvement. She was seen by cardiology in the setting of chest pain and inferolateral TWI but appeared similar to prior with negative troponin so plan was for outpatient stress test, which has not yet been completed. She reports prior to today she was doing well post discharge. She denies any recent fevers/chills. Denies chest pain prior to today, Endorses coughing during the episode of dyspnea today but not much sputum. She endorses an episode of nausea that self resolved with no associated abdominal pain or vomiting. She reports some LE edema. Reports compliance with her medications and denies any missed Lasix. She reports dust exposure in her current living facility and had recent possible exposure to insecticide prior to [facility] admission.    Pt was found to be hypoxic and initially placed on RA but given work of breathing was escalated to BiPAP. She was given SL nitro with improvement in chest pain. On evaluation, pt reported improvement in her symptoms although remained tachypneic. CT chest angio negative for PE but showed similar diffuse bilateral infiltrates. Pt given empiric abx and steroids.     Interval HPI:  Pt seen at bedside in AM  no acute medical complaints  given 30U lantus today at 5pm, to discontinue insulin drip after 2 hours  added solumedrol TID    PAST MEDICAL & SURGICAL HISTORY:  Bipolar disorder      IDDM (insulin dependent diabetes mellitus)      Seizure      Cocaine abuse      Mild intermittent asthma without complication      Acute myocardial infarction      H/O heart artery stent      CAD (coronary artery disease)      Diabetes mellitus      CAD (coronary artery disease)      HTN (hypertension)      CAD (coronary artery disease)      T2DM (type 2 diabetes mellitus)      S/P hernia repair      S/P cardiac cath          Review of Systems:  CONSTITUTIONAL: No fever, chills, or fatigue  EYES: No eye pain, visual disturbances, or discharge  ENMT:  No difficulty hearing, tinnitus, vertigo; No sinus or throat pain  RESPIRATORY: No cough, wheezing, chills or hemoptysis; No shortness of breath  CARDIOVASCULAR: No chest pain, palpitations, dizziness, or leg swelling  GASTROINTESTINAL: No abdominal or epigastric pain. No nausea, vomiting, or hematemesis; No diarrhea or constipation.  GENITOURINARY: No dysuria, frequency, hematuria, or incontinence  NEUROLOGICAL: No headaches, memory loss, loss of strength, or numbness  SKIN: No rash  MUSCULOSKELETAL: No joint pain or swelling; No muscle, back, or extremity pain  PSYCHIATRIC: No depression, anxiety, mood swings, or difficulty sleeping      Medications:  azithromycin  IVPB      piperacillin/tazobactam IVPB.. 3.375 Gram(s) IV Intermittent every 8 hours    carvedilol 25 milliGRAM(s) Oral <User Schedule>  furosemide   Injectable 40 milliGRAM(s) IV Push two times a day  lisinopril 10 milliGRAM(s) Oral daily    budesonide 160 MICROgram(s)/formoterol 4.5 MICROgram(s) Inhaler 2 Puff(s) Inhalation two times a day  tiotropium 2.5 MICROgram(s) Inhaler 2 Puff(s) Inhalation daily    gabapentin 400 milliGRAM(s) Oral every 8 hours  hydrOXYzine hydrochloride 50 milliGRAM(s) Oral at bedtime PRN  melatonin 5 milliGRAM(s) Oral at bedtime  mirtazapine 7.5 milliGRAM(s) Oral daily      aspirin  chewable 81 milliGRAM(s) Oral daily  heparin   Injectable 5000 Unit(s) SubCutaneous every 8 hours    pantoprazole    Tablet 40 milliGRAM(s) Oral before breakfast  psyllium Powder 1 Packet(s) Oral daily      atorvastatin 20 milliGRAM(s) Oral at bedtime  dextrose 50% Injectable 25 milliLiter(s) IV Push every 15 minutes  dextrose 50% Injectable 12.5 Gram(s) IV Push once  dextrose Oral Gel 15 Gram(s) Oral once  glucagon  Injectable 1 milliGRAM(s) IntraMuscular once  insulin glargine Injectable (LANTUS) 25 Unit(s) SubCutaneous at bedtime  insulin lispro (ADMELOG) corrective regimen sliding scale   SubCutaneous Before meals and at bedtime  insulin regular Infusion 4 Unit(s)/Hr IV Continuous <Continuous>  levothyroxine 100 MICROGram(s) Oral daily  methylPREDNISolone sodium succinate Injectable 40 milliGRAM(s) IV Push every 8 hours    dextrose 5%. 1000 milliLiter(s) IV Continuous <Continuous>  dextrose 5%. 1000 milliLiter(s) IV Continuous <Continuous>      chlorhexidine 2% Cloths 1 Application(s) Topical <User Schedule>            ICU Vital Signs Last 24 Hrs  T(C): 36.8 (01 Sep 2024 15:55), Max: 37.4 (31 Aug 2024 19:45)  T(F): 98.3 (01 Sep 2024 15:55), Max: 99.4 (31 Aug 2024 19:45)  HR: 89 (01 Sep 2024 17:00) (83 - 123)  BP: 130/78 (01 Sep 2024 17:00) (107/68 - 169/102)  BP(mean): 94 (01 Sep 2024 17:00) (80 - 131)  ABP: --  ABP(mean): --  RR: 22 (01 Sep 2024 17:00) (17 - 32)  SpO2: 98% (01 Sep 2024 17:00) (82% - 100%)    O2 Parameters below as of 01 Sep 2024 16:00  Patient On (Oxygen Delivery Method): nasal cannula  O2 Flow (L/min): 4          ABG - ( 01 Sep 2024 00:30 )  pH, Arterial: 7.420 pH, Blood: x     /  pCO2: 39    /  pO2: 62    / HCO3: 25    / Base Excess: 0.8   /  SaO2: 92.8                I&O's Detail    31 Aug 2024 07:01  -  01 Sep 2024 07:00  --------------------------------------------------------  IN:  Total IN: 0 mL    OUT:    Voided (mL): 1500 mL  Total OUT: 1500 mL    Total NET: -1500 mL      01 Sep 2024 07:01  -  01 Sep 2024 17:45  --------------------------------------------------------  IN:    Insulin: 30 mL    IV PiggyBack: 100 mL    Oral Fluid: 740 mL  Total IN: 870 mL    OUT:    Voided (mL): 1100 mL  Total OUT: 1100 mL    Total NET: -230 mL            LABS:                        11.3   15.22 )-----------( 230      ( 01 Sep 2024 13:00 )             33.5     09-01    136  |  98  |  26.8<H>  ----------------------------<  243<H>  4.3   |  25.0  |  1.04    Ca    8.1<L>      01 Sep 2024 13:00  Phos  4.1     09-01  Mg     2.0     09-01    TPro  6.3<L>  /  Alb  3.0<L>  /  TBili  0.5  /  DBili  x   /  AST  24  /  ALT  16  /  AlkPhos  43  09-01          CAPILLARY BLOOD GLUCOSE      POCT Blood Glucose.: 191 mg/dL (01 Sep 2024 16:59)    PT/INR - ( 31 Aug 2024 20:06 )   PT: 10.6 sec;   INR: 0.95 ratio         PTT - ( 31 Aug 2024 20:06 )  PTT:31.2 sec  Urinalysis Basic - ( 01 Sep 2024 13:00 )    Color: x / Appearance: x / SG: x / pH: x  Gluc: 243 mg/dL / Ketone: x  / Bili: x / Urobili: x   Blood: x / Protein: x / Nitrite: x   Leuk Esterase: x / RBC: x / WBC x   Sq Epi: x / Non Sq Epi: x / Bacteria: x      CULTURES:  Rapid RVP Result: NotDetec (08-31 @ 20:09)      Physical Examination:    General: No acute distress.    HEENT: Pupils equal, reactive to light.  Symmetric.  PULM: Clear to auscultation bilaterally, no significant sputum production  NECK: Supple, no lymphadenopathy, trachea midline  CVS: Regular rate and rhythm, no murmurs, rubs, or gallops  ABD: Soft, nondistended, nontender, normoactive bowel sounds, no masses  EXT: No edema, nontender  SKIN: Warm and well perfused, no rashes noted.  NEURO: Alert, oriented, interactive, nonfocal    RADIOLOGY:  < from: CT Angio Chest PE Protocol w/ IV Cont (09.01.24 @ 04:07) >  IMPRESSION:  No evidence of pulmonary embolus.    Stable appearance of diffuse bilateral opacities more pronounced in the   upper lobes, with pleural scarring; which likely represents pulmonary   edema and/or pneumonia.    < end of copied text >        < from: TTE W or WO Ultrasound Enhancing Agent (09.01.24 @ 10:06) >  CONCLUSIONS:      1. Left ventricular systolic function is normal with an ejection fraction visually estimated at 50 to 55 %. There are no regional wall motion abnormalities seen.   2. There is moderate (grade 2) left ventricular diastolic dysfunction, with elevated left ventricular filling pressure.   3. Mildly enlarged right ventricular cavity size and normal right ventricular systolic function.   4. Left atrium is moderately dilated.   5. No significant valvular disease.   6. No prior echocardiogram is available for comparison.    < end of copied text >

## 2024-09-01 NOTE — H&P ADULT - HISTORY OF PRESENT ILLNESS
43F active smoker with hx of CAD s/p PCI 2018, HFrEF (EF45%-50%), hx of asthma/copd not on home O2, DM, hx of crack cocaine use presented to the hospital with respiratory distress and chest discomfort after intercourse with her boyfriend. Pt was recently admitted to [facility] for dyspnea/chest pain 8/11-8/16 and was found to have diffuse alveolar opacities with subpleural sparing. Pt was diuresed, treated with empiric abx and treated with steroid course with symptomatic improvement. She was seen by cardiology in the setting of chest pain and inferolateral TWI but appeared similar to prior with negative troponin so plan was for outpatient stress test, which has not yet been completed. She reports prior to today she was doing well post discharge. She denies any recent fevers/chills. Denies chest pain prior to today, Endorses coughing during the episode of dyspnea today but not much sputum. She endorses an episode of nausea that self resolved with no associated abdominal pain or vomiting. She reports some LE edema. Reports compliance with her medications and denies any missed Lasix.   Pt was found to be hypoxic and initially placed on RA but given work of breathing was escalated to BiPAP. She was given SL nitro with improvement in chest pain. On evaluation, pt reported improvement in her symptoms although remained tachypneic. CT chest angio negative for PE but showed similar diffuse bilateral infiltrates. Pt given empiric abx and steroids. Had received sepsis fluids in the ED with interval worsening so will diurese.      43F active smoker with hx of CAD s/p PCI 2018, HFrEF (EF45%-50%), hx of asthma/copd not on home O2, DM, hx of crack cocaine use presented to the hospital with respiratory distress and chest discomfort after intercourse with her boyfriend. Pt was recently admitted to [facility] for dyspnea/chest pain 8/11-8/16 and was found to have diffuse alveolar opacities with subpleural sparing. Pt was diuresed, treated with empiric abx and treated with steroid course with symptomatic improvement. She was seen by cardiology in the setting of chest pain and inferolateral TWI but appeared similar to prior with negative troponin so plan was for outpatient stress test, which has not yet been completed. She reports prior to today she was doing well post discharge. She denies any recent fevers/chills. Denies chest pain prior to today, Endorses coughing during the episode of dyspnea today but not much sputum. She endorses an episode of nausea that self resolved with no associated abdominal pain or vomiting. She reports some LE edema. Reports compliance with her medications and denies any missed Lasix. She reports dust exposure in her current living facility and had recent possible exposure to insecticide prior to [facility] admission.    Pt was found to be hypoxic and initially placed on RA but given work of breathing was escalated to BiPAP. She was given SL nitro with improvement in chest pain. On evaluation, pt reported improvement in her symptoms although remained tachypneic. CT chest angio negative for PE but showed similar diffuse bilateral infiltrates. Pt given empiric abx and steroids. Had received sepsis fluids in the ED with interval worsening so will kris.

## 2024-09-01 NOTE — H&P ADULT - NSHPLABSRESULTS_GEN_ALL_CORE
12.2   18.62 )-----------( 179      ( 01 Sep 2024 03:30 )             36.4     09-01    130<L>  |  93<L>  |  27.8<H>  ----------------------------<  474<HH>  5.2   |  21.0<L>  |  1.13    Ca    8.3<L>      01 Sep 2024 03:30  Phos  2.5     09-01  Mg     1.5     09-01    TPro  6.6  /  Alb  3.3  /  TBili  0.4  /  DBili  x   /  AST  28  /  ALT  20  /  AlkPhos  52  09-01 12.2   18.62 )-----------( 179      ( 01 Sep 2024 03:30 )             36.4     09-01    130<L>  |  93<L>  |  27.8<H>  ----------------------------<  474<HH>  5.2   |  21.0<L>  |  1.13    Ca    8.3<L>      01 Sep 2024 03:30  Phos  2.5     09-01  Mg     1.5     09-01    TPro  6.6  /  Alb  3.3  /  TBili  0.4  /  DBili  x   /  AST  28  /  ALT  20  /  AlkPhos  52  09-01    < from: CT Angio Chest PE Protocol w/ IV Cont (09.01.24 @ 04:07) >      FINDINGS:  PULMONARY ARTERIES: Adequate opacification of the pulmonary arteries. No   evidence of pulmonary embolus.  LUNGS AND LARGE AIRWAYS: Patent central airways. Redemonstrated areas of   diffuse bilateral opacities with subpleural sparing, more pronounced in   the upper lobes, which appears similar compared with most recent prior.  PLEURA: No pleural effusion.  VESSELS: Within normal limits.  HEART: Heart size is normal. No pericardial effusion.  MEDIASTINUM AND DIDI: No lymphadenopathy. Stable appearance of multiple   subcentimeter mediastinal lymph nodes, nonspecific.  CHEST WALL AND LOWER NECK: Within normal limits.  VISUALIZED UPPER ABDOMEN: No acute findings.  BONES: Mild degenerative changes.    IMPRESSION:  No evidence of pulmonary embolus.    Stable appearance of diffuse bilateral opacities more pronounced in the   upper lobes, with pleural scarring; which likely represents pulmonary   edema and/or pneumonia.    < end of copied text >

## 2024-09-01 NOTE — PROGRESS NOTE ADULT - ASSESSMENT
43F active smoker with hx of CAD s/p PCI 2018, HFrEF (EF45%-50%), hx of asthma/copd not on home O2, DM, hx of crack cocaine use presented to the hospital with respiratory distress and chest discomfort with recent admission to [place] for same found to have bilateral diffuse alveolar opacities of unclear etiology now with recurrent respiratory failure w/ persistent infiltrates possibly secondary to underlying infectious/inflammatory process vs fluid overload     CNS   monitor neuro checks   Utox neg   c/w mirtazepine   c/w hydroxyzine prn   c/w gabapentin     Pulm   hypoxic respiratory failure   unclear etiology; possibly infectious vs inflammatory vs hypersensitivity vs fluid overload vs multifactorial   will attempt transition from NIV to high flow nasal cannula post diuresis   wean supplemental O2 as tolerated for goal sat >90%   c/w empiric steroids for now   will check autoimmune/hypersensitivity panel   c/w Spiriva/Symbicort   c/w lasix BID  CT chest angio neg for PE   may require bronchoscopy and possibly biopsy pending clinical course     CV   Atypical chest pain   possibly related to pleurisy vs MSK; r/o cardiac etiology   troponins neg x 3; EKG w/ TWI similar to prior   TTE EF50-55%  HTN   c/w Coreg   c/w Lisinopril   CAD/HFrEF  c/w ASA  c/w Lipitor   c/w coreg   c/w lasix 40 BID for now     ID   concern for sepsis   c/w empiric zosyn/azithro  s/p vanc  MRSA negative  obtain sputum cx  legionella negative  RVP negative  f/u mycoplasma pcr  f/u LDH/fungitell/aspergillus   f/u blood cx     Endocrine   DM w/ uncontrolled hyperglycemia   usually 25U lantus at bedtime at home  given 30U lantus, to be transitioned off insulin drip later today.    Full code   DVT ppx   PPI ppx

## 2024-09-01 NOTE — PROGRESS NOTE ADULT - ATTENDING COMMENTS
acute hypoxic respiratory failure  b/l diffuse GGO + consolidation, subpleural sparing   r/o CHF ex  r/o copd exacerbation  hyperglycemia     clinically now improved, on nasal canula  insulin drip bridged to lantus home dose at 5 pm   will continue methylpre 40 mg IV q8h  continue lasix  vanco d/c  continue zosyn and azithro  ILD / autoimune workup pending   patient may need bronch if no further improvement   patient may also need prolong steroid course   remain in MICU for now, possible can be downgrade if BG remains control on lantus, and respiratory status remains stabl e

## 2024-09-01 NOTE — H&P ADULT - NSHPPHYSICALEXAM_GEN_ALL_CORE
GEN: awake, alert, in mild distress   HEENT: NC/AT, anicteric, MMM  CV: S1S2, RRR   Pulm: bilateral rhonchi, tachypneic   GI: normoactive BS, soft, NT/ND   Ext: mild peripheral edema bilaterally   Neuro: AOX3, no focal deficits

## 2024-09-01 NOTE — PATIENT PROFILE ADULT - FALL HARM RISK - HARM RISK INTERVENTIONS

## 2024-09-02 DIAGNOSIS — I24.9 ACUTE ISCHEMIC HEART DISEASE, UNSPECIFIED: ICD-10-CM

## 2024-09-02 LAB
ACETONE SERPL-MCNC: NEGATIVE — SIGNIFICANT CHANGE UP
ALBUMIN SERPL ELPH-MCNC: 2.9 G/DL — LOW (ref 3.3–5.2)
ALBUMIN SERPL ELPH-MCNC: 3 G/DL — LOW (ref 3.3–5.2)
ALP SERPL-CCNC: 43 U/L — SIGNIFICANT CHANGE UP (ref 40–120)
ALP SERPL-CCNC: 44 U/L — SIGNIFICANT CHANGE UP (ref 40–120)
ALT FLD-CCNC: 15 U/L — SIGNIFICANT CHANGE UP
ALT FLD-CCNC: 16 U/L — SIGNIFICANT CHANGE UP
ANION GAP SERPL CALC-SCNC: 12 MMOL/L — SIGNIFICANT CHANGE UP (ref 5–17)
ANION GAP SERPL CALC-SCNC: 12 MMOL/L — SIGNIFICANT CHANGE UP (ref 5–17)
ANION GAP SERPL CALC-SCNC: 16 MMOL/L — SIGNIFICANT CHANGE UP (ref 5–17)
AST SERPL-CCNC: 15 U/L — SIGNIFICANT CHANGE UP
AST SERPL-CCNC: 26 U/L — SIGNIFICANT CHANGE UP
BASOPHILS # BLD AUTO: 0.01 K/UL — SIGNIFICANT CHANGE UP (ref 0–0.2)
BASOPHILS NFR BLD AUTO: 0.1 % — SIGNIFICANT CHANGE UP (ref 0–2)
BILIRUB SERPL-MCNC: 0.2 MG/DL — LOW (ref 0.4–2)
BILIRUB SERPL-MCNC: 0.2 MG/DL — LOW (ref 0.4–2)
BUN SERPL-MCNC: 40.2 MG/DL — HIGH (ref 8–20)
BUN SERPL-MCNC: 40.4 MG/DL — HIGH (ref 8–20)
BUN SERPL-MCNC: 40.5 MG/DL — HIGH (ref 8–20)
CALCIUM SERPL-MCNC: 7.7 MG/DL — LOW (ref 8.4–10.5)
CALCIUM SERPL-MCNC: 8 MG/DL — LOW (ref 8.4–10.5)
CALCIUM SERPL-MCNC: 8.1 MG/DL — LOW (ref 8.4–10.5)
CHLORIDE SERPL-SCNC: 95 MMOL/L — LOW (ref 96–108)
CHLORIDE SERPL-SCNC: 97 MMOL/L — SIGNIFICANT CHANGE UP (ref 96–108)
CHLORIDE SERPL-SCNC: 98 MMOL/L — SIGNIFICANT CHANGE UP (ref 96–108)
CO2 SERPL-SCNC: 20 MMOL/L — LOW (ref 22–29)
CO2 SERPL-SCNC: 25 MMOL/L — SIGNIFICANT CHANGE UP (ref 22–29)
CO2 SERPL-SCNC: 25 MMOL/L — SIGNIFICANT CHANGE UP (ref 22–29)
CREAT SERPL-MCNC: 1.04 MG/DL — SIGNIFICANT CHANGE UP (ref 0.5–1.3)
CREAT SERPL-MCNC: 1.21 MG/DL — SIGNIFICANT CHANGE UP (ref 0.5–1.3)
CREAT SERPL-MCNC: 1.28 MG/DL — SIGNIFICANT CHANGE UP (ref 0.5–1.3)
EGFR: 53 ML/MIN/1.73M2 — LOW
EGFR: 56 ML/MIN/1.73M2 — LOW
EGFR: 68 ML/MIN/1.73M2 — SIGNIFICANT CHANGE UP
EOSINOPHIL # BLD AUTO: 0 K/UL — SIGNIFICANT CHANGE UP (ref 0–0.5)
EOSINOPHIL NFR BLD AUTO: 0 % — SIGNIFICANT CHANGE UP (ref 0–6)
GLUCOSE BLDC GLUCOMTR-MCNC: 197 MG/DL — HIGH (ref 70–99)
GLUCOSE BLDC GLUCOMTR-MCNC: 323 MG/DL — HIGH (ref 70–99)
GLUCOSE BLDC GLUCOMTR-MCNC: 324 MG/DL — HIGH (ref 70–99)
GLUCOSE BLDC GLUCOMTR-MCNC: 326 MG/DL — HIGH (ref 70–99)
GLUCOSE BLDC GLUCOMTR-MCNC: 393 MG/DL — HIGH (ref 70–99)
GLUCOSE BLDC GLUCOMTR-MCNC: 413 MG/DL — HIGH (ref 70–99)
GLUCOSE SERPL-MCNC: 314 MG/DL — HIGH (ref 70–99)
GLUCOSE SERPL-MCNC: 394 MG/DL — HIGH (ref 70–99)
GLUCOSE SERPL-MCNC: 397 MG/DL — HIGH (ref 70–99)
HCT VFR BLD CALC: 32.7 % — LOW (ref 34.5–45)
HGB BLD-MCNC: 10.9 G/DL — LOW (ref 11.5–15.5)
IMM GRANULOCYTES NFR BLD AUTO: 0.4 % — SIGNIFICANT CHANGE UP (ref 0–0.9)
LYMPHOCYTES # BLD AUTO: 0.68 K/UL — LOW (ref 1–3.3)
LYMPHOCYTES # BLD AUTO: 5 % — LOW (ref 13–44)
MAGNESIUM SERPL-MCNC: 2.3 MG/DL — SIGNIFICANT CHANGE UP (ref 1.8–2.6)
MCHC RBC-ENTMCNC: 28.3 PG — SIGNIFICANT CHANGE UP (ref 27–34)
MCHC RBC-ENTMCNC: 33.3 GM/DL — SIGNIFICANT CHANGE UP (ref 32–36)
MCV RBC AUTO: 84.9 FL — SIGNIFICANT CHANGE UP (ref 80–100)
MONOCYTES # BLD AUTO: 0.24 K/UL — SIGNIFICANT CHANGE UP (ref 0–0.9)
MONOCYTES NFR BLD AUTO: 1.8 % — LOW (ref 2–14)
NEUTROPHILS # BLD AUTO: 12.58 K/UL — HIGH (ref 1.8–7.4)
NEUTROPHILS NFR BLD AUTO: 92.7 % — HIGH (ref 43–77)
PHOSPHATE SERPL-MCNC: 3.6 MG/DL — SIGNIFICANT CHANGE UP (ref 2.4–4.7)
PLATELET # BLD AUTO: 242 K/UL — SIGNIFICANT CHANGE UP (ref 150–400)
POTASSIUM SERPL-MCNC: 4.4 MMOL/L — SIGNIFICANT CHANGE UP (ref 3.5–5.3)
POTASSIUM SERPL-MCNC: 4.7 MMOL/L — SIGNIFICANT CHANGE UP (ref 3.5–5.3)
POTASSIUM SERPL-MCNC: 4.9 MMOL/L — SIGNIFICANT CHANGE UP (ref 3.5–5.3)
POTASSIUM SERPL-SCNC: 4.4 MMOL/L — SIGNIFICANT CHANGE UP (ref 3.5–5.3)
POTASSIUM SERPL-SCNC: 4.7 MMOL/L — SIGNIFICANT CHANGE UP (ref 3.5–5.3)
POTASSIUM SERPL-SCNC: 4.9 MMOL/L — SIGNIFICANT CHANGE UP (ref 3.5–5.3)
PROT SERPL-MCNC: 6.3 G/DL — LOW (ref 6.6–8.7)
PROT SERPL-MCNC: 6.5 G/DL — LOW (ref 6.6–8.7)
RBC # BLD: 3.85 M/UL — SIGNIFICANT CHANGE UP (ref 3.8–5.2)
RBC # FLD: 14.6 % — HIGH (ref 10.3–14.5)
SODIUM SERPL-SCNC: 131 MMOL/L — LOW (ref 135–145)
SODIUM SERPL-SCNC: 134 MMOL/L — LOW (ref 135–145)
SODIUM SERPL-SCNC: 135 MMOL/L — SIGNIFICANT CHANGE UP (ref 135–145)
WBC # BLD: 13.57 K/UL — HIGH (ref 3.8–10.5)
WBC # FLD AUTO: 13.57 K/UL — HIGH (ref 3.8–10.5)

## 2024-09-02 PROCEDURE — 99232 SBSQ HOSP IP/OBS MODERATE 35: CPT

## 2024-09-02 PROCEDURE — 99253 IP/OBS CNSLTJ NEW/EST LOW 45: CPT

## 2024-09-02 PROCEDURE — 99254 IP/OBS CNSLTJ NEW/EST MOD 60: CPT

## 2024-09-02 PROCEDURE — 99233 SBSQ HOSP IP/OBS HIGH 50: CPT

## 2024-09-02 PROCEDURE — 99221 1ST HOSP IP/OBS SF/LOW 40: CPT

## 2024-09-02 PROCEDURE — 99222 1ST HOSP IP/OBS MODERATE 55: CPT

## 2024-09-02 RX ORDER — ENOXAPARIN SODIUM 100 MG/ML
120 INJECTION SUBCUTANEOUS ONCE
Refills: 0 | Status: DISCONTINUED | OUTPATIENT
Start: 2024-09-02 | End: 2024-09-02

## 2024-09-02 RX ORDER — INSULIN GLARGINE 100 [IU]/ML
25 INJECTION, SOLUTION SUBCUTANEOUS AT BEDTIME
Refills: 0 | Status: DISCONTINUED | OUTPATIENT
Start: 2024-09-02 | End: 2024-09-03

## 2024-09-02 RX ORDER — INSULIN GLARGINE 100 [IU]/ML
25 INJECTION, SOLUTION SUBCUTANEOUS EVERY MORNING
Refills: 0 | Status: DISCONTINUED | OUTPATIENT
Start: 2024-09-02 | End: 2024-09-03

## 2024-09-02 RX ORDER — POLYETHYLENE GLYCOL 3350 17 G/17G
17 POWDER, FOR SOLUTION ORAL AT BEDTIME
Refills: 0 | Status: DISCONTINUED | OUTPATIENT
Start: 2024-09-02 | End: 2024-09-02

## 2024-09-02 RX ORDER — INSULIN GLARGINE 100 [IU]/ML
40 INJECTION, SOLUTION SUBCUTANEOUS AT BEDTIME
Refills: 0 | Status: DISCONTINUED | OUTPATIENT
Start: 2024-09-02 | End: 2024-09-02

## 2024-09-02 RX ORDER — SPIRONOLACTONE 25 MG/1
25 TABLET, FILM COATED ORAL DAILY
Refills: 0 | Status: DISCONTINUED | OUTPATIENT
Start: 2024-09-02 | End: 2024-09-03

## 2024-09-02 RX ORDER — FUROSEMIDE 40 MG
40 TABLET ORAL DAILY
Refills: 0 | Status: DISCONTINUED | OUTPATIENT
Start: 2024-09-02 | End: 2024-09-03

## 2024-09-02 RX ORDER — METHYLPREDNISOLONE 4 MG
40 TABLET ORAL DAILY
Refills: 0 | Status: DISCONTINUED | OUTPATIENT
Start: 2024-09-02 | End: 2024-09-03

## 2024-09-02 RX ORDER — FUROSEMIDE 40 MG
40 TABLET ORAL DAILY
Refills: 0 | Status: COMPLETED | OUTPATIENT
Start: 2024-09-02 | End: 2024-09-02

## 2024-09-02 RX ADMIN — Medication 400 MILLIGRAM(S): at 06:09

## 2024-09-02 RX ADMIN — BUDESONIDE AND FORMOTEROL FUMARATE 2 PUFF(S): 80; 4.5 AEROSOL, METERED RESPIRATORY (INHALATION) at 09:07

## 2024-09-02 RX ADMIN — Medication 8: at 17:04

## 2024-09-02 RX ADMIN — PSYLLIUM HUSK 1 PACKET(S): 3.4 GRANULE ORAL at 12:27

## 2024-09-02 RX ADMIN — Medication 400 MILLIGRAM(S): at 21:10

## 2024-09-02 RX ADMIN — Medication 7.5 MILLIGRAM(S): at 12:26

## 2024-09-02 RX ADMIN — Medication 40 MILLIGRAM(S): at 07:58

## 2024-09-02 RX ADMIN — Medication 10: at 12:37

## 2024-09-02 RX ADMIN — Medication 5 MILLIGRAM(S): at 21:10

## 2024-09-02 RX ADMIN — TIOTROPIUM BROMIDE INHALATION SPRAY 2 PUFF(S): 3.12 SPRAY, METERED RESPIRATORY (INHALATION) at 09:07

## 2024-09-02 RX ADMIN — Medication 5000 UNIT(S): at 21:09

## 2024-09-02 RX ADMIN — Medication 12: at 07:49

## 2024-09-02 RX ADMIN — Medication 5000 UNIT(S): at 06:10

## 2024-09-02 RX ADMIN — Medication 14 UNIT(S): at 17:05

## 2024-09-02 RX ADMIN — Medication 400 MILLIGRAM(S): at 16:09

## 2024-09-02 RX ADMIN — LISINOPRIL 10 MILLIGRAM(S): 10 TABLET ORAL at 06:10

## 2024-09-02 RX ADMIN — BUDESONIDE AND FORMOTEROL FUMARATE 2 PUFF(S): 80; 4.5 AEROSOL, METERED RESPIRATORY (INHALATION) at 20:44

## 2024-09-02 RX ADMIN — Medication 10 UNIT(S): at 07:50

## 2024-09-02 RX ADMIN — Medication 81 MILLIGRAM(S): at 12:25

## 2024-09-02 RX ADMIN — CHLORHEXIDINE GLUCONATE 1 APPLICATION(S): 40 SOLUTION TOPICAL at 07:13

## 2024-09-02 RX ADMIN — PIPERACILLIN SODIUM AND TAZOBACTAM SODIUM 25 GRAM(S): 3; .375 INJECTION, POWDER, FOR SOLUTION INTRAVENOUS at 21:11

## 2024-09-02 RX ADMIN — AZITHROMYCIN 255 MILLIGRAM(S): 500 TABLET, FILM COATED ORAL at 01:28

## 2024-09-02 RX ADMIN — PIPERACILLIN SODIUM AND TAZOBACTAM SODIUM 25 GRAM(S): 3; .375 INJECTION, POWDER, FOR SOLUTION INTRAVENOUS at 12:25

## 2024-09-02 RX ADMIN — INSULIN GLARGINE 25 UNIT(S): 100 INJECTION, SOLUTION SUBCUTANEOUS at 14:16

## 2024-09-02 RX ADMIN — Medication 5000 UNIT(S): at 14:11

## 2024-09-02 RX ADMIN — INSULIN GLARGINE 25 UNIT(S): 100 INJECTION, SOLUTION SUBCUTANEOUS at 21:09

## 2024-09-02 RX ADMIN — CARVEDILOL 25 MILLIGRAM(S): 6.25 TABLET ORAL at 07:58

## 2024-09-02 RX ADMIN — PIPERACILLIN SODIUM AND TAZOBACTAM SODIUM 25 GRAM(S): 3; .375 INJECTION, POWDER, FOR SOLUTION INTRAVENOUS at 04:28

## 2024-09-02 RX ADMIN — Medication 40 MILLIGRAM(S): at 06:10

## 2024-09-02 RX ADMIN — Medication 40 MILLIGRAM(S): at 04:28

## 2024-09-02 RX ADMIN — Medication 20 MILLIGRAM(S): at 21:10

## 2024-09-02 RX ADMIN — Medication 14 UNIT(S): at 12:37

## 2024-09-02 RX ADMIN — Medication 2: at 21:08

## 2024-09-02 RX ADMIN — Medication 100 MICROGRAM(S): at 06:09

## 2024-09-02 NOTE — CONSULT NOTE ADULT - ASSESSMENT
43y old  Female PMH  asthma, COPD, HFrEF, IDDM, PNA, smoker, CAD s/p stent x1 in 2018 s/p AMI. Was just at [facility] on 8/13/24 with SOB, was treated with ABX and steroids. Cardiology was following here there and recommended out patient NST. She now presents here with angina during sexual intercourse. 1st sex round she had SOB and stopped. 2nd round she had SOB with chest pressure, she stopped all activity but her symptoms lasted for 1 hour 30 minutes. When she arrived at the hospital she still had some chest discomfort, was given NTG and symptoms resolved.  Also she is endorsing pleuritic chest pain when she lays flat and takes a deep breath in, improved with sitting up. Today when sitting up to ambulate to the bathroom she had angina which was relieved with rest. Also noted to have NSVT 4 beats on telemetry. Has significant ST / T changes when compared to EKG at [place].  43y old  Female PMH  asthma, COPD, HFrEF, IDDM, PNA, smoker, CAD s/p stent x1 in 2018 s/p AMI. Was just at [facility] on 8/13/24 with SOB, was treated with ABX, steroids, and IVP lasix . Cardiology was following her there and recommended out patient NST. She now presents here with angina during sexual intercourse. 1st sex round she had SOB and stopped. 2nd round she had SOB with chest pressure, she stopped all activity but her symptoms lasted for 1 hour 30 minutes. When she arrived at the hospital she still had some chest discomfort, was given NTG and symptoms resolved.  Also she is endorsing pleuritic chest pain when she lays flat and takes a deep breath in, improved with sitting up. Today when sitting up to ambulate to the bathroom she had angina which was relieved with rest. Also noted to have NSVT 4 beats on telemetry. Has significant ST / T changes when compared to EKG at [place].

## 2024-09-02 NOTE — PROGRESS NOTE ADULT - SUBJECTIVE AND OBJECTIVE BOX
No events overnight. Comfortable on NC. Glucose elevated secondary to high doses of methylprednisolone.    Vital Signs:    T(C): 36.6 (09-02-24 @ 07:27), Max: 36.8 (09-01-24 @ 15:55)  HR: 88 (09-02-24 @ 06:00) (83 - 104)  BP: 137/85 (09-02-24 @ 06:00) (105/63 - 137/85)  RR: 18 (09-02-24 @ 06:00) (17 - 30)  SpO2: 98% (09-02-24 @ 06:00) (96% - 100%)    Vent data:        I's/O's:      09-01-24 @ 07:01  -  09-02-24 @ 07:00  --------------------------------------------------------  IN: 1123 mL / OUT: 2000 mL / NET: -877 mL        PMH:    Acute respiratory failure with hypoxia    No pertinent family history in first degree relatives    Family history of acute heart failure (Mother)    FH: type 2 diabetes (Mother)    Handoff    MEWS Score    Bipolar disorder    IDDM (insulin dependent diabetes mellitus)    Seizure    Cocaine abuse    Mild intermittent asthma without complication    Acute myocardial infarction    H/O heart artery stent    CAD (coronary artery disease)    No pertinent past medical history    Diabetes mellitus    CAD (coronary artery disease)    Mild HTN    HTN (hypertension)    CAD (coronary artery disease)    T2DM (type 2 diabetes mellitus)    Acute myocardial infarction    H/O heart artery stent    CAD (coronary artery disease)    Acute hypoxic respiratory failure    No significant past surgical history    S/P hernia repair    S/P cardiac cath    DIFFICULTY BREATHING    15    [email or web address]        Allergies:    shellfish (Swelling)  Seafood (Swelling)  shellfish (Rash)  No Known Drug Allergies  shellfish (Unknown)      Labs:                          10.9   13.57 )-----------( 242      ( 02 Sep 2024 01:15 )             32.7       Micro:        Physical Exam:    Gen: NAD  HEENT: AT  CV: No obvious murmurs  Lungs: CTA B/L  Abd: Soft, NT  Ext: No edema  Neuro: CN II - XII grossly intact  Mental status: At baseline    Radiology:      Medications:    aspirin  chewable 81 milliGRAM(s) Oral daily  atorvastatin 20 milliGRAM(s) Oral at bedtime  budesonide 160 MICROgram(s)/formoterol 4.5 MICROgram(s) Inhaler 2 Puff(s) Inhalation two times a day  carvedilol 25 milliGRAM(s) Oral <User Schedule>  chlorhexidine 2% Cloths 1 Application(s) Topical <User Schedule>  dextrose 5%. 1000 milliLiter(s) IV Continuous <Continuous>  dextrose 5%. 1000 milliLiter(s) IV Continuous <Continuous>  dextrose 50% Injectable 25 milliLiter(s) IV Push every 15 minutes  dextrose 50% Injectable 12.5 Gram(s) IV Push once  dextrose Oral Gel 15 Gram(s) Oral once  gabapentin 400 milliGRAM(s) Oral every 8 hours  glucagon  Injectable 1 milliGRAM(s) IntraMuscular once  glucagon  Injectable 1 milliGRAM(s) IntraMuscular once  heparin   Injectable 5000 Unit(s) SubCutaneous every 8 hours  hydrOXYzine hydrochloride 50 milliGRAM(s) Oral at bedtime PRN  insulin glargine Injectable (LANTUS) 30 Unit(s) SubCutaneous at bedtime  insulin lispro (ADMELOG) corrective regimen sliding scale   SubCutaneous Before meals and at bedtime  insulin lispro Injectable (ADMELOG) 10 Unit(s) SubCutaneous three times a day before meals  levothyroxine 100 MICROGram(s) Oral daily  lisinopril 10 milliGRAM(s) Oral daily  melatonin 5 milliGRAM(s) Oral at bedtime  methylPREDNISolone sodium succinate Injectable 40 milliGRAM(s) IV Push daily  mirtazapine 7.5 milliGRAM(s) Oral daily  pantoprazole    Tablet 40 milliGRAM(s) Oral before breakfast  piperacillin/tazobactam IVPB.. 3.375 Gram(s) IV Intermittent every 8 hours  psyllium Powder 1 Packet(s) Oral daily  tiotropium 2.5 MICROgram(s) Inhaler 2 Puff(s) Inhalation daily

## 2024-09-02 NOTE — CONSULT NOTE ADULT - SUBJECTIVE AND OBJECTIVE BOX
[facility]                                              CARDIOLOGY AT [facility]                                                   [address], [place] [address]                                             Telephone: [phone]. Fax:[phone]                                                       CARDIOLOGY CONSULTATION NOTE                                                                                             History obtained by: Patient and medical record  Community Cardiologist: Chau Hahn. [place]    obtained: Yes [ x ] No [  ]  Reason for Consultation: pulmonary edema   Available out pt records reviewed: Yes [x  ] No [  ]    HPI:  Patient is a 43y old  Female PMH  asthma, COPD, HFrEF, IDDM, PNA, smoker, CAD s/p stent x1 in 2018 s/p AMI. Was just at [facility] on 8/13/24 with SOB, was treated with ABX and steroids. Cardiology was following here there and recommended out patient NST. She now presents here with angina during sexual intercourse. 1st sex round she had SOB and stopped. 2nd round she had SOB with chest pressure, she stopped all activity but her symptoms lasted for 1 hour 30 minutes. When she arrived at the hospital she still had some chest discomfort, was given NTG and symptoms resolved.  Also she is endorsing pleuritic chest pain when she lays flat and takes a deep breath in, improved with sitting up. Today when sitting up to ambulate to the bathroom she had angina which was relieved with rest. Also noted to have NSVT 4 beats on telemetry. Has significant ST / T changes when compared to EKG at [place].              CARDIAC TESTING   ECHO:  < from: TTE W or WO Ultrasound Enhancing Agent (09.01.24 @ 10:06) >  CONCLUSIONS:      1. Left ventricular systolic function is normal with an ejection fraction visually estimated at 50 to 55 %. There are no regional wall motion abnormalities seen.   2. There is moderate (grade 2) left ventricular diastolic dysfunction, with elevated left ventricular filling pressure.   3. Mildly enlarged right ventricular cavity size and normal right ventricular systolic function.   4. Left atrium is moderately dilated.   5. No significant valvular disease.   6. No prior echocardiogram is available for comparison.    < end of copied text >    STRESS:  < from: NM Nuclear Stress Pharmacologic Multiple (12.02.22 @ 11:05) >  IMPRESSION: Normal SPECT Myocardial Perfusion Imaging.    Normal left ventricular function with post stress ejection fraction of   51% (Normal: 50% or greater).    No regional wall motion abnormalities.    Findings suspicious for apical thinning artifact; otherwise NO evidence   of reversible or fixed perfusion defects.    < end of copied text >    CATH:     ELECTROPHYSIOLOGY:     PAST MEDICAL HISTORY  Bipolar disorder    IDDM (insulin dependent diabetes mellitus)    Seizure    Cocaine abuse    Mild intermittent asthma without complication    Acute myocardial infarction    H/O heart artery stent    CAD (coronary artery disease)    No pertinent past medical history    Diabetes mellitus    CAD (coronary artery disease)    Mild HTN    HTN (hypertension)    CAD (coronary artery disease)    T2DM (type 2 diabetes mellitus)        PAST SURGICAL HISTORY  No significant past surgical history    S/P hernia repair    S/P cardiac cath        SOCIAL HISTORY:  Denies smoking/alcohol/drugs  CIGARETTES:     ALCOHOL:  DRUGS:    FAMILY HISTORY:  Family history of acute heart failure (Mother)    FH: type 2 diabetes (Mother)      Family History of Cardiovascular Disease:  Yes [  ] No [ x ]  Coronary Artery Disease in first degree relative: Yes [  ] No [ x ]  Sudden Cardiac Death in First degree relative: Yes [  ] No [ x ]    HOME MEDICATIONS:  Admelog SoloStar 100 units/mL injectable solution: Use as directed as needed per sliding scale (02 Dec 2022 14:14)  aspirin 81 mg oral capsule: 1 cap(s) orally once a day (21 Aug 2024 13:24)  Aspirin Enteric Coated 81 mg oral delayed release tablet: 1 tab(s) orally once a day (02 Dec 2022 14:14)  atorvastatin 20 mg oral tablet: 1 tab(s) orally once a day (at bedtime) (02 Dec 2022 14:14)  Basaglar KwikPen 100 units/mL subcutaneous solution: 24 unit(s) subcutaneously once a day (at bedtime) (02 Dec 2022 14:14)  Bydureon BCise 2 mg/0.85 mL subcutaneous suspension, extended release: 2 milligram(s) subcutaneously once a week next dose 8/15 (21 Aug 2024 13:24)  Coreg 25 mg oral tablet: 1 tab(s) orally 2 times a day (02 Dec 2022 14:14)  Coreg 25 mg oral tablet: 1 tab(s) orally 2 times a day (21 Aug 2024 13:24)  FLUoxetine 20 mg oral capsule: 1 cap(s) orally once a day (02 Dec 2022 14:14)  Lantus 100 units/mL subcutaneous solution: 25 unit(s) subcutaneous once a day (at bedtime) (21 Aug 2024 13:24)  Lasix 20 mg oral tablet: 1 tab(s) orally once a day (21 Aug 2024 13:24)  levothyroxine 100 mcg (0.1 mg) oral tablet: 1 tab(s) orally once a day (02 Dec 2022 14:14)  Lipitor 20 mg oral tablet: 1 tab(s) orally once a day (21 Aug 2024 13:24)  lisinopril 10 mg oral tablet: 1 tab(s) orally (21 Aug 2024 13:24)  lisinopril 10 mg oral tablet: 1 tab(s) orally 2 times a day (02 Dec 2022 14:14)  loratadine 10 mg oral tablet: 1 tab(s) orally once a day, As Needed (02 Dec 2022 14:14)  Metamucil 1.7 g oral wafer: 1 each orally once a day (at bedtime) as needed for  constipation (21 Aug 2024 13:24)  Neurontin 400 mg oral capsule: 1 cap(s) orally 3 times a day (21 Aug 2024 13:24)  omeprazole 20 mg oral delayed release capsule: 1 cap(s) orally once a day (02 Dec 2022 14:14)  Vistaril 50 mg oral capsule: 1 cap(s) orally once a day (at bedtime), As Needed (02 Dec 2022 14:14)      CURRENT CARDIAC MEDICATIONS:  carvedilol 25 milliGRAM(s) Oral <User Schedule>  lisinopril 10 milliGRAM(s) Oral daily      CURRENT OTHER MEDICATIONS:  budesonide 160 MICROgram(s)/formoterol 4.5 MICROgram(s) Inhaler 2 Puff(s) Inhalation two times a day  tiotropium 2.5 MICROgram(s) Inhaler 2 Puff(s) Inhalation daily  gabapentin 400 milliGRAM(s) Oral every 8 hours  hydrOXYzine hydrochloride 50 milliGRAM(s) Oral at bedtime PRN Anxiety  melatonin 5 milliGRAM(s) Oral at bedtime  mirtazapine 7.5 milliGRAM(s) Oral daily  pantoprazole    Tablet 40 milliGRAM(s) Oral before breakfast  psyllium Powder 1 Packet(s) Oral daily  aspirin  chewable 81 milliGRAM(s) Oral daily  atorvastatin 20 milliGRAM(s) Oral at bedtime  chlorhexidine 2% Cloths 1 Application(s) Topical <User Schedule>  dextrose 5%. 1000 milliLiter(s) (100 mL/Hr) IV Continuous <Continuous>  dextrose 5%. 1000 milliLiter(s) (50 mL/Hr) IV Continuous <Continuous>  dextrose 50% Injectable 25 milliLiter(s) IV Push every 15 minutes, Stop order after: 1 Doses  dextrose 50% Injectable 12.5 Gram(s) IV Push once, Stop order after: 1 Doses  dextrose Oral Gel 15 Gram(s) Oral once, Stop order after: 1 Doses  glucagon  Injectable 1 milliGRAM(s) IntraMuscular once, Stop order after: 1 Doses  glucagon  Injectable 1 milliGRAM(s) IntraMuscular once, Stop order after: 1 Doses  heparin   Injectable 5000 Unit(s) SubCutaneous every 8 hours  insulin glargine Injectable (LANTUS) 30 Unit(s) SubCutaneous at bedtime  insulin lispro (ADMELOG) corrective regimen sliding scale   SubCutaneous Before meals and at bedtime  insulin lispro Injectable (ADMELOG) 10 Unit(s) SubCutaneous three times a day before meals  levothyroxine 100 MICROGram(s) Oral daily  methylPREDNISolone sodium succinate Injectable 40 milliGRAM(s) IV Push daily  piperacillin/tazobactam IVPB.. 3.375 Gram(s) IV Intermittent every 8 hours, Stop order after: 7 Days      ALLERGIES:   shellfish (Swelling)  Seafood (Swelling)  shellfish (Rash)  No Known Drug Allergies  shellfish (Unknown)      REVIEW OF SYMPTOMS:   CONSTITUTIONAL: No fever, no chills, no weight loss, no weight gain, no fatigue   ENMT:  No vertigo; No sinus or throat pain  NECK: No pain or stiffness  CARDIOVASCULAR: No chest pain, no dyspnea, no syncope/presyncope, no palpitations, no dizziness, no Orthopnea, no Paroxsymal nocturnal dyspnea  RESPIRATORY: no Shortness of breath, no cough, no wheezing  : No dysuria, no hematuria   GI: No dark color stool, no nausea, no diarrhea, no constipation, no abdominal pain   NEURO: No headache, no slurred speech   MUSCULOSKELETAL: No joint pain or swelling; No muscle, back, or extremity pain  PSYCH: No agitation, no anxiety.    ALL OTHER REVIEW OF SYSTEMS ARE NEGATIVE.    VITAL SIGNS:  T(C): 36.6 (09-02-24 @ 07:27), Max: 36.8 (09-01-24 @ 15:55)  T(F): 97.9 (09-02-24 @ 07:27), Max: 98.3 (09-01-24 @ 15:55)  HR: 101 (09-02-24 @ 09:07) (83 - 104)  BP: 114/74 (09-02-24 @ 09:00) (105/63 - 142/94)  RR: 18 (09-02-24 @ 09:00) (17 - 30)  SpO2: 98% (09-02-24 @ 09:07) (97% - 100%)    INTAKE AND OUTPUT:     09-01 @ 07:01  -  09-02 @ 07:00  --------------------------------------------------------  IN: 1123 mL / OUT: 2000 mL / NET: -877 mL        PHYSICAL EXAM:  Constitutional: Comfortable . No acute distress.   HEENT: Atraumatic and normocephalic , neck is supple . no JVD. No carotid bruit.  CNS: A&Ox3. No focal deficits.   Respiratory: CTAB, unlabored   Cardiovascular: RRR normal s1 s2. No murmur. No rubs or gallop.  Gastrointestinal: Soft, non-tender. +Bowel sounds.   Extremities: 2+ Peripheral Pulses, No clubbing, cyanosis, or edema  Psychiatric: Calm . no agitation.   Skin: Warm and dry, no ulcers on extremities     LABS:                            10.9   13.57 )-----------( 242      ( 02 Sep 2024 01:15 )             32.7     09-02    131<L>  |  95<L>  |  40.2<H>  ----------------------------<  394<H>  4.9   |  20.0<L>  |  1.04    Ca    7.7<L>      02 Sep 2024 06:25  Phos  3.6     09-02  Mg     2.3     09-02    TPro  6.5<L>  /  Alb  2.9<L>  /  TBili  0.2<L>  /  DBili  x   /  AST  26  /  ALT  16  /  AlkPhos  43  09-02    PT/INR - ( 31 Aug 2024 20:06 )   PT: 10.6 sec;   INR: 0.95 ratio         PTT - ( 31 Aug 2024 20:06 )  PTT:31.2 sec  Urinalysis Basic - ( 02 Sep 2024 06:25 )    Color: x / Appearance: x / SG: x / pH: x  Gluc: 394 mg/dL / Ketone: x  / Bili: x / Urobili: x   Blood: x / Protein: x / Nitrite: x   Leuk Esterase: x / RBC: x / WBC x   Sq Epi: x / Non Sq Epi: x / Bacteria: x              INTERPRETATION OF TELEMETRY: SR    ECG: SR, TWI / ST diffuse   Prior ECG: Yes [ x ] No [  ]                                                  [facility]                                              CARDIOLOGY AT [facility]                                                   [address], [place] [address]                                             Telephone: [phone]. Fax:[phone]                                                       CARDIOLOGY CONSULTATION NOTE                                                                                             History obtained by: Patient and medical record  Community Cardiologist: Chau Hahn. [place]    obtained: Yes [ x ] No [  ]  Reason for Consultation: pulmonary edema   Available out pt records reviewed: Yes [x  ] No [  ]    HPI:  Patient is a 43y old  Female PMH  asthma, COPD, HFrEF, IDDM, PNA, smoker, CAD s/p stent x1 in 2018 s/p AMI. Was just at [facility] on 8/13/24 with SOB, was treated with ABX ,steroids, IVP lasix . Cardiology was following her there and recommended out patient NST. She now presents here with angina during sexual intercourse. 1st sex round she had SOB and stopped. 2nd round she had SOB with chest pressure, she stopped all activity but her symptoms lasted for 1 hour 30 minutes. When she arrived at the hospital she still had some chest discomfort, was given NTG and symptoms resolved.  Also she is endorsing pleuritic chest pain when she lays flat and takes a deep breath in, improved with sitting up. Today when sitting up to ambulate to the bathroom she had angina which was relieved with rest. Also noted to have NSVT 4 beats on telemetry. Has significant ST / T changes when compared to EKG at [place].              CARDIAC TESTING   ECHO:  < from: TTE W or WO Ultrasound Enhancing Agent (09.01.24 @ 10:06) >  CONCLUSIONS:      1. Left ventricular systolic function is normal with an ejection fraction visually estimated at 50 to 55 %. There are no regional wall motion abnormalities seen.   2. There is moderate (grade 2) left ventricular diastolic dysfunction, with elevated left ventricular filling pressure.   3. Mildly enlarged right ventricular cavity size and normal right ventricular systolic function.   4. Left atrium is moderately dilated.   5. No significant valvular disease.   6. No prior echocardiogram is available for comparison.    < end of copied text >    STRESS:  < from: NM Nuclear Stress Pharmacologic Multiple (12.02.22 @ 11:05) >  IMPRESSION: Normal SPECT Myocardial Perfusion Imaging.    Normal left ventricular function with post stress ejection fraction of   51% (Normal: 50% or greater).    No regional wall motion abnormalities.    Findings suspicious for apical thinning artifact; otherwise NO evidence   of reversible or fixed perfusion defects.    < end of copied text >    CATH:     ELECTROPHYSIOLOGY:     PAST MEDICAL HISTORY  Bipolar disorder    IDDM (insulin dependent diabetes mellitus)    Seizure    Cocaine abuse    Mild intermittent asthma without complication    Acute myocardial infarction    H/O heart artery stent    CAD (coronary artery disease)    No pertinent past medical history    Diabetes mellitus    CAD (coronary artery disease)    Mild HTN    HTN (hypertension)    CAD (coronary artery disease)    T2DM (type 2 diabetes mellitus)        PAST SURGICAL HISTORY  No significant past surgical history    S/P hernia repair    S/P cardiac cath        SOCIAL HISTORY:  Denies smoking/alcohol/drugs  CIGARETTES:     ALCOHOL:  DRUGS:    FAMILY HISTORY:  Family history of acute heart failure (Mother)    FH: type 2 diabetes (Mother)      Family History of Cardiovascular Disease:  Yes [  ] No [ x ]  Coronary Artery Disease in first degree relative: Yes [  ] No [ x ]  Sudden Cardiac Death in First degree relative: Yes [  ] No [ x ]    HOME MEDICATIONS:  Admelog SoloStar 100 units/mL injectable solution: Use as directed as needed per sliding scale (02 Dec 2022 14:14)  aspirin 81 mg oral capsule: 1 cap(s) orally once a day (21 Aug 2024 13:24)  Aspirin Enteric Coated 81 mg oral delayed release tablet: 1 tab(s) orally once a day (02 Dec 2022 14:14)  atorvastatin 20 mg oral tablet: 1 tab(s) orally once a day (at bedtime) (02 Dec 2022 14:14)  Basaglar KwikPen 100 units/mL subcutaneous solution: 24 unit(s) subcutaneously once a day (at bedtime) (02 Dec 2022 14:14)  Bydureon BCise 2 mg/0.85 mL subcutaneous suspension, extended release: 2 milligram(s) subcutaneously once a week next dose 8/15 (21 Aug 2024 13:24)  Coreg 25 mg oral tablet: 1 tab(s) orally 2 times a day (02 Dec 2022 14:14)  Coreg 25 mg oral tablet: 1 tab(s) orally 2 times a day (21 Aug 2024 13:24)  FLUoxetine 20 mg oral capsule: 1 cap(s) orally once a day (02 Dec 2022 14:14)  Lantus 100 units/mL subcutaneous solution: 25 unit(s) subcutaneous once a day (at bedtime) (21 Aug 2024 13:24)  Lasix 20 mg oral tablet: 1 tab(s) orally once a day (21 Aug 2024 13:24)  levothyroxine 100 mcg (0.1 mg) oral tablet: 1 tab(s) orally once a day (02 Dec 2022 14:14)  Lipitor 20 mg oral tablet: 1 tab(s) orally once a day (21 Aug 2024 13:24)  lisinopril 10 mg oral tablet: 1 tab(s) orally (21 Aug 2024 13:24)  lisinopril 10 mg oral tablet: 1 tab(s) orally 2 times a day (02 Dec 2022 14:14)  loratadine 10 mg oral tablet: 1 tab(s) orally once a day, As Needed (02 Dec 2022 14:14)  Metamucil 1.7 g oral wafer: 1 each orally once a day (at bedtime) as needed for  constipation (21 Aug 2024 13:24)  Neurontin 400 mg oral capsule: 1 cap(s) orally 3 times a day (21 Aug 2024 13:24)  omeprazole 20 mg oral delayed release capsule: 1 cap(s) orally once a day (02 Dec 2022 14:14)  Vistaril 50 mg oral capsule: 1 cap(s) orally once a day (at bedtime), As Needed (02 Dec 2022 14:14)      CURRENT CARDIAC MEDICATIONS:  carvedilol 25 milliGRAM(s) Oral <User Schedule>  lisinopril 10 milliGRAM(s) Oral daily      CURRENT OTHER MEDICATIONS:  budesonide 160 MICROgram(s)/formoterol 4.5 MICROgram(s) Inhaler 2 Puff(s) Inhalation two times a day  tiotropium 2.5 MICROgram(s) Inhaler 2 Puff(s) Inhalation daily  gabapentin 400 milliGRAM(s) Oral every 8 hours  hydrOXYzine hydrochloride 50 milliGRAM(s) Oral at bedtime PRN Anxiety  melatonin 5 milliGRAM(s) Oral at bedtime  mirtazapine 7.5 milliGRAM(s) Oral daily  pantoprazole    Tablet 40 milliGRAM(s) Oral before breakfast  psyllium Powder 1 Packet(s) Oral daily  aspirin  chewable 81 milliGRAM(s) Oral daily  atorvastatin 20 milliGRAM(s) Oral at bedtime  chlorhexidine 2% Cloths 1 Application(s) Topical <User Schedule>  dextrose 5%. 1000 milliLiter(s) (100 mL/Hr) IV Continuous <Continuous>  dextrose 5%. 1000 milliLiter(s) (50 mL/Hr) IV Continuous <Continuous>  dextrose 50% Injectable 25 milliLiter(s) IV Push every 15 minutes, Stop order after: 1 Doses  dextrose 50% Injectable 12.5 Gram(s) IV Push once, Stop order after: 1 Doses  dextrose Oral Gel 15 Gram(s) Oral once, Stop order after: 1 Doses  glucagon  Injectable 1 milliGRAM(s) IntraMuscular once, Stop order after: 1 Doses  glucagon  Injectable 1 milliGRAM(s) IntraMuscular once, Stop order after: 1 Doses  heparin   Injectable 5000 Unit(s) SubCutaneous every 8 hours  insulin glargine Injectable (LANTUS) 30 Unit(s) SubCutaneous at bedtime  insulin lispro (ADMELOG) corrective regimen sliding scale   SubCutaneous Before meals and at bedtime  insulin lispro Injectable (ADMELOG) 10 Unit(s) SubCutaneous three times a day before meals  levothyroxine 100 MICROGram(s) Oral daily  methylPREDNISolone sodium succinate Injectable 40 milliGRAM(s) IV Push daily  piperacillin/tazobactam IVPB.. 3.375 Gram(s) IV Intermittent every 8 hours, Stop order after: 7 Days      ALLERGIES:   shellfish (Swelling)  Seafood (Swelling)  shellfish (Rash)  No Known Drug Allergies  shellfish (Unknown)      REVIEW OF SYMPTOMS:   CONSTITUTIONAL: No fever, no chills, no weight loss, no weight gain, no fatigue   ENMT:  No vertigo; No sinus or throat pain  NECK: No pain or stiffness  CARDIOVASCULAR: No chest pain, no dyspnea, no syncope/presyncope, no palpitations, no dizziness, no Orthopnea, no Paroxsymal nocturnal dyspnea  RESPIRATORY: no Shortness of breath, no cough, no wheezing  : No dysuria, no hematuria   GI: No dark color stool, no nausea, no diarrhea, no constipation, no abdominal pain   NEURO: No headache, no slurred speech   MUSCULOSKELETAL: No joint pain or swelling; No muscle, back, or extremity pain  PSYCH: No agitation, no anxiety.    ALL OTHER REVIEW OF SYSTEMS ARE NEGATIVE.    VITAL SIGNS:  T(C): 36.6 (09-02-24 @ 07:27), Max: 36.8 (09-01-24 @ 15:55)  T(F): 97.9 (09-02-24 @ 07:27), Max: 98.3 (09-01-24 @ 15:55)  HR: 101 (09-02-24 @ 09:07) (83 - 104)  BP: 114/74 (09-02-24 @ 09:00) (105/63 - 142/94)  RR: 18 (09-02-24 @ 09:00) (17 - 30)  SpO2: 98% (09-02-24 @ 09:07) (97% - 100%)    INTAKE AND OUTPUT:     09-01 @ 07:01  -  09-02 @ 07:00  --------------------------------------------------------  IN: 1123 mL / OUT: 2000 mL / NET: -877 mL        PHYSICAL EXAM:  Constitutional: Comfortable . No acute distress.   HEENT: Atraumatic and normocephalic , neck is supple . no JVD. No carotid bruit.  CNS: A&Ox3. No focal deficits.   Respiratory: CTAB, unlabored   Cardiovascular: RRR normal s1 s2. No murmur. No rubs or gallop.  Gastrointestinal: Soft, non-tender. +Bowel sounds.   Extremities: 2+ Peripheral Pulses, No clubbing, cyanosis, or edema  Psychiatric: Calm . no agitation.   Skin: Warm and dry, no ulcers on extremities     LABS:                            10.9   13.57 )-----------( 242      ( 02 Sep 2024 01:15 )             32.7     09-02    131<L>  |  95<L>  |  40.2<H>  ----------------------------<  394<H>  4.9   |  20.0<L>  |  1.04    Ca    7.7<L>      02 Sep 2024 06:25  Phos  3.6     09-02  Mg     2.3     09-02    TPro  6.5<L>  /  Alb  2.9<L>  /  TBili  0.2<L>  /  DBili  x   /  AST  26  /  ALT  16  /  AlkPhos  43  09-02    PT/INR - ( 31 Aug 2024 20:06 )   PT: 10.6 sec;   INR: 0.95 ratio         PTT - ( 31 Aug 2024 20:06 )  PTT:31.2 sec  Urinalysis Basic - ( 02 Sep 2024 06:25 )    Color: x / Appearance: x / SG: x / pH: x  Gluc: 394 mg/dL / Ketone: x  / Bili: x / Urobili: x   Blood: x / Protein: x / Nitrite: x   Leuk Esterase: x / RBC: x / WBC x   Sq Epi: x / Non Sq Epi: x / Bacteria: x              INTERPRETATION OF TELEMETRY: SR    ECG: SR, TWI / ST diffuse   Prior ECG: Yes [ x ] No [  ]

## 2024-09-02 NOTE — PROGRESS NOTE ADULT - ASSESSMENT
MICU Transfer / Hospital course:   44 y/o F w/ PMH of polysubstance abuse (crack cocaine), asthma/COPD (not on home O2), HFmrEF (45-50%), CAD s/p PCI (2018), IDDM, recent admission to [facility] 08/11-08/16 for GOODEN and CP and was found to have diffuse alveolar opacities w/ subpleural sparing and was treated w/ IV diuresis, empiric abx and IV steroids.  On d/c from [facility] pt f/u w/ cardiologist c/o chest pain and had an unchanged EKG (inferolateral TWI) and negative trops and was scheduled for stress test that she has not completed yet, presented 08/31 to [facility] ED c/o GOODEN, mildly productive cough, LE edema and pleuritic CP.  Pt was found to be hypoxic w/ increased WOB requiring BiPAP in ED and was given SL nitro for CP and admitted to micu for acute hypoxic respiratory failure.  CTA chest was negative for PE but showed similar diffuse b/l infiltrates and pt was subsequently started on empiric Zosyn and IV steroids.  Sepsis w/u was ordered in ED and was also given IVFs per protocol but appeared to be in decompensated HF therefore pt was started on diuresis and since d/c'd as pt now euvolemic.  Hospital course complicated by hyperglycemia likely due to steroids.  Steroids being weaned and pt tolerating.  Pt has 4 beats of asymptomatic NSVT overnight.  Cardio following.  TTE now w/ pEF and no WMAs appreciated but will go for [facility] tomorrow.  Pt medically stable for transfer to medicine for continued management.         MICU Transfer / Hospital course:   42 y/o F w/ PMH of polysubstance abuse (crack cocaine), asthma/COPD (not on home O2), HFmrEF (45-50%), CAD s/p PCI (2018), IDDM, recent admission to [facility] 08/11-08/16 for GOODEN and CP and was found to have diffuse alveolar opacities w/ subpleural sparing and was treated w/ IV diuresis, empiric abx and IV steroids.  On d/c from [facility] pt f/u w/ cardiologist c/o chest pain and had an unchanged EKG (inferolateral TWI) and negative trops and was scheduled for stress test that she has not completed yet, presented 08/31 to [facility] ED c/o GOODEN, mildly productive cough, LE edema and pleuritic CP.  Pt was found to be hypoxic w/ increased WOB requiring BiPAP in ED and was given SL nitro for CP and admitted to micu for acute hypoxic respiratory failure.  CTA chest was negative for PE but showed similar diffuse b/l infiltrates and pt was subsequently started on empiric Zosyn and IV steroids.  Sepsis w/u was ordered in ED and was also given IVFs per protocol but appeared to be in decompensated HF therefore pt was started on diuresis and since d/c'd as pt now euvolemic.  Hospital course complicated by hyperglycemia likely due to steroids.  Steroids being weaned and pt tolerating.  Pt has 4 beats of asymptomatic NSVT overnight.  Cardio following.  TTE now w/ pEF and no WMAs appreciated but will go for [facility] tomorrow.  Pt medically stable for transfer to medicine for continued management.        Acute hypoxic respiratory failure likely multifactorial 2/2 acute decompensated HFpEF and COPD exacerbation   - Has persistent similar infiltrates on CT chest from this admission as compared to recent [facility] admission   - Clinically euvolemic at this time and copd exacerbation improving and now weaned off O2  - s/p IV diuresis but since d/c'd as pt is now euvolemic but will place on po lasix and add spironolactone   - Prior TTE showed moderately rEF at 45% but repeat on 09/01/24 shows LVEF 50-55% w/ no WMAs appreciated and no significant valvulopathies  - Continue to wean of steroids as tolerated, c/w bronchodilators standing/prn and encourage incentive spirometry   - Monitor daily weights, strict I/o's and fluid restrict   - Monitor on telemetry and maintain K>4 and Mg>2  - NPO after midnight for [facility] tomorrow 09/03 as per cardio   - Cardiology following       SIRS but low suspicion for sepsis   - Tachy, tachypneic and leukocytosis on arrival  - Tachy and tachypnic from decompensated HF and copd exacerbation  - Infiltrates unchanged from CT at [facility]  - Clinically nontoxic appearing and afebrile   - Procal elevated but no clear source of infection   - Negative RVP, cultures and legionella UAg  - Sputum culture pending   - Started on empiric zosyn 09/01 but given infectious w/u negative and recent course of empiric abxs at [facility] would stop Zosyn after 3 days  - Leukocytosis elevated on admission but suspect due to steroids taper given on d/c from [facility] and is currently still on steroids       Pleuritic chest pain, resolved   - Suspect post tussive however has significant cardiac hx and noted to have 4 beats of NSVT on telemetry but asymptomatic  - EKG w/ no acute ischemic changes and when compared to prior EKG from 08/11 is not significantly different on my review   - Denied positional association   - Trops negative but   - Utox negative   - CTA negative for PE   - Lipid panel form 08/13 w/ ; goal <70 therefore will c/w statin   - CRP elevated but is down trending from 08/12/24 and no evidence of pericarditis on EKG   - TTE from 09/01 shows LVEF 50-55% and no significant valvulopathy and no WMA  - NPO after midnight for [facility] on 09/03/24  - Cardio following       IDDM w/ hyperglycemia   - A1c 8.6 on 08/13  - AG and serum HCO3 normal   - BG still uncontrolled and likely due to steroids  - Will be more aggressive w/ insulin regimen and will consult endocrine  - ISS and hypoglycemic protocol in place       Normocytic anemia   - Baseline Hb ranges from 10-12 and currently at baseline   - Hemodynamically stable w/ no active bleeding   - Given RDW will check iron panel, B12 and folate levels   - BUN elevated due to steroids   - c/w protonix for GI cytoprotection   - Monitor CBC and transfuse for Hb<8      CAD s/p PCI  HTN / HLD  - c/w ASA, statin   - c/w carvedilol w/ caution in light of hx of cocaine use   - Utox on this admission negative   - c/w lisinopril and will add spironolactone and po lasix       Hypothyroid   - c/w synthroid       Polysubstance abuse   - Hx of crack cocaine use but utox negative at this time   - c/w gabapentin and prn atarax   - Counseled on abstaining from drug use       VTE ppx: heparin sq    Dispo: acute.  Pending [facility] tomorrow.  Anticipate d/c in 2-4 days.      MICU Transfer / Hospital course:   42 y/o F w/ PMH of polysubstance abuse (crack cocaine), asthma/COPD (not on home O2), HFmrEF (45-50%), CAD s/p PCI (2018), IDDM, recent admission to [facility] 08/11-08/16 for GOODEN and CP and was found to have diffuse alveolar opacities w/ subpleural sparing and was treated w/ IV diuresis, empiric abx and IV steroids.  On d/c from [facility] pt f/u w/ cardiologist c/o chest pain and had an unchanged EKG (inferolateral TWI) and negative trops and was scheduled for stress test that she has not completed yet, presented 08/31 to [facility] ED c/o GOODEN, mildly productive cough, LE edema and pleuritic CP.  Pt was found to be hypoxic w/ increased WOB requiring BiPAP in ED and was given SL nitro for CP and admitted to micu for acute hypoxic respiratory failure.  CTA chest was negative for PE but showed similar diffuse b/l infiltrates and pt was subsequently started on empiric Zosyn and IV steroids.  Sepsis w/u was ordered in ED and was also given IVFs per protocol but appeared to be in decompensated HF therefore pt was started on diuresis and since d/c'd as pt now euvolemic.  Hospital course complicated by hyperglycemia likely due to steroids.  Steroids being weaned and pt tolerating.  Pt has 4 beats of asymptomatic NSVT overnight.  Cardio following.  TTE now w/ pEF and no WMAs appreciated but will go for [facility] tomorrow.  Pt medically stable for transfer to medicine for continued management.        Acute hypoxic respiratory failure likely multifactorial 2/2 acute decompensated HFpEF and COPD exacerbation   - Clinically euvolemic at this time and copd exacerbation improving and now weaned off O2  - Has persistent similar infiltrates on CT chest from this admission as compared to recent [facility] admission and clinically no evidence of PNA   - s/p IV diuresis but since d/c'd as pt is now euvolemic but will place on po lasix and add spironolactone   - Prior TTE showed moderately rEF at 45% but repeat on 09/01/24 shows LVEF 50-55% w/ no WMAs appreciated and no significant valvulopathies  - Continue to wean of steroids as tolerated, c/w bronchodilators standing/prn and encourage incentive spirometry   - Monitor daily weights, strict I/o's and fluid restrict   - Monitor on telemetry and maintain K>4 and Mg>2  - NPO after midnight for [facility] tomorrow 09/03 as per cardio   - Cardiology following       SIRS but low suspicion for sepsis   - Tachy, tachypneic and leukocytosis on arrival  - Tachy and tachypnic from decompensated HF and copd exacerbation  - Infiltrates unchanged from CT at [facility]  - Clinically nontoxic appearing and afebrile   - Procal elevated but no clear source of infection   - Negative RVP, cultures and legionella UAg  - Sputum culture pending   - Started on empiric zosyn 09/01 but given infectious w/u negative and recent course of empiric abxs at [facility] would stop Zosyn after 3 days  - Leukocytosis elevated on admission but suspect due to steroids taper given on d/c from [facility] and is currently still on steroids       Pleuritic chest pain, resolved   - Suspect post tussive however has significant cardiac hx and noted to have 4 beats of NSVT on telemetry but asymptomatic  - EKG w/ no acute ischemic changes and when compared to prior EKG from 08/11 is not significantly different on my review   - [facility] positional association   - Trops negative but   - Utox negative   - CTA negative for PE   - Lipid panel form 08/13 w/ ; goal <70 therefore will c/w statin   - CRP elevated but is down trending from 08/12/24 and no evidence of pericarditis on EKG   - TTE from 09/01 shows LVEF 50-55% and no significant valvulopathy and no WMA  - NPO after midnight for [facility] on 09/03/24  - Cardio following       IDDM w/ hyperglycemia   - A1c 8.6 on 08/13  - AG and serum HCO3 normal   - BG still uncontrolled and likely due to steroids  - Will be more aggressive w/ insulin regimen and will consult endocrine  - ISS and hypoglycemic protocol in place       Normocytic anemia   - Baseline Hb ranges from 10-12 and currently at baseline   - Hemodynamically stable w/ no active bleeding   - Given RDW will check iron panel, B12 and folate levels   - BUN elevated due to steroids   - c/w protonix for GI cytoprotection   - Monitor CBC and transfuse for Hb<8      CAD s/p PCI  HTN / HLD  - c/w ASA, statin   - c/w carvedilol w/ caution in light of hx of cocaine use   - Utox on this admission negative   - c/w lisinopril and will add spironolactone and po lasix       Hypothyroid   - c/w synthroid       Polysubstance abuse   - Hx of crack cocaine use but utox negative at this time   - c/w gabapentin and prn atarax   - Counseled on abstaining from drug use       VTE ppx: heparin sq    Dispo: acute.  Pending [facility] tomorrow.  Anticipate d/c in 2-4 days.      MICU Transfer / Hospital course:   42 y/o F w/ PMH of polysubstance abuse (crack cocaine), asthma/COPD (not on home O2), HFmrEF (45-50%), CAD s/p PCI (2018), IDDM, recent admission to [facility] 08/11-08/16 for GOODEN and CP and was found to have diffuse alveolar opacities w/ subpleural sparing and was treated w/ IV diuresis, empiric abx and IV steroids.  On d/c from [facility] pt f/u w/ cardiologist c/o chest pain and had an unchanged EKG (inferolateral TWI) and negative trops and was scheduled for stress test that she has not completed yet, presented 08/31 to [facility] ED c/o GOODEN, mildly productive cough, LE edema and pleuritic CP.  Pt was found to be hypoxic w/ increased WOB requiring BiPAP in ED and was given SL nitro for CP and admitted to micu for acute hypoxic respiratory failure.  CTA chest was negative for PE but showed similar diffuse b/l infiltrates and pt was subsequently started on empiric Zosyn and IV steroids.  Sepsis w/u was ordered in ED and was also given IVFs per protocol but appeared to be in decompensated HF therefore pt was started on diuresis and since d/c'd as pt now euvolemic.  Hospital course complicated by hyperglycemia likely due to steroids.  Steroids being weaned and pt tolerating.  Pt has 4 beats of asymptomatic NSVT overnight.  Cardio following.  TTE now w/ pEF and no WMAs appreciated but will go for [facility] tomorrow.  Pt medically stable for transfer to medicine for continued management.        Acute hypoxic respiratory failure likely multifactorial 2/2 acute decompensated HFpEF and COPD exacerbation   - Clinically euvolemic at this time and copd exacerbation resolving and now weaned off O2  - Has persistent similar infiltrates on CT chest from this admission as compared to recent [facility] admission and clinically no evidence of PNA   - s/p IV diuresis but since d/c'd as pt is now euvolemic but will place on po lasix and add spironolactone   - Prior TTE showed moderately rEF at 45% but repeat on 09/01/24 shows LVEF 50-55% w/ no WMAs appreciated and no significant valvulopathies  - Continue to wean of steroids as tolerated, c/w bronchodilators standing/prn and encourage incentive spirometry   - Monitor daily weights, strict I/o's and fluid restrict   - Monitor on telemetry and maintain K>4 and Mg>2  - NPO after midnight for [facility] tomorrow 09/03 as per cardio   - Cardiology following       SIRS but low suspicion for sepsis   - Tachy, tachypneic and leukocytosis on arrival  - Tachy and tachypnic from decompensated HF and copd exacerbation  - Infiltrates unchanged from CT at [facility]  - s/p steroids and abx during recent [facility] admission   - Clinically nontoxic appearing and afebrile   - Procal elevated but no clear source of infection   - Negative RVP, cultures and legionella UAg  - Sputum culture pending   - Leukocytosis elevated on admission but could be reactive as no clear source of infection at this time   - WBC remain elevated likely from current steroid regimen   - Started on empiric zosyn 09/01 but given infectious w/u negative and recent course of empiric abxs at [facility] would stop Zosyn after 3 days       Pleuritic chest pain, resolved   - Suspect post tussive however has significant cardiac hx and noted to have 4 beats of NSVT on telemetry but asymptomatic  - EKG w/ no acute ischemic changes and when compared to prior EKG from 08/11 is not significantly different on my review   - Denied positional association   - Trops negative but   - Utox negative   - CTA negative for PE   - Lipid panel form 08/13 w/ ; goal <70 therefore will c/w statin   - CRP elevated but is down trending from 08/12/24 and no evidence of pericarditis on EKG   - TTE from 09/01 shows LVEF 50-55% and no significant valvulopathy and no WMA  - NPO after midnight for [facility] on 09/03/24  - Cardio following       IDDM w/ hyperglycemia   - A1c 8.6 on 08/13  - AG and serum HCO3 normal   - BG still uncontrolled and likely due to steroids  - Will be more aggressive w/ insulin regimen and will consult endocrine  - ISS and hypoglycemic protocol in place       Normocytic anemia   - Baseline Hb ranges from 10-12 and currently at baseline   - Hemodynamically stable w/ no active bleeding   - Given RDW will check iron panel, B12 and folate levels   - BUN elevated due to steroids   - c/w protonix for GI cytoprotection   - Monitor CBC and transfuse for Hb<8      CAD s/p PCI  HTN / HLD  - c/w ASA, statin   - c/w carvedilol w/ caution in light of hx of cocaine use   - Utox on this admission negative   - c/w lisinopril and will add spironolactone and po lasix       Hypothyroid   - c/w synthroid       Polysubstance abuse   - Hx of crack cocaine use but utox negative at this time   - c/w gabapentin and prn atarax   - Counseled on abstaining from drug use       VTE ppx: heparin sq    Dispo: acute.  Pending [facility] tomorrow.  Anticipate d/c in 2-4 days.

## 2024-09-02 NOTE — CONSULT NOTE ADULT - ASSESSMENT
43 Female, active smoker and Hx of crack cocaine use who has PMHx of IDDM, COPD/Asthma, CAD, s/p PCI, HFrEF admitted for respiratory distress. Patient had recent admission ad [facility] for similar symptoms, found with b/l diffused infiltrates. Now admitted for COPD exacerbation and Acute Decompensated HFpEF. Treated with diuretics and IV Steroids. BG remained significantly elevated after steroid treatment Endocrinology consult was requested for hyperglycemia and DM management. Home regimen consist of Lantus 25U qhs and Bydureon 2 mg weekly. A1C 8.6%    Yesterday patient received total of 45U Lantus (15 in AM + 30 in PM)  Currently scheduled for Lantus 40U qhs  + Admelog 14U TIDac    # Uncontrolled T2D exacerbated by steroid induced hyperglycemia  On steroid taper. Will be on Solumedrol for the next ~3 days  Would recommend increasing Lantus to 25U q12h with first dose given now.  Continue current Admelog 14U TIDac  Continue current correction scale  Monitor POC glucose qac and qhs with goal 120 - 180 mg/dL  Endocrinology will follow    # Acute respiratory failure secondary to COPD ex and Acute decompensated HF  Management as per primary team  Monitor POC glucose  Would require insulin taper down as the steroids are weaned off    # Hypothyroidism  Clinically euthyroid  TSH has been wnl 3 weeks ago  Continue current dose of Levothyroxine.

## 2024-09-02 NOTE — PROGRESS NOTE ADULT - SUBJECTIVE AND OBJECTIVE BOX
MICU Transfer / Hospital course:   44 y/o F w/ PMH of polysubstance abuse (crack cocaine), asthma/COPD (not on home O2), HFmrEF (45-50%), CAD s/p PCI (2018), IDDM, recent admission to [facility] 08/11-08/16 for GOODEN and CP and was found to have diffuse alveolar opacities w/ subpleural sparing and was treated w/ IV diuresis, empiric abx and IV steroids.  On d/c from [facility] pt f/u w/ cardiologist c/o chest pain and had an unchanged EKG (inferolateral TWI) and negative trops and was scheduled for stress test that she has not completed yet, presented 08/31 to [facility] ED c/o GOODEN, mildly productive cough, LE edema and pleuritic CP.  Pt was found to be hypoxic w/ increased WOB requiring BiPAP in ED and was given SL nitro for CP and admitted to micu for acute hypoxic respiratory failure.  CTA chest was negative for PE but showed similar diffuse b/l infiltrates and pt was subsequently started on empiric Zosyn and IV steroids.  Sepsis w/u was ordered in ED and was also given IVFs per protocol but appeared to be in decompensated HF therefore pt was started on diuresis and since d/c'd as pt now euvolemic.  Hospital course complicated by hyperglycemia likely due to steroids.  Steroids being weaned and pt tolerating.  Pt has 4 beats of asymptomatic NSVT overnight.  Cardio following.  TTE now w/ pEF and no WMAs appreciated but will go for [facility] tomorrow.  Pt medically stable for transfer to medicine for continued management.        SUBJECTIVE / OVERNIGHT EVENTS:  4 beats of NSVT on telemetry overnight but asymptomatic.        I&O's Summary    01 Sep 2024 07:01  -  02 Sep 2024 07:00  --------------------------------------------------------  IN: 1123 mL / OUT: 2000 mL / NET: -877 mL    02 Sep 2024 07:01  -  02 Sep 2024 12:01  --------------------------------------------------------  IN: 60 mL / OUT: 800 mL / NET: -740 mL          PHYSICAL EXAM:  Vital Signs Last 24 Hrs  T(C): 36.7 (02 Sep 2024 11:20), Max: 36.8 (01 Sep 2024 15:55)  T(F): 98 (02 Sep 2024 11:20), Max: 98.3 (01 Sep 2024 15:55)  HR: 94 (02 Sep 2024 11:00) (83 - 104)  BP: 119/71 (02 Sep 2024 11:00) (99/62 - 142/94)  BP(mean): 83 (02 Sep 2024 11:00) (65 - 108)  RR: 21 (02 Sep 2024 11:00) (17 - 30)  SpO2: 95% (02 Sep 2024 11:00) (95% - 100%)    Parameters below as of 02 Sep 2024 11:00  Patient On (Oxygen Delivery Method): room air          GENERAL: pt examined bedside, laying comfortably in bed in NAD  HEENT: NC/AT, moist oral mucosa, clear conjunctiva, sclera nonicteric  RESPIRATORY: Normal respiratory effort, no wheezing, rhonchi, rales  CARDIOVASCULAR: RRR, normal S1 and S2, no murmur/rub/gallop  ABDOMEN: soft, NT/ND, normoactive bowel sounds, no rebound/guarding  EXTREMITIES: No cynaosis, no clubbing, no lower extremity edema  PSYCH: affect appropriate and cooperative  NEUROLOGY: A+O to person, place, and time, no focal neurologic deficits appreciated   SKIN: No rashes or no palpable lesions        LABS:                        10.9   13.57 )-----------( 242      ( 02 Sep 2024 01:15 )             32.7     09-02    135  |  98  |  40.5<H>  ----------------------------<  397<H>  4.4   |  25.0  |  1.21    Ca    8.0<L>      02 Sep 2024 09:57  Phos  3.6     09-02  Mg     2.3     09-02    TPro  6.5<L>  /  Alb  2.9<L>  /  TBili  0.2<L>  /  DBili  x   /  AST  26  /  ALT  16  /  AlkPhos  43  09-02    PT/INR - ( 31 Aug 2024 20:06 )   PT: 10.6 sec;   INR: 0.95 ratio         PTT - ( 31 Aug 2024 20:06 )  PTT:31.2 sec      Urinalysis Basic - ( 02 Sep 2024 09:57 )    Color: x / Appearance: x / SG: x / pH: x  Gluc: 397 mg/dL / Ketone: x  / Bili: x / Urobili: x   Blood: x / Protein: x / Nitrite: x   Leuk Esterase: x / RBC: x / WBC x   Sq Epi: x / Non Sq Epi: x / Bacteria: x        Culture - Blood (collected 31 Aug 2024 20:06)  Source: .Blood Blood-Peripheral  Preliminary Report (02 Sep 2024 02:02):    No growth at 24 hours      CAPILLARY BLOOD GLUCOSE      POCT Blood Glucose.: 413 mg/dL (02 Sep 2024 07:42)  POCT Blood Glucose.: 326 mg/dL (02 Sep 2024 01:20)  POCT Blood Glucose.: 441 mg/dL (01 Sep 2024 23:04)  POCT Blood Glucose.: 491 mg/dL (01 Sep 2024 22:03)  POCT Blood Glucose.: 428 mg/dL (01 Sep 2024 20:06)  POCT Blood Glucose.: 374 mg/dL (01 Sep 2024 18:57)  POCT Blood Glucose.: 266 mg/dL (01 Sep 2024 18:03)  POCT Blood Glucose.: 191 mg/dL (01 Sep 2024 16:59)  POCT Blood Glucose.: 175 mg/dL (01 Sep 2024 16:09)  POCT Blood Glucose.: 194 mg/dL (01 Sep 2024 15:04)  POCT Blood Glucose.: 276 mg/dL (01 Sep 2024 14:06)  POCT Blood Glucose.: 243 mg/dL (01 Sep 2024 13:11)        RADIOLOGY & ADDITIONAL TESTS:    < from: CT Angio Chest PE Protocol w/ IV Cont (09.01.24 @ 04:07) >  IMPRESSION:  No evidence of pulmonary embolus.    Stable appearance of diffuse bilateral opacities more pronounced in the   upper lobes, with pleural scarring; which likely represents pulmonary   edema and/or pneumonia.    < end of copied text >    < from: CT Chest No Cont (08.12.24 @ 05:00) >  IMPRESSION:  Extensive symmetric alveolar densities in both lungs with subpleural   sparing. Pulmonary edema is more likely. Coexisting infection cannot be   excluded.    < end of copied text >      < from: TTE W or WO Ultrasound Enhancing Agent (09.01.24 @ 10:06) >  CONCLUSIONS:      1. Left ventricular systolic function is normal with an ejection fraction visually estimated at 50 to 55 %. There are no regional wall motion abnormalities seen.   2. There is moderate (grade 2) left ventricular diastolic dysfunction, with elevated left ventricular filling pressure.   3. Mildly enlarged right ventricular cavity size and normal right ventricular systolic function.   4. Left atrium is moderately dilated.   5. No significant valvular disease.   6. No prior echocardiogram is available for comparison.    < end of copied text >        MEDICATIONS  (STANDING):  aspirin  chewable 81 milliGRAM(s) Oral daily  atorvastatin 20 milliGRAM(s) Oral at bedtime  budesonide 160 MICROgram(s)/formoterol 4.5 MICROgram(s) Inhaler 2 Puff(s) Inhalation two times a day  carvedilol 25 milliGRAM(s) Oral <User Schedule>  chlorhexidine 2% Cloths 1 Application(s) Topical <User Schedule>  dextrose 5%. 1000 milliLiter(s) (100 mL/Hr) IV Continuous <Continuous>  dextrose 5%. 1000 milliLiter(s) (50 mL/Hr) IV Continuous <Continuous>  dextrose 50% Injectable 25 milliLiter(s) IV Push every 15 minutes  dextrose 50% Injectable 12.5 Gram(s) IV Push once  dextrose Oral Gel 15 Gram(s) Oral once  furosemide    Tablet 40 milliGRAM(s) Oral daily  gabapentin 400 milliGRAM(s) Oral every 8 hours  glucagon  Injectable 1 milliGRAM(s) IntraMuscular once  glucagon  Injectable 1 milliGRAM(s) IntraMuscular once  heparin   Injectable 5000 Unit(s) SubCutaneous every 8 hours  insulin glargine Injectable (LANTUS) 40 Unit(s) SubCutaneous at bedtime  insulin lispro (ADMELOG) corrective regimen sliding scale   SubCutaneous Before meals and at bedtime  insulin lispro Injectable (ADMELOG) 14 Unit(s) SubCutaneous three times a day before meals  levothyroxine 100 MICROGram(s) Oral daily  lisinopril 10 milliGRAM(s) Oral daily  melatonin 5 milliGRAM(s) Oral at bedtime  methylPREDNISolone sodium succinate Injectable 40 milliGRAM(s) IV Push daily  mirtazapine 7.5 milliGRAM(s) Oral daily  pantoprazole    Tablet 40 milliGRAM(s) Oral before breakfast  piperacillin/tazobactam IVPB.. 3.375 Gram(s) IV Intermittent every 8 hours  psyllium Powder 1 Packet(s) Oral daily  spironolactone 25 milliGRAM(s) Oral daily  tiotropium 2.5 MICROgram(s) Inhaler 2 Puff(s) Inhalation daily    MEDICATIONS  (PRN):  hydrOXYzine hydrochloride 50 milliGRAM(s) Oral at bedtime PRN Anxiety                                   MICU Transfer / Hospital course:   44 y/o F w/ PMH of polysubstance abuse (crack cocaine), asthma/COPD (not on home O2), HFmrEF (45-50%), CAD s/p PCI (2018), IDDM, recent admission to [facility] 08/11-08/16 for GOODEN and CP and was found to have diffuse alveolar opacities w/ subpleural sparing and was treated w/ IV diuresis, empiric abx and IV steroids.  On d/c from [facility] pt f/u w/ cardiologist c/o chest pain and had an unchanged EKG (inferolateral TWI) and negative trops and was scheduled for stress test that she has not completed yet, presented 08/31 to [facility] ED c/o GOODEN, mildly productive cough, LE edema and pleuritic CP.  Pt was found to be hypoxic w/ increased WOB requiring BiPAP in ED and was given SL nitro for CP and admitted to micu for acute hypoxic respiratory failure.  CTA chest was negative for PE but showed similar diffuse b/l infiltrates and pt was subsequently started on empiric Zosyn and IV steroids.  Sepsis w/u was ordered in ED and was also given IVFs per protocol but appeared to be in decompensated HF therefore pt was started on diuresis and since d/c'd as pt now euvolemic.  Hospital course complicated by hyperglycemia likely due to steroids.  Steroids being weaned and pt tolerating.  Pt has 4 beats of asymptomatic NSVT overnight.  Cardio following.  TTE now w/ pEF and no WMAs appreciated but will go for [facility] tomorrow.  Pt medically stable for transfer to medicine for continued management.        SUBJECTIVE / OVERNIGHT EVENTS:  4 beats of NSVT on telemetry overnight but asymptomatic.        I&O's Summary    01 Sep 2024 07:01  -  02 Sep 2024 07:00  --------------------------------------------------------  IN: 1123 mL / OUT: 2000 mL / NET: -877 mL    02 Sep 2024 07:01  -  02 Sep 2024 12:01  --------------------------------------------------------  IN: 60 mL / OUT: 800 mL / NET: -740 mL          PHYSICAL EXAM:  Vital Signs Last 24 Hrs  T(C): 36.7 (02 Sep 2024 11:20), Max: 36.8 (01 Sep 2024 15:55)  T(F): 98 (02 Sep 2024 11:20), Max: 98.3 (01 Sep 2024 15:55)  HR: 94 (02 Sep 2024 11:00) (83 - 104)  BP: 119/71 (02 Sep 2024 11:00) (99/62 - 142/94)  BP(mean): 83 (02 Sep 2024 11:00) (65 - 108)  RR: 21 (02 Sep 2024 11:00) (17 - 30)  SpO2: 95% (02 Sep 2024 11:00) (95% - 100%)    Parameters below as of 02 Sep 2024 11:00  Patient On (Oxygen Delivery Method): room air        GENERAL: pt examined bedside, laying comfortably in bed in NAD  HEENT: NC/AT, moist oral mucosa, clear conjunctiva, sclera nonicteric  RESPIRATORY: Normal respiratory effort, no wheezing, rhonchi, rales  CARDIOVASCULAR: RRR, normal S1 and S2, no murmur/rub/gallop  ABDOMEN: soft, NT/ND, normoactive bowel sounds, no rebound/guarding  EXTREMITIES: No cynaosis, no clubbing, no lower extremity edema  PSYCH: affect appropriate and cooperative  NEUROLOGY: A+O x3, no focal neurologic deficits appreciated   SKIN: No rashes or no palpable lesions        LABS:                        10.9   13.57 )-----------( 242      ( 02 Sep 2024 01:15 )             32.7     09-02    135  |  98  |  40.5<H>  ----------------------------<  397<H>  4.4   |  25.0  |  1.21    Ca    8.0<L>      02 Sep 2024 09:57  Phos  3.6     09-02  Mg     2.3     09-02    TPro  6.5<L>  /  Alb  2.9<L>  /  TBili  0.2<L>  /  DBili  x   /  AST  26  /  ALT  16  /  AlkPhos  43  09-02    PT/INR - ( 31 Aug 2024 20:06 )   PT: 10.6 sec;   INR: 0.95 ratio         PTT - ( 31 Aug 2024 20:06 )  PTT:31.2 sec      Urinalysis Basic - ( 02 Sep 2024 09:57 )    Color: x / Appearance: x / SG: x / pH: x  Gluc: 397 mg/dL / Ketone: x  / Bili: x / Urobili: x   Blood: x / Protein: x / Nitrite: x   Leuk Esterase: x / RBC: x / WBC x   Sq Epi: x / Non Sq Epi: x / Bacteria: x        Culture - Blood (collected 31 Aug 2024 20:06)  Source: .Blood Blood-Peripheral  Preliminary Report (02 Sep 2024 02:02):    No growth at 24 hours      CAPILLARY BLOOD GLUCOSE      POCT Blood Glucose.: 413 mg/dL (02 Sep 2024 07:42)  POCT Blood Glucose.: 326 mg/dL (02 Sep 2024 01:20)  POCT Blood Glucose.: 441 mg/dL (01 Sep 2024 23:04)  POCT Blood Glucose.: 491 mg/dL (01 Sep 2024 22:03)  POCT Blood Glucose.: 428 mg/dL (01 Sep 2024 20:06)  POCT Blood Glucose.: 374 mg/dL (01 Sep 2024 18:57)  POCT Blood Glucose.: 266 mg/dL (01 Sep 2024 18:03)  POCT Blood Glucose.: 191 mg/dL (01 Sep 2024 16:59)  POCT Blood Glucose.: 175 mg/dL (01 Sep 2024 16:09)  POCT Blood Glucose.: 194 mg/dL (01 Sep 2024 15:04)  POCT Blood Glucose.: 276 mg/dL (01 Sep 2024 14:06)  POCT Blood Glucose.: 243 mg/dL (01 Sep 2024 13:11)        RADIOLOGY & ADDITIONAL TESTS:    < from: CT Angio Chest PE Protocol w/ IV Cont (09.01.24 @ 04:07) >  IMPRESSION:  No evidence of pulmonary embolus.    Stable appearance of diffuse bilateral opacities more pronounced in the   upper lobes, with pleural scarring; which likely represents pulmonary   edema and/or pneumonia.    < end of copied text >    < from: CT Chest No Cont (08.12.24 @ 05:00) >  IMPRESSION:  Extensive symmetric alveolar densities in both lungs with subpleural   sparing. Pulmonary edema is more likely. Coexisting infection cannot be   excluded.    < end of copied text >      < from: TTE W or WO Ultrasound Enhancing Agent (09.01.24 @ 10:06) >  CONCLUSIONS:      1. Left ventricular systolic function is normal with an ejection fraction visually estimated at 50 to 55 %. There are no regional wall motion abnormalities seen.   2. There is moderate (grade 2) left ventricular diastolic dysfunction, with elevated left ventricular filling pressure.   3. Mildly enlarged right ventricular cavity size and normal right ventricular systolic function.   4. Left atrium is moderately dilated.   5. No significant valvular disease.   6. No prior echocardiogram is available for comparison.    < end of copied text >        MEDICATIONS  (STANDING):  aspirin  chewable 81 milliGRAM(s) Oral daily  atorvastatin 20 milliGRAM(s) Oral at bedtime  budesonide 160 MICROgram(s)/formoterol 4.5 MICROgram(s) Inhaler 2 Puff(s) Inhalation two times a day  carvedilol 25 milliGRAM(s) Oral <User Schedule>  chlorhexidine 2% Cloths 1 Application(s) Topical <User Schedule>  dextrose 5%. 1000 milliLiter(s) (100 mL/Hr) IV Continuous <Continuous>  dextrose 5%. 1000 milliLiter(s) (50 mL/Hr) IV Continuous <Continuous>  dextrose 50% Injectable 25 milliLiter(s) IV Push every 15 minutes  dextrose 50% Injectable 12.5 Gram(s) IV Push once  dextrose Oral Gel 15 Gram(s) Oral once  furosemide    Tablet 40 milliGRAM(s) Oral daily  gabapentin 400 milliGRAM(s) Oral every 8 hours  glucagon  Injectable 1 milliGRAM(s) IntraMuscular once  glucagon  Injectable 1 milliGRAM(s) IntraMuscular once  heparin   Injectable 5000 Unit(s) SubCutaneous every 8 hours  insulin glargine Injectable (LANTUS) 40 Unit(s) SubCutaneous at bedtime  insulin lispro (ADMELOG) corrective regimen sliding scale   SubCutaneous Before meals and at bedtime  insulin lispro Injectable (ADMELOG) 14 Unit(s) SubCutaneous three times a day before meals  levothyroxine 100 MICROGram(s) Oral daily  lisinopril 10 milliGRAM(s) Oral daily  melatonin 5 milliGRAM(s) Oral at bedtime  methylPREDNISolone sodium succinate Injectable 40 milliGRAM(s) IV Push daily  mirtazapine 7.5 milliGRAM(s) Oral daily  pantoprazole    Tablet 40 milliGRAM(s) Oral before breakfast  piperacillin/tazobactam IVPB.. 3.375 Gram(s) IV Intermittent every 8 hours  psyllium Powder 1 Packet(s) Oral daily  spironolactone 25 milliGRAM(s) Oral daily  tiotropium 2.5 MICROgram(s) Inhaler 2 Puff(s) Inhalation daily    MEDICATIONS  (PRN):  hydrOXYzine hydrochloride 50 milliGRAM(s) Oral at bedtime PRN Anxiety

## 2024-09-02 NOTE — CONSULT NOTE ADULT - SUBJECTIVE AND OBJECTIVE BOX
HPI:  43 Female, active smoker and Hx of crack cocaine use who has PMHx of IDDM, COPD/Asthma, CAD, s/p PCI, HFrEF admitted for respiratory distress. Patient had recent admission ad [facility] for similar symptoms, found with b/l diffused infiltrates. Now admitted for COPD exacerbation and Acute Decompensated HFpEF. Treated with diuretics and IV Steroids. BG remained significantly elevated after steroid treatment Endocrinology consult was requested for hyperglycemia and DM management. Home regimen consist of Lantus 25U qhs and Bydureon 2 mg weekly. A1C 8.6%      REVIEW OF SYSTEMS:    CONSTITUTIONAL: No fever, weight loss, or fatigue  EYES: No eye pain, visual disturbances, or discharge  ENMT:  No difficulty hearing, tinnitus, vertigo; No sinus or throat pain  NECK: No pain or stiffness  RESPIRATORY: + No shortness of breath  CARDIOVASCULAR: No chest pain, palpitations, dizziness, or leg swelling  GASTROINTESTINAL: No abdominal or epigastric pain. No nausea, vomiting, or hematemesis; No diarrhea or constipation. No melena or hematochezia.  NEUROLOGICAL: No headaches, memory loss, loss of strength, numbness, or tremors  SKIN: No itching, burning, rashes, or lesions   MUSCULOSKELETAL: No joint pain or swelling; No muscle, back, or extremity pain  PSYCHIATRIC: No depression, anxiety, mood swings, or difficulty sleeping      Allergies  shellfish (Swelling)  Seafood (Swelling)  shellfish (Rash)  No Known Drug Allergies      MEDICATIONS  (STANDING):  aspirin  chewable 81 milliGRAM(s) Oral daily  atorvastatin 20 milliGRAM(s) Oral at bedtime  budesonide 160 MICROgram(s)/formoterol 4.5 MICROgram(s) Inhaler 2 Puff(s) Inhalation two times a day  carvedilol 25 milliGRAM(s) Oral <User Schedule>  chlorhexidine 2% Cloths 1 Application(s) Topical <User Schedule>  dextrose 5%. 1000 milliLiter(s) (100 mL/Hr) IV Continuous <Continuous>  dextrose 5%. 1000 milliLiter(s) (50 mL/Hr) IV Continuous <Continuous>  dextrose 50% Injectable 25 milliLiter(s) IV Push every 15 minutes  dextrose 50% Injectable 12.5 Gram(s) IV Push once  dextrose Oral Gel 15 Gram(s) Oral once  furosemide    Tablet 40 milliGRAM(s) Oral daily  gabapentin 400 milliGRAM(s) Oral every 8 hours  glucagon  Injectable 1 milliGRAM(s) IntraMuscular once  glucagon  Injectable 1 milliGRAM(s) IntraMuscular once  heparin   Injectable 5000 Unit(s) SubCutaneous every 8 hours  insulin glargine Injectable (LANTUS) 25 Unit(s) SubCutaneous every morning  insulin glargine Injectable (LANTUS) 25 Unit(s) SubCutaneous at bedtime  insulin lispro (ADMELOG) corrective regimen sliding scale   SubCutaneous Before meals and at bedtime  insulin lispro Injectable (ADMELOG) 14 Unit(s) SubCutaneous three times a day before meals  levothyroxine 100 MICROGram(s) Oral daily  lisinopril 10 milliGRAM(s) Oral daily  melatonin 5 milliGRAM(s) Oral at bedtime  methylPREDNISolone sodium succinate Injectable 40 milliGRAM(s) IV Push daily  mirtazapine 7.5 milliGRAM(s) Oral daily  pantoprazole    Tablet 40 milliGRAM(s) Oral before breakfast  piperacillin/tazobactam IVPB.. 3.375 Gram(s) IV Intermittent every 8 hours  psyllium Powder 1 Packet(s) Oral daily  spironolactone 25 milliGRAM(s) Oral daily  tiotropium 2.5 MICROgram(s) Inhaler 2 Puff(s) Inhalation daily    MEDICATIONS  (PRN):  hydrOXYzine hydrochloride 50 milliGRAM(s) Oral at bedtime PRN Anxiety      Vital Signs Last 24 Hrs  T(C): 36.7 (02 Sep 2024 11:20), Max: 36.8 (01 Sep 2024 15:55)  T(F): 98 (02 Sep 2024 11:20), Max: 98.3 (01 Sep 2024 15:55)  HR: 85 (02 Sep 2024 13:00) (83 - 104)  BP: 130/96 (02 Sep 2024 13:00) (99/62 - 142/94)  BP(mean): 106 (02 Sep 2024 13:00) (65 - 108)  RR: 20 (02 Sep 2024 13:00) (17 - 30)  SpO2: 95% (02 Sep 2024 13:00) (95% - 100%)    Parameters below as of 02 Sep 2024 12:00  Patient On (Oxygen Delivery Method): room air      Weight (kg): 93.9 (09-01-24 @ 16:00)    Physical Exam:    Constitutional: NAD,  HEENT: EOMI, no exophalmos  Neck: trachea midline, no thyroid enlargement  Respiratory: CTAB, normal respirations  Cardiovascular: S1 and S2, RRR  Gastrointestinal: BS+, soft, ntnd  Extremities: No peripheral edema  Neurological: AOx3, no focal deficits  Psychiatric: Normal mood and normal affect  Skin: no rashes, no acanthosis    LABS  09-02    135  |  98  |  40.5<H>  ----------------------------<  397<H>  4.4   |  25.0  |  1.21    Ca    8.0<L>      02 Sep 2024 09:57  Phos  3.6     09-02  Mg     2.3     09-02    TPro  6.5<L>  /  Alb  2.9<L>  /  TBili  0.2<L>  /  DBili  x   /  AST  26  /  ALT  16  /  AlkPhos  43  09-02                          10.9   13.57 )-----------( 242      ( 02 Sep 2024 01:15 )             32.7       A1C with Estimated Average Glucose Result: 8.6 % (08-13-24 @ 06:55)  Thyroid Stimulating Hormone, Serum: 0.76 uIU/mL (08-12-24 @ 09:30)  A1C with Estimated Average Glucose Result: 8.4 % (08-12-24 @ 05:41)    Alkaline Phosphatase: 43 U/L (09-02-24 @ 06:25)  Aspartate Aminotransferase (AST/SGOT): 26 U/L (09-02-24 @ 06:25)  Alanine Aminotransferase (ALT/SGPT): 16 U/L (09-02-24 @ 06:25)  Albumin: 2.9 g/dL (09-02-24 @ 06:25)  Alanine Aminotransferase (ALT/SGPT): 15 U/L (09-02-24 @ 01:15)  Alkaline Phosphatase: 44 U/L (09-02-24 @ 01:15)  Albumin: 3.0 g/dL (09-02-24 @ 01:15)  Aspartate Aminotransferase (AST/SGOT): 15 U/L (09-02-24 @ 01:15)  Aspartate Aminotransferase (AST/SGOT): 24 U/L (09-01-24 @ 13:00)  Alanine Aminotransferase (ALT/SGPT): 16 U/L (09-01-24 @ 13:00)  Alkaline Phosphatase: 43 U/L (09-01-24 @ 13:00)  Albumin: 3.0 g/dL (09-01-24 @ 13:00)  Aspartate Aminotransferase (AST/SGOT): 28 U/L (09-01-24 @ 03:30)  Alanine Aminotransferase (ALT/SGPT): 20 U/L (09-01-24 @ 03:30)  Alkaline Phosphatase: 52 U/L (09-01-24 @ 03:30)  Albumin: 3.3 g/dL (09-01-24 @ 03:30)  Aspartate Aminotransferase (AST/SGOT): 33 U/L (08-31-24 @ 20:06)  Alanine Aminotransferase (ALT/SGPT): 20 U/L (08-31-24 @ 20:06)  Alkaline Phosphatase: 52 U/L (08-31-24 @ 20:06)  Albumin: 3.3 g/dL (08-31-24 @ 20:06)    CAPILLARY BLOOD GLUCOSE    POCT Blood Glucose.: 323 mg/dL (02 Sep 2024 14:15)  POCT Blood Glucose.: 393 mg/dL (02 Sep 2024 12:36)  POCT Blood Glucose.: 413 mg/dL (02 Sep 2024 07:42)  POCT Blood Glucose.: 326 mg/dL (02 Sep 2024 01:20)

## 2024-09-02 NOTE — CONSULT NOTE ADULT - PROBLEM SELECTOR RECOMMENDATION 9
-Anginal symptoms for the past 72 hours, intermittent, worse with activity and is positional when laying flat.   -Had NSVT on tele  -Agree with Lovenox ACS dosing  -Trops x3 < 15  -EKG with worsening ischemia  -ASA load if not done already  -TTE without RWMA, preserved EF  -C/w BB, statin  -CRP elevated, differential includes pericarditis   -No PE. No signs of overt fluid overload  -Plan for [facility] 9/3

## 2024-09-02 NOTE — CHART NOTE - NSCHARTNOTEFT_GEN_A_CORE
MICU DOWN GRADE NOTE      HPI:43F active smoker with hx of CAD s/p PCI 2018, HFrEF (EF45%-50%), hx of asthma/copd not on home O2, DM, hx of crack cocaine use presented to the hospital with respiratory distress and chest discomfort after intercourse with her boyfriend. Pt was recently admitted to [facility] for dyspnea/chest pain 8/11-8/16 and was found to have diffuse alveolar opacities with subpleural sparing. Pt was diuresed, treated with empiric abx and treated with steroid course with symptomatic improvement. She was seen by cardiology in the setting of chest pain and inferolateral TWI but appeared similar to prior with negative troponin so plan was for outpatient stress test, which has not yet been completed. She reports prior to today she was doing well post discharge. She denies any recent fevers/chills. Denies chest pain prior to today, Endorses coughing during the episode of dyspnea today but not much sputum. She endorses an episode of nausea that self resolved with no associated abdominal pain or vomiting. She reports some LE edema. Reports compliance with her medications and denies any missed Lasix. She reports dust exposure in her current living facility and had recent possible exposure to insecticide prior to [facility] admission.    Pt was found to be hypoxic and initially placed on RA but given work of breathing was escalated to BiPAP. She was given SL nitro with improvement in chest pain. On evaluation, pt reported improvement in her symptoms although remained tachypneic. CT chest angio negative for PE but showed similar diffuse bilateral infiltrates. Pt given empiric abx and steroids.         INTERVAL HPI/OVERNIGHT EVENTS:  Changed solumedrol to qDay  12U sliding scale insulin given due to glucose of 413    Patient complaining of chest pain that is worse with movement and breathing. States pain started after taking off NC. Feels well. Denies Chest pain, abdominal pain, shortness of breath, fevers, chills, nausea, vomiting, diarrhea, dysuria, headache, dizziness.     REVIEW OF SYSTEMS:  CONSTITUTIONAL: No fever, chills  HEENT:  No blurry vision No sinus or throat pain  NECK: No pain or stiffness  RESPIRATORP: Pleuritic chest pain worse with movement; No shortness of breath  CARDIOVASCULAR: No palpitations  GASTROINTESTINAL: No abdominal pain. No nausea, vomiting, or diarrhea  GENITOURINARY: No dysuria  NEUROLOGICAL: No HA, No focal weakness  SKIN: No itching, burning, rashes, or lesions   MUSCULOSKELETAL: No joint pain or swelling; No muscle, back, or extremity pain    MEDICATIONS:  aspirin  chewable 81 milliGRAM(s) Oral daily  atorvastatin 20 milliGRAM(s) Oral at bedtime  budesonide 160 MICROgram(s)/formoterol 4.5 MICROgram(s) Inhaler 2 Puff(s) Inhalation two times a day  carvedilol 25 milliGRAM(s) Oral <User Schedule>  chlorhexidine 2% Cloths 1 Application(s) Topical <User Schedule>  dextrose 5%. 1000 milliLiter(s) IV Continuous <Continuous>  dextrose 5%. 1000 milliLiter(s) IV Continuous <Continuous>  dextrose 50% Injectable 25 milliLiter(s) IV Push every 15 minutes  dextrose 50% Injectable 12.5 Gram(s) IV Push once  dextrose Oral Gel 15 Gram(s) Oral once  gabapentin 400 milliGRAM(s) Oral every 8 hours  glucagon  Injectable 1 milliGRAM(s) IntraMuscular once  glucagon  Injectable 1 milliGRAM(s) IntraMuscular once  heparin   Injectable 5000 Unit(s) SubCutaneous every 8 hours  hydrOXYzine hydrochloride 50 milliGRAM(s) Oral at bedtime PRN  insulin glargine Injectable (LANTUS) 30 Unit(s) SubCutaneous at bedtime  insulin lispro (ADMELOG) corrective regimen sliding scale   SubCutaneous Before meals and at bedtime  insulin lispro Injectable (ADMELOG) 10 Unit(s) SubCutaneous three times a day before meals  levothyroxine 100 MICROGram(s) Oral daily  lisinopril 10 milliGRAM(s) Oral daily  melatonin 5 milliGRAM(s) Oral at bedtime  methylPREDNISolone sodium succinate Injectable 40 milliGRAM(s) IV Push daily  mirtazapine 7.5 milliGRAM(s) Oral daily  pantoprazole    Tablet 40 milliGRAM(s) Oral before breakfast  piperacillin/tazobactam IVPB.. 3.375 Gram(s) IV Intermittent every 8 hours  psyllium Powder 1 Packet(s) Oral daily  tiotropium 2.5 MICROgram(s) Inhaler 2 Puff(s) Inhalation daily      T(C): 36.6 (09-02-24 @ 07:27), Max: 36.8 (09-01-24 @ 15:55)  HR: 88 (09-02-24 @ 06:00) (83 - 104)  BP: 137/85 (09-02-24 @ 06:00) (105/63 - 137/85)  RR: 18 (09-02-24 @ 06:00) (17 - 30)  SpO2: 98% (09-02-24 @ 06:00) (96% - 100%)  Wt(kg): --Vital Signs Last 24 Hrs  T(C): 36.6 (02 Sep 2024 07:27), Max: 36.8 (01 Sep 2024 15:55)  T(F): 97.9 (02 Sep 2024 07:27), Max: 98.3 (01 Sep 2024 15:55)  HR: 88 (02 Sep 2024 06:00) (83 - 104)  BP: 137/85 (02 Sep 2024 06:00) (105/63 - 137/85)  BP(mean): 99 (02 Sep 2024 06:00) (65 - 99)  RR: 18 (02 Sep 2024 06:00) (17 - 30)  SpO2: 98% (02 Sep 2024 06:00) (96% - 100%)    Parameters below as of 02 Sep 2024 06:00  Patient On (Oxygen Delivery Method): room air        PHYSICAL EXAM:  GENERAL: NAD, well-groomed, well-developed  HEAD:  Atraumatic, Normocephalic  EYES: EOMI, PERRLA, conjunctiva and sclera clear  NECK: Supple, No JVD,  CHEST/LUNG: Clear to auscultation bilaterally; No rales, rhonchi, wheezing, or rubs  HEART: Regular rate and rhythm; No murmurs, rubs, or gallops  ABDOMEN: Soft, Nontender, Nondistended; Bowel sounds present  NEURO: Alert & Oriented X3  EXTREMITIES: No LE edema, no calf tenderness  LYMPH: No lymphadenopathy noted  SKIN: No rashes or lesions    Consultant(s) Notes Reviewed:  [x ] YES  [ ] NO  Care Discussed with Consultants/Other Providers [ x] YES  [ ] NO    LABS:                        10.9   13.57 )-----------( 242      ( 02 Sep 2024 01:15 )             32.7     09-02    131<L>  |  95<L>  |  40.2<H>  ----------------------------<  394<H>  4.9   |  20.0<L>  |  1.04    Ca    7.7<L>      02 Sep 2024 06:25  Phos  3.6     09-02  Mg     2.3     09-02    TPro  6.5<L>  /  Alb  2.9<L>  /  TBili  0.2<L>  /  DBili  x   /  AST  26  /  ALT  16  /  AlkPhos  43  09-02    PT/INR - ( 31 Aug 2024 20:06 )   PT: 10.6 sec;   INR: 0.95 ratio         PTT - ( 31 Aug 2024 20:06 )  PTT:31.2 sec  Urinalysis Basic - ( 02 Sep 2024 06:25 )    Color: x / Appearance: x / SG: x / pH: x  Gluc: 394 mg/dL / Ketone: x  / Bili: x / Urobili: x   Blood: x / Protein: x / Nitrite: x   Leuk Esterase: x / RBC: x / WBC x   Sq Epi: x / Non Sq Epi: x / Bacteria: x      CAPILLARY BLOOD GLUCOSE      POCT Blood Glucose.: 413 mg/dL (02 Sep 2024 07:42)  POCT Blood Glucose.: 326 mg/dL (02 Sep 2024 01:20)  POCT Blood Glucose.: 441 mg/dL (01 Sep 2024 23:04)  POCT Blood Glucose.: 491 mg/dL (01 Sep 2024 22:03)  POCT Blood Glucose.: 428 mg/dL (01 Sep 2024 20:06)  POCT Blood Glucose.: 374 mg/dL (01 Sep 2024 18:57)  POCT Blood Glucose.: 266 mg/dL (01 Sep 2024 18:03)  POCT Blood Glucose.: 191 mg/dL (01 Sep 2024 16:59)  POCT Blood Glucose.: 175 mg/dL (01 Sep 2024 16:09)  POCT Blood Glucose.: 194 mg/dL (01 Sep 2024 15:04)  POCT Blood Glucose.: 276 mg/dL (01 Sep 2024 14:06)  POCT Blood Glucose.: 243 mg/dL (01 Sep 2024 13:11)  POCT Blood Glucose.: 257 mg/dL (01 Sep 2024 11:55)  POCT Blood Glucose.: 291 mg/dL (01 Sep 2024 10:36)  POCT Blood Glucose.: 334 mg/dL (01 Sep 2024 09:33)  POCT Blood Glucose.: 435 mg/dL (01 Sep 2024 08:19)      ABG - ( 01 Sep 2024 00:30 )  pH, Arterial: 7.420 pH, Blood: x     /  pCO2: 39    /  pO2: 62    / HCO3: 25    / Base Excess: 0.8   /  SaO2: 92.8              Urinalysis Basic - ( 02 Sep 2024 06:25 )    Color: x / Appearance: x / SG: x / pH: x  Gluc: 394 mg/dL / Ketone: x  / Bili: x / Urobili: x   Blood: x / Protein: x / Nitrite: x   Leuk Esterase: x / RBC: x / WBC x   Sq Epi: x / Non Sq Epi: x / Bacteria: x        RADIOLOGY & ADDITIONAL TESTS:    Imaging Personally Reviewed:  [x ] YES  [ ] NO      A+P   43F active smoker with hx of CAD s/p PCI 2018, HFrEF (EF45%-50%), hx of asthma/copd not on home O2, DM, hx of crack cocaine use presented to the hospital with respiratory distress and chest discomfort with recent admission to [facility] for same found to have bilateral diffuse alveolar opacities of unclear etiology now with recurrent respiratory failure w/ persistent infiltrates possibly secondary to underlying infectious/inflammatory process vs fluid overload   #Hypoxic respiratory failure 2/2 infectious vs inflammatory vs fluid overload  -B.l diffuse GGO +consolidation, subpleural sparing  -CT angio negative for PE  -autoimmune/hypersentivity panel pending  -c/w lasix BID  -c.w spiriva/symbicort  -on empiric zosyn  -may require bronchoscopy and possibly biopsy  -Salumedrol 40mg IV qDay  -on Nasal canual oxygen    #Atypical chest pain   possibly related to pleurisy vs MSK; r/o cardiac etiology   troponins neg x 3; EKG w/ TWI similar to prior   TTE EF50-55%  HTN   c/w Coreg   c/w Lisinopril   CAD/HFrEF  c/w ASA  c/w Lipitor   c/w coreg   c/w lasix 40 BID for now     #DM w. uncontrolled hyperglycemia  -most likely 2/2 steroids  -reduced steroids to qDay  -30U lantus at bedtime

## 2024-09-02 NOTE — PROGRESS NOTE ADULT - ASSESSMENT
43 F, Smoker, CAD s/p PCI 2018, HFrEF 45%, Asthma/COPD (no home o2), DM, h/o crack cocaine use, presented with respiratory distress, recently in [facility] for dyspnea chest pain 8/11 to 8/16 found to have diffuse alveolar opacities w/ subpleural sparing, improved with abx/steroids/diuresis, plan for outpatient stress test (not yet done). On this admission, similar presentation and improved iwth NIV/nitro/abx/steroid, course c/b steroid-induced hyperglycemia. Unclear etiology of respiratory failure with persistent infiltrates. Response of chest pain from SL nitro + rapid improvement with diuresis suggests cardiac etiology. But persistent infiltrates suggests against pulmonary edema unless she flared up again in between scans. ILD possible, pending autoimmune workup. DAH also possible without hemoptysis, ANCA serologies pending.    # Respiratory failure from multifactorial etiologies  # Cardiogenic pulmonary edema  # Pneumonia  # AECOPD/ asthma exacerbation  # Hyperglycemia  # CAD    - Follow up autoimmune workup (especially ANCA)  - May need bronchoscopy - pulm follow up on floors  - Titrate insulin for hyperglycemia, should improve after tapering of steroids  - Unclear if prolonged steroid course would benefit as it's unclear if this is an ILD exacerbation. Taper down to 40 mg solumedrol daily for now  - Needs outpatient pulmonary follow up  - Zosyn for  7 days  - Stable for downgrade to medical floor

## 2024-09-03 DIAGNOSIS — I50.9 HEART FAILURE, UNSPECIFIED: ICD-10-CM

## 2024-09-03 LAB
ACE SERPL-CCNC: 62 U/L — SIGNIFICANT CHANGE UP (ref 14–82)
ALBUMIN SERPL ELPH-MCNC: 2.8 G/DL — LOW (ref 3.3–5.2)
ALP SERPL-CCNC: 42 U/L — SIGNIFICANT CHANGE UP (ref 40–120)
ALT FLD-CCNC: 13 U/L — SIGNIFICANT CHANGE UP
ANA TITR SER: NEGATIVE — SIGNIFICANT CHANGE UP
ANION GAP SERPL CALC-SCNC: 12 MMOL/L — SIGNIFICANT CHANGE UP (ref 5–17)
AST SERPL-CCNC: 14 U/L — SIGNIFICANT CHANGE UP
BASOPHILS # BLD AUTO: 0.02 K/UL — SIGNIFICANT CHANGE UP (ref 0–0.2)
BASOPHILS NFR BLD AUTO: 0.2 % — SIGNIFICANT CHANGE UP (ref 0–2)
BILIRUB SERPL-MCNC: 0.2 MG/DL — LOW (ref 0.4–2)
BUN SERPL-MCNC: 53.4 MG/DL — HIGH (ref 8–20)
CALCIUM SERPL-MCNC: 7.9 MG/DL — LOW (ref 8.4–10.5)
CHLORIDE SERPL-SCNC: 100 MMOL/L — SIGNIFICANT CHANGE UP (ref 96–108)
CO2 SERPL-SCNC: 26 MMOL/L — SIGNIFICANT CHANGE UP (ref 22–29)
CREAT SERPL-MCNC: 1.71 MG/DL — HIGH (ref 0.5–1.3)
EGFR: 37 ML/MIN/1.73M2 — LOW
EOSINOPHIL # BLD AUTO: 0.17 K/UL — SIGNIFICANT CHANGE UP (ref 0–0.5)
EOSINOPHIL NFR BLD AUTO: 1.6 % — SIGNIFICANT CHANGE UP (ref 0–6)
FERRITIN SERPL-MCNC: 46 NG/ML — SIGNIFICANT CHANGE UP (ref 15–150)
FOLATE SERPL-MCNC: 14.4 NG/ML — SIGNIFICANT CHANGE UP
GLUCOSE BLDC GLUCOMTR-MCNC: 262 MG/DL — HIGH (ref 70–99)
GLUCOSE BLDC GLUCOMTR-MCNC: 302 MG/DL — HIGH (ref 70–99)
GLUCOSE BLDC GLUCOMTR-MCNC: 342 MG/DL — HIGH (ref 70–99)
GLUCOSE BLDC GLUCOMTR-MCNC: 372 MG/DL — HIGH (ref 70–99)
GLUCOSE SERPL-MCNC: 260 MG/DL — HIGH (ref 70–99)
HCT VFR BLD CALC: 32.1 % — LOW (ref 34.5–45)
HGB BLD-MCNC: 10.6 G/DL — LOW (ref 11.5–15.5)
IMM GRANULOCYTES NFR BLD AUTO: 0.4 % — SIGNIFICANT CHANGE UP (ref 0–0.9)
IRON SATN MFR SERPL: 34 UG/DL — LOW (ref 37–145)
IRON SATN MFR SERPL: 9 % — LOW (ref 14–50)
LYMPHOCYTES # BLD AUTO: 2.94 K/UL — SIGNIFICANT CHANGE UP (ref 1–3.3)
LYMPHOCYTES # BLD AUTO: 27 % — SIGNIFICANT CHANGE UP (ref 13–44)
M PNEUMO IGM SER-ACNC: 0.85 INDEX — SIGNIFICANT CHANGE UP (ref 0–0.9)
MAGNESIUM SERPL-MCNC: 2.3 MG/DL — SIGNIFICANT CHANGE UP (ref 1.6–2.6)
MCHC RBC-ENTMCNC: 28.3 PG — SIGNIFICANT CHANGE UP (ref 27–34)
MCHC RBC-ENTMCNC: 33 GM/DL — SIGNIFICANT CHANGE UP (ref 32–36)
MCV RBC AUTO: 85.6 FL — SIGNIFICANT CHANGE UP (ref 80–100)
MONOCYTES # BLD AUTO: 1.08 K/UL — HIGH (ref 0–0.9)
MONOCYTES NFR BLD AUTO: 9.9 % — SIGNIFICANT CHANGE UP (ref 2–14)
MYCOPLASMA AG SPEC QL: NEGATIVE — SIGNIFICANT CHANGE UP
NEUTROPHILS # BLD AUTO: 6.63 K/UL — SIGNIFICANT CHANGE UP (ref 1.8–7.4)
NEUTROPHILS NFR BLD AUTO: 60.9 % — SIGNIFICANT CHANGE UP (ref 43–77)
PHOSPHATE SERPL-MCNC: 5.6 MG/DL — HIGH (ref 2.4–4.7)
PLATELET # BLD AUTO: 238 K/UL — SIGNIFICANT CHANGE UP (ref 150–400)
POTASSIUM SERPL-MCNC: 3.7 MMOL/L — SIGNIFICANT CHANGE UP (ref 3.5–5.3)
POTASSIUM SERPL-SCNC: 3.7 MMOL/L — SIGNIFICANT CHANGE UP (ref 3.5–5.3)
PROT SERPL-MCNC: 6 G/DL — LOW (ref 6.6–8.7)
RBC # BLD: 3.75 M/UL — LOW (ref 3.8–5.2)
RBC # FLD: 14.6 % — HIGH (ref 10.3–14.5)
SODIUM SERPL-SCNC: 138 MMOL/L — SIGNIFICANT CHANGE UP (ref 135–145)
SSA-52 (RO52) (ENA) ANTIBODY, IGG: 2 AU/ML — SIGNIFICANT CHANGE UP (ref 0–40)
SSA-60 (RO60) (ENA) ANTIBODY, IGG: 0 AU/ML — SIGNIFICANT CHANGE UP (ref 0–40)
TIBC SERPL-MCNC: 382 UG/DL — SIGNIFICANT CHANGE UP (ref 220–430)
TRANSFERRIN SERPL-MCNC: 267 MG/DL — SIGNIFICANT CHANGE UP (ref 192–382)
VIT B12 SERPL-MCNC: 583 PG/ML — SIGNIFICANT CHANGE UP (ref 232–1245)
WBC # BLD: 10.88 K/UL — HIGH (ref 3.8–10.5)
WBC # FLD AUTO: 10.88 K/UL — HIGH (ref 3.8–10.5)

## 2024-09-03 PROCEDURE — 99233 SBSQ HOSP IP/OBS HIGH 50: CPT

## 2024-09-03 PROCEDURE — 99232 SBSQ HOSP IP/OBS MODERATE 35: CPT

## 2024-09-03 PROCEDURE — 99234 HOSP IP/OBS SM DT SF/LOW 45: CPT

## 2024-09-03 RX ORDER — PREDNISONE 10 MG
40 TABLET, DOSE PACK ORAL DAILY
Refills: 0 | Status: DISCONTINUED | OUTPATIENT
Start: 2024-09-03 | End: 2024-09-03

## 2024-09-03 RX ORDER — POTASSIUM CHLORIDE 10 MEQ
40 TABLET, EXT RELEASE, PARTICLES/CRYSTALS ORAL ONCE
Refills: 0 | Status: COMPLETED | OUTPATIENT
Start: 2024-09-03 | End: 2024-09-03

## 2024-09-03 RX ORDER — INSULIN GLARGINE 100 [IU]/ML
28 INJECTION, SOLUTION SUBCUTANEOUS AT BEDTIME
Refills: 0 | Status: DISCONTINUED | OUTPATIENT
Start: 2024-09-03 | End: 2024-09-04

## 2024-09-03 RX ORDER — SODIUM CHLORIDE 9 MG/ML
500 INJECTION INTRAMUSCULAR; INTRAVENOUS; SUBCUTANEOUS
Refills: 0 | Status: COMPLETED | OUTPATIENT
Start: 2024-09-03 | End: 2024-09-03

## 2024-09-03 RX ORDER — INSULIN GLARGINE 100 [IU]/ML
28 INJECTION, SOLUTION SUBCUTANEOUS
Refills: 0 | Status: DISCONTINUED | OUTPATIENT
Start: 2024-09-04 | End: 2024-09-04

## 2024-09-03 RX ORDER — PREDNISONE 10 MG
40 TABLET, DOSE PACK ORAL DAILY
Refills: 0 | Status: DISCONTINUED | OUTPATIENT
Start: 2024-09-03 | End: 2024-09-05

## 2024-09-03 RX ADMIN — SODIUM CHLORIDE 500 MILLILITER(S): 9 INJECTION INTRAMUSCULAR; INTRAVENOUS; SUBCUTANEOUS at 11:17

## 2024-09-03 RX ADMIN — Medication 20 MILLIGRAM(S): at 22:40

## 2024-09-03 RX ADMIN — Medication 8: at 22:39

## 2024-09-03 RX ADMIN — Medication 5000 UNIT(S): at 06:12

## 2024-09-03 RX ADMIN — PIPERACILLIN SODIUM AND TAZOBACTAM SODIUM 25 GRAM(S): 3; .375 INJECTION, POWDER, FOR SOLUTION INTRAVENOUS at 13:17

## 2024-09-03 RX ADMIN — Medication 10: at 11:16

## 2024-09-03 RX ADMIN — TIOTROPIUM BROMIDE INHALATION SPRAY 2 PUFF(S): 3.12 SPRAY, METERED RESPIRATORY (INHALATION) at 08:39

## 2024-09-03 RX ADMIN — Medication 400 MILLIGRAM(S): at 06:12

## 2024-09-03 RX ADMIN — Medication 400 MILLIGRAM(S): at 13:17

## 2024-09-03 RX ADMIN — Medication 8: at 16:43

## 2024-09-03 RX ADMIN — Medication 40 MILLIEQUIVALENT(S): at 11:18

## 2024-09-03 RX ADMIN — Medication 5 MILLIGRAM(S): at 22:40

## 2024-09-03 RX ADMIN — BUDESONIDE AND FORMOTEROL FUMARATE 2 PUFF(S): 80; 4.5 AEROSOL, METERED RESPIRATORY (INHALATION) at 08:39

## 2024-09-03 RX ADMIN — CARVEDILOL 25 MILLIGRAM(S): 6.25 TABLET ORAL at 08:19

## 2024-09-03 RX ADMIN — BUDESONIDE AND FORMOTEROL FUMARATE 2 PUFF(S): 80; 4.5 AEROSOL, METERED RESPIRATORY (INHALATION) at 21:01

## 2024-09-03 RX ADMIN — CARVEDILOL 25 MILLIGRAM(S): 6.25 TABLET ORAL at 22:40

## 2024-09-03 RX ADMIN — PIPERACILLIN SODIUM AND TAZOBACTAM SODIUM 25 GRAM(S): 3; .375 INJECTION, POWDER, FOR SOLUTION INTRAVENOUS at 04:35

## 2024-09-03 RX ADMIN — Medication 100 MICROGRAM(S): at 06:12

## 2024-09-03 RX ADMIN — Medication 7.5 MILLIGRAM(S): at 11:18

## 2024-09-03 RX ADMIN — SODIUM CHLORIDE 500 MILLILITER(S): 9 INJECTION INTRAMUSCULAR; INTRAVENOUS; SUBCUTANEOUS at 22:41

## 2024-09-03 RX ADMIN — Medication 6: at 08:18

## 2024-09-03 RX ADMIN — Medication 14 UNIT(S): at 16:44

## 2024-09-03 RX ADMIN — Medication 400 MILLIGRAM(S): at 22:40

## 2024-09-03 RX ADMIN — Medication 40 MILLIGRAM(S): at 06:12

## 2024-09-03 RX ADMIN — Medication 5000 UNIT(S): at 22:40

## 2024-09-03 RX ADMIN — Medication 14 UNIT(S): at 11:16

## 2024-09-03 RX ADMIN — INSULIN GLARGINE 28 UNIT(S): 100 INJECTION, SOLUTION SUBCUTANEOUS at 22:39

## 2024-09-03 RX ADMIN — Medication 5000 UNIT(S): at 13:17

## 2024-09-03 RX ADMIN — INSULIN GLARGINE 25 UNIT(S): 100 INJECTION, SOLUTION SUBCUTANEOUS at 08:18

## 2024-09-03 RX ADMIN — PSYLLIUM HUSK 1 PACKET(S): 3.4 GRANULE ORAL at 11:17

## 2024-09-03 RX ADMIN — CHLORHEXIDINE GLUCONATE 1 APPLICATION(S): 40 SOLUTION TOPICAL at 06:11

## 2024-09-03 RX ADMIN — Medication 81 MILLIGRAM(S): at 11:18

## 2024-09-03 NOTE — PROGRESS NOTE ADULT - NS ATTEND AMEND GEN_ALL_CORE FT
seen with above,    43F history significant for obesity (BMI 36), chronic active smoker, asthma/COPD, HF recovered LV EF, CAD/MI s/p stent x1 in 2018 s/p AMI, was recently at [facility] on 8/13/24 treated with IV antibiotic, steroids, and Lasix, presents with chest discomfort and dyspnea with hypoxic respiratory failure, CTPA no PE but significant bilateral pulmonary infiltrates, pBNP 678 (lower compared to prior 900s), suspect unstable angina was planning for LHC    -unclear etiology of bilateral pulmonary infiltrates on empiric steroids, recommend pulmonary eval. to consider bronchoscopy/biopsy  -GINGER this morning with uptrending Cr. 1.2--> 1.7 will postpone LHC until tomorrow if Cr. improves  -gentle IVF 500ml x5hrs, hold Lasix/spironolactone for now  -hold lisinopril as well given --> 100        Timbo Navarro DO, [facility]  Faculty Non-Invasive Cardiologist  [phone] seen with above,    43F history significant for obesity (BMI 36), chronic active smoker, asthma/COPD, HF recovered LV EF, CAD/MI s/p stent x1 in 2018 s/p AMI, was recently at [facility] on 8/13/24 treated with IV antibiotic, steroids, and Lasix, presents with chest discomfort and dyspnea with hypoxic respiratory failure, CTPA no PE but significant bilateral pulmonary infiltrates, pBNP 678 (lower compared to prior 900s), suspect unstable angina was planning for LHC    -unclear etiology of chronic persistent bilateral pulmonary infiltrates (noted similar findings on last CT chest in 8/2024) remains on empiric steroids, recommend pulmonary eval. to consider bronchoscopy/biopsy  -GINGER this morning with uptrending Cr. 1.2--> 1.7 will postpone LHC until tomorrow if Cr. improves  -gentle IVF 500ml x5hrs, hold Lasix/spironolactone for now  -hold lisinopril as well given --> 100        Timbo Navarro DO, [facility]  Faculty Non-Invasive Cardiologist  [phone]

## 2024-09-03 NOTE — PROGRESS NOTE ADULT - ASSESSMENT
42 y/o F w/ PMH of polysubstance abuse (crack cocaine), asthma/COPD (not on home O2), HFmrEF (45-50%), CAD s/p PCI (2018), IDDM, recent admission to [facility] 08/11-08/16 for GOODEN and CP and was found to have diffuse alveolar opacities w/ subpleural sparing and was treated w/ IV diuresis, empiric abx and IV steroids.  On d/c from [facility] pt f/u w/ cardiologist c/o chest pain and had an unchanged EKG (inferolateral TWI) and negative trops and was scheduled for stress test that she has not completed yet, presented 08/31 to [facility] ED c/o GOODEN, mildly productive cough, LE edema and pleuritic CP.  Pt was found to be hypoxic w/ increased WOB requiring BiPAP in ED and was given SL nitro for CP and admitted to [facility] for acute hypoxic respiratory failure.  CTA chest was negative for PE but showed similar diffuse b/l infiltrates and pt was subsequently started on empiric Zosyn and IV steroids.  Sepsis w/u was ordered in ED and was also given IVFs per protocol but appeared to be in decompensated HF therefore pt was started on diuresis and since d/c'd as pt now euvolemic.  Hospital course complicated by hyperglycemia likely due to steroids.  Steroids being weaned and pt tolerating.  Pt has 4 beats of asymptomatic NSVT overnight.  Cardio following.  TTE now w/ pEF and no WMAs appreciated but will go for [facility] tomorrow.  Pt medically stable for transfer to medicine for continued management.        #Acute hypoxic respiratory failure likely multifactorial 2/2 acute decompensated HFpEF and COPD exacerbation   - Clinically euvolemic at this time and copd exacerbation resolving and now weaned off O2  - Has persistent similar infiltrates on CT chest from this admission as compared to recent [facility] admission and clinically no evidence of PNA   - s/p IV diuresis but since d/c'd as pt is now euvolemic but will place on po lasix and add spironolactone   - Prior TTE showed moderately rEF at 45% but repeat on 09/01/24 shows LVEF 50-55% w/ no WMAs appreciated and no significant valvulopathies  - Methylprednisolone IV changed to Prednisone 40mg oral qd   - c/w bronchodilators standing/prn and encourage incentive spirometry   -Appreciate Pulmonary consult, persistent infiltrates likely multifactorial, no bronch at this time may need longer steroids if cardiac w/u neg  -Appreciate cards consult, [facility] tentative pending improvement in Cr   - Monitor daily weights, strict I/o's and fluid restrict   - Monitor on telemetry and maintain K>4 and Mg>2    #GINGER:  -lasix, aldactone, lisinopril held  -s/p 500cc NS bolus  -monitor BMP      #SIRS but low suspicion for sepsis > now resolved   - Tachy, tachypneic and leukocytosis on arrival  - Tachy and tachypnic from decompensated HF and copd exacerbation  - Infiltrates unchanged from CT at [facility]  - s/p steroids and abx during recent [facility] admission   - Clinically nontoxic appearing and afebrile   - Procal elevated but no clear source of infection   - Negative RVP, cultures and legionella UAg  - Sputum culture pending   - Leukocytosis elevated on admission but could be reactive as no clear source of infection at this time   - WBC remain elevated likely from current steroid regimen   - Started on empiric zosyn 09/01 but given infectious w/u negative and recent course of empiric abxs at [facility] would stop Zosyn after 3 days       Pleuritic chest pain, resolved   - Suspect post tussive however has significant cardiac hx and noted to have 4 beats of NSVT on telemetry but asymptomatic  - EKG w/ no acute ischemic changes and when compared to prior EKG from 08/11 is not significantly different on my review   - Denied positional association   - Trops negative but   - Utox negative   - CTA negative for PE   - Lipid panel form 08/13 w/ ; goal <70 therefore will c/w statin   - CRP elevated but is down trending from 08/12/24 and no evidence of pericarditis on EKG   - TTE from 09/01 shows LVEF 50-55% and no significant valvulopathy and no WMA  - Cardio following       IDDM w/ hyperglycemia   - A1c 8.6 on 08/13  - AG and serum HCO3 normal   - BG still uncontrolled and likely due to steroids  - Appreciate Endocrine consult recs   - ISS and hypoglycemic protocol in place       Normocytic anemia   - Baseline Hb ranges from 10-12 and currently at baseline   - Hemodynamically stable w/ no active bleeding   - Given RDW will check iron panel, B12 and folate levels, normal except Iron-34, sw ferrous sulfate oral qd   - BUN elevated due to steroids   - c/w protonix for GI cytoprotection   - Monitor CBC and transfuse for Hb<8      CAD s/p PCI  HTN / HLD  - c/w ASA, statin   - c/w carvedilol w/ caution in light of hx of cocaine use   - Utox on this admission negative   - Lasix, lisinopril and aldactone on hold due to GINGER       Hypothyroid   - c/w synthroid       Polysubstance abuse   - Hx of crack cocaine use but utox negative at this time   - c/w gabapentin and prn atarax   - Counseled on abstaining from drug use       VTE ppx: heparin sq    Dispo: acute.  Pending [facility] tomorrow.  Anticipate d/c in 2-4 days.

## 2024-09-03 NOTE — PROGRESS NOTE ADULT - ASSESSMENT
43y old  Female PMH  asthma, COPD, HFrEF, IDDM, PNA, smoker, CAD s/p stent x1 in 2018 s/p AMI. Was just at [facility] on 8/13/24 with SOB, was treated with ABX, steroids, and IVP lasix . Cardiology was following her there and recommended out patient NST. She now presents here with angina during sexual intercourse. 1st sex round she had SOB and stopped. 2nd round she had SOB with chest pressure, she stopped all activity but her symptoms lasted for 1 hour 30 minutes. When she arrived at the hospital she still had some chest discomfort, was given NTG and symptoms resolved.  Also she is endorsing pleuritic chest pain when she lays flat and takes a deep breath in, improved with sitting up. Today when sitting up to ambulate to the bathroom she had angina which was relieved with rest. Also noted to have NSVT 4 beats on telemetry. Has significant ST / T changes when compared to EKG at [place].

## 2024-09-03 NOTE — PROGRESS NOTE ADULT - PROBLEM SELECTOR PLAN 1
- Anginal symptoms for the past 72 hours, intermittent, worse with activity and is positional when laying flat.   - Had NSVT on tele  - Trops x3 < 15  - EKG with worsening ischemia  - c/w ASA 81 mg po daily, beta blocker, and statin.   - TTE without WMA, preserved EF  - CRP elevated, differential includes pericarditis   - No PE. No signs of overt fluid overload  - found to have GINGER, with creatinine from 1.21 to 1.71. will d/c aldactone, lasix, and lisinopril, give x1 500 cc  NS bolus. Will re-check renal function in AM and add on for LHC on 9/4. Keep npo after midnight tonight.

## 2024-09-03 NOTE — PROGRESS NOTE ADULT - SUBJECTIVE AND OBJECTIVE BOX
Hailey Ellis MD (Available on [employer])  [facility] Division of Hospital Medicine    Chief Complaint:  sob    SUBJECTIVE / OVERNIGHT EVENTS:  Pt seen and examined at bedside. Denies any complaints.     Patient denies chest pain, SOB, abd pain, N/V, fever, chills, dysuria or any other complaints. All remainder ROS negative.     INTERVAL EVENTS:  -Pt hemodynamically stable, now on NC 2L  -GINGER with Cr-1.7, lasix, aldactone held. NS 500ml bolus given   -Appreciate Pulmonary consult  -Appreciate cards consult  -Methylprednisolone switched to Prednisone 40mg oral qd for 5 days, duration per pulm  -Zosyn discontinued after day 3 of abx     MEDICATIONS  (STANDING):  aspirin  chewable 81 milliGRAM(s) Oral daily  atorvastatin 20 milliGRAM(s) Oral at bedtime  budesonide 160 MICROgram(s)/formoterol 4.5 MICROgram(s) Inhaler 2 Puff(s) Inhalation two times a day  carvedilol 25 milliGRAM(s) Oral <User Schedule>  chlorhexidine 2% Cloths 1 Application(s) Topical <User Schedule>  dextrose 5%. 1000 milliLiter(s) (100 mL/Hr) IV Continuous <Continuous>  dextrose 5%. 1000 milliLiter(s) (50 mL/Hr) IV Continuous <Continuous>  dextrose 50% Injectable 25 milliLiter(s) IV Push every 15 minutes  dextrose 50% Injectable 12.5 Gram(s) IV Push once  dextrose Oral Gel 15 Gram(s) Oral once  gabapentin 400 milliGRAM(s) Oral every 8 hours  glucagon  Injectable 1 milliGRAM(s) IntraMuscular once  glucagon  Injectable 1 milliGRAM(s) IntraMuscular once  heparin   Injectable 5000 Unit(s) SubCutaneous every 8 hours  insulin glargine Injectable (LANTUS) 28 Unit(s) SubCutaneous at bedtime  insulin lispro (ADMELOG) corrective regimen sliding scale   SubCutaneous Before meals and at bedtime  insulin lispro Injectable (ADMELOG) 14 Unit(s) SubCutaneous three times a day before meals  levothyroxine 100 MICROGram(s) Oral daily  melatonin 5 milliGRAM(s) Oral at bedtime  mirtazapine 7.5 milliGRAM(s) Oral daily  pantoprazole    Tablet 40 milliGRAM(s) Oral before breakfast  piperacillin/tazobactam IVPB.. 3.375 Gram(s) IV Intermittent every 8 hours  predniSONE   Tablet 40 milliGRAM(s) Oral daily  psyllium Powder 1 Packet(s) Oral daily  sodium chloride 0.9%. 500 milliLiter(s) (500 mL/Hr) IV Continuous <Continuous>  tiotropium 2.5 MICROgram(s) Inhaler 2 Puff(s) Inhalation daily    MEDICATIONS  (PRN):  hydrOXYzine hydrochloride 50 milliGRAM(s) Oral at bedtime PRN Anxiety        I&O's Summary    02 Sep 2024 07:01  -  03 Sep 2024 07:00  --------------------------------------------------------  IN: 1080 mL / OUT: 1350 mL / NET: -270 mL    03 Sep 2024 07:01  -  03 Sep 2024 16:25  --------------------------------------------------------  IN: 741 mL / OUT: 800 mL / NET: -59 mL        PHYSICAL EXAM:  Vital Signs Last 24 Hrs  T(C): 36.7 (03 Sep 2024 16:07), Max: 36.8 (03 Sep 2024 11:26)  T(F): 98.1 (03 Sep 2024 16:07), Max: 98.2 (03 Sep 2024 11:26)  HR: 89 (03 Sep 2024 15:00) (80 - 104)  BP: 113/71 (03 Sep 2024 15:00) (91/52 - 121/77)  BP(mean): 85 (03 Sep 2024 15:00) (64 - 94)  RR: 19 (03 Sep 2024 15:00) (15 - 27)  SpO2: 96% (03 Sep 2024 15:00) (95% - 100%)    Parameters below as of 03 Sep 2024 13:00  Patient On (Oxygen Delivery Method): room air            CONSTITUTIONAL: NAD  ENMT: Moist oral mucosa  RESPIRATORY: Normal respiratory effort; lungs are clear to auscultation bilaterally  CARDIOVASCULAR: Regular rate and rhythm, normal S1 and S2, no murmur/rub/gallop  ABDOMEN: Nontender to palpation, normoactive bowel sounds, no rebound/guarding; No hepatosplenomegaly  EXTREMITIES: No LE swelling   PSYCH: A+O to person, place, and time; affect appropriate  NEUROLOGY: CN 2-12 are intact and symmetric; no gross sensory deficits;   SKIN: No rashes; no palpable lesions    LABS:                        10.6   10.88 )-----------( 238      ( 03 Sep 2024 03:10 )             32.1     09-03    138  |  100  |  53.4<H>  ----------------------------<  260<H>  3.7   |  26.0  |  1.71<H>    Ca    7.9<L>      03 Sep 2024 03:10  Phos  5.6     09-03  Mg     2.3     09-03    TPro  6.0<L>  /  Alb  2.8<L>  /  TBili  0.2<L>  /  DBili  x   /  AST  14  /  ALT  13  /  AlkPhos  42  09-03          Urinalysis Basic - ( 03 Sep 2024 03:10 )    Color: x / Appearance: x / SG: x / pH: x  Gluc: 260 mg/dL / Ketone: x  / Bili: x / Urobili: x   Blood: x / Protein: x / Nitrite: x   Leuk Esterase: x / RBC: x / WBC x   Sq Epi: x / Non Sq Epi: x / Bacteria: x        Culture - Blood (collected 31 Aug 2024 20:06)  Source: .Blood Blood-Peripheral  Preliminary Report (03 Sep 2024 02:01):    No growth at 48 Hours      CAPILLARY BLOOD GLUCOSE      POCT Blood Glucose.: 372 mg/dL (03 Sep 2024 11:14)  POCT Blood Glucose.: 262 mg/dL (03 Sep 2024 08:14)  POCT Blood Glucose.: 197 mg/dL (02 Sep 2024 21:06)  POCT Blood Glucose.: 324 mg/dL (02 Sep 2024 17:01)        RADIOLOGY & ADDITIONAL TESTS:  Results Reviewed:   Imaging Personally Reviewed:  Electrocardiogram Personally Reviewed:

## 2024-09-03 NOTE — PROGRESS NOTE ADULT - ASSESSMENT
43F with PMHx of IDDM, drug use, COPD/Asthma, CAD, s/p PCI, HFrEF admitted for respiratory distress. Patient had recent admission ad [facility] for similar symptoms, found with b/l diffused infiltrates. Now admitted for COPD exacerbation and acute decompensated HFpEF. treated with diuretics and IV Steroids.    Consulted for diabetes management  Home regimen: Lantus 25U qhs and Bydureon 2 mg weekly  Current a1c: 8.6%    1. DM2 with steroid induced hyperglycemia  - Increase lantus to 28 units BID  - Continue premeal admelog 14 units TID  - Correction scale  - Monitor POC glucose with goal 120 - 180 mg/dL  - Possibly NPO after midnight for cardiac cath    2. Acute respiratory failure secondary/ COPD/acute decompensated HF  - Management as per primary team  - On solumedrol 40mg daily  - Would require insulin taper down as the steroids are weaned off    3. ACS   - EKG with worsening ischemia  - ASA/beta blocker/statin  - NPO after midnight for possible LHC on 9/4    4. Hypothyroidism  - Clinically euthyroid  - Continue current dose of Levothyroxine 43F with PMHx of IDDM, drug use, COPD/Asthma, CAD, s/p PCI, HFrEF admitted for respiratory distress. Patient had recent admission ad [facility] for similar symptoms, found with b/l diffused infiltrates. Now admitted for COPD exacerbation and acute decompensated HFpEF. treated with diuretics and IV Steroids.    Consulted for diabetes management  Home regimen: Lantus 25U qhs and Bydureon 2 mg weekly  Current a1c: 8.6%    1. DM2 with steroid induced hyperglycemia  - Increase lantus to 28 units BID  - Continue premeal admelog 14 units TID  - Correction scale  - Monitor POC glucose with goal 120 - 180 mg/dL  - Possibly NPO after midnight for cardiac cath    2. Acute respiratory failure secondary/ COPD/acute decompensated HF  - Management as per primary team  - Last dose of IV solumedrol 40mg this am ---> now changed to prednisone 40mg dailu  - Will continue to taper insulin as steroids are weaned off    3. ACS   - EKG with worsening ischemia  - ASA/beta blocker/statin  - NPO after midnight for possible LHC on 9/4    4. Hypothyroidism  - Clinically euthyroid  - Continue current dose of Levothyroxine

## 2024-09-03 NOTE — CONSULT NOTE ADULT - ASSESSMENT
Ms. Joseph is a 43 year old smoker (notes smoking maybe 1-2 cigarettes per day since age 14), CAD s/p PCI 2018, HFrEF 45%, asthma/COPD, DM, h/o crack cocaine use who presented with respiratory distress. Unclear etiology of respiratory failure with this type of infiltrate pattern (i.e. diffuse with subpleural sparing) - which can include etiologies such as organizing pneumonia, pulmonary alveolar proteinosis, diffuse alveolar hemorrhage, vaping-associated lung injury, crack lung, pulmonary edema, pneumocystis jirovecii pneumonia, and COVID.    I think for now given her rapid improvement, would continue with her current plan of a cardiac work-up for now, though if that is un-revealing, we may need to consider treating her as an organizing pneumonia with a longer course of steroids. I don't think there's a rush for a bronchoscopy at the moment and I'll get some further history tomorrow regarding any recent crack use (though cocaine was negative on tox screen) or vape use as well as follow-up remaining rheumatological titers.

## 2024-09-03 NOTE — CHART NOTE - NSCHARTNOTEFT_GEN_A_CORE
Nutrition Note: Aware pt downgraded from ICU to medicine, awaiting transfer to floor. Chart reviewed; RD to follow up with full nutrition assessment per medical floor protocol.

## 2024-09-03 NOTE — PROGRESS NOTE ADULT - SUBJECTIVE AND OBJECTIVE BOX
[facility]                                                         CARDIOLOGY AT [place] [address]                                                         Telephone: [phone]. Fax:[phone]                                                                             PROGRESS NOTE    Reason for follow up: Pulmonary edema, ACS   Update: Pt seen and examined at the bedside. Creatinine uptrending from 1.21 to 1.71. Currently on 2L nasal cannula, not on home oxygen. Pt has no complaints.     Review of symptoms:   Cardiac:  No chest pain. No dyspnea. No palpitations.  Respiratory: no cough. No dyspnea  Gastrointestinal: No diarrhea. No abdominal pain. No bleeding.   Neuro: No focal neuro complaints.    Vitals:  T(C): 36.5 (09-03-24 @ 07:46), Max: 36.7 (09-02-24 @ 11:20)  HR: 87 (09-03-24 @ 08:46) (80 - 104)  BP: 115/70 (09-03-24 @ 08:00) (91/52 - 134/79)  RR: 22 (09-03-24 @ 08:00) (15 - 25)  SpO2: 98% (09-03-24 @ 08:46) (94% - 100%)  Wt(kg): --  I&O's Summary    02 Sep 2024 07:01  -  03 Sep 2024 07:00  --------------------------------------------------------  IN: 1080 mL / OUT: 1350 mL / NET: -270 mL    03 Sep 2024 07:01  -  03 Sep 2024 09:36  --------------------------------------------------------  IN: 200 mL / OUT: 0 mL / NET: 200 mL      Weight (kg): 93.9 (09-01 @ 16:00)    PHYSICAL EXAM:  Appearance: Comfortable. No acute distress  HEENT:  Atraumatic. Normocephalic.  Normal oral mucosa  Neurologic: A & O x 3, no gross focal deficits.  Cardiovascular: RRR S1 S2, No murmur, no rubs/gallops. No JVD  Respiratory: Lungs clear to auscultation, unlabored   Gastrointestinal:  Soft, Non-tender, + BS  Lower Extremities: 2+ Peripheral Pulses, No clubbing, cyanosis, or edema  Psychiatry: Patient is calm. No agitation.   Skin: warm and dry.    CURRENT CARDIAC MEDICATIONS:  carvedilol 25 milliGRAM(s) Oral <User Schedule>      CURRENT OTHER MEDICATIONS:  budesonide 160 MICROgram(s)/formoterol 4.5 MICROgram(s) Inhaler 2 Puff(s) Inhalation two times a day  tiotropium 2.5 MICROgram(s) Inhaler 2 Puff(s) Inhalation daily  piperacillin/tazobactam IVPB.. 3.375 Gram(s) IV Intermittent every 8 hours  gabapentin 400 milliGRAM(s) Oral every 8 hours  hydrOXYzine hydrochloride 50 milliGRAM(s) Oral at bedtime PRN Anxiety  melatonin 5 milliGRAM(s) Oral at bedtime  mirtazapine 7.5 milliGRAM(s) Oral daily  pantoprazole    Tablet 40 milliGRAM(s) Oral before breakfast  psyllium Powder 1 Packet(s) Oral daily  atorvastatin 20 milliGRAM(s) Oral at bedtime  dextrose 50% Injectable 25 milliLiter(s) IV Push every 15 minutes, Stop order after: 1 Doses  dextrose 50% Injectable 12.5 Gram(s) IV Push once, Stop order after: 1 Doses  dextrose Oral Gel 15 Gram(s) Oral once, Stop order after: 1 Doses  glucagon  Injectable 1 milliGRAM(s) IntraMuscular once, Stop order after: 1 Doses  glucagon  Injectable 1 milliGRAM(s) IntraMuscular once, Stop order after: 1 Doses  insulin glargine Injectable (LANTUS) 25 Unit(s) SubCutaneous at bedtime  insulin glargine Injectable (LANTUS) 25 Unit(s) SubCutaneous every morning  insulin lispro (ADMELOG) corrective regimen sliding scale   SubCutaneous Before meals and at bedtime  insulin lispro Injectable (ADMELOG) 14 Unit(s) SubCutaneous three times a day before meals  levothyroxine 100 MICROGram(s) Oral daily  methylPREDNISolone sodium succinate Injectable 40 milliGRAM(s) IV Push daily  aspirin  chewable 81 milliGRAM(s) Oral daily  chlorhexidine 2% Cloths 1 Application(s) Topical <User Schedule>  dextrose 5%. 1000 milliLiter(s) (100 mL/Hr) IV Continuous <Continuous>  dextrose 5%. 1000 milliLiter(s) (50 mL/Hr) IV Continuous <Continuous>  heparin   Injectable 5000 Unit(s) SubCutaneous every 8 hours  potassium chloride    Tablet ER 40 milliEquivalent(s) Oral once, Stop order after: 1 Doses  sodium chloride 0.9%. 500 milliLiter(s) (500 mL/Hr) IV Continuous <Continuous>      LABS:	 	                            10.6   10.88 )-----------( 238      ( 03 Sep 2024 03:10 )             32.1     09-03    138  |  100  |  53.4<H>  ----------------------------<  260<H>  3.7   |  26.0  |  1.71<H>    Ca    7.9<L>      03 Sep 2024 03:10  Phos  5.6     09-03  Mg     2.3     09-03    TPro  6.0<L>  /  Alb  2.8<L>  /  TBili  0.2<L>  /  DBili  x   /  AST  14  /  ALT  13  /  AlkPhos  42  09-03    PT/INR/PTT ( 31 Aug 2024 20:06 )                       :                       :      10.6         :       31.2                  .        .                   .              .           .       0.95        .                                       Lipid Profile: Date: 08-12 @ 05:41  Total cholesterol 219; Direct LDL: --; HDL: 104; Triglycerides:51    HgA1c: 8.6%   TSH:     TELEMETRY: NSR with PVCs   ECG: NSR     DIAGNOSTIC TESTING:  [x ] Echocardiogram: < from: TTE W or WO Ultrasound Enhancing Agent (09.01.24 @ 10:06) >   1. Left ventricular systolic function is normal with an ejection fraction visually estimated at 50 to 55 %. There are no regional wall motion abnormalities seen.   2. There is moderate (grade 2) left ventricular diastolic dysfunction, with elevated left ventricular filling pressure.   3. Mildly enlarged right ventricular cavity size and normal right ventricular systolic function.   4. Left atrium is moderately dilated.   5. No significant valvular disease.   6. No prior echocardiogram is available for comparison.    < end of copied text >     [ ]  Catheterization:  [ ] Stress Test:    OTHER:

## 2024-09-03 NOTE — CONSULT NOTE ADULT - SUBJECTIVE AND OBJECTIVE BOX
Ms. Joseph is a 43 year old smoker (notes smoking maybe 1-2 cigarettes per day since age 14), CAD s/p PCI 2018, HFrEF 45%, asthma/COPD, DM, h/o crack cocaine use who presented with respiratory distress.    She was recently in [facility] for dyspnea and chest pain from 8/11/2024 to 8/16/2024 and was found to have diffuse alveolar opacities with subpleural sparing, which improved with antibiotics/steroids/diuresis and a plan was made for an outpatient stress test (not yet prior to her current admission). On this admission, she had a similar presentation and improved with NIV/nitro/antibiotics/steroids with her course complicated by steroid-induced hyperglycemia. Today, she notes feeling well and is on room air.    Vitals, labs, medications, and imaging reviewed.  General: Comfortable, no acute distress  CV: RRR, no overt murmurs  Lungs: Clear bilaterally  GI: Soft, non-tender  Ext: No notable edema

## 2024-09-03 NOTE — PROGRESS NOTE ADULT - SUBJECTIVE AND OBJECTIVE BOX
INTERVAL EVENTS:  Follow up diabetes management    ROS: Denies chest , sob, abd pain. Endorses good appetite    MEDICATIONS  (STANDING):  aspirin  chewable 81 milliGRAM(s) Oral daily  atorvastatin 20 milliGRAM(s) Oral at bedtime  budesonide 160 MICROgram(s)/formoterol 4.5 MICROgram(s) Inhaler 2 Puff(s) Inhalation two times a day  carvedilol 25 milliGRAM(s) Oral <User Schedule>  chlorhexidine 2% Cloths 1 Application(s) Topical <User Schedule>  dextrose 5%. 1000 milliLiter(s) (100 mL/Hr) IV Continuous <Continuous>  dextrose 5%. 1000 milliLiter(s) (50 mL/Hr) IV Continuous <Continuous>  dextrose 50% Injectable 25 milliLiter(s) IV Push every 15 minutes  dextrose 50% Injectable 12.5 Gram(s) IV Push once  dextrose Oral Gel 15 Gram(s) Oral once  gabapentin 400 milliGRAM(s) Oral every 8 hours  glucagon  Injectable 1 milliGRAM(s) IntraMuscular once  glucagon  Injectable 1 milliGRAM(s) IntraMuscular once  heparin   Injectable 5000 Unit(s) SubCutaneous every 8 hours  insulin glargine Injectable (LANTUS) 28 Unit(s) SubCutaneous at bedtime  insulin lispro (ADMELOG) corrective regimen sliding scale   SubCutaneous Before meals and at bedtime  insulin lispro Injectable (ADMELOG) 14 Unit(s) SubCutaneous three times a day before meals  levothyroxine 100 MICROGram(s) Oral daily  melatonin 5 milliGRAM(s) Oral at bedtime  mirtazapine 7.5 milliGRAM(s) Oral daily  pantoprazole    Tablet 40 milliGRAM(s) Oral before breakfast  piperacillin/tazobactam IVPB.. 3.375 Gram(s) IV Intermittent every 8 hours  potassium chloride    Tablet ER 40 milliEquivalent(s) Oral once  predniSONE   Tablet 40 milliGRAM(s) Oral daily  psyllium Powder 1 Packet(s) Oral daily  sodium chloride 0.9%. 500 milliLiter(s) (500 mL/Hr) IV Continuous <Continuous>  tiotropium 2.5 MICROgram(s) Inhaler 2 Puff(s) Inhalation daily    MEDICATIONS  (PRN):  hydrOXYzine hydrochloride 50 milliGRAM(s) Oral at bedtime PRN Anxiety    Allergies  shellfish (Swelling)  Seafood (Swelling)  shellfish (Rash)  No Known Drug Allergies  shellfish (Unknown)    Vital Signs Last 24 Hrs  T(C): 36.5 (03 Sep 2024 07:46), Max: 36.7 (02 Sep 2024 11:20)  T(F): 97.7 (03 Sep 2024 07:46), Max: 98.1 (02 Sep 2024 16:16)  HR: 87 (03 Sep 2024 08:46) (80 - 104)  BP: 115/70 (03 Sep 2024 08:00) (91/52 - 134/79)  BP(mean): 83 (03 Sep 2024 08:00) (64 - 106)  RR: 22 (03 Sep 2024 08:00) (15 - 25)  SpO2: 98% (03 Sep 2024 08:46) (94% - 100%)    Parameters below as of 03 Sep 2024 08:46  Patient On (Oxygen Delivery Method): nasal cannula, 2L    PHYSICAL EXAM:  General: No apparent distress    Respiratory: Lungs clear bilaterally  Cardiac: +S1, S2, no m/r/g  GI: +BS, soft, non tender, non distended  Extremities: No peripheral edema  Neuro: A+O X3    LABS:                        10.6   10.88 )-----------( 238      ( 03 Sep 2024 03:10 )             32.1     09-03    138  |  100  |  53.4<H>  ----------------------------<  260<H>  3.7   |  26.0  |  1.71<H>    Ca    7.9<L>      03 Sep 2024 03:10  Phos  5.6     09-03  Mg     2.3     09-03    TPro  6.0<L>  /  Alb  2.8<L>  /  TBili  0.2<L>  /  DBili  x   /  AST  14  /  ALT  13  /  AlkPhos  42  09-03    Urinalysis Basic - ( 03 Sep 2024 03:10 )    Color: x / Appearance: x / SG: x / pH: x  Gluc: 260 mg/dL / Ketone: x  / Bili: x / Urobili: x   Blood: x / Protein: x / Nitrite: x   Leuk Esterase: x / RBC: x / WBC x   Sq Epi: x / Non Sq Epi: x / Bacteria: x    POCT Blood Glucose.: 262 mg/dL (09-03-24 @ 08:14)  POCT Blood Glucose.: 197 mg/dL (09-02-24 @ 21:06)  POCT Blood Glucose.: 324 mg/dL (09-02-24 @ 17:01)  POCT Blood Glucose.: 323 mg/dL (09-02-24 @ 14:15)  POCT Blood Glucose.: 393 mg/dL (09-02-24 @ 12:36)    Thyroid Stimulating Hormone, Serum: 0.76 uIU/mL (08-12-24 @ 09:30)

## 2024-09-04 ENCOUNTER — TRANSCRIPTION ENCOUNTER (OUTPATIENT)
Age: 44
End: 2024-09-04

## 2024-09-04 LAB
ANION GAP SERPL CALC-SCNC: 9 MMOL/L — SIGNIFICANT CHANGE UP (ref 5–17)
AUTO DIFF PNL BLD: NEGATIVE — SIGNIFICANT CHANGE UP
BUN SERPL-MCNC: 42.6 MG/DL — HIGH (ref 8–20)
C-ANCA SER-ACNC: NEGATIVE — SIGNIFICANT CHANGE UP
CALCIUM SERPL-MCNC: 8.5 MG/DL — SIGNIFICANT CHANGE UP (ref 8.4–10.5)
CHLORIDE SERPL-SCNC: 101 MMOL/L — SIGNIFICANT CHANGE UP (ref 96–108)
CO2 SERPL-SCNC: 27 MMOL/L — SIGNIFICANT CHANGE UP (ref 22–29)
CREAT SERPL-MCNC: 0.98 MG/DL — SIGNIFICANT CHANGE UP (ref 0.5–1.3)
EGFR: 73 ML/MIN/1.73M2 — SIGNIFICANT CHANGE UP
FUNGITELL: <31 PG/ML — SIGNIFICANT CHANGE UP
GLUCOSE BLDC GLUCOMTR-MCNC: 163 MG/DL — HIGH (ref 70–99)
GLUCOSE BLDC GLUCOMTR-MCNC: 209 MG/DL — HIGH (ref 70–99)
GLUCOSE BLDC GLUCOMTR-MCNC: 230 MG/DL — HIGH (ref 70–99)
GLUCOSE BLDC GLUCOMTR-MCNC: 261 MG/DL — HIGH (ref 70–99)
GLUCOSE BLDC GLUCOMTR-MCNC: 371 MG/DL — HIGH (ref 70–99)
GLUCOSE BLDC GLUCOMTR-MCNC: 377 MG/DL — HIGH (ref 70–99)
GLUCOSE SERPL-MCNC: 265 MG/DL — HIGH (ref 70–99)
HCT VFR BLD CALC: 31.5 % — LOW (ref 34.5–45)
HGB BLD-MCNC: 10.1 G/DL — LOW (ref 11.5–15.5)
MAGNESIUM SERPL-MCNC: 2.1 MG/DL — SIGNIFICANT CHANGE UP (ref 1.6–2.6)
MCHC RBC-ENTMCNC: 27.7 PG — SIGNIFICANT CHANGE UP (ref 27–34)
MCHC RBC-ENTMCNC: 32.1 GM/DL — SIGNIFICANT CHANGE UP (ref 32–36)
MCV RBC AUTO: 86.3 FL — SIGNIFICANT CHANGE UP (ref 80–100)
MPO AB + PR3 PNL SER: SIGNIFICANT CHANGE UP
P-ANCA SER-ACNC: NEGATIVE — SIGNIFICANT CHANGE UP
PLATELET # BLD AUTO: 257 K/UL — SIGNIFICANT CHANGE UP (ref 150–400)
POTASSIUM SERPL-MCNC: 4.1 MMOL/L — SIGNIFICANT CHANGE UP (ref 3.5–5.3)
POTASSIUM SERPL-SCNC: 4.1 MMOL/L — SIGNIFICANT CHANGE UP (ref 3.5–5.3)
RBC # BLD: 3.65 M/UL — LOW (ref 3.8–5.2)
RBC # FLD: 14.5 % — SIGNIFICANT CHANGE UP (ref 10.3–14.5)
SODIUM SERPL-SCNC: 137 MMOL/L — SIGNIFICANT CHANGE UP (ref 135–145)
WBC # BLD: 9.51 K/UL — SIGNIFICANT CHANGE UP (ref 3.8–10.5)
WBC # FLD AUTO: 9.51 K/UL — SIGNIFICANT CHANGE UP (ref 3.8–10.5)

## 2024-09-04 PROCEDURE — 99234 HOSP IP/OBS SM DT SF/LOW 45: CPT

## 2024-09-04 PROCEDURE — 0523T NTRAPX C FFR W/3D FUNCJL MAP: CPT

## 2024-09-04 PROCEDURE — 99233 SBSQ HOSP IP/OBS HIGH 50: CPT

## 2024-09-04 PROCEDURE — 92978 ENDOLUMINL IVUS OCT C 1ST: CPT | Mod: 26,LD

## 2024-09-04 PROCEDURE — 93010 ELECTROCARDIOGRAM REPORT: CPT

## 2024-09-04 PROCEDURE — 92928 PRQ TCAT PLMT NTRAC ST 1 LES: CPT | Mod: LD

## 2024-09-04 PROCEDURE — 99152 MOD SED SAME PHYS/QHP 5/>YRS: CPT

## 2024-09-04 PROCEDURE — 93458 L HRT ARTERY/VENTRICLE ANGIO: CPT | Mod: 26,59

## 2024-09-04 RX ORDER — TICAGRELOR 90 MG/1
90 TABLET ORAL
Refills: 0 | Status: DISCONTINUED | OUTPATIENT
Start: 2024-09-04 | End: 2024-09-05

## 2024-09-04 RX ORDER — INSULIN GLARGINE 100 [IU]/ML
30 INJECTION, SOLUTION SUBCUTANEOUS AT BEDTIME
Refills: 0 | Status: DISCONTINUED | OUTPATIENT
Start: 2024-09-04 | End: 2024-09-05

## 2024-09-04 RX ORDER — SODIUM CHLORIDE 9 MG/ML
250 INJECTION INTRAMUSCULAR; INTRAVENOUS; SUBCUTANEOUS ONCE
Refills: 0 | Status: COMPLETED | OUTPATIENT
Start: 2024-09-04 | End: 2024-09-04

## 2024-09-04 RX ORDER — INSULIN GLARGINE 100 [IU]/ML
30 INJECTION, SOLUTION SUBCUTANEOUS
Refills: 0 | Status: DISCONTINUED | OUTPATIENT
Start: 2024-09-05 | End: 2024-09-05

## 2024-09-04 RX ADMIN — CHLORHEXIDINE GLUCONATE 1 APPLICATION(S): 40 SOLUTION TOPICAL at 06:23

## 2024-09-04 RX ADMIN — Medication 7.5 MILLIGRAM(S): at 16:01

## 2024-09-04 RX ADMIN — Medication 5000 UNIT(S): at 21:08

## 2024-09-04 RX ADMIN — BUDESONIDE AND FORMOTEROL FUMARATE 2 PUFF(S): 80; 4.5 AEROSOL, METERED RESPIRATORY (INHALATION) at 08:41

## 2024-09-04 RX ADMIN — TICAGRELOR 90 MILLIGRAM(S): 90 TABLET ORAL at 21:07

## 2024-09-04 RX ADMIN — BUDESONIDE AND FORMOTEROL FUMARATE 2 PUFF(S): 80; 4.5 AEROSOL, METERED RESPIRATORY (INHALATION) at 20:18

## 2024-09-04 RX ADMIN — Medication 100 MICROGRAM(S): at 06:23

## 2024-09-04 RX ADMIN — Medication 5000 UNIT(S): at 06:23

## 2024-09-04 RX ADMIN — Medication 40 MILLIGRAM(S): at 06:23

## 2024-09-04 RX ADMIN — Medication 16 UNIT(S): at 17:19

## 2024-09-04 RX ADMIN — Medication 81 MILLIGRAM(S): at 11:13

## 2024-09-04 RX ADMIN — Medication 400 MILLIGRAM(S): at 16:01

## 2024-09-04 RX ADMIN — INSULIN GLARGINE 30 UNIT(S): 100 INJECTION, SOLUTION SUBCUTANEOUS at 21:14

## 2024-09-04 RX ADMIN — CARVEDILOL 25 MILLIGRAM(S): 6.25 TABLET ORAL at 21:07

## 2024-09-04 RX ADMIN — Medication 20 MILLIGRAM(S): at 21:08

## 2024-09-04 RX ADMIN — SODIUM CHLORIDE 250 MILLILITER(S): 9 INJECTION INTRAMUSCULAR; INTRAVENOUS; SUBCUTANEOUS at 11:13

## 2024-09-04 RX ADMIN — Medication 400 MILLIGRAM(S): at 06:23

## 2024-09-04 RX ADMIN — CARVEDILOL 25 MILLIGRAM(S): 6.25 TABLET ORAL at 08:32

## 2024-09-04 RX ADMIN — Medication 10: at 17:18

## 2024-09-04 RX ADMIN — SODIUM CHLORIDE 250 MILLILITER(S): 9 INJECTION INTRAMUSCULAR; INTRAVENOUS; SUBCUTANEOUS at 16:42

## 2024-09-04 RX ADMIN — Medication 2: at 21:06

## 2024-09-04 RX ADMIN — Medication 5 MILLIGRAM(S): at 21:08

## 2024-09-04 RX ADMIN — TIOTROPIUM BROMIDE INHALATION SPRAY 2 PUFF(S): 3.12 SPRAY, METERED RESPIRATORY (INHALATION) at 08:41

## 2024-09-04 RX ADMIN — PSYLLIUM HUSK 1 PACKET(S): 3.4 GRANULE ORAL at 21:09

## 2024-09-04 RX ADMIN — Medication 400 MILLIGRAM(S): at 21:08

## 2024-09-04 NOTE — DISCHARGE NOTE PROVIDER - CARE PROVIDER_API CALL
Timbo Navarro  Cardiovascular Disease  [address] [facility], [address] [address]  Phone: [phone]  Fax: [phone]  Follow Up Time: 1 week   Timbo Navarro  Cardiovascular Disease  [address] [facility], [address]  [place] [address]  Phone: [phone]  Fax: [phone]  Follow Up Time: 1 week    Moody Delatorre  Pulmonary Disease  [address] [facility], [address]  [place] [address]  Phone: [phone]  Fax: [phone]  Follow Up Time: 1 week

## 2024-09-04 NOTE — PROGRESS NOTE ADULT - PROBLEM SELECTOR PLAN 1
- Anginal symptoms for the past 72 hours, intermittent, worse with activity and is positional when laying flat.   - Had NSVT on tele  - Trops x3 < 15  - EKG with worsening ischemia  - c/w ASA 81 mg po daily, beta blocker, and statin.   - TTE without WMA, preserved EF  - CRP elevated, differential includes pericarditis   - No PE. No signs of overt fluid overload  - improved GINGER  - plan for [facility] today for further evaluation

## 2024-09-04 NOTE — PROGRESS NOTE ADULT - SUBJECTIVE AND OBJECTIVE BOX
Hailey Ellis MD (Available on [employer])  [facility] Division of Hospital Medicine    Chief Complaint:  sob    SUBJECTIVE / OVERNIGHT EVENTS:  Pt seen and examined at bedside. Denies any complaints.    Patient denies chest pain, SOB, abd pain, N/V, fever, chills, dysuria or any other complaints. All remainder ROS negative.     INTERVAL EVENTS:  -Pt hemodynamically stable with no overnight events  -LHC with 70% LAD occlusion, 1 stent placed. DAPT started per cards    MEDICATIONS  (STANDING):  aspirin  chewable 81 milliGRAM(s) Oral daily  atorvastatin 20 milliGRAM(s) Oral at bedtime  budesonide 160 MICROgram(s)/formoterol 4.5 MICROgram(s) Inhaler 2 Puff(s) Inhalation two times a day  carvedilol 25 milliGRAM(s) Oral <User Schedule>  chlorhexidine 2% Cloths 1 Application(s) Topical <User Schedule>  dextrose 5%. 1000 milliLiter(s) (100 mL/Hr) IV Continuous <Continuous>  dextrose 5%. 1000 milliLiter(s) (50 mL/Hr) IV Continuous <Continuous>  dextrose 50% Injectable 25 milliLiter(s) IV Push every 15 minutes  dextrose 50% Injectable 12.5 Gram(s) IV Push once  dextrose Oral Gel 15 Gram(s) Oral once  gabapentin 400 milliGRAM(s) Oral every 8 hours  glucagon  Injectable 1 milliGRAM(s) IntraMuscular once  glucagon  Injectable 1 milliGRAM(s) IntraMuscular once  heparin   Injectable 5000 Unit(s) SubCutaneous every 8 hours  insulin glargine Injectable (LANTUS) 30 Unit(s) SubCutaneous at bedtime  insulin lispro (ADMELOG) corrective regimen sliding scale   SubCutaneous Before meals and at bedtime  insulin lispro Injectable (ADMELOG) 16 Unit(s) SubCutaneous three times a day before meals  levothyroxine 100 MICROGram(s) Oral daily  melatonin 5 milliGRAM(s) Oral at bedtime  mirtazapine 7.5 milliGRAM(s) Oral daily  pantoprazole    Tablet 40 milliGRAM(s) Oral before breakfast  predniSONE   Tablet 40 milliGRAM(s) Oral daily  psyllium Powder 1 Packet(s) Oral daily  sodium chloride 0.9% Bolus 250 milliLiter(s) IV Bolus once  ticagrelor 90 milliGRAM(s) Oral two times a day  tiotropium 2.5 MICROgram(s) Inhaler 2 Puff(s) Inhalation daily    MEDICATIONS  (PRN):  hydrOXYzine hydrochloride 50 milliGRAM(s) Oral at bedtime PRN Anxiety        I&O's Summary    03 Sep 2024 07:01  -  04 Sep 2024 07:00  --------------------------------------------------------  IN: 1400 mL / OUT: 2850 mL / NET: -1450 mL    04 Sep 2024 07:01  -  04 Sep 2024 15:31  --------------------------------------------------------  IN: 0 mL / OUT: 250 mL / NET: -250 mL        PHYSICAL EXAM:  Vital Signs Last 24 Hrs  T(C): 36.8 (04 Sep 2024 11:05), Max: 36.8 (03 Sep 2024 20:41)  T(F): 98.2 (04 Sep 2024 11:05), Max: 98.2 (03 Sep 2024 20:41)  HR: 96 (04 Sep 2024 15:00) (80 - 99)  BP: 152/84 (04 Sep 2024 14:30) (106/74 - 158/100)  BP(mean): 97 (04 Sep 2024 08:00) (70 - 101)  RR: 16 (04 Sep 2024 14:30) (15 - 25)  SpO2: 98% (04 Sep 2024 15:00) (96% - 100%)    Parameters below as of 04 Sep 2024 15:00  Patient On (Oxygen Delivery Method): room air            CONSTITUTIONAL: NAD  ENMT: Moist oral mucosa  RESPIRATORY: Normal respiratory effort; lungs are clear to auscultation bilaterally  CARDIOVASCULAR: Regular rate and rhythm, normal S1 and S2, no murmur/rub/gallop  ABDOMEN: Nontender to palpation, normoactive bowel sounds, no rebound/guarding; No hepatosplenomegaly  EXTREMITIES: No LE swelling   PSYCH: A+O to person, place, and time; affect appropriate  NEUROLOGY: CN 2-12 are intact and symmetric; no gross sensory deficits;   SKIN: No rashes; no palpable lesions    LABS:                        10.1   9.51  )-----------( 257      ( 04 Sep 2024 03:15 )             31.5     09-04    137  |  101  |  42.6<H>  ----------------------------<  265<H>  4.1   |  27.0  |  0.98    Ca    8.5      04 Sep 2024 03:15  Phos  5.6     09-03  Mg     2.1     09-04    TPro  6.0<L>  /  Alb  2.8<L>  /  TBili  0.2<L>  /  DBili  x   /  AST  14  /  ALT  13  /  AlkPhos  42  09-03          Urinalysis Basic - ( 04 Sep 2024 03:15 )    Color: x / Appearance: x / SG: x / pH: x  Gluc: 265 mg/dL / Ketone: x  / Bili: x / Urobili: x   Blood: x / Protein: x / Nitrite: x   Leuk Esterase: x / RBC: x / WBC x   Sq Epi: x / Non Sq Epi: x / Bacteria: x        CAPILLARY BLOOD GLUCOSE      POCT Blood Glucose.: 261 mg/dL (04 Sep 2024 11:21)  POCT Blood Glucose.: 209 mg/dL (04 Sep 2024 08:16)  POCT Blood Glucose.: 230 mg/dL (04 Sep 2024 06:10)  POCT Blood Glucose.: 342 mg/dL (03 Sep 2024 22:03)  POCT Blood Glucose.: 302 mg/dL (03 Sep 2024 16:42)        RADIOLOGY & ADDITIONAL TESTS:  Results Reviewed:   Imaging Personally Reviewed:  Electrocardiogram Personally Reviewed:

## 2024-09-04 NOTE — SBIRT NOTE ADULT - NSSBIRTDRGFEELBADGUILT_GEN_A_CORE
HEMODIALYSIS PRE-TREATMENT NOTE    Patient Identifiers prior to treatment: name, birthdate and band    Isolation Required: na                      Isolation Type: na       (please document if patient is being managed as a PUI/COVID-19 patient)        Hepatitis status:                           Date Drawn                             Result  Hepatitis B Surface Antigen 10/10/2022 neg        Hepatitis B Surface Antibody 10/10/2022 pos     20   Hepatitis B Core Antibody            How was Hepatitis Status verified: labs     Was a copy of the labs you documented provided to facility for the patient's chart: yes    Hemodialysis orders verified: yes    Access Within normal limits ( I.e. s/s of infection,...): yes     Pre-Assessment completed: yes    Pre-dialysis report received from: Emerald Martinez Rn                      Time: 1080 Yes

## 2024-09-04 NOTE — CHART NOTE - NSCHARTNOTEFT_GEN_A_CORE
Now s/p LHC via RRA with Dr. Hugh Arias, pt tolerated procedure well. Pt arrived to recovery in NAD and HDS, RRA access site stable with radial band in place, no bleed/hematoma, distal pulse +2, RUE/ Right hand remains acyanotic; warm to touch; motor/sensory function intact; 2+ right radial pulse  Intraprocedurally: Lidocaine 1% 5ml; Midazolam 2mg IV; Fentanyl 75 mcg IV; Verapamil 5mg IA; Heparin 4,000 units IA; Heparin 10,000 units IV; Nicardipine 750mcg IC; Nitroglycerin 100mcg IC; Brilinta 180mg PO x1; NS bolus 30ml; omnipaque 134ml  Findings: pLAD 70% stenosis s/p 1 YUE (DIANA FRONTIER 3.5x15MM) EDP 18 (PRELIMINARY VERBAL REPORT FROM DR ARIAS; PENDING OFFICIAL REPORT)  Post Cath EKG: SR with lateral TWI (similar EKG to prior)  Post procedure, patient denies chest pain, chest pressure, shortness of breath, palpitations, indigestion, arm pain, jaw pain, abdominal complaints.     Plan:  -Formal cath report pending  -Post procedure management/monitoring per protocol  -Access site precautions  -Radial compression band removal at 1500 (3pm) if RRA site stable w/o active bleeding or wrist hematoma  -Bedrest x2 hours post procedure. Ok to ambulate at 4pm if RRA site stable w/o active bleeding or wrist hematoma.   -Labs and EKG in am  -NS 0.9% 250ml/hr x 1 bolus: post procedure TOMMY ppx   -Repeat ECG if any clinical indication or change on tele  -Continue current medical therapy  -intraprocedural patient received brilinta 180mg PO x1  -Dual anti platelet therapy with aspirin 81mg po daily/ brilinta 90mg BID  -Cont BB with carvedilol 25mg BID  -Cont statin therapy with Lipitor 20mg po qHS   -Educated regarding strict adherence with DAPT   -Educated regarding post procedure management and care  -Discussed the importance of RF modification  -Cardiac rehab info provided/referral and communication to cardiac rehab completed  -DISPO: BP mgmt; likely resume lisinopril and diuretics tomorrow if Scr stable  Further plan per medicine team and cardiology team   -pulm following unclear etiology of chronic persistent bilateral pulmonary infiltrates (noted similar findings on last CT chest in 8/2024) remains on empiric steroids

## 2024-09-04 NOTE — DISCHARGE NOTE PROVIDER - PROVIDER TOKENS
PROVIDER:[TOKEN:[[id number]],FOLLOWUP:[1 week]] PROVIDER:[TOKEN:[[id number]],FOLLOWUP:[1 week]],PROVIDER:[TOKEN:[[id number]],FOLLOWUP:[1 week]]

## 2024-09-04 NOTE — PROGRESS NOTE ADULT - ASSESSMENT
43F with PMHx of IDDM, drug use, COPD/Asthma, CAD, s/p PCI, HFrEF admitted for respiratory distress. Patient had recent admission ad [facility] for similar symptoms, found with b/l diffused infiltrates. Now admitted for COPD exacerbation and acute decompensated HFpEF. treated with diuretics and IV Steroids.    Consulted for diabetes management  Home regimen: Lantus 25U qhs and Bydureon 2 mg weekly  Current a1c: 8.6%    1. DM2 with steroid induced hyperglycemia  - Increase lantus to 30 units BID  - Increase premeal admelog to 16 units TID  - Correction scale  - Monitor POC glucose with goal 120 - 180 mg/dL    2. Acute respiratory failure secondary/ COPD/acute decompensated HF  - Management as per primary team  - On prednisone 40mg x5 days    3. ACS   - EKG with worsening ischemia  - ASA/beta blocker/statin  - NPO after midnight for LHC     4. Hypothyroidism  - Clinically euthyroid  - Continue current dose of Levothyroxine

## 2024-09-04 NOTE — DISCHARGE NOTE PROVIDER - ATTENDING DISCHARGE PHYSICAL EXAMINATION:
T(C): 36.9 (09-05-24 @ 11:09), Max: 36.9 (09-05-24 @ 00:00)  HR: 91 (09-05-24 @ 13:00) (85 - 104)  BP: 134/78 (09-05-24 @ 13:00) (112/50 - 160/95)  RR: 25 (09-05-24 @ 13:00) (16 - 28)  SpO2: 97% (09-05-24 @ 13:00) (96% - 100%)    CONSTITUTIONAL: Well groomed, no apparent distress  EYES: PERRLA and symmetric, EOMI, No conjunctival or scleral injection, non-icteric  ENMT: Oral mucosa with moist membranes. Normal dentition; no pharyngeal injection or exudates             NECK: Supple, symmetric and without tracheal deviation   RESP: No respiratory distress, no use of accessory muscles; CTA b/l, no WRR  CV: RRR, +S1S2, no MRG; no JVD; no peripheral edema  GI: Soft, NT, ND, no rebound, no guarding; no palpable masses; no hepatosplenomegaly; no hernia palpated  LYMPH: No cervical LAD or tenderness; no axillary LAD or tenderness; no inguinal LAD or tenderness  MSK: Normal gait; No digital clubbing or cyanosis; examination of the (head/neck/spine/ribs/pelvis, RUE, LUE, RLE, LLE) without misalignment,            Normal ROM without pain, no spinal tenderness, normal muscle strength/tone  SKIN: No rashes or ulcers noted; no subcutaneous nodules or induration palpable  NEURO: CN II-XII intact; normal reflexes in upper and lower extremities, sensation intact in upper and lower extremities b/l to light touch   PSYCH: Appropriate insight/judgment; A+O x 3, mood and affect appropriate, recent/remote memory intact

## 2024-09-04 NOTE — PROGRESS NOTE ADULT - ASSESSMENT
44 y/o F w/ PMH of polysubstance abuse (crack cocaine), asthma/COPD (not on home O2), HFmrEF (45-50%), CAD s/p PCI (2018), IDDM, recent admission to [facility] 08/11-08/16 for GOODEN and CP and was found to have diffuse alveolar opacities w/ subpleural sparing and was treated w/ IV diuresis, empiric abx and IV steroids.  On d/c from [facility] pt f/u w/ cardiologist c/o chest pain and had an unchanged EKG (inferolateral TWI) and negative trops and was scheduled for stress test that she has not completed yet, presented 08/31 to [facility] ED c/o GOODEN, mildly productive cough, LE edema and pleuritic CP.  Pt was found to be hypoxic w/ increased WOB requiring BiPAP in ED and was given SL nitro for CP and admitted to micu for acute hypoxic respiratory failure.  CTA chest was negative for PE but showed similar diffuse b/l infiltrates and pt was subsequently started on empiric Zosyn and IV steroids.  Sepsis w/u was ordered in ED and was also given IVFs per protocol but appeared to be in decompensated HF therefore pt was started on diuresis and since d/c'd as pt now euvolemic.  Hospital course complicated by hyperglycemia likely due to steroids.  Steroids being weaned and pt tolerating.  Pt has 4 beats of asymptomatic NSVT overnight. TTE: LVEF 50-55%; no RWMA; moderate grade 2 LV DD; mildly enlarged RV size and function; no significant valvular disease.  Patient had GINGER yesterday Scr 1.7 improved now today 0.98. Patient presents to [facility] for elective LHC to further assess prior stent patency and evaluate coronary anatomy.    RISK ASSESSMENTS:  ASA: 3  MALLAMPATI: 2  BRA 4.6%  Scr: 0.98   EgFR 73    PLAN:  -Procedure and risks discussed with patient  -EKG and labs reviewed  -aspirin 81mg PO pre procedure ordered  -0.9% NS 250cc bolus ordered to prevent TOMMY  -Consent obtained by interventional cardiologist Dr. Hugh Anderson    Risks, benefits, and alternatives reviewed.  Risks including but not limited to MI, death, stroke, bleeding, infection, vessel injury, hematoma, renal failure, allergic reaction, urgent open heart surgery, restenosis and stent thrombosis were reviewed.  All questions answered.  Patient is agreeable to proceed.   Pt. assessed, appropriate for sedation, pt. educated regarding the plan for Versed/Fentanyl as needed.

## 2024-09-04 NOTE — PROGRESS NOTE ADULT - ASSESSMENT
Assessment:    42 y/o F w/ PMH of polysubstance abuse (crack cocaine), asthma/COPD (not on home O2), HFmrEF (45-50%), CAD s/p PCI (2018), IDDM, recent admission to [facility] 08/11-08/16 for GOODEN and CP and was found to have diffuse alveolar opacities w/ subpleural sparing and was treated w/ IV diuresis, empiric abx and IV steroids.  On d/c from [facility] pt f/u w/ cardiologist c/o chest pain and had an unchanged EKG (inferolateral TWI) and negative trops and was scheduled for stress test that she has not completed yet, presented 08/31 to [facility] ED c/o GOODEN, mildly productive cough, LE edema and pleuritic CP.  Pt was found to be hypoxic w/ increased WOB requiring BiPAP in ED and was given SL nitro for CP and admitted to micu for acute hypoxic respiratory failure.  CTA chest was negative for PE but showed similar diffuse b/l infiltrates and pt was subsequently started on empiric Zosyn and IV steroids.  Sepsis w/u was ordered in ED and was also given IVFs per protocol but appeared to be in decompensated HF therefore pt was started on diuresis and since d/c'd as pt now euvolemic.  Hospital course complicated by hyperglycemia likely due to steroids.  Steroids being weaned and pt tolerating.  Pt has 4 beats of asymptomatic NSVT overnight.  Cardio following.  TTE now w/ pEF and no WMAs appreciated but will go for [facility] tomorrow.  Pt medically stable for transfer to medicine for continued management.        #Acute hypoxic respiratory failure likely multifactorial 2/2 acute decompensated HFpEF and COPD exacerbation   #CAD s/p PCI with 70% occlusion of LAD, 1 stent placed  - Clinically euvolemic at this time and copd exacerbation resolving and now weaned off O2  - Has persistent similar infiltrates on CT chest from this admission as compared to recent [facility] admission and clinically no evidence of PNA   - s/p IV diuresis but since d/c'd as pt is now euvolemic but will place on po lasix and add spironolactone   - Prior TTE showed moderately rEF at 45% but repeat on 09/01/24 shows LVEF 50-55% w/ no WMAs appreciated and no significant valvulopathies  - Methylprednisolone IV changed to Prednisone 40mg oral qd   - c/w bronchodilators standing/prn and encourage incentive spirometry   -Appreciate Pulmonary consult, persistent infiltrates likely multifactorial, no bronch at this time may need longer steroids if cardiac w/u neg  -Appreciate cards consult, s/p [facility] s/p 1 stent placed LAD   - Monitor daily weights, strict I/o's and fluid restrict   - Monitor on telemetry and maintain K>4 and Mg>2      #Persistent b/l Pulmonary Infiltrates on CT scan:  -On Prednisone 40mg oral qd   -Pulm on board   -Initially planned for bronch however will be delayed for now given recent PCI/stent placement     #GINGER: now resolved   -lasix, aldactone, lisinopril held  -s/p 500cc NS bolus  -monitor BMP      #SIRS but low suspicion for sepsis > now resolved   - Tachy, tachypneic and leukocytosis on arrival  - Tachy and tachypnic from decompensated HF and copd exacerbation  - Infiltrates unchanged from CT at [facility]  - s/p steroids and abx during recent [facility] admission   - Clinically nontoxic appearing and afebrile   - Procal elevated but no clear source of infection   - Negative RVP, cultures and legionella UAg  - Sputum culture pending   - Leukocytosis elevated on admission but could be reactive as no clear source of infection at this time   - WBC remain elevated likely from current steroid regimen   -s/p Zosyn for 3 days       Pleuritic chest pain, resolved   - Suspect post tussive however has significant cardiac hx and noted to have 4 beats of NSVT on telemetry but asymptomatic  - EKG w/ no acute ischemic changes and when compared to prior EKG from 08/11 is not significantly different on my review   - Denied positional association   - Trops negative but   - Utox negative   - CTA negative for PE   - Lipid panel form 08/13 w/ ; goal <70 therefore will c/w statin   - CRP elevated but is down trending from 08/12/24 and no evidence of pericarditis on EKG   - TTE from 09/01 shows LVEF 50-55% and no significant valvulopathy and no WMA  - Cardio following       IDDM w/ hyperglycemia   - A1c 8.6 on 08/13  - AG and serum HCO3 normal   - BG still uncontrolled and likely due to steroids  - Appreciate Endocrine consult recs   - ISS and hypoglycemic protocol in place       Normocytic anemia   - Baseline Hb ranges from 10-12 and currently at baseline   - Hemodynamically stable w/ no active bleeding   - Given RDW will check iron panel, B12 and folate levels, normal except Iron-34, sw ferrous sulfate oral qd   - BUN elevated due to steroids   - c/w protonix for GI cytoprotection   - Monitor CBC and transfuse for Hb<8      CAD s/p PCI  HTN / HLD  - c/w ASA, brillanta and statin   - c/w carvedilol w/ caution in light of hx of cocaine use   - Utox on this admission negative   - Lasix, lisinopril and aldactone on hold due to GINGER       Hypothyroid   - c/w synthroid       Polysubstance abuse   - Hx of crack cocaine use but utox negative at this time   - c/w gabapentin and prn atarax   - Counseled on abstaining from drug use       VTE ppx: heparin sq    Dispo: acute.  Medically acute. Would anticipate dc in 1-2 days.

## 2024-09-04 NOTE — DISCHARGE NOTE PROVIDER - HOSPITAL COURSE
42 y/o F w/ PMH of polysubstance abuse (crack cocaine), asthma/COPD (not on home O2), HFmrEF (45-50%), CAD s/p PCI (2018), IDDM, recent admission to [facility] 08/11-08/16 for GOODEN and CP and was found to have diffuse alveolar opacities w/ subpleural sparing and was treated w/ IV diuresis, empiric abx and IV steroids.  On d/c from [facility] pt f/u w/ cardiologist c/o chest pain and had an unchanged EKG (inferolateral TWI) and negative trops and was scheduled for stress test that she has not completed yet, presented 08/31 to [facility] ED c/o GOODEN, mildly productive cough, LE edema and pleuritic CP.  Pt was found to be hypoxic w/ increased WOB requiring BiPAP in ED and was given SL nitro for CP and admitted to micu for acute hypoxic respiratory failure.  CTA chest was negative for PE but showed similar diffuse b/l infiltrates and pt was subsequently started on empiric Zosyn and IV steroids.  Sepsis w/u was ordered in ED and was also given IVFs per protocol but appeared to be in decompensated HF therefore pt was started on diuresis and since d/c'd as pt now euvolemic.  Hospital course complicated by hyperglycemia likely due to steroids.  Steroids being weaned and pt tolerating.  Pt has 4 beats of asymptomatic NSVT overnight.  Cardio following.  TTE now w/ pEF and no WMAs. She underwent LHC with stent placed to RCC. She was started on dapt. Pt is medically stable and cleared by cards for dc post cath. Pulmonary also cleared the patient, recs to be discharged off steroids and to fu with pulmonary clinic if develops sob, for further w/u for Cryptogenic organizing pneumonia. Appreciate Endo recs for dc insulin.

## 2024-09-04 NOTE — DISCHARGE NOTE PROVIDER - NSDCCPCAREPLAN_GEN_ALL_CORE_FT
PRINCIPAL DISCHARGE DIAGNOSIS  Diagnosis: CAD (coronary artery disease)  Assessment and Plan of Treatment: Coronary artery disease is the buildup of plaque in the arteries that supply oxygen-rich blood to your heart. Plaque causes a narrowing or blockage that could result in a heart attack. Symptoms include chest pain or discomfort, shortness of breath, dizziness, palpitations, fatigue or reduced exercise tolerance. .  Go to the ED with any acute onset of chest pain, palpitations, shortness of breath or dizziness. Do NOT miss a dose or stop taking your Aspirin 81mg once a day; Brilinta (Ticagrelor) 90mg twice a day, these medications keep your stent open and prevent a heart attack. If anyone tells you to stop these medications, speak to your cardiologist immediately.  Managing risk factors will help keep your stent open and prevent future blockages, risk factors may include: high blood pressure, high cholesterol, diabetes, obesity, sedentary life style and smoking.    Your diet should be low in fat, cholesterol, salt and carbohydrates, increase fruits (caution if diabetic), vegetables and whole grains/fiber rich foods.   Take all your cardiac  medications as prescribed.    Exercise is a very important factor in heart health. Once your post procedure restrictions have passed, you should engage in heart healthy, aerobic exercise. Be sure to have clearance from your cardiologist. Cardiac rehab programs could be extremely beneficial and your cardiologist could help set this up.   Follow up with your cardiologist within 1-2 weeks after your procedure.   Call your cardiologist or our unit ([phone]) with any questions or concerns that may arise.

## 2024-09-04 NOTE — PROGRESS NOTE ADULT - ASSESSMENT
43y old  Female PMH  asthma, COPD, HFrEF, IDDM, PNA, smoker, CAD s/p stent x1 in 2018 s/p AMI. Was just at [facility] on 8/13/24 with SOB, was treated with ABX, steroids, and IVP lasix . Cardiology was following her there and recommended out patient NST. She now presents here with angina during sexual intercourse. 1st sex round she had SOB and stopped. 2nd round she had SOB with chest pressure, she stopped all activity but her symptoms lasted for 1 hour 30 minutes. When she arrived at the hospital she still had some chest discomfort, was given NTG and symptoms resolved.  Also she is endorsing pleuritic chest pain when she lays flat and takes a deep breath in, improved with sitting up. Today when sitting up to ambulate to the bathroom she had angina which was relieved with rest. Also noted to have NSVT 4 beats on telemetry. Has significant ST / T changes when compared to EKG at [facility].

## 2024-09-04 NOTE — DISCHARGE NOTE PROVIDER - CARE PROVIDERS DIRECT ADDRESSES
,[email or web address]

## 2024-09-04 NOTE — PROGRESS NOTE ADULT - PROBLEM SELECTOR PLAN 2
- hx of HFrEF, now recovered  - TTE EF persevered, 50-55% with no WMA   - GDMT: C/w coreg. Lasix, lisinopril and aldactone on hold due to GINGER. will re-introduce once renal function recovers.   - pt appears euvolemic.  - will give x1 500 cc  NS bolus. Will re-check renal function in AM and add on for LHC on 9/4. Keep npo after midnight tonight.
- hx of HFrEF, now recovered  - TTE EF persevered, 50-55% with no WMA   - GDMT: C/w coreg. Lasix, lisinopril and aldactone on hold due to GINGER. will re-introduce once renal function recovers post LHC.   - pt appears euvolemic.

## 2024-09-04 NOTE — DISCHARGE NOTE PROVIDER - NSDCFUADDINST_GEN_ALL_CORE_FT
-Please take aspirin and brillanta daily  -Take Insulin lantus 30 units before breakfast and dinner with Insulin lispro 16 units three times before meals  -If develop shortness of breath please call pulmonary office or go to nearest ED

## 2024-09-04 NOTE — DISCHARGE NOTE PROVIDER - ATTENDING ATTESTATION STATEMENT
I highly recommend quitting smoking. If you are ever more interested in quitting and finding out how we can help, please set up an appointment with me to discuss.    - Diet Recommendations:  1. Try to eat as many whole, natural foods as you can and limit the processed, manufactured food as much as possible.  2. Eat more of the \"superfoods:\" almonds, blueberries, sweet potatoes, beans, broccoli, oats, spinach, tomatoes, and salmon.  3. Decrease saturated and trans fats and simple carbs.  4. Red meat and pork 1 serving per week or less. Poultry is preferred. The less meat the better.  5. Two servings of non-breaded fish a week, may help prevent heart disease.  6. Fats from nuts, seeds, olive oil are good.  7. Avoid sugary foods, and beverages. Especially fruit juices and soda.  8. Increase dietary fiber. 5 servings of vegetables and fruit daily, varying colors.      - Exercise Recommendations: 150 minutes per week of moderate-intensity exercise is recommended. This includes any exercise that gets heart rate up and stays up, including walking, swimming, running, playing sports.   I have personally seen and examined the patient. I have collaborated with and supervised the

## 2024-09-04 NOTE — PROGRESS NOTE ADULT - SUBJECTIVE AND OBJECTIVE BOX
[facility]                                                         CARDIOLOGY AT [place] [address]                                                         Telephone: [phone]. Fax:[phone]                                                                             PROGRESS NOTE    Reason for follow up: Chest Pain/SOB  Update: plan for [facility] today, improved creatinine      Review of symptoms:   Cardiac:  No chest pain. No dyspnea. No palpitations.  Respiratory: no cough. No dyspnea  Gastrointestinal: No diarrhea. No abdominal pain. No bleeding.   Neuro: No focal neuro complaints.    Vitals:  T(C): 36.8 (09-04-24 @ 11:05), Max: 36.8 (09-03-24 @ 20:41)  HR: 80 (09-04-24 @ 11:05) (80 - 99)  BP: 158/100 (09-04-24 @ 11:05) (106/74 - 158/100)  RR: 16 (09-04-24 @ 11:05) (16 - 25)  SpO2: 100% (09-04-24 @ 11:05) (96% - 100%)  Wt(kg): --  I&O's Summary    03 Sep 2024 07:01  -  04 Sep 2024 07:00  --------------------------------------------------------  IN: 1400 mL / OUT: 2850 mL / NET: -1450 mL    04 Sep 2024 07:01  -  04 Sep 2024 11:30  --------------------------------------------------------  IN: 0 mL / OUT: 250 mL / NET: -250 mL      Weight (kg): 93.9 (09-01 @ 16:00)    PHYSICAL EXAM:  Appearance: Comfortable. No acute distress  HEENT:  Atraumatic. Normocephalic.  Normal oral mucosa  Neurologic: A & O x 3, no gross focal deficits.  Cardiovascular: RRR S1 S2, No murmur, no rubs/gallops. No JVD  Respiratory: Lungs clear to auscultation, unlabored   Gastrointestinal:  Soft, Non-tender, + BS  Lower Extremities: 2+ Peripheral Pulses, No clubbing, cyanosis, or edema  Psychiatry: Patient is calm. No agitation.   Skin: warm and dry.    CURRENT CARDIAC MEDICATIONS:  carvedilol 25 milliGRAM(s) Oral <User Schedule>      CURRENT OTHER MEDICATIONS:  budesonide 160 MICROgram(s)/formoterol 4.5 MICROgram(s) Inhaler 2 Puff(s) Inhalation two times a day  tiotropium 2.5 MICROgram(s) Inhaler 2 Puff(s) Inhalation daily  gabapentin 400 milliGRAM(s) Oral every 8 hours  hydrOXYzine hydrochloride 50 milliGRAM(s) Oral at bedtime PRN Anxiety  melatonin 5 milliGRAM(s) Oral at bedtime  mirtazapine 7.5 milliGRAM(s) Oral daily  pantoprazole    Tablet 40 milliGRAM(s) Oral before breakfast  psyllium Powder 1 Packet(s) Oral daily  atorvastatin 20 milliGRAM(s) Oral at bedtime  dextrose 50% Injectable 25 milliLiter(s) IV Push every 15 minutes, Stop order after: 1 Doses  dextrose 50% Injectable 12.5 Gram(s) IV Push once, Stop order after: 1 Doses  dextrose Oral Gel 15 Gram(s) Oral once, Stop order after: 1 Doses  glucagon  Injectable 1 milliGRAM(s) IntraMuscular once, Stop order after: 1 Doses  glucagon  Injectable 1 milliGRAM(s) IntraMuscular once, Stop order after: 1 Doses  insulin glargine Injectable (LANTUS) 30 Unit(s) SubCutaneous at bedtime  insulin lispro (ADMELOG) corrective regimen sliding scale   SubCutaneous Before meals and at bedtime  insulin lispro Injectable (ADMELOG) 16 Unit(s) SubCutaneous three times a day before meals  levothyroxine 100 MICROGram(s) Oral daily  predniSONE   Tablet 40 milliGRAM(s) Oral daily, Stop order after: 5 Days  aspirin  chewable 81 milliGRAM(s) Oral daily  chlorhexidine 2% Cloths 1 Application(s) Topical <User Schedule>  dextrose 5%. 1000 milliLiter(s) (100 mL/Hr) IV Continuous <Continuous>  dextrose 5%. 1000 milliLiter(s) (50 mL/Hr) IV Continuous <Continuous>  heparin   Injectable 5000 Unit(s) SubCutaneous every 8 hours      LABS:	 	                            10.1   9.51  )-----------( 257      ( 04 Sep 2024 03:15 )             31.5     09-04    137  |  101  |  42.6<H>  ----------------------------<  265<H>  4.1   |  27.0  |  0.98    Ca    8.5      04 Sep 2024 03:15  Phos  5.6     09-03  Mg     2.1     09-04    TPro  6.0<L>  /  Alb  2.8<L>  /  TBili  0.2<L>  /  DBili  x   /  AST  14  /  ALT  13  /  AlkPhos  42  09-03    PT/INR/PTT ( 31 Aug 2024 20:06 )                       :                       :      10.6         :       31.2                  .        .                   .              .           .       0.95        .                                       Lipid Profile: Date: 08-12 @ 05:41  Total cholesterol 219; Direct LDL: --; HDL: 104; Triglycerides:51    HgA1c:   TSH:     TELEMETRY: NSR  ECG:    DIAGNOSTIC TESTING:  [ ] Echocardiogram:   < from: TTE W or WO Ultrasound Enhancing Agent (09.01.24 @ 10:06) >    TRANSTHORACIC ECHOCARDIOGRAM REPORT  ________________________________________________________________________________                                      _______       Pt. Name:       BRUNA MARTINEZ Study Date:    9/1/2024  MRN:            [id number]      YOB: 1980  Accession #:    [id number]      Age:           43 years  Account#:       [id number]     Gender:        F  Heart Rate:                    Height:        62.00 in (157.48 cm)  Rhythm:                        Weight:  214.72 lb (97.40 kg)  Blood Pressure: 114/79 mmHg    BSA/BMI:       1.97 m² / 39.27 kg/m²  ________________________________________________________________________________________  Referring Physician:    [id number] Cole Sanchez  Interpreting Physician: Moody Ortiz MD  Primary Sonographer:    Mariely Chung    CPT:               ECHO TTE WO CON COMP W DOPP - 73784.m  Indication(s):     Dyspnea, unspecified - R06.00  Procedure:         Transthoracic echocardiogram with 2-D, M-mode and complete                     spectral and color flow Doppler.  Ordering Location: [facility]  Admission Status:  Inpatient    _______________________________________________________________________________________     CONCLUSIONS:      1. Left ventricular systolic function is normal with an ejection fraction visually estimated at 50 to 55 %. There are no regional wall motion abnormalities seen.   2. There is moderate (grade 2) left ventricular diastolic dysfunction, with elevated left ventricular filling pressure.   3. Mildly enlarged right ventricular cavity size and normal right ventricular systolic function.   4. Left atrium is moderately dilated.   5. No significant valvular disease.   6. No prior echocardiogram is available for comparison.    ________________________________________________________________    < end of copied text >  [ ]  Catheterization:  [ ] Stress Test:    OTHER:

## 2024-09-04 NOTE — PROGRESS NOTE ADULT - SUBJECTIVE AND OBJECTIVE BOX
[facility]                                                         CARDIOLOGY AT [place] [address]                                                         Telephone: [phone]. Fax:[phone]                                                          INTERVENTIONAL CARDIOLOGY PROGRESS NOTE    Reason for follow up: [facility]  Update: Patient reports feeling better today without current complaints of chest pain and shortness of breath. Patient tolerating RA 02sat 95%. Patient with improvement of GINGER Scr 1.7 yesterday now 0.98    Procedure: [facility]  Procedure Date: 9/4/24  Procedural Interventionalist: Dr. Hugh Anderson    Review of symptoms:   Cardiac:  No chest pain. No dyspnea. No palpitations.  Respiratory: no cough. No dyspnea  Gastrointestinal: No diarrhea. No abdominal pain. No bleeding.   Neuro: No focal neuro complaints.    Vitals:  T(C): 36.7 (09-04-24 @ 08:14), Max: 36.8 (09-03-24 @ 11:26)  HR: 91 (09-04-24 @ 08:00) (87 - 99)  BP: 134/81 (09-04-24 @ 08:00) (106/74 - 146/85)  RR: 19 (09-04-24 @ 08:00) (17 - 25)  SpO2: 100% (09-04-24 @ 08:45) (96% - 100%)  Wt(kg): --  I&O's Summary    03 Sep 2024 07:01  -  04 Sep 2024 07:00  --------------------------------------------------------  IN: 1400 mL / OUT: 2850 mL / NET: -1450 mL    04 Sep 2024 07:01  -  04 Sep 2024 11:06  --------------------------------------------------------  IN: 0 mL / OUT: 250 mL / NET: -250 mL      Weight (kg): 93.9 (09-01 @ 16:00)    PHYSICAL EXAM:  Appearance: Comfortable. No acute distress  HEENT:  Atraumatic. Normocephalic.  Normal oral mucosa  Neurologic: A & O x 3, no gross focal deficits.  Cardiovascular: RRR S1 S2, No murmur, no rubs/gallops. No JVD  Respiratory: Lungs clear to auscultation, unlabored   Gastrointestinal:  Soft, Non-tender, + BS  Lower Extremities: 2+ Peripheral Pulses, No clubbing, cyanosis, or edema  Psychiatry: Patient is calm. No agitation.   Skin: warm and dry.    CURRENT CARDIAC MEDICATIONS:  carvedilol 25 milliGRAM(s) Oral <User Schedule>      CURRENT OTHER MEDICATIONS:  budesonide 160 MICROgram(s)/formoterol 4.5 MICROgram(s) Inhaler 2 Puff(s) Inhalation two times a day  tiotropium 2.5 MICROgram(s) Inhaler 2 Puff(s) Inhalation daily  gabapentin 400 milliGRAM(s) Oral every 8 hours  hydrOXYzine hydrochloride 50 milliGRAM(s) Oral at bedtime PRN Anxiety  melatonin 5 milliGRAM(s) Oral at bedtime  mirtazapine 7.5 milliGRAM(s) Oral daily  pantoprazole    Tablet 40 milliGRAM(s) Oral before breakfast  psyllium Powder 1 Packet(s) Oral daily  atorvastatin 20 milliGRAM(s) Oral at bedtime  dextrose 50% Injectable 25 milliLiter(s) IV Push every 15 minutes, Stop order after: 1 Doses  dextrose 50% Injectable 12.5 Gram(s) IV Push once, Stop order after: 1 Doses  dextrose Oral Gel 15 Gram(s) Oral once, Stop order after: 1 Doses  glucagon  Injectable 1 milliGRAM(s) IntraMuscular once, Stop order after: 1 Doses  glucagon  Injectable 1 milliGRAM(s) IntraMuscular once, Stop order after: 1 Doses  insulin glargine Injectable (LANTUS) 30 Unit(s) SubCutaneous at bedtime  insulin lispro (ADMELOG) corrective regimen sliding scale   SubCutaneous Before meals and at bedtime  insulin lispro Injectable (ADMELOG) 16 Unit(s) SubCutaneous three times a day before meals  levothyroxine 100 MICROGram(s) Oral daily  predniSONE   Tablet 40 milliGRAM(s) Oral daily, Stop order after: 5 Days  aspirin  chewable 81 milliGRAM(s) Oral daily  chlorhexidine 2% Cloths 1 Application(s) Topical <User Schedule>  dextrose 5%. 1000 milliLiter(s) (100 mL/Hr) IV Continuous <Continuous>  dextrose 5%. 1000 milliLiter(s) (50 mL/Hr) IV Continuous <Continuous>  heparin   Injectable 5000 Unit(s) SubCutaneous every 8 hours  sodium chloride 0.9% Bolus 250 milliLiter(s) IV Bolus once, Stop order after: 1 Doses      LABS:	 	                      10.1   9.51  )-----------( 257      ( 04 Sep 2024 03:15 )             31.5     09-04    137  |  101  |  42.6<H>  ----------------------------<  265<H>  4.1   |  27.0  |  0.98    Ca    8.5      04 Sep 2024 03:15  Phos  5.6     09-03  Mg     2.1     09-04    TPro  6.0<L>  /  Alb  2.8<L>  /  TBili  0.2<L>  /  DBili  x   /  AST  14  /  ALT  13  /  AlkPhos  42  09-03    PT/INR/PTT ( 31 Aug 2024 20:06 )                       :                       :      10.6         :       31.2                  .        .                   .              .           .       0.95        .                                       Lipid Profile: Date: 08-12 @ 05:41  Total cholesterol 219; Direct LDL: --; HDL: 104; Triglycerides:51    HgA1c: A1C with Estimated Average Glucose Result: 8.6    TSH: Thyroid Stimulating Hormone, Serum: 0.76 uIU/mL (08.12.24 @ 09:30)    TELEMETRY: SR  ECG: Sinus tachycardia with inferolateral t wave inversions    DIAGNOSTIC TESTING:  [X ] Echocardiogram: < from: TTE W or WO Ultrasound Enhancing Agent (09.01.24 @ 10:06) >     CONCLUSIONS:      1. Left ventricular systolic function is normal with an ejection fraction visually estimated at 50 to 55 %. There are no regional wall motion abnormalities seen.   2. There is moderate (grade 2) left ventricular diastolic dysfunction, with elevated left ventricular filling pressure.   3. Mildly enlarged right ventricular cavity size and normal right ventricular systolic function.   4. Left atrium is moderately dilated.   5. No significant valvular disease.   6. No prior echocardiogram is available for comparison.      < end of copied text >      CTA: < from: CT Angio Chest PE Protocol w/ IV Cont (09.01.24 @ 04:07) >  IMPRESSION:  No evidence of pulmonary embolus.    Stable appearance of diffuse bilateral opacities more pronounced in the   upper lobes, with pleural scarring; which likely represents pulmonary   edema and/or pneumonia.    < end of copied text >

## 2024-09-04 NOTE — PROGRESS NOTE ADULT - NS ATTEND AMEND GEN_ALL_CORE FT
seen with above,      43F history significant for obesity (BMI 36), chronic active smoker, asthma/COPD, HF recovered LV EF, CAD/MI with PCI in 2018 s/p AMI?, was recently at [facility] on 8/13/24 treated with IV antibiotic, steroids, and Lasix, presents with chest discomfort and dyspnea with hypoxic respiratory failure, CTPA no PE but significant bilateral pulmonary infiltrates, pBNP 678 (lower compared to prior 900s), suspect unstable angina was planning for LHC had GINGER yesterday    -GINGER resolved with IVF hydration and holding of maintenance Lasix, spironolactone and lisinopril; underwent LHC today noted pLAD 70% stenosis s/p stent x1 started on DAPT with ASA/ticagrelor   -no clear signs of HF, await report of LVEDP on cath, would continue to hold Lasix/spironolactone   -unclear etiology of chronic persistent bilateral pulmonary infiltrates (noted similar findings on last CT chest in 8/2024) remains on empiric steroids, seen by pulmonary may need eventual bronchoscopy but given now s/p PCI/stent would need to wait at least 3 months before any biopsy can be done  -need smoking cessation            Timbo Navarro DO, [facility]  Faculty Non-Invasive Cardiologist  [phone].

## 2024-09-04 NOTE — DISCHARGE NOTE PROVIDER - NSDCACTIVITY_GEN_ALL_CORE
Do not drive or operate machinery/Showering allowed/Do not make important decisions/Walking - Indoors allowed/No heavy lifting/straining/Walking - Outdoors allowed Do not drive or operate machinery/Showering allowed/Do not make important decisions/Walking - Indoors allowed/No heavy lifting/straining/Walking - Outdoors allowed/Activity as tolerated

## 2024-09-04 NOTE — DISCHARGE NOTE PROVIDER - NSDCMRMEDTOKEN_GEN_ALL_CORE_FT
Admelog pen needles: Use three times a day with admelog pen  Admelog SoloStar 100 units/mL injectable solution: 10 unit(s) injectable 3 times a day (before meals)  Admelog SoloStar 100 units/mL injectable solution: Use as directed as needed per sliding scale  Albuterol (Eqv-ProAir HFA) 90 mcg/inh inhalation aerosol: 2 puff(s) inhaled every 6 hours as needed for wheezing  amoxicillin-clavulanate 875 mg-125 mg oral tablet: 1 tab(s) orally 2 times a day  aspirin 81 mg oral capsule: 1 cap(s) orally once a day  Aspirin Enteric Coated 81 mg oral delayed release tablet: 1 tab(s) orally once a day  atorvastatin 20 mg oral tablet: 1 tab(s) orally once a day (at bedtime)  atorvastatin 20 mg oral tablet: 1 tab(s) orally once a day (at bedtime)  Basaglar KwikPen 100 units/mL subcutaneous solution: 24 unit(s) subcutaneously once a day (at bedtime)  budesonide-formoterol 80 mcg-4.5 mcg/inh inhalation aerosol: 2 puff(s) inhaled 2 times a day  Bydureon BCise 2 mg/0.85 mL subcutaneous suspension, extended release: 2 milligram(s) subcutaneously once a week next dose 8/15  carvedilol 12.5 mg oral tablet: 1 tab(s) orally every 12 hours  Coreg 25 mg oral tablet: 1 tab(s) orally 2 times a day  Coreg 25 mg oral tablet: 1 tab(s) orally 2 times a day  FLUoxetine 20 mg oral capsule: 1 cap(s) orally once a day  hydrALAZINE 50 mg oral tablet: 1 tab(s) orally every 8 hours  Lantus 100 units/mL subcutaneous solution: 25 unit(s) subcutaneous once a day (at bedtime)  Lantus pen needles: Use once a night w/ lantus pen  Lantus Solostar Pen 100 units/mL subcutaneous solution: 30 unit(s) subcutaneous once a day (at bedtime)  Lasix 20 mg oral tablet: 1 tab(s) orally once a day  levothyroxine 100 mcg (0.1 mg) oral tablet: 1 tab(s) orally once a day  levothyroxine 100 mcg (0.1 mg) oral tablet: 1 tab(s) orally once a day  Lipitor 20 mg oral tablet: 1 tab(s) orally once a day  lisinopril 10 mg oral tablet: 1 tab(s) orally  lisinopril 10 mg oral tablet: 1 tab(s) orally once a day  lisinopril 10 mg oral tablet: 1 tab(s) orally 2 times a day  loratadine 10 mg oral tablet: 1 tab(s) orally once a day, As Needed  Metamucil 1.7 g oral wafer: 1 each orally once a day (at bedtime) as needed for  constipation  mirtazapine 7.5 mg oral tablet: 2 tab(s) orally once a day  Neurontin 400 mg oral capsule: 1 cap(s) orally 3 times a day  omeprazole 20 mg oral delayed release capsule: 1 cap(s) orally once a day  pantoprazole 40 mg oral delayed release tablet: 1 tab(s) orally once a day (before a meal)  risperiDONE 0.5 mg oral tablet: 1 tab(s) orally once a day (at bedtime)  sertraline 25 mg oral tablet: 1 tab(s) orally once a day  tiotropium 2.5 mcg/inh inhalation aerosol: 2 puff(s) inhaled 2 times a day  Vistaril 50 mg oral capsule: 1 cap(s) orally once a day (at bedtime), As Needed   Admelog pen needles: Use three times a day with admelog pen  Admelog SoloStar 100 units/mL injectable solution: 16 unit(s) injectable 3 times a day (before meals)  Admelog SoloStar 100 units/mL injectable solution: Use as directed as needed per sliding scale  Albuterol (Eqv-ProAir HFA) 90 mcg/inh inhalation aerosol: 2 puff(s) inhaled every 6 hours as needed for wheezing  aspirin 81 mg oral capsule: 1 cap(s) orally once a day  aspirin 81 mg oral tablet, chewable: 1 tab(s) orally once a day  Aspirin Enteric Coated 81 mg oral delayed release tablet: 1 tab(s) orally once a day  atorvastatin 20 mg oral tablet: 1 tab(s) orally once a day (at bedtime)  atorvastatin 20 mg oral tablet: 1 tab(s) orally once a day (at bedtime)  Basaglar KwikPen 100 units/mL subcutaneous solution: 30 unit(s) subcutaneously 2 times a day  Brilinta (ticagrelor) 90 mg oral tablet: 1 tab(s) orally 2 times a day  budesonide-formoterol 80 mcg-4.5 mcg/inh inhalation aerosol: 2 puff(s) inhaled 2 times a day  Bydureon BCise 2 mg/0.85 mL subcutaneous suspension, extended release: 2 milligram(s) subcutaneously once a week next dose 8/15  carvedilol 12.5 mg oral tablet: 1 tab(s) orally every 12 hours  Coreg 25 mg oral tablet: 1 tab(s) orally 2 times a day  Coreg 25 mg oral tablet: 1 tab(s) orally 2 times a day  FLUoxetine 20 mg oral capsule: 1 cap(s) orally once a day  Lantus pen needles: Use once a night w/ lantus pen  Lasix 20 mg oral tablet: 1 tab(s) orally once a day  levothyroxine 100 mcg (0.1 mg) oral tablet: 1 tab(s) orally once a day  levothyroxine 100 mcg (0.1 mg) oral tablet: 1 tab(s) orally once a day  Lipitor 20 mg oral tablet: 1 tab(s) orally once a day  lisinopril 10 mg oral tablet: 1 tab(s) orally  lisinopril 10 mg oral tablet: 1 tab(s) orally 2 times a day  lisinopril 10 mg oral tablet: 1 tab(s) orally once a day  loratadine 10 mg oral tablet: 1 tab(s) orally once a day, As Needed  Metamucil 1.7 g oral wafer: 1 each orally once a day (at bedtime) as needed for  constipation  mirtazapine 7.5 mg oral tablet: 2 tab(s) orally once a day  Neurontin 400 mg oral capsule: 1 cap(s) orally 3 times a day  omeprazole 20 mg oral delayed release capsule: 1 cap(s) orally once a day  risperiDONE 0.5 mg oral tablet: 1 tab(s) orally once a day (at bedtime)  sertraline 25 mg oral tablet: 1 tab(s) orally once a day  spironolactone 25 mg oral tablet: 1 tab(s) orally once a day  ticagrelor 90 mg oral tablet: 1 tab(s) orally 2 times a day  tiotropium 2.5 mcg/inh inhalation aerosol: 2 puff(s) inhaled 2 times a day  Vistaril 50 mg oral capsule: 1 cap(s) orally once a day (at bedtime), As Needed   Admelog SoloStar 100 units/mL injectable solution: 16 unit(s) injectable 3 times a day (before meals)  Albuterol (Eqv-ProAir HFA) 90 mcg/inh inhalation aerosol: 2 puff(s) inhaled every 6 hours as needed for wheezing  aspirin 81 mg oral tablet, chewable: 1 tab(s) orally once a day  Basaglar KwikPen 100 units/mL subcutaneous solution: 30 unit(s) subcutaneously 2 times a day  budesonide-formoterol 80 mcg-4.5 mcg/inh inhalation aerosol: 2 puff(s) inhaled 2 times a day  Bydureon BCise 2 mg/0.85 mL subcutaneous suspension, extended release: 2 milligram(s) subcutaneously once a week next dose 8/15  carvedilol 12.5 mg oral tablet: 1 tab(s) orally every 12 hours  FLUoxetine 20 mg oral capsule: 1 cap(s) orally once a day  Lantus Solostar Pen 100 units/mL subcutaneous solution: 30 subcutaneous 2 times a day  Lasix 20 mg oral tablet: 1 tab(s) orally once a day  levothyroxine 100 mcg (0.1 mg) oral tablet: 1 tab(s) orally once a day  Lipitor 20 mg oral tablet: 1 tab(s) orally once a day  lisinopril 10 mg oral tablet: 1 tab(s) orally once a day  loratadine 10 mg oral tablet: 1 tab(s) orally once a day, As Needed  Metamucil 1.7 g oral wafer: 1 each orally once a day (at bedtime) as needed for  constipation  mirtazapine 7.5 mg oral tablet: 2 tab(s) orally once a day  Neurontin 400 mg oral capsule: 1 cap(s) orally 3 times a day  omeprazole 20 mg oral delayed release capsule: 1 cap(s) orally once a day  risperiDONE 0.5 mg oral tablet: 1 tab(s) orally once a day (at bedtime)  sertraline 25 mg oral tablet: 1 tab(s) orally once a day  spironolactone 25 mg oral tablet: 1 tab(s) orally once a day  ticagrelor 90 mg oral tablet: 1 tab(s) orally 2 times a day  tiotropium 2.5 mcg/inh inhalation aerosol: 2 puff(s) inhaled 2 times a day  Vistaril 50 mg oral capsule: 1 cap(s) orally once a day (at bedtime), As Needed

## 2024-09-04 NOTE — PROGRESS NOTE ADULT - SUBJECTIVE AND OBJECTIVE BOX
INTERVAL EVENTS:  Follow up diabetes management    ROS: No acute complaints at time of exam    MEDICATIONS  (STANDING):  aspirin  chewable 81 milliGRAM(s) Oral daily  atorvastatin 20 milliGRAM(s) Oral at bedtime  budesonide 160 MICROgram(s)/formoterol 4.5 MICROgram(s) Inhaler 2 Puff(s) Inhalation two times a day  carvedilol 25 milliGRAM(s) Oral <User Schedule>  chlorhexidine 2% Cloths 1 Application(s) Topical <User Schedule>  dextrose 5%. 1000 milliLiter(s) (100 mL/Hr) IV Continuous <Continuous>  dextrose 5%. 1000 milliLiter(s) (50 mL/Hr) IV Continuous <Continuous>  dextrose 50% Injectable 25 milliLiter(s) IV Push every 15 minutes  dextrose 50% Injectable 12.5 Gram(s) IV Push once  dextrose Oral Gel 15 Gram(s) Oral once  gabapentin 400 milliGRAM(s) Oral every 8 hours  glucagon  Injectable 1 milliGRAM(s) IntraMuscular once  glucagon  Injectable 1 milliGRAM(s) IntraMuscular once  heparin   Injectable 5000 Unit(s) SubCutaneous every 8 hours  insulin glargine Injectable (LANTUS) 30 Unit(s) SubCutaneous at bedtime  insulin lispro (ADMELOG) corrective regimen sliding scale   SubCutaneous Before meals and at bedtime  insulin lispro Injectable (ADMELOG) 16 Unit(s) SubCutaneous three times a day before meals  levothyroxine 100 MICROGram(s) Oral daily  melatonin 5 milliGRAM(s) Oral at bedtime  mirtazapine 7.5 milliGRAM(s) Oral daily  pantoprazole    Tablet 40 milliGRAM(s) Oral before breakfast  predniSONE   Tablet 40 milliGRAM(s) Oral daily  psyllium Powder 1 Packet(s) Oral daily  tiotropium 2.5 MICROgram(s) Inhaler 2 Puff(s) Inhalation daily    MEDICATIONS  (PRN):  hydrOXYzine hydrochloride 50 milliGRAM(s) Oral at bedtime PRN Anxiety    Allergies  shellfish (Swelling)  Seafood (Swelling)  shellfish (Rash)  No Known Drug Allergies  shellfish (Unknown)    Vital Signs Last 24 Hrs  T(C): 36.7 (04 Sep 2024 08:14), Max: 36.8 (03 Sep 2024 11:26)  T(F): 98.1 (04 Sep 2024 08:14), Max: 98.2 (03 Sep 2024 11:26)  HR: 91 (04 Sep 2024 08:00) (87 - 99)  BP: 134/81 (04 Sep 2024 08:00) (106/74 - 146/85)  BP(mean): 97 (04 Sep 2024 08:00) (70 - 101)  RR: 19 (04 Sep 2024 08:00) (17 - 25)  SpO2: 100% (04 Sep 2024 08:45) (96% - 100%)    Parameters below as of 04 Sep 2024 08:45  Patient On (Oxygen Delivery Method): room air    PHYSICAL EXAM:  General: No apparent distress  Respiratory: Lungs clear bilaterally  Cardiac: +S1, S2, no m/r/g  GI: +BS, soft, non tender, non distended  Extremities: No peripheral edema  Neuro: A+O X3    LABS:                        10.1   9.51  )-----------( 257      ( 04 Sep 2024 03:15 )             31.5     09-04    137  |  101  |  42.6<H>  ----------------------------<  265<H>  4.1   |  27.0  |  0.98    Ca    8.5      04 Sep 2024 03:15  Phos  5.6     09-03  Mg     2.1     09-04    TPro  6.0<L>  /  Alb  2.8<L>  /  TBili  0.2<L>  /  DBili  x   /  AST  14  /  ALT  13  /  AlkPhos  42  09-03    Urinalysis Basic - ( 04 Sep 2024 03:15 )    Color: x / Appearance: x / SG: x / pH: x  Gluc: 265 mg/dL / Ketone: x  / Bili: x / Urobili: x   Blood: x / Protein: x / Nitrite: x   Leuk Esterase: x / RBC: x / WBC x   Sq Epi: x / Non Sq Epi: x / Bacteria: x    POCT Blood Glucose.: 209 mg/dL (09-04-24 @ 08:16)  POCT Blood Glucose.: 230 mg/dL (09-04-24 @ 06:10)  POCT Blood Glucose.: 342 mg/dL (09-03-24 @ 22:03)  POCT Blood Glucose.: 302 mg/dL (09-03-24 @ 16:42)  POCT Blood Glucose.: 372 mg/dL (09-03-24 @ 11:14)    Thyroid Stimulating Hormone, Serum: 0.76 uIU/mL (08-12-24 @ 09:30)

## 2024-09-05 ENCOUNTER — TRANSCRIPTION ENCOUNTER (OUTPATIENT)
Age: 44
End: 2024-09-05

## 2024-09-05 VITALS
DIASTOLIC BLOOD PRESSURE: 78 MMHG | OXYGEN SATURATION: 97 % | HEART RATE: 88 BPM | TEMPERATURE: 98 F | SYSTOLIC BLOOD PRESSURE: 135 MMHG | RESPIRATION RATE: 18 BRPM

## 2024-09-05 LAB
A FUMIGATUS IGG SER QL: 18 MCG/ML — SIGNIFICANT CHANGE UP
ALTERNARIA TENUIS/ALTERNATA IGG: 12.6 MCG/ML — HIGH
ANION GAP SERPL CALC-SCNC: 11 MMOL/L — SIGNIFICANT CHANGE UP (ref 5–17)
AUREOBASIDIUM PULLULANS IGG: 5.5 MCG/ML — SIGNIFICANT CHANGE UP
BUN SERPL-MCNC: 23.3 MG/DL — HIGH (ref 8–20)
CALCIUM SERPL-MCNC: 8.3 MG/DL — LOW (ref 8.4–10.5)
CHLORIDE SERPL-SCNC: 101 MMOL/L — SIGNIFICANT CHANGE UP (ref 96–108)
CO2 SERPL-SCNC: 24 MMOL/L — SIGNIFICANT CHANGE UP (ref 22–29)
CREAT SERPL-MCNC: 0.8 MG/DL — SIGNIFICANT CHANGE UP (ref 0.5–1.3)
EGFR: 93 ML/MIN/1.73M2 — SIGNIFICANT CHANGE UP
GLUCOSE BLDC GLUCOMTR-MCNC: 147 MG/DL — HIGH (ref 70–99)
GLUCOSE BLDC GLUCOMTR-MCNC: 235 MG/DL — HIGH (ref 70–99)
GLUCOSE BLDC GLUCOMTR-MCNC: 280 MG/DL — HIGH (ref 70–99)
GLUCOSE SERPL-MCNC: 175 MG/DL — HIGH (ref 70–99)
HCT VFR BLD CALC: 30.5 % — LOW (ref 34.5–45)
HGB BLD-MCNC: 10 G/DL — LOW (ref 11.5–15.5)
LACEYELLA SACCHARI IGG: 28.8 MCG/ML — HIGH
MAGNESIUM SERPL-MCNC: 1.6 MG/DL — SIGNIFICANT CHANGE UP (ref 1.6–2.6)
MCHC RBC-ENTMCNC: 28.3 PG — SIGNIFICANT CHANGE UP (ref 27–34)
MCHC RBC-ENTMCNC: 32.8 GM/DL — SIGNIFICANT CHANGE UP (ref 32–36)
MCV RBC AUTO: 86.4 FL — SIGNIFICANT CHANGE UP (ref 80–100)
MICROPOLYSPORA FAENI IGG: <2 MCG/ML — SIGNIFICANT CHANGE UP
PENICILLIUM CHRYSOGENUM/NOTATUM IGG: 15.6 MCG/ML — SIGNIFICANT CHANGE UP
PHOMA BETAE IGG: 11.5 MCG/ML — HIGH
PLATELET # BLD AUTO: 246 K/UL — SIGNIFICANT CHANGE UP (ref 150–400)
POTASSIUM SERPL-MCNC: 3.8 MMOL/L — SIGNIFICANT CHANGE UP (ref 3.5–5.3)
POTASSIUM SERPL-SCNC: 3.8 MMOL/L — SIGNIFICANT CHANGE UP (ref 3.5–5.3)
RBC # BLD: 3.53 M/UL — LOW (ref 3.8–5.2)
RBC # FLD: 14.5 % — SIGNIFICANT CHANGE UP (ref 10.3–14.5)
SODIUM SERPL-SCNC: 136 MMOL/L — SIGNIFICANT CHANGE UP (ref 135–145)
TRICHODERMA VIRIDE IGG: 2.7 MCG/ML — SIGNIFICANT CHANGE UP
WBC # BLD: 8.93 K/UL — SIGNIFICANT CHANGE UP (ref 3.8–10.5)
WBC # FLD AUTO: 8.93 K/UL — SIGNIFICANT CHANGE UP (ref 3.8–10.5)

## 2024-09-05 PROCEDURE — 94640 AIRWAY INHALATION TREATMENT: CPT

## 2024-09-05 PROCEDURE — 84295 ASSAY OF SERUM SODIUM: CPT

## 2024-09-05 PROCEDURE — 97163 PT EVAL HIGH COMPLEX 45 MIN: CPT

## 2024-09-05 PROCEDURE — C1769: CPT

## 2024-09-05 PROCEDURE — 99232 SBSQ HOSP IP/OBS MODERATE 35: CPT

## 2024-09-05 PROCEDURE — C1887: CPT

## 2024-09-05 PROCEDURE — 87640 STAPH A DNA AMP PROBE: CPT

## 2024-09-05 PROCEDURE — 84132 ASSAY OF SERUM POTASSIUM: CPT

## 2024-09-05 PROCEDURE — 87040 BLOOD CULTURE FOR BACTERIA: CPT

## 2024-09-05 PROCEDURE — 85610 PROTHROMBIN TIME: CPT

## 2024-09-05 PROCEDURE — 83540 ASSAY OF IRON: CPT

## 2024-09-05 PROCEDURE — 80307 DRUG TEST PRSMV CHEM ANLYZR: CPT

## 2024-09-05 PROCEDURE — 93458 L HRT ARTERY/VENTRICLE ANGIO: CPT | Mod: 59

## 2024-09-05 PROCEDURE — 82728 ASSAY OF FERRITIN: CPT

## 2024-09-05 PROCEDURE — 82947 ASSAY GLUCOSE BLOOD QUANT: CPT

## 2024-09-05 PROCEDURE — 99239 HOSP IP/OBS DSCHRG MGMT >30: CPT

## 2024-09-05 PROCEDURE — 87899 AGENT NOS ASSAY W/OPTIC: CPT

## 2024-09-05 PROCEDURE — 82009 KETONE BODYS QUAL: CPT

## 2024-09-05 PROCEDURE — 86431 RHEUMATOID FACTOR QUANT: CPT

## 2024-09-05 PROCEDURE — 82330 ASSAY OF CALCIUM: CPT

## 2024-09-05 PROCEDURE — C1894: CPT

## 2024-09-05 PROCEDURE — 83880 ASSAY OF NATRIURETIC PEPTIDE: CPT

## 2024-09-05 PROCEDURE — 83550 IRON BINDING TEST: CPT

## 2024-09-05 PROCEDURE — 84145 PROCALCITONIN (PCT): CPT

## 2024-09-05 PROCEDURE — 82962 GLUCOSE BLOOD TEST: CPT

## 2024-09-05 PROCEDURE — 83605 ASSAY OF LACTIC ACID: CPT

## 2024-09-05 PROCEDURE — 80048 BASIC METABOLIC PNL TOTAL CA: CPT

## 2024-09-05 PROCEDURE — 84466 ASSAY OF TRANSFERRIN: CPT

## 2024-09-05 PROCEDURE — 82164 ANGIOTENSIN I ENZYME TEST: CPT

## 2024-09-05 PROCEDURE — 0523T NTRAPX C FFR W/3D FUNCJL MAP: CPT

## 2024-09-05 PROCEDURE — 82607 VITAMIN B-12: CPT

## 2024-09-05 PROCEDURE — C9600: CPT | Mod: LD

## 2024-09-05 PROCEDURE — 86001 ALLERGEN SPECIFIC IGG: CPT

## 2024-09-05 PROCEDURE — 0225U NFCT DS DNA&RNA 21 SARSCOV2: CPT

## 2024-09-05 PROCEDURE — 86738 MYCOPLASMA ANTIBODY: CPT

## 2024-09-05 PROCEDURE — 36415 COLL VENOUS BLD VENIPUNCTURE: CPT

## 2024-09-05 PROCEDURE — 84100 ASSAY OF PHOSPHORUS: CPT

## 2024-09-05 PROCEDURE — 92978 ENDOLUMINL IVUS OCT C 1ST: CPT | Mod: LD

## 2024-09-05 PROCEDURE — 96367 TX/PROPH/DG ADDL SEQ IV INF: CPT

## 2024-09-05 PROCEDURE — 93010 ELECTROCARDIOGRAM REPORT: CPT

## 2024-09-05 PROCEDURE — C1725: CPT

## 2024-09-05 PROCEDURE — 36600 WITHDRAWAL OF ARTERIAL BLOOD: CPT

## 2024-09-05 PROCEDURE — 84484 ASSAY OF TROPONIN QUANT: CPT

## 2024-09-05 PROCEDURE — 80074 ACUTE HEPATITIS PANEL: CPT

## 2024-09-05 PROCEDURE — 83735 ASSAY OF MAGNESIUM: CPT

## 2024-09-05 PROCEDURE — 80053 COMPREHEN METABOLIC PANEL: CPT

## 2024-09-05 PROCEDURE — 86235 NUCLEAR ANTIGEN ANTIBODY: CPT

## 2024-09-05 PROCEDURE — C1874: CPT

## 2024-09-05 PROCEDURE — 86038 ANTINUCLEAR ANTIBODIES: CPT

## 2024-09-05 PROCEDURE — 86225 DNA ANTIBODY NATIVE: CPT

## 2024-09-05 PROCEDURE — 96365 THER/PROPH/DIAG IV INF INIT: CPT

## 2024-09-05 PROCEDURE — 84702 CHORIONIC GONADOTROPIN TEST: CPT

## 2024-09-05 PROCEDURE — 99285 EMERGENCY DEPT VISIT HI MDM: CPT

## 2024-09-05 PROCEDURE — C1753: CPT

## 2024-09-05 PROCEDURE — 86200 CCP ANTIBODY: CPT

## 2024-09-05 PROCEDURE — 86036 ANCA SCREEN EACH ANTIBODY: CPT

## 2024-09-05 PROCEDURE — 99233 SBSQ HOSP IP/OBS HIGH 50: CPT

## 2024-09-05 PROCEDURE — 94660 CPAP INITIATION&MGMT: CPT

## 2024-09-05 PROCEDURE — 85027 COMPLETE CBC AUTOMATED: CPT

## 2024-09-05 PROCEDURE — 93005 ELECTROCARDIOGRAM TRACING: CPT

## 2024-09-05 PROCEDURE — 82746 ASSAY OF FOLIC ACID SERUM: CPT

## 2024-09-05 PROCEDURE — 94760 N-INVAS EAR/PLS OXIMETRY 1: CPT

## 2024-09-05 PROCEDURE — 87641 MR-STAPH DNA AMP PROBE: CPT

## 2024-09-05 PROCEDURE — 87449 NOS EACH ORGANISM AG IA: CPT

## 2024-09-05 PROCEDURE — 82435 ASSAY OF BLOOD CHLORIDE: CPT

## 2024-09-05 PROCEDURE — 86140 C-REACTIVE PROTEIN: CPT

## 2024-09-05 PROCEDURE — 96375 TX/PRO/DX INJ NEW DRUG ADDON: CPT

## 2024-09-05 PROCEDURE — 82803 BLOOD GASES ANY COMBINATION: CPT

## 2024-09-05 PROCEDURE — 71045 X-RAY EXAM CHEST 1 VIEW: CPT

## 2024-09-05 PROCEDURE — 85025 COMPLETE CBC W/AUTO DIFF WBC: CPT

## 2024-09-05 PROCEDURE — 85014 HEMATOCRIT: CPT

## 2024-09-05 PROCEDURE — 85730 THROMBOPLASTIN TIME PARTIAL: CPT

## 2024-09-05 PROCEDURE — 85018 HEMOGLOBIN: CPT

## 2024-09-05 PROCEDURE — 83615 LACTATE (LD) (LDH) ENZYME: CPT

## 2024-09-05 PROCEDURE — 81003 URINALYSIS AUTO W/O SCOPE: CPT

## 2024-09-05 PROCEDURE — 93306 TTE W/DOPPLER COMPLETE: CPT

## 2024-09-05 PROCEDURE — 71275 CT ANGIOGRAPHY CHEST: CPT | Mod: MC

## 2024-09-05 RX ORDER — INSULIN GLARGINE 100 [IU]/ML
30 INJECTION, SOLUTION SUBCUTANEOUS
Qty: 1 | Refills: 0
Start: 2024-09-05 | End: 2024-10-04

## 2024-09-05 RX ORDER — ASPIRIN 81 MG
1 TABLET, DELAYED RELEASE (ENTERIC COATED) ORAL
Qty: 30 | Refills: 0
Start: 2024-09-05 | End: 2024-10-04

## 2024-09-05 RX ORDER — TICAGRELOR 90 MG/1
1 TABLET ORAL
Qty: 60 | Refills: 0
Start: 2024-09-05 | End: 2024-10-04

## 2024-09-05 RX ORDER — SPIRONOLACTONE 25 MG/1
1 TABLET, FILM COATED ORAL
Qty: 30 | Refills: 0
Start: 2024-09-05 | End: 2024-10-04

## 2024-09-05 RX ORDER — INSULIN GLARGINE 100 [IU]/ML
30 INJECTION, SOLUTION SUBCUTANEOUS
Qty: 3 | Refills: 0
Start: 2024-09-05 | End: 2024-10-04

## 2024-09-05 RX ORDER — FUROSEMIDE 40 MG
20 TABLET ORAL DAILY
Refills: 0 | Status: DISCONTINUED | OUTPATIENT
Start: 2024-09-05 | End: 2024-09-05

## 2024-09-05 RX ORDER — LISINOPRIL 10 MG/1
10 TABLET ORAL DAILY
Refills: 0 | Status: DISCONTINUED | OUTPATIENT
Start: 2024-09-05 | End: 2024-09-05

## 2024-09-05 RX ORDER — LISINOPRIL 10 MG/1
1 TABLET ORAL
Qty: 30 | Refills: 0
Start: 2024-09-05 | End: 2024-10-04

## 2024-09-05 RX ORDER — INSULIN GLARGINE 100 [IU]/ML
30 INJECTION, SOLUTION SUBCUTANEOUS
Qty: 0 | Refills: 0 | DISCHARGE
Start: 2024-09-05

## 2024-09-05 RX ORDER — POTASSIUM CHLORIDE 10 MEQ
40 TABLET, EXT RELEASE, PARTICLES/CRYSTALS ORAL ONCE
Refills: 0 | Status: COMPLETED | OUTPATIENT
Start: 2024-09-05 | End: 2024-09-05

## 2024-09-05 RX ORDER — SPIRONOLACTONE 25 MG/1
25 TABLET, FILM COATED ORAL DAILY
Refills: 0 | Status: DISCONTINUED | OUTPATIENT
Start: 2024-09-06 | End: 2024-09-05

## 2024-09-05 RX ADMIN — Medication 100 MICROGRAM(S): at 05:25

## 2024-09-05 RX ADMIN — TIOTROPIUM BROMIDE INHALATION SPRAY 2 PUFF(S): 3.12 SPRAY, METERED RESPIRATORY (INHALATION) at 08:34

## 2024-09-05 RX ADMIN — Medication 16 UNIT(S): at 08:45

## 2024-09-05 RX ADMIN — Medication 40 MILLIEQUIVALENT(S): at 09:14

## 2024-09-05 RX ADMIN — Medication 40 MILLIGRAM(S): at 05:25

## 2024-09-05 RX ADMIN — LISINOPRIL 10 MILLIGRAM(S): 10 TABLET ORAL at 12:26

## 2024-09-05 RX ADMIN — Medication 5000 UNIT(S): at 05:25

## 2024-09-05 RX ADMIN — Medication 16 UNIT(S): at 16:48

## 2024-09-05 RX ADMIN — Medication 5000 UNIT(S): at 15:30

## 2024-09-05 RX ADMIN — Medication 6: at 08:44

## 2024-09-05 RX ADMIN — CARVEDILOL 25 MILLIGRAM(S): 6.25 TABLET ORAL at 08:50

## 2024-09-05 RX ADMIN — Medication 400 MILLIGRAM(S): at 05:26

## 2024-09-05 RX ADMIN — Medication 4: at 16:48

## 2024-09-05 RX ADMIN — BUDESONIDE AND FORMOTEROL FUMARATE 2 PUFF(S): 80; 4.5 AEROSOL, METERED RESPIRATORY (INHALATION) at 08:34

## 2024-09-05 RX ADMIN — Medication 400 MILLIGRAM(S): at 15:30

## 2024-09-05 RX ADMIN — Medication 20 MILLIGRAM(S): at 09:14

## 2024-09-05 RX ADMIN — TICAGRELOR 90 MILLIGRAM(S): 90 TABLET ORAL at 05:26

## 2024-09-05 RX ADMIN — INSULIN GLARGINE 30 UNIT(S): 100 INJECTION, SOLUTION SUBCUTANEOUS at 08:45

## 2024-09-05 RX ADMIN — Medication 25 GRAM(S): at 09:14

## 2024-09-05 NOTE — PROGRESS NOTE ADULT - TIME BILLING
Time spent reviewing the chart documentation, reviewing labs and imaging studies, evaluating the patient, discussing the plan of care with the consultants & medical team, and documenting.
Time spent reviewing the chart documentation, reviewing labs and imaging studies, evaluating the patient, discussing the plan of care with the consultants & medical team, and documenting.
greater than 50% of time spent reviewing labs, notes, orders and radiographs, coordinating care  discussed with nursing, ICU team
reviewing labs, notes, orders, radiographic studies, as well as counseling and coordinating care with the relevant multidisciplinary team, including with the primary and consulting providers.
Time spent reviewing the chart documentation, reviewing labs and imaging studies, evaluating the patient, discussing the plan of care with the consultants & medical team, and documenting.
Hypoxic respiratory failure, Chronic HFpEF, CAD
CAD s/p PCI, Chronic HFpEF, Persistent bilateral pulmonary infiltrates
CAD, Acute hypoxic respiratory failure, Chronic pulmonary infiltrate

## 2024-09-05 NOTE — PROGRESS NOTE ADULT - NS ATTEND AMEND GEN_ALL_CORE FT
seen with above,    43F history significant for obesity (BMI 36), chronic active smoker, asthma/COPD, HF recovered LV EF, CAD/MI with PCI in 2018 to RCA, was recently at [facility] on 8/13/24 treated with IV antibiotic, steroids, and Lasix, presents with chest discomfort and dyspnea with hypoxic respiratory failure, CTPA no PE but significant bilateral pulmonary infiltrates, pBNP 678 (lower compared to prior 900s), suspect unstable angina was planning for LHC had GINGER yesterday    -GINGER resolved with IVF hydration and holding of maintenance Lasix, spironolactone and lisinopril; underwent LHC noted pLAD 80% stenosis s/p stent x1 started on DAPT with ASA/ticagrelor needs to clarify if her insurance would cover if not then reload with clopidogrel 600mg x1 then 75mg once daily; OM2 70% too small for PCI, continue medical management  -LVEDP 25 on cath, will resume PO Lasix at 20mg and restart spironolactone 25mg, does not need lisinopril for now  -unclear etiology of chronic persistent bilateral pulmonary infiltrates (noted similar findings on last CT chest in 8/2024) remains on empiric steroids, seen by pulmonary may need eventual bronchoscopy but given now s/p PCI/stent would need to wait at least 3 months before any biopsy can be done  -need smoking cessation    -discharge planning and can establish care in our office within 1-2 weeks as she living in [place] currently and no longer following with her former cardiologist          Timbo Navarro DO, [facility]  Faculty Non-Invasive Cardiologist  [phone].

## 2024-09-05 NOTE — PROGRESS NOTE ADULT - SUBJECTIVE AND OBJECTIVE BOX
Underwent a left heart cath on 9/4/2024, and ultimately ended up having significant disease of her LAD which was stented. No respiratory complaints today.    Vitals, labs, medications, and imaging reviewed.  General: Comfortable, no acute distress  CV: RRR, no overt murmurs  Lungs: Clear bilaterally  GI: Soft, non-tender  Ext: No notable edema

## 2024-09-05 NOTE — PROGRESS NOTE ADULT - ASSESSMENT
43F history significant for obesity (BMI 36), chronic active smoker, asthma/COPD, HF recovered LV EF, CAD/MI with PCI in 2018 s/p AMI?, was recently at [facility] on 8/13/24 treated with IV antibiotic, steroids, and Lasix, presents with chest discomfort and dyspnea with hypoxic respiratory failure, CTPA no PE but significant bilateral pulmonary infiltrates, pBNP 678 (lower compared to prior 900s), suspect unstable angina was planning for LHC had GINGER yesterday

## 2024-09-05 NOTE — PHYSICAL THERAPY INITIAL EVALUATION ADULT - PERTINENT HX OF CURRENT PROBLEM, REHAB EVAL
ED c/o GOODEN, mildly productive cough, LE edema and pleuritic CP.  Pt was found to be hypoxic w/ increased WOB requiring BiPAP in ED and was given SL nitro for CP and admitted to micu for acute hypoxic respiratory failure.  CTA chest was negative for PE but showed similar diffuse b/l infiltrates and pt was subsequently started on empiric Zosyn and IV steroids.  Sepsis w/u was ordered in ED and was also given IVFs per protocol but appeared to be in decompensated HF therefore pt was started on diuresis and since d/c'd as pt now euvolemic.  Hospital course complicated by hyperglycemia likely due to steroids.  Steroids being weaned and pt tolerating.  Pt has 4 beats of asymptomatic NSVT overnight.

## 2024-09-05 NOTE — PROGRESS NOTE ADULT - PROVIDER SPECIALTY LIST ADULT
Endocrinology
Hospitalist
Hospitalist
MICU
Endocrinology
Hospitalist
Pulmonology
Critical Care
Endocrinology
Intervent Cardiology
Cardiology
Cardiology
Hospitalist
Cardiology

## 2024-09-05 NOTE — PROVIDER CONTACT NOTE (CRITICAL VALUE NOTIFICATION) - ACTION/TREATMENT ORDERED:
Accucheck/fingerstick done, still elevated, Humulin R 5u IVP and Lantus ordered.
new order to be placed to redraw labs

## 2024-09-05 NOTE — PROGRESS NOTE ADULT - ASSESSMENT
Ms. Joseph is a 43 year old smoker (notes smoking maybe 1-2 cigarettes per day since age 14), CAD s/p PCI 2018, HFrEF 45%, asthma/COPD, DM, and a history of crack cocaine use who presented with respiratory distress.     Given the significant disease requiring stent placement, I wonder if the majority of her symptoms were cardiac related. The pattern on her CT chest (diffuse ground glass with subpleural sparing) can be seen in pulmonary edema, and I wonder if those findings are related as well. However, organizing pneumonia is also high on the differential as she had seemingly gotten better with steroids. She does appear ready for discharge today, and I suggested we stop the steroids completely and gave her the number to our clinic to call within the next week to let us know if she is having any symptoms of shortness of breath - if she is, will re-start on prednisone but will plan on seeing her formally in clinic in a month regardless.

## 2024-09-05 NOTE — DISCHARGE NOTE NURSING/CASE MANAGEMENT/SOCIAL WORK - PATIENT PORTAL LINK FT
You can access the FollowMyHealth Patient Portal offered by [facility] by registering at the following website: [email or web address]. By joining [employer]’s FollowMyHealth portal, you will also be able to view your health information using other applications (apps) compatible with our system.

## 2024-09-05 NOTE — PROGRESS NOTE ADULT - NS ATTEND AMEND GEN_ALL_CORE FT
Patient was seen and examined at bedside with NP. The case was discussed in details. Agree with above assessment and recommendations.

## 2024-09-05 NOTE — DISCHARGE NOTE NURSING/CASE MANAGEMENT/SOCIAL WORK - NSDCVIVACCINE_GEN_ALL_CORE_FT
COVID-19 vaccine, vector-nr, rS-Ad26, PF, 0.5 mL ([place]); 26-Mar-2021 13:04; Joya Miranda (RN); [facility]; [id number] (Exp. Date: 25-May-2021); IntraMuscular; Deltoid Left.; 0.5 milliLiter(s);   influenza, injectable, quadrivalent, preservative free; 11-Apr-2016 13:09; Jacob Montiel (RN); [employer]; [id number]; IntraMuscular; Deltoid Left.; 0.5 milliLiter(s); VIS (VIS Published: 07-Aug-2015, VIS Presented: 11-Apr-2016);   influenza, injectable, quadrivalent, preservative free; 30-Sep-2023 13:36; Patty Magallon (RN); [employer]; [id number] (Exp. Date: 29-Jun-2024); IntraMuscular; Deltoid Left.; 0.5 milliLiter(s); VIS (VIS Published: 06-Aug-2021, VIS Presented: 30-Sep-2023);

## 2024-09-05 NOTE — PROGRESS NOTE ADULT - ASSESSMENT
43F with PMHx of IDDM, drug use, COPD/Asthma, CAD, s/p PCI, HFrEF admitted for respiratory distress. Patient had recent admission ad [facility] for similar symptoms, found with b/l diffused infiltrates. Now admitted for COPD exacerbation and acute decompensated HFpEF. treated with diuretics and IV Steroids.    Consulted for diabetes management  Home regimen: Lantus 25U qhs and Bydureon 2 mg weekly  Current a1c: 8.6%    1. DM2 with steroid induced hyperglycemia  - Continue lantus 30 units BID  - Continue premeal admelog 16 units TID and correction scale  - Discharge recommendations: Lantus 30 units BID, admelog 16 units TID, Bydureon 2mg weekly  - Pt instructed her glucoses will likely improve off steroids and she will need less insulin, check FS 4x daily to monitor  - Will arrange outpatient follow up with our office    2. Acute respiratory failure secondary/ COPD/acute decompensated HF  - Management as per primary team  - On prednisone 40mg x5 days    3. ACS   - EKG with worsening ischemia  - ASA/beta blocker/statin  - NPO after midnight for LHC     4. Hypothyroidism  - Clinically euthyroid  - Continue current dose of Levothyroxine   43F with PMHx of IDDM, drug use, COPD/Asthma, CAD, s/p PCI, HFrEF admitted for respiratory distress. Patient had recent admission ad [facility] for similar symptoms, found with b/l diffused infiltrates. Now admitted for COPD exacerbation and acute decompensated HFpEF. treated with diuretics and IV Steroids.    Consulted for diabetes management  Home regimen: Lantus 25U qhs and Bydureon 2 mg weekly  Current a1c: 8.6%    1. DM2 with steroid induced hyperglycemia  - Continue lantus 30 units BID  - Continue premeal admelog 16 units TID and correction scale  - Discharge recommendations: Lantus 30 units BID, admelog 16 units TID, Bydureon 2mg weekly  - Pt instructed her glucoses will likely improve off steroids and she will need less insulin, check FS 4x daily to monitor  - Follow up appt: 10/4 @ 1PM ([id number])    2. Acute respiratory failure secondary/ COPD/acute decompensated HF  - Management as per primary team  - On prednisone 40mg x5 days    3. ACS   - EKG with worsening ischemia  - ASA/beta blocker/statin  - NPO after midnight for LHC     4. Hypothyroidism  - Clinically euthyroid  - Continue current dose of Levothyroxine

## 2024-09-05 NOTE — PROGRESS NOTE ADULT - REASON FOR ADMISSION
Respiratory distress

## 2024-09-05 NOTE — PROGRESS NOTE ADULT - SUBJECTIVE AND OBJECTIVE BOX
[facility]                                                         CARDIOLOGY AT [facility]                                                                  [address], [place] [address]                                                         Telephone: [phone]. Fax:[phone]                                                                             PROGRESS NOTE    Reason for follow up: Chest Pain/SOB  Update: s/p LHC via RRA with 1 YUE to pLAD      Review of symptoms:   Cardiac:  No chest pain. No dyspnea. No palpitations.  Respiratory: no cough. No dyspnea  Gastrointestinal: No diarrhea. No abdominal pain. No bleeding.   Neuro: No focal neuro complaints.    Vitals:  T(C): 36.9 (09-05-24 @ 07:31), Max: 36.9 (09-05-24 @ 00:00)  HR: 89 (09-05-24 @ 07:00) (80 - 104)  BP: 140/84 (09-05-24 @ 07:00) (115/74 - 160/95)  RR: 16 (09-05-24 @ 07:00) (15 - 28)  SpO2: 97% (09-05-24 @ 07:00) (95% - 100%)  Wt(kg): --  I&O's Summary    04 Sep 2024 07:01  -  05 Sep 2024 07:00  --------------------------------------------------------  IN: 150 mL / OUT: 2050 mL / NET: -1900 mL      Weight (kg): 93.9 (09-01 @ 16:00)    PHYSICAL EXAM:  Appearance: Comfortable. No acute distress  HEENT:  Atraumatic. Normocephalic.  Normal oral mucosa  Neurologic: A & O x 3, no gross focal deficits.  Cardiovascular: RRR S1 S2, No murmur, no rubs/gallops. No JVD  Respiratory: Lungs clear to auscultation, unlabored   Gastrointestinal:  Soft, Non-tender, + BS  Lower Extremities: 2+ Peripheral Pulses, No clubbing, cyanosis, or edema  Psychiatry: Patient is calm. No agitation.   Skin: warm and dry.  CATH SITE: right wrist benign s/p cath.  No bleeding, no ecchymosis, no hematoma. Extremity Warm to touch, with palpable distal pulses, and brisk capillary refill.      CURRENT CARDIAC MEDICATIONS:  carvedilol 25 milliGRAM(s) Oral <User Schedule>  furosemide    Tablet 20 milliGRAM(s) Oral daily  lisinopril 10 milliGRAM(s) Oral daily      CURRENT OTHER MEDICATIONS:  budesonide 160 MICROgram(s)/formoterol 4.5 MICROgram(s) Inhaler 2 Puff(s) Inhalation two times a day  tiotropium 2.5 MICROgram(s) Inhaler 2 Puff(s) Inhalation daily  gabapentin 400 milliGRAM(s) Oral every 8 hours  hydrOXYzine hydrochloride 50 milliGRAM(s) Oral at bedtime PRN Anxiety  melatonin 5 milliGRAM(s) Oral at bedtime  mirtazapine 7.5 milliGRAM(s) Oral daily  pantoprazole    Tablet 40 milliGRAM(s) Oral before breakfast  psyllium Powder 1 Packet(s) Oral daily  atorvastatin 20 milliGRAM(s) Oral at bedtime  dextrose 50% Injectable 25 milliLiter(s) IV Push every 15 minutes, Stop order after: 1 Doses  dextrose 50% Injectable 12.5 Gram(s) IV Push once, Stop order after: 1 Doses  dextrose Oral Gel 15 Gram(s) Oral once, Stop order after: 1 Doses  glucagon  Injectable 1 milliGRAM(s) IntraMuscular once, Stop order after: 1 Doses  glucagon  Injectable 1 milliGRAM(s) IntraMuscular once, Stop order after: 1 Doses  insulin glargine Injectable (LANTUS) 30 Unit(s) SubCutaneous before breakfast  insulin glargine Injectable (LANTUS) 30 Unit(s) SubCutaneous at bedtime  insulin lispro (ADMELOG) corrective regimen sliding scale   SubCutaneous Before meals and at bedtime  insulin lispro Injectable (ADMELOG) 16 Unit(s) SubCutaneous three times a day before meals  levothyroxine 100 MICROGram(s) Oral daily  predniSONE   Tablet 40 milliGRAM(s) Oral daily, Stop order after: 5 Days  aspirin  chewable 81 milliGRAM(s) Oral daily  chlorhexidine 2% Cloths 1 Application(s) Topical <User Schedule>  dextrose 5%. 1000 milliLiter(s) (100 mL/Hr) IV Continuous <Continuous>  dextrose 5%. 1000 milliLiter(s) (50 mL/Hr) IV Continuous <Continuous>  heparin   Injectable 5000 Unit(s) SubCutaneous every 8 hours  magnesium sulfate  IVPB 2 Gram(s) IV Intermittent once, Stop order after: 1 Doses  potassium chloride    Tablet ER 40 milliEquivalent(s) Oral once, Stop order after: 1 Doses  ticagrelor 90 milliGRAM(s) Oral two times a day      LABS:	 	                            10.0   8.93  )-----------( 246      ( 05 Sep 2024 02:50 )             30.5     09-05    136  |  101  |  23.3<H>  ----------------------------<  175<H>  3.8   |  24.0  |  0.80    Ca    8.3<L>      05 Sep 2024 04:40  Mg     1.6     09-05      PT/INR/PTT ( 31 Aug 2024 20:06 )                       :                       :      10.6         :       31.2                  .        .                   .              .           .       0.95        .                                       Lipid Profile: Date: 08-12 @ 05:41  Total cholesterol 219; Direct LDL: --; HDL: 104; Triglycerides:51    HgA1c:   TSH:     TELEMETRY: NSR  ECG:    DIAGNOSTIC TESTING:  [ ] Echocardiogram:   < from: TTE W or WO Ultrasound Enhancing Agent (09.01.24 @ 10:06) >    TRANSTHORACIC ECHOCARDIOGRAM REPORT  ________________________________________________________________________________                                      _______       Pt. Name:       BRUNA MARTINEZ Study Date:    9/1/2024  MRN:            [id number]      YOB: 1980  Accession #:    [id number]      Age:           43 years  Account#:       [id number]     Gender:        F  Heart Rate:                    Height:        62.00 in (157.48 cm)  Rhythm:                        Weight:  214.72 lb (97.40 kg)  Blood Pressure: 114/79 mmHg    BSA/BMI:       1.97 m² / 39.27 kg/m²  ________________________________________________________________________________________  Referring Physician:    [id number] Cole Sanchez  Interpreting Physician: Moody Ortiz MD  Primary Sonographer:    Mariely Chung    CPT:               ECHO TTE WO CON COMP W DOPP - 45537.m  Indication(s):     Dyspnea, unspecified - R06.00  Procedure:         Transthoracic echocardiogram with 2-D, M-mode and complete                     spectral and color flow Doppler.  Ordering Location: [facility]  Admission Status:  Inpatient    _______________________________________________________________________________________     CONCLUSIONS:      1. Left ventricular systolic function is normal with an ejection fraction visually estimated at 50 to 55 %. There are no regional wall motion abnormalities seen.   2. There is moderate (grade 2) left ventricular diastolic dysfunction, with elevated left ventricular filling pressure.   3. Mildly enlarged right ventricular cavity size and normal right ventricular systolic function.   4. Left atrium is moderately dilated.   5. No significant valvular disease.   6. No prior echocardiogram is available for comparison.    < end of copied text >  [ ]  Catheterization:  < from: Cardiac Catheterization (09.04.24 @ 12:02) >  Diagnostic Findings:     Coronary Angiography   The coronary circulation is right dominant. Cardiac catheterization  was performed electively.    LM   Left main artery: The segment is normal sized. Angiography shows minor  irregularities.    LAD   Left anterior descending artery: The segment is normal sized. Proximal  left anterior descending: There is an 80 % stenosis.  SHERRI Flow 3.      CX   Circumflex: The segment is normal sized. Second obtuse marginal: very  small territory. There is a 70 % stenosis.    RCA   Right coronary artery: The segment is normal sized. Angiography shows  minor irregularities. mid RCA stent widely patent.    Ramus   Ramus intermedius: Angiography shows no disease.      Left Heart Cath   Ejection fraction was calculated by echo with a value of 50%. LV to AO  pullback was performed and there is no pressure  gradient. LHC performed: Aortic valve crossed and left ventricular  pressures were obtained.    < end of copied text >  [ ] Stress Test:    OTHER:

## 2024-09-05 NOTE — DISCHARGE NOTE NURSING/CASE MANAGEMENT/SOCIAL WORK - NSDCPEFALRISK_GEN_ALL_CORE
For information on Fall & Injury Prevention, visit: [email or web address] OR  [email or web address] OR  https://www.cdc.gov/steadi/patient.html

## 2024-09-05 NOTE — PROGRESS NOTE ADULT - SUBJECTIVE AND OBJECTIVE BOX
INTERVAL EVENTS:  Follow up diabetes management    ROS: Denies chest pain, sob, abd pain.     MEDICATIONS  (STANDING):  aspirin  chewable 81 milliGRAM(s) Oral daily  atorvastatin 20 milliGRAM(s) Oral at bedtime  budesonide 160 MICROgram(s)/formoterol 4.5 MICROgram(s) Inhaler 2 Puff(s) Inhalation two times a day  carvedilol 25 milliGRAM(s) Oral <User Schedule>  chlorhexidine 2% Cloths 1 Application(s) Topical <User Schedule>  dextrose 5%. 1000 milliLiter(s) (100 mL/Hr) IV Continuous <Continuous>  dextrose 5%. 1000 milliLiter(s) (50 mL/Hr) IV Continuous <Continuous>  dextrose 50% Injectable 12.5 Gram(s) IV Push once  dextrose 50% Injectable 25 milliLiter(s) IV Push every 15 minutes  dextrose Oral Gel 15 Gram(s) Oral once  furosemide    Tablet 20 milliGRAM(s) Oral daily  gabapentin 400 milliGRAM(s) Oral every 8 hours  glucagon  Injectable 1 milliGRAM(s) IntraMuscular once  glucagon  Injectable 1 milliGRAM(s) IntraMuscular once  heparin   Injectable 5000 Unit(s) SubCutaneous every 8 hours  insulin glargine Injectable (LANTUS) 30 Unit(s) SubCutaneous at bedtime  insulin glargine Injectable (LANTUS) 30 Unit(s) SubCutaneous before breakfast  insulin lispro (ADMELOG) corrective regimen sliding scale   SubCutaneous Before meals and at bedtime  insulin lispro Injectable (ADMELOG) 16 Unit(s) SubCutaneous three times a day before meals  levothyroxine 100 MICROGram(s) Oral daily  lisinopril 10 milliGRAM(s) Oral daily  melatonin 5 milliGRAM(s) Oral at bedtime  mirtazapine 7.5 milliGRAM(s) Oral daily  pantoprazole    Tablet 40 milliGRAM(s) Oral before breakfast  predniSONE   Tablet 40 milliGRAM(s) Oral daily  psyllium Powder 1 Packet(s) Oral daily  ticagrelor 90 milliGRAM(s) Oral two times a day  tiotropium 2.5 MICROgram(s) Inhaler 2 Puff(s) Inhalation daily    MEDICATIONS  (PRN):  hydrOXYzine hydrochloride 50 milliGRAM(s) Oral at bedtime PRN Anxiety    Allergies  shellfish (Swelling)  Seafood (Swelling)  shellfish (Rash)  No Known Drug Allergies  shellfish (Unknown)    Vital Signs Last 24 Hrs  T(C): 36.9 (05 Sep 2024 11:09), Max: 36.9 (05 Sep 2024 00:00)  T(F): 98.5 (05 Sep 2024 11:09), Max: 98.5 (05 Sep 2024 07:31)  HR: 94 (05 Sep 2024 12:00) (81 - 104)  BP: 112/50 (05 Sep 2024 12:00) (112/50 - 160/95)  BP(mean): 67 (05 Sep 2024 12:00) (67 - 114)  RR: 24 (05 Sep 2024 12:00) (15 - 28)  SpO2: 97% (05 Sep 2024 12:00) (95% - 100%)    Parameters below as of 05 Sep 2024 12:00  Patient On (Oxygen Delivery Method): room air    PHYSICAL EXAM:  General: No apparent distress  Respiratory: Lungs clear bilaterally  Cardiac: +S1, S2, no m/r/g  GI: +BS, soft, non tender, non distended  Extremities: No peripheral edema  Neuro: A+O X3    LABS:                        10.0   8.93  )-----------( 246      ( 05 Sep 2024 02:50 )             30.5     09-05    136  |  101  |  23.3<H>  ----------------------------<  175<H>  3.8   |  24.0  |  0.80    Ca    8.3<L>      05 Sep 2024 04:40  Mg     1.6     09-05      Urinalysis Basic - ( 05 Sep 2024 04:40 )    Color: x / Appearance: x / SG: x / pH: x  Gluc: 175 mg/dL / Ketone: x  / Bili: x / Urobili: x   Blood: x / Protein: x / Nitrite: x   Leuk Esterase: x / RBC: x / WBC x   Sq Epi: x / Non Sq Epi: x / Bacteria: x    POCT Blood Glucose.: 147 mg/dL (09-05-24 @ 11:41)  POCT Blood Glucose.: 280 mg/dL (09-05-24 @ 08:40)  POCT Blood Glucose.: 163 mg/dL (09-04-24 @ 21:05)  POCT Blood Glucose.: 371 mg/dL (09-04-24 @ 17:12)  POCT Blood Glucose.: 377 mg/dL (09-04-24 @ 16:09)    Thyroid Stimulating Hormone, Serum: 0.76 uIU/mL (08-12-24 @ 09:30)

## 2024-09-05 NOTE — PHYSICAL THERAPY INITIAL EVALUATION ADULT - HEALTH SCREEN CRITERIA
Patient positioned semi-prone with head supported on foam ring pillow. Gel wedge positioned under right chest/upper abdomen. Patient secured to table with safety belt.    Fluoro time 1:54  Cap intact and verified by [facility] RN and TORRES RN  Pad Site Unremarkable Post Procedure yes

## 2024-09-05 NOTE — PROGRESS NOTE ADULT - PROBLEM SELECTOR PLAN 1
.  - presented with anginal symptoms  - Trops x3 < 15  - EKG with worsening ischemia  - TTE without WMA, preserved EF  - s/p LHC with 1 YUE to pLAD   - GDMT:        DAPT: ASA 81mg /Brilinta 90mg PO        Statin: Lipitor 20mg PO HS       BetaBlocker: Coreg 25mg PO BID        Other: Lisinopril 10mg PO daily       Aggressive cardiovascular risk reduction and lifestyle modification.  - reinforced strict compliance with DAPT   - cardiac rehab info provided/referral and communication to cardiac rehab completed  - patient can follow up with primary cardiologist, states she may prefer to follow up in [place], can follow up with Dr. Navarro as OP in 1 week.   - No further inpatient cardiac workup as this time.   - Will sign off .  - presented with anginal symptoms  - Trops x3 < 15  - EKG with worsening ischemia  - TTE without WMA, preserved EF  - s/p LHC with 1 YUE to pLAD   - GDMT:        DAPT: ASA 81mg /Brilinta 90mg PO        Statin: Lipitor 20mg PO HS       BetaBlocker: Coreg 25mg PO BID        Other: Spironolactone 25mg       Aggressive cardiovascular risk reduction and lifestyle modification.  - reinforced strict compliance with DAPT   - cardiac rehab info provided/referral and communication to cardiac rehab completed  - patient can follow up with primary cardiologist, states she may prefer to follow up in [place], can follow up with Dr. Navarro as OP in 1 week.   - No further inpatient cardiac workup as this time.   - Will sign off

## 2024-09-06 LAB
CULTURE RESULTS: SIGNIFICANT CHANGE UP
SPECIMEN SOURCE: SIGNIFICANT CHANGE UP

## 2024-09-25 ENCOUNTER — NON-APPOINTMENT (OUTPATIENT)
Age: 44
End: 2024-09-25

## 2024-09-25 ENCOUNTER — APPOINTMENT (OUTPATIENT)
Dept: CARDIOLOGY | Facility: CLINIC | Age: 44
End: 2024-09-25
Payer: MEDICAID

## 2024-09-25 VITALS — DIASTOLIC BLOOD PRESSURE: 86 MMHG | SYSTOLIC BLOOD PRESSURE: 128 MMHG

## 2024-09-25 VITALS
BODY MASS INDEX: 38.27 KG/M2 | SYSTOLIC BLOOD PRESSURE: 112 MMHG | DIASTOLIC BLOOD PRESSURE: 76 MMHG | WEIGHT: 216 LBS | HEART RATE: 103 BPM | HEIGHT: 63 IN | OXYGEN SATURATION: 99 %

## 2024-09-25 VITALS — DIASTOLIC BLOOD PRESSURE: 68 MMHG | SYSTOLIC BLOOD PRESSURE: 122 MMHG

## 2024-09-25 DIAGNOSIS — I10 ESSENTIAL (PRIMARY) HYPERTENSION: ICD-10-CM

## 2024-09-25 DIAGNOSIS — Z09 ENCOUNTER FOR FOLLOW-UP EXAMINATION AFTER COMPLETED TREATMENT FOR CONDITIONS OTHER THAN MALIGNANT NEOPLASM: ICD-10-CM

## 2024-09-25 PROCEDURE — 93000 ELECTROCARDIOGRAM COMPLETE: CPT

## 2024-09-25 PROCEDURE — 99215 OFFICE O/P EST HI 40 MIN: CPT | Mod: 25

## 2024-09-25 RX ORDER — MIRTAZAPINE 15 MG/1
15 TABLET, FILM COATED ORAL
Refills: 0 | Status: ACTIVE | COMMUNITY

## 2024-09-25 RX ORDER — ATORVASTATIN CALCIUM 20 MG/1
20 TABLET, FILM COATED ORAL
Qty: 90 | Refills: 1 | Status: ACTIVE | COMMUNITY
Start: 1900-01-01 | End: 1900-01-01

## 2024-09-25 RX ORDER — CARVEDILOL 12.5 MG/1
12.5 TABLET, FILM COATED ORAL
Qty: 180 | Refills: 1 | Status: ACTIVE | COMMUNITY
Start: 1900-01-01 | End: 1900-01-01

## 2024-09-25 RX ORDER — TICAGRELOR 90 MG/1
90 TABLET ORAL TWICE DAILY
Qty: 180 | Refills: 1 | Status: ACTIVE | COMMUNITY
Start: 1900-01-01 | End: 1900-01-01

## 2024-09-25 RX ORDER — HYDROXYZINE PAMOATE 25 MG/1
25 CAPSULE ORAL
Refills: 0 | Status: ACTIVE | COMMUNITY

## 2024-09-25 RX ORDER — INSULIN GLARGINE 100 [IU]/ML
100 INJECTION, SOLUTION SUBCUTANEOUS
Refills: 0 | Status: ACTIVE | COMMUNITY

## 2024-09-25 RX ORDER — RANITIDINE 75 MG/1
TABLET, COATED ORAL DAILY
Refills: 0 | Status: ACTIVE | COMMUNITY

## 2024-09-25 RX ORDER — GABAPENTIN 400 MG/1
400 CAPSULE ORAL 3 TIMES DAILY
Refills: 0 | Status: ACTIVE | COMMUNITY

## 2024-09-25 RX ORDER — FUROSEMIDE 20 MG/1
20 TABLET ORAL DAILY
Qty: 30 | Refills: 5 | Status: ACTIVE | COMMUNITY
Start: 1900-01-01 | End: 1900-01-01

## 2024-09-25 RX ORDER — BLOOD PRESSURE TEST KIT-LARGE
KIT MISCELLANEOUS
Qty: 1 | Refills: 0 | Status: ACTIVE | COMMUNITY
Start: 2024-09-25 | End: 1900-01-01

## 2024-09-26 RX ORDER — LISINOPRIL 10 MG/1
10 TABLET ORAL
Refills: 0 | Status: ACTIVE | COMMUNITY

## 2024-09-26 RX ORDER — SPIRONOLACTONE 25 MG/1
25 TABLET ORAL
Refills: 0 | Status: ACTIVE | COMMUNITY

## 2024-09-30 ENCOUNTER — APPOINTMENT (OUTPATIENT)
Dept: CARDIOLOGY | Facility: CLINIC | Age: 44
End: 2024-09-30
Payer: MEDICAID

## 2024-09-30 VITALS
OXYGEN SATURATION: 98 % | SYSTOLIC BLOOD PRESSURE: 120 MMHG | BODY MASS INDEX: 37.92 KG/M2 | HEIGHT: 63 IN | HEART RATE: 97 BPM | WEIGHT: 214 LBS | DIASTOLIC BLOOD PRESSURE: 70 MMHG

## 2024-09-30 DIAGNOSIS — Z98.61 ATHEROSCLEROTIC HEART DISEASE OF NATIVE CORONARY ARTERY W/OUT ANGINA PECTORIS: ICD-10-CM

## 2024-09-30 DIAGNOSIS — I50.20 UNSPECIFIED SYSTOLIC (CONGESTIVE) HEART FAILURE: ICD-10-CM

## 2024-09-30 DIAGNOSIS — I25.10 ATHEROSCLEROTIC HEART DISEASE OF NATIVE CORONARY ARTERY W/OUT ANGINA PECTORIS: ICD-10-CM

## 2024-09-30 DIAGNOSIS — N93.9 ABNORMAL UTERINE AND VAGINAL BLEEDING, UNSPECIFIED: ICD-10-CM

## 2024-09-30 PROCEDURE — G2211 COMPLEX E/M VISIT ADD ON: CPT | Mod: NC

## 2024-09-30 PROCEDURE — 99213 OFFICE O/P EST LOW 20 MIN: CPT

## 2024-09-30 NOTE — DISCUSSION/SUMMARY
[FreeTextEntry1] : 43F h/o prior history of polysubstance abuse (crack cocaine), asthma/COPD (not on home O2), HFmrEF (45-50%), CAD s/p PCI (2018), IDDM, recent admission to [facility] 08/11-08/16 for GOODEN and CP and was found to have diffuse alveolar opacities w/ subpleural sparing and was treated w/ IV diuresis, empiric abx and IV steroids.  On d/c from [facility] pt f/u w/ cardiologist c/o chest pain and had an unchanged EKG (inferolateral TWI) and negative trops and was scheduled for stress test that she has not completed yet, presented 08/31 to [facility] ED c/o GOODEN, mildly productive cough, LE edema and pleuritic CP.  Pt was found to be hypoxic w/ increased WOB requiring BiPAP in ED and was given SL nitro for CP and admitted to [facility]u for acute hypoxic respiratory failure.  CTA chest was negative for PE but showed similar diffuse b/l infiltrates and pt was subsequently started on empiric Zosyn and IV steroids.  Sepsis w/u was ordered in ED and was also given IVFs per protocol but appeared to be in decompensated HF therefore pt was started on diuresis and since d/c'd as pt now euvolemic.  Hospital course complicated by hyperglycemia likely due to steroids.  Steroids being weaned and pt tolerating.  Pt has 4 beats of asymptomatic NSVT overnight.   TTE on 9/1/2024 with LVEF 50-55% and no regional WMA seen, increased LV mass, grade 2 DD, [facility] 9/4/204 s/p pLAD 80% s/p YUE x 1. OM2 70% stenosis very small territory medically managed, last seen 9/25/24 refer for cardiac rehab, returns for follow up.   CAD, prior PCI 2018, now s/p C YUE pLAD on 9/4/2024: Continue DAPT, ASA, Ticagrelor, atorvastatin 20mg with goal LDL <70, on carvedilol, defer nitrate as no residual angina; can start cardiac rehab, avoid ETOH and illicit drug use.   HFpEF: LVEF 50-55%, appears euvolemic on Lasix 20mg/spironolactone, consider SGLT2i pending BMP and if GYN visit benign  COPD, bilateral pulmonary infiltrates: need repeat CT chest with pulmonary visit, if persistent then likely need bronchoscopy but would need to hold off at least 3 months from stent placement as currently on   DM2, obesity- follow up with Endocrine, consider GLP agent for concurrent weight loss.

## 2024-09-30 NOTE — HISTORY OF PRESENT ILLNESS
[FreeTextEntry1] : 43F h/o prior history of polysubstance abuse (crack cocaine), asthma/COPD (not on home O2), HFmrEF (45-50%), CAD s/p PCI (2018), IDDM, recent admission to [facility] 08/11-08/16 for GOODEN and CP and was found to have diffuse alveolar opacities w/ subpleural sparing and was treated w/ IV diuresis, empiric abx and IV steroids.  On d/c from [facility] pt f/u w/ cardiologist c/o chest pain and had an unchanged EKG (inferolateral TWI) and negative trops and was scheduled for stress test that she has not completed yet, presented 08/31 to [facility] ED c/o GOODEN, mildly productive cough, LE edema and pleuritic CP.  Pt was found to be hypoxic w/ increased WOB requiring BiPAP in ED and was given SL nitro for CP and admitted to [facility]u for acute hypoxic respiratory failure.  CTA chest was negative for PE but showed similar diffuse b/l infiltrates and pt was subsequently started on empiric Zosyn and IV steroids.  Sepsis w/u was ordered in ED and was also given IVFs per protocol but appeared to be in decompensated HF therefore pt was started on diuresis and since d/c'd as pt now euvolemic.  Hospital course complicated by hyperglycemia likely due to steroids.  Steroids being weaned and pt tolerating.  Pt has 4 beats of asymptomatic NSVT overnight.   TTE on 9/1/2024 with LVEF 50-55% and no regional WMA seen, increased LV mass, grade 2 DD, LHC 9/4/204 s/p pLAD 80% s/p YUE x 1. OM2 70% stenosis very small territory medically managed, last seen 9/25/24 refer for cardiac rehab, returns for follow up.   Patient presents with her friend for the visit.  She reports was told need to follow up again prior to cardiac rehab, adherent with DAPT use, she had one episode vaginal bleeding off cycle from her regular menses but since resolved no recent GYN eval., denies chest pain and dyspnea has improved, able to ambulate up to an hour, pending pulmonary eval. next week. No substance use recently.     Prior visit 9/25/24:  Pt presents for HFU Pt currently resides at a drug and alcohol rehab facility in [place]. She reports no substance abuse or ETOH use since April 2024 She reports compliance with meds, but has run out of Carvedilol and Furosemide last week. She reports not getting new meds upon discharge, possibly taking meds from prior DC from [facility].  She is unsure of what her is taking, FLAVIO Rubio called [email or web address], brilinta was the only medication dispensed from [email or web address].  Reports compliance with ASA 81 daily and Brilinta  90 MG BID daily, no missed doses.  She has been tracking her BP twice daily, brought log today,  using a BP machine supplied at the facility with consistently elevated readings 130's - 150's / 80's - 90's , with SBP up to 160's at times. She reports feeling much better since stent placement. She is able to lay flat in bed without orthopnea/PND, cough has resolved,  walking without any SOB/GOODEN and has not had any more chest pressure. Denies LE edema, palpitations, lightheadedness, syncope or any evidence of bleeding She has been tracking her BP daily at the facility,  130's - 150's / 80's - 90's

## 2024-10-03 LAB — HBA1C MFR BLD HPLC: 8.6

## 2024-10-04 ENCOUNTER — APPOINTMENT (OUTPATIENT)
Dept: ENDOCRINOLOGY | Facility: CLINIC | Age: 44
End: 2024-10-04
Payer: MEDICAID

## 2024-10-04 VITALS
OXYGEN SATURATION: 98 % | WEIGHT: 219 LBS | HEIGHT: 63 IN | DIASTOLIC BLOOD PRESSURE: 62 MMHG | SYSTOLIC BLOOD PRESSURE: 120 MMHG | BODY MASS INDEX: 38.8 KG/M2 | HEART RATE: 84 BPM

## 2024-10-04 DIAGNOSIS — E66.9 OBESITY, UNSPECIFIED: ICD-10-CM

## 2024-10-04 DIAGNOSIS — E11.65 TYPE 2 DIABETES MELLITUS WITH HYPERGLYCEMIA: ICD-10-CM

## 2024-10-04 DIAGNOSIS — R94.6 ABNORMAL RESULTS OF THYROID FUNCTION STUDIES: ICD-10-CM

## 2024-10-04 LAB
GLUCOSE BLDC GLUCOMTR-MCNC: 332
GLUCOSE BLDC GLUCOMTR-MCNC: 352

## 2024-10-04 PROCEDURE — 82962 GLUCOSE BLOOD TEST: CPT

## 2024-10-04 PROCEDURE — 99214 OFFICE O/P EST MOD 30 MIN: CPT | Mod: 25

## 2024-10-04 RX ORDER — PEN NEEDLE, DIABETIC 29 G X1/2"
32G X 4 MM NEEDLE, DISPOSABLE MISCELLANEOUS
Qty: 4 | Refills: 3 | Status: ACTIVE | COMMUNITY
Start: 2024-10-04 | End: 1900-01-01

## 2024-10-04 RX ORDER — BLOOD SUGAR DIAGNOSTIC
STRIP MISCELLANEOUS 4 TIMES DAILY
Qty: 4 | Refills: 1 | Status: ACTIVE | COMMUNITY
Start: 2024-10-04 | End: 1900-01-01

## 2024-10-04 RX ORDER — INSULIN GLARGINE 100 [IU]/ML
100 INJECTION, SOLUTION SUBCUTANEOUS
Qty: 2 | Refills: 2 | Status: ACTIVE | COMMUNITY
Start: 2024-10-04 | End: 1900-01-01

## 2024-10-04 RX ORDER — LANCETS 33 GAUGE
EACH MISCELLANEOUS
Qty: 360 | Refills: 1 | Status: ACTIVE | COMMUNITY
Start: 2024-10-04 | End: 1900-01-01

## 2024-10-04 NOTE — ASSESSMENT
[Diabetes Foot Care] : diabetes foot care [Long Term Vascular Complications] : long term vascular complications of diabetes [Carbohydrate Consistent Diet] : carbohydrate consistent diet [Importance of Diet and Exercise] : importance of diet and exercise to improve glycemic control, achieve weight loss and improve cardiovascular health [Exercise/Effect on Glucose] : exercise/effect on glucose [Retinopathy Screening] : Patient was referred to ophthalmology for retinopathy screening [FreeTextEntry1] : T2DM -Reviewed risk/complication of uncontrolled DM  -Increase exercise as tolerated 5 days a week x 30 mins/day -Increase dietary efforts, low carb/low sugar -Continue to check FS 3x's a day and keep log, bring log to next appt -Repeat HgbA1C prior to next appt  -Pt interested in DEXCOM -Continue Lantus 30 units BID -Start ademelog 8 units TID AC -Start Mounjaro 2.5 mg weekly - denies h/o pancreatitis and thyroid cancer -Needs diet education  Hypothyroidism?? -Will check TFTs, but never on meds in past  Obesity -Start Mounjaro to help with weight loss  RTO in 3 weeks CDE RTO in 6 weeks NP RTO in 5 months MD

## 2024-10-04 NOTE — HISTORY OF PRESENT ILLNESS
[FreeTextEntry1] : Patient is here for HFU after being admitted to [facility]. Hospital Course: Discharge Date 05-Sep-2024 Admission Date 01-Sep-2024 02:11 Reason for Admission respiratory distress Hospital Course  44 y/o F w/ PMH of polysubstance abuse (crack cocaine), asthma/COPD (not on home O2), HFmrEF (45-50%), CAD s/p PCI (2018), IDDM, recent admission to [facility] 08/11-08/16 for GOODEN and CP and was found to have diffuse alveolar opacities w/ subpleural sparing and was treated w/ IV diuresis, empiric abx and IV steroids.  On d/c from [facility] pt f/u w/ cardiologist c/o chest pain and had an unchanged EKG (inferolateral TWI) and negative trops and was scheduled for stress test that she has not completed yet, presented 08/31 to [facility] ED c/o GOODEN, mildly productive cough, LE edema and pleuritic CP.  Pt was found to be hypoxic w/ increased WOB requiring BiPAP in ED and was given SL nitro for CP and admitted to [facility]u for acute hypoxic respiratory failure.  CTA chest was negative for PE but showed similar diffuse b/l infiltrates and pt was subsequently started on empiric Zosyn and IV steroids.  Sepsis w/u was ordered in ED and was also given IVFs per protocol but appeared to be in decompensated HF therefore pt was started on diuresis and since d/c'd as pt now euvolemic.  Hospital course complicated by hyperglycemia likely due to steroids.  Steroids being weaned and pt tolerating.  Pt has 4 beats of asymptomatic NSVT overnight.  Cardio following.  TTE now w/ pEF and no WMAs. She underwent LHC with stent placed to RCC. She was started on dapt. Pt is medically stable and cleared by cards for dc post cath. Pulmonary also cleared the patient, recs to be discharged off steroids and to fu with pulmonary clinic if develops sob, for further w/u for Cryptogenic organizing pneumonia.  Patient has h/o drug addition to crack and cocaine. She was in [facility] iin April 2024, then went to 28 day detox program, now living in half way home. She has been sober for 7 months. She says she has never been compliant with docotr visits. But wants to get her diabetes in control.    Quality: T2DM induced by steroid use Severity: moderate Duration: 21+ years  Onset: GDM never went away  Modifying Factors: insulin  SMBG 3x's a day  Last week it was High on meter Now has been 300+  Current   Gave water and humalog 10 units   HgbA1C: 8.6% 8/13/2024  Current Regimen: Lantus 30 units BID Admelog 16 units TID (did not get) Bydureon 2 mg weekly Has been off months   Eye Exam: 4/26/2024 was arrested and saw eye Dr. 5/28/2024 saw when was in intermediate, had DR in past now resolved Foot Exam: none  Kidney Disease: none Heart Disease: 2 CAD, 2 stents, 2018 and another one 9/4/2024  Weight: stable  Diet: eats a lot fruits  Exercise: going to cardiac rehab twice a week x 36 sessions Smoking:  none   Hypothyroidism  As per patient has slow thyroid, not on any medication

## 2024-10-07 RX ORDER — INSULIN LISPRO 100 [IU]/ML
100 INJECTION, SOLUTION INTRAVENOUS; SUBCUTANEOUS
Qty: 3 | Refills: 2 | Status: ACTIVE | COMMUNITY
Start: 2024-10-04 | End: 1900-01-01

## 2024-10-07 RX ORDER — BLOOD-GLUCOSE SENSOR
EACH MISCELLANEOUS
Qty: 3 | Refills: 5 | Status: ACTIVE | COMMUNITY
Start: 2024-10-07

## 2024-10-08 ENCOUNTER — APPOINTMENT (OUTPATIENT)
Dept: PULMONOLOGY | Facility: CLINIC | Age: 44
End: 2024-10-08

## 2024-10-08 RX ORDER — TIRZEPATIDE 2.5 MG/.5ML
2.5 INJECTION, SOLUTION SUBCUTANEOUS
Qty: 4 | Refills: 0 | Status: DISCONTINUED | COMMUNITY
Start: 2024-10-04 | End: 2024-10-08

## 2024-10-08 RX ORDER — BLOOD-GLUCOSE METER
W/DEVICE EACH MISCELLANEOUS
Qty: 1 | Refills: 0 | Status: ACTIVE | COMMUNITY
Start: 2024-10-08 | End: 1900-01-01

## 2024-10-08 RX ORDER — BLOOD-GLUCOSE,RECEIVER,CONT
EACH MISCELLANEOUS
Qty: 1 | Refills: 0 | Status: ACTIVE | COMMUNITY
Start: 2024-10-08 | End: 1900-01-01

## 2024-10-08 RX ORDER — SEMAGLUTIDE 0.68 MG/ML
2 INJECTION, SOLUTION SUBCUTANEOUS
Qty: 3 | Refills: 1 | Status: ACTIVE | COMMUNITY
Start: 2024-10-08 | End: 1900-01-01

## 2024-10-15 ENCOUNTER — APPOINTMENT (OUTPATIENT)
Dept: ENDOCRINOLOGY | Facility: CLINIC | Age: 44
End: 2024-10-15
Payer: MEDICAID

## 2024-10-15 PROCEDURE — G0108 DIAB MANAGE TRN  PER INDIV: CPT

## 2024-10-17 ENCOUNTER — NON-APPOINTMENT (OUTPATIENT)
Age: 44
End: 2024-10-17

## 2024-10-25 ENCOUNTER — NON-APPOINTMENT (OUTPATIENT)
Age: 44
End: 2024-10-25

## 2024-10-26 DIAGNOSIS — E11.65 TYPE 2 DIABETES MELLITUS WITH HYPERGLYCEMIA: ICD-10-CM

## 2024-10-26 PROCEDURE — 95251 CONT GLUC MNTR ANALYSIS I&R: CPT

## 2024-10-30 ENCOUNTER — APPOINTMENT (OUTPATIENT)
Dept: CARDIOLOGY | Facility: CLINIC | Age: 44
End: 2024-10-30
Payer: MEDICAID

## 2024-10-30 VITALS
BODY MASS INDEX: 38.8 KG/M2 | WEIGHT: 219 LBS | DIASTOLIC BLOOD PRESSURE: 74 MMHG | HEIGHT: 63 IN | SYSTOLIC BLOOD PRESSURE: 128 MMHG | HEART RATE: 95 BPM | OXYGEN SATURATION: 97 %

## 2024-10-30 DIAGNOSIS — J44.9 CHRONIC OBSTRUCTIVE PULMONARY DISEASE, UNSPECIFIED: ICD-10-CM

## 2024-10-30 DIAGNOSIS — E66.9 OBESITY, UNSPECIFIED: ICD-10-CM

## 2024-10-30 DIAGNOSIS — I10 ESSENTIAL (PRIMARY) HYPERTENSION: ICD-10-CM

## 2024-10-30 DIAGNOSIS — I25.10 ATHEROSCLEROTIC HEART DISEASE OF NATIVE CORONARY ARTERY W/OUT ANGINA PECTORIS: ICD-10-CM

## 2024-10-30 DIAGNOSIS — Z98.61 ATHEROSCLEROTIC HEART DISEASE OF NATIVE CORONARY ARTERY W/OUT ANGINA PECTORIS: ICD-10-CM

## 2024-10-30 DIAGNOSIS — I50.20 UNSPECIFIED SYSTOLIC (CONGESTIVE) HEART FAILURE: ICD-10-CM

## 2024-10-30 PROCEDURE — 99214 OFFICE O/P EST MOD 30 MIN: CPT | Mod: 25

## 2024-10-30 PROCEDURE — 93000 ELECTROCARDIOGRAM COMPLETE: CPT

## 2024-11-01 ENCOUNTER — APPOINTMENT (OUTPATIENT)
Dept: CARDIOLOGY | Facility: CLINIC | Age: 44
End: 2024-11-01

## 2024-11-11 ENCOUNTER — APPOINTMENT (OUTPATIENT)
Dept: CARDIOLOGY | Facility: CLINIC | Age: 44
End: 2024-11-11
Payer: MEDICAID

## 2024-11-11 PROCEDURE — 93798 PHYS/QHP OP CAR RHAB W/ECG: CPT

## 2024-11-12 ENCOUNTER — APPOINTMENT (OUTPATIENT)
Dept: ENDOCRINOLOGY | Facility: CLINIC | Age: 44
End: 2024-11-12
Payer: MEDICAID

## 2024-11-12 PROCEDURE — G0108 DIAB MANAGE TRN  PER INDIV: CPT

## 2024-11-13 ENCOUNTER — APPOINTMENT (OUTPATIENT)
Dept: CARDIOLOGY | Facility: CLINIC | Age: 44
End: 2024-11-13
Payer: MEDICAID

## 2024-11-13 PROCEDURE — 93798 PHYS/QHP OP CAR RHAB W/ECG: CPT

## 2024-11-15 ENCOUNTER — APPOINTMENT (OUTPATIENT)
Dept: CARDIOLOGY | Facility: CLINIC | Age: 44
End: 2024-11-15
Payer: MEDICAID

## 2024-11-15 PROCEDURE — 93798 PHYS/QHP OP CAR RHAB W/ECG: CPT

## 2024-11-18 ENCOUNTER — NON-APPOINTMENT (OUTPATIENT)
Age: 44
End: 2024-11-18

## 2024-11-18 ENCOUNTER — APPOINTMENT (OUTPATIENT)
Dept: CARDIOLOGY | Facility: CLINIC | Age: 44
End: 2024-11-18
Payer: MEDICAID

## 2024-11-18 PROCEDURE — 93798 PHYS/QHP OP CAR RHAB W/ECG: CPT

## 2024-11-19 DIAGNOSIS — E11.65 TYPE 2 DIABETES MELLITUS WITH HYPERGLYCEMIA: ICD-10-CM

## 2024-11-19 PROCEDURE — 95251 CONT GLUC MNTR ANALYSIS I&R: CPT

## 2024-11-20 ENCOUNTER — APPOINTMENT (OUTPATIENT)
Dept: CARDIOLOGY | Facility: CLINIC | Age: 44
End: 2024-11-20
Payer: MEDICAID

## 2024-11-20 PROCEDURE — 93798 PHYS/QHP OP CAR RHAB W/ECG: CPT

## 2024-11-22 ENCOUNTER — APPOINTMENT (OUTPATIENT)
Dept: CARDIOLOGY | Facility: CLINIC | Age: 44
End: 2024-11-22

## 2024-11-25 ENCOUNTER — APPOINTMENT (OUTPATIENT)
Dept: CARDIOLOGY | Facility: CLINIC | Age: 44
End: 2024-11-25
Payer: MEDICAID

## 2024-11-25 PROCEDURE — 93798 PHYS/QHP OP CAR RHAB W/ECG: CPT

## 2024-11-27 ENCOUNTER — APPOINTMENT (OUTPATIENT)
Dept: CARDIOLOGY | Facility: CLINIC | Age: 44
End: 2024-11-27
Payer: MEDICAID

## 2024-11-27 PROCEDURE — 93798 PHYS/QHP OP CAR RHAB W/ECG: CPT

## 2024-11-29 ENCOUNTER — APPOINTMENT (OUTPATIENT)
Dept: CARDIOLOGY | Facility: CLINIC | Age: 44
End: 2024-11-29

## 2024-12-02 ENCOUNTER — APPOINTMENT (OUTPATIENT)
Dept: CARDIOLOGY | Facility: CLINIC | Age: 44
End: 2024-12-02
Payer: MEDICAID

## 2024-12-02 PROCEDURE — 93798 PHYS/QHP OP CAR RHAB W/ECG: CPT

## 2024-12-04 ENCOUNTER — APPOINTMENT (OUTPATIENT)
Dept: CARDIOLOGY | Facility: CLINIC | Age: 44
End: 2024-12-04
Payer: MEDICAID

## 2024-12-04 PROCEDURE — 93798 PHYS/QHP OP CAR RHAB W/ECG: CPT

## 2024-12-06 ENCOUNTER — APPOINTMENT (OUTPATIENT)
Dept: CARDIOLOGY | Facility: CLINIC | Age: 44
End: 2024-12-06
Payer: MEDICAID

## 2024-12-06 PROCEDURE — 93798 PHYS/QHP OP CAR RHAB W/ECG: CPT

## 2024-12-09 ENCOUNTER — APPOINTMENT (OUTPATIENT)
Dept: CARDIOLOGY | Facility: CLINIC | Age: 44
End: 2024-12-09
Payer: MEDICAID

## 2024-12-09 ENCOUNTER — APPOINTMENT (OUTPATIENT)
Dept: PULMONOLOGY | Facility: CLINIC | Age: 44
End: 2024-12-09

## 2024-12-09 PROCEDURE — 93798 PHYS/QHP OP CAR RHAB W/ECG: CPT

## 2024-12-11 ENCOUNTER — APPOINTMENT (OUTPATIENT)
Dept: CARDIOLOGY | Facility: CLINIC | Age: 44
End: 2024-12-11
Payer: MEDICAID

## 2024-12-11 PROCEDURE — 93798 PHYS/QHP OP CAR RHAB W/ECG: CPT

## 2024-12-13 ENCOUNTER — APPOINTMENT (OUTPATIENT)
Dept: CARDIOLOGY | Facility: CLINIC | Age: 44
End: 2024-12-13

## 2024-12-16 ENCOUNTER — APPOINTMENT (OUTPATIENT)
Dept: CARDIOLOGY | Facility: CLINIC | Age: 44
End: 2024-12-16
Payer: MEDICAID

## 2024-12-16 PROCEDURE — 93798 PHYS/QHP OP CAR RHAB W/ECG: CPT

## 2024-12-18 ENCOUNTER — APPOINTMENT (OUTPATIENT)
Dept: CARDIOLOGY | Facility: CLINIC | Age: 44
End: 2024-12-18

## 2024-12-20 ENCOUNTER — APPOINTMENT (OUTPATIENT)
Dept: CARDIOLOGY | Facility: CLINIC | Age: 44
End: 2024-12-20

## 2024-12-23 ENCOUNTER — APPOINTMENT (OUTPATIENT)
Dept: CARDIOLOGY | Facility: CLINIC | Age: 44
End: 2024-12-23

## 2024-12-27 ENCOUNTER — APPOINTMENT (OUTPATIENT)
Dept: CARDIOLOGY | Facility: CLINIC | Age: 44
End: 2024-12-27

## 2024-12-30 ENCOUNTER — APPOINTMENT (OUTPATIENT)
Dept: CARDIOLOGY | Facility: CLINIC | Age: 44
End: 2024-12-30
Payer: MEDICAID

## 2024-12-30 PROCEDURE — 93798 PHYS/QHP OP CAR RHAB W/ECG: CPT

## 2025-01-03 ENCOUNTER — APPOINTMENT (OUTPATIENT)
Dept: CARDIOLOGY | Facility: CLINIC | Age: 45
End: 2025-01-03

## 2025-01-06 ENCOUNTER — APPOINTMENT (OUTPATIENT)
Dept: CARDIOLOGY | Facility: CLINIC | Age: 45
End: 2025-01-06

## 2025-01-08 ENCOUNTER — APPOINTMENT (OUTPATIENT)
Dept: CARDIOLOGY | Facility: CLINIC | Age: 45
End: 2025-01-08

## 2025-01-10 ENCOUNTER — APPOINTMENT (OUTPATIENT)
Dept: CARDIOLOGY | Facility: CLINIC | Age: 45
End: 2025-01-10

## 2025-01-13 ENCOUNTER — APPOINTMENT (OUTPATIENT)
Dept: CARDIOLOGY | Facility: CLINIC | Age: 45
End: 2025-01-13

## 2025-01-15 ENCOUNTER — APPOINTMENT (OUTPATIENT)
Dept: CARDIOLOGY | Facility: CLINIC | Age: 45
End: 2025-01-15

## 2025-01-15 NOTE — H&P ADULT - MINUTES
Stop taking metoprolol.    Stop taking aspirin.    Stop taking effient.     Start taking plavix 75 mg daily.    Have fasting (nothing to eat/drink for 8 hours except for water) labs obtained.  Our office will call you with results.     Schedule follow up with Dr. Alejandra in 1 year.    80

## 2025-01-17 ENCOUNTER — APPOINTMENT (OUTPATIENT)
Dept: CARDIOLOGY | Facility: CLINIC | Age: 45
End: 2025-01-17

## 2025-01-22 ENCOUNTER — APPOINTMENT (OUTPATIENT)
Dept: CARDIOLOGY | Facility: CLINIC | Age: 45
End: 2025-01-22

## 2025-01-24 ENCOUNTER — APPOINTMENT (OUTPATIENT)
Dept: CARDIOLOGY | Facility: CLINIC | Age: 45
End: 2025-01-24

## 2025-01-27 ENCOUNTER — APPOINTMENT (OUTPATIENT)
Dept: CARDIOLOGY | Facility: CLINIC | Age: 45
End: 2025-01-27

## 2025-01-29 ENCOUNTER — APPOINTMENT (OUTPATIENT)
Dept: CARDIOLOGY | Facility: CLINIC | Age: 45
End: 2025-01-29

## 2025-01-31 ENCOUNTER — APPOINTMENT (OUTPATIENT)
Dept: CARDIOLOGY | Facility: CLINIC | Age: 45
End: 2025-01-31

## 2025-02-03 ENCOUNTER — APPOINTMENT (OUTPATIENT)
Dept: CARDIOLOGY | Facility: CLINIC | Age: 45
End: 2025-02-03

## 2025-02-05 ENCOUNTER — APPOINTMENT (OUTPATIENT)
Dept: CARDIOLOGY | Facility: CLINIC | Age: 45
End: 2025-02-05

## 2025-02-06 ENCOUNTER — APPOINTMENT (OUTPATIENT)
Dept: ENDOCRINOLOGY | Facility: CLINIC | Age: 45
End: 2025-02-06
Payer: MEDICAID

## 2025-02-06 VITALS
HEART RATE: 105 BPM | WEIGHT: 245 LBS | HEIGHT: 63 IN | OXYGEN SATURATION: 99 % | SYSTOLIC BLOOD PRESSURE: 120 MMHG | BODY MASS INDEX: 43.41 KG/M2 | DIASTOLIC BLOOD PRESSURE: 86 MMHG

## 2025-02-06 DIAGNOSIS — R94.6 ABNORMAL RESULTS OF THYROID FUNCTION STUDIES: ICD-10-CM

## 2025-02-06 DIAGNOSIS — E11.65 TYPE 2 DIABETES MELLITUS WITH HYPERGLYCEMIA: ICD-10-CM

## 2025-02-06 LAB — GLUCOSE BLDC GLUCOMTR-MCNC: 160

## 2025-02-06 PROCEDURE — 99214 OFFICE O/P EST MOD 30 MIN: CPT | Mod: 25

## 2025-02-06 PROCEDURE — 95251 CONT GLUC MNTR ANALYSIS I&R: CPT

## 2025-02-06 PROCEDURE — 82962 GLUCOSE BLOOD TEST: CPT

## 2025-02-07 ENCOUNTER — APPOINTMENT (OUTPATIENT)
Dept: CARDIOLOGY | Facility: CLINIC | Age: 45
End: 2025-02-07

## 2025-02-09 NOTE — H&P ADULT - NSICDXPASTMEDICALHX_GEN_ALL_CORE_FT
Start Time: 5:00  End Time: 5:50    Visit Type: audio and video visit    Virtual or Telephone Consent    An interactive audio and video telecommunication system which permits real time communications between the patient (at the originating site) and provider (at the distant site) was utilized to provide this telehealth service.   Verbal consent was requested and obtained from Maris Ness on this date, 11/4/24 for a telehealth visit.      Maris disclosed that she was in her dorm room at [employer] and confirmed she had privacy. I conducted the visit from my home office.     Symptoms: mild anxiety, skin picking, obsessive thoughts, compulsive behaviors    Functioning: moderate impairment    Treatment plan: assist Maris to maintain positive functioning and to manage her anxiety and obsessive-compulsive behaviors.    Treatment: DBT.CBT/weekly appointments    Maris reported a change in the status of her knee and her decision to look into surgery. She reported experiencing increased anxiety with the stress this causes her and noted OCD symptoms have returned. She reported wanting help to reduce the compulsive tapping that has re-emerged for her. We also discussed a recent social situation that resulted in the college requiring Maris complete some procedures.    Progress: Maris has been able to identify and eliminate obsessive thoughts and some compulsive behaviors. She has reduced perfectionism and has developed a more flexible thinking style. However, she can drift back to perfectionist thinking and experiences OCD symptoms when she experiences high levels of stress and anxiety.   PAST MEDICAL HISTORY:  Acute myocardial infarction     Bipolar disorder     CAD (coronary artery disease)     Cocaine abuse     H/O heart artery stent     IDDM (insulin dependent diabetes mellitus)     Mild intermittent asthma without complication     Seizure

## 2025-02-13 NOTE — CONSULT NOTE ADULT - SUBJECTIVE AND OBJECTIVE BOX
41 yo F with PMH significant for IDDM, HTN, Cardiac stent, Asthma, Depression, Psychosis, Hx of crack cocaine abuse in remission 1 year, multiple  episodes of Suicidal ideation admitted to  for Mx of Depression and SI. Pt denies headache, dizziness, chest pain, palpitations or SOB. Denies fever, chills, cough, dysuria or diarrhea. Medicine consult is    called for medical Mx. Pt was seen and examined in presence of  Nursing Assistant.      PMH: As above  PSH: Cardiac stent  Social Hx: Pt reported that she smokes cigs daily, denies drinking alcohol.   Family Hx: Denies family Hx of CAD, DM, HTN or any psych problems    ROS: + Depression, suicidal ideation, rest as per HPI    ICU Vital Signs Last 24 Hrs  T(C): 36.9 (20 Mar 2021 13:15), Max: 36.9 (20 Mar 2021 13:15)  T(F): 98.4 (20 Mar 2021 13:15), Max: 98.4 (20 Mar 2021 13:15)  HR: 82 (20 Mar 2021 11:20) (79 - 90)  BP: 121/79 (20 Mar 2021 11:20) (106/66 - 121/79)  RR: 14 (20 Mar 2021 13:15) (14 - 20)  SpO2: 100% (20 Mar 2021 13:15) (97% - 100%)    PHYSICAL EXAM:  Constitutional: NAD  HEENT: NCAT, PERRLA, EOMI,  Neck: No LAD, No JVD  Respiratory: CTA b/l ,  No Wheezing /rhonchi  Cardiovascular: S1 and S2 +  Gastrointestinal:  Soft, NT, ND, BS +  Extremities: No peripheral edema  Neurological: AAO x 3,  Gross motor and sensational intact. no focal deficits  Rectal: Pt refused  : Pt refused  SKin: no rash, no erythema  Psychiatry: normal    Labs:  HBA1C: 11.9  UA: Moderate ketones  Utox: + THC, + Cocaine            
Alissa Villa

## 2025-02-14 ENCOUNTER — APPOINTMENT (OUTPATIENT)
Dept: ENDOCRINOLOGY | Facility: CLINIC | Age: 45
End: 2025-02-14

## 2025-02-18 ENCOUNTER — NON-APPOINTMENT (OUTPATIENT)
Age: 45
End: 2025-02-18

## 2025-02-18 ENCOUNTER — APPOINTMENT (OUTPATIENT)
Dept: CARDIOLOGY | Facility: CLINIC | Age: 45
End: 2025-02-18
Payer: MEDICAID

## 2025-02-18 VITALS
BODY MASS INDEX: 42.88 KG/M2 | HEART RATE: 92 BPM | OXYGEN SATURATION: 100 % | WEIGHT: 242 LBS | SYSTOLIC BLOOD PRESSURE: 119 MMHG | HEIGHT: 63 IN | DIASTOLIC BLOOD PRESSURE: 70 MMHG

## 2025-02-18 DIAGNOSIS — R94.31 ABNORMAL ELECTROCARDIOGRAM [ECG] [EKG]: ICD-10-CM

## 2025-02-18 DIAGNOSIS — Z98.61 ATHEROSCLEROTIC HEART DISEASE OF NATIVE CORONARY ARTERY W/OUT ANGINA PECTORIS: ICD-10-CM

## 2025-02-18 DIAGNOSIS — I25.10 ATHEROSCLEROTIC HEART DISEASE OF NATIVE CORONARY ARTERY W/OUT ANGINA PECTORIS: ICD-10-CM

## 2025-02-18 DIAGNOSIS — E11.65 TYPE 2 DIABETES MELLITUS WITH HYPERGLYCEMIA: ICD-10-CM

## 2025-02-18 DIAGNOSIS — J44.9 CHRONIC OBSTRUCTIVE PULMONARY DISEASE, UNSPECIFIED: ICD-10-CM

## 2025-02-18 DIAGNOSIS — R91.8 OTHER NONSPECIFIC ABNORMAL FINDING OF LUNG FIELD: ICD-10-CM

## 2025-02-18 DIAGNOSIS — E66.01 MORBID (SEVERE) OBESITY DUE TO EXCESS CALORIES: ICD-10-CM

## 2025-02-18 PROCEDURE — 93000 ELECTROCARDIOGRAM COMPLETE: CPT

## 2025-02-18 PROCEDURE — 99215 OFFICE O/P EST HI 40 MIN: CPT | Mod: 25

## 2025-02-26 LAB
ALBUMIN SERPL ELPH-MCNC: 3.4 G/DL
ALP BLD-CCNC: 54 U/L
ALT SERPL-CCNC: 15 U/L
ANION GAP SERPL CALC-SCNC: 11 MMOL/L
AST SERPL-CCNC: 20 U/L
BASOPHILS # BLD AUTO: 0.02 K/UL
BASOPHILS NFR BLD AUTO: 0.5 %
BILIRUB SERPL-MCNC: 0.2 MG/DL
BUN SERPL-MCNC: 15 MG/DL
CALCIUM SERPL-MCNC: 9.1 MG/DL
CHLORIDE SERPL-SCNC: 105 MMOL/L
CHOLEST SERPL-MCNC: 181 MG/DL
CO2 SERPL-SCNC: 25 MMOL/L
CREAT SERPL-MCNC: 0.98 MG/DL
EGFR: 72 ML/MIN/1.73M2
EOSINOPHIL # BLD AUTO: 0.12 K/UL
EOSINOPHIL NFR BLD AUTO: 2.8 %
ESTIMATED AVERAGE GLUCOSE: 180 MG/DL
GLUCOSE SERPL-MCNC: 115 MG/DL
HBA1C MFR BLD HPLC: 7.9 %
HCT VFR BLD CALC: 33.7 %
HDLC SERPL-MCNC: 64 MG/DL
HGB BLD-MCNC: 10.8 G/DL
IMM GRANULOCYTES NFR BLD AUTO: 0 %
LDLC SERPL CALC-MCNC: 102 MG/DL
LYMPHOCYTES # BLD AUTO: 1.7 K/UL
LYMPHOCYTES NFR BLD AUTO: 39 %
MAN DIFF?: NORMAL
MCHC RBC-ENTMCNC: 27.8 PG
MCHC RBC-ENTMCNC: 32 G/DL
MCV RBC AUTO: 86.9 FL
MONOCYTES # BLD AUTO: 0.53 K/UL
MONOCYTES NFR BLD AUTO: 12.2 %
NEUTROPHILS # BLD AUTO: 1.99 K/UL
NEUTROPHILS NFR BLD AUTO: 45.5 %
NONHDLC SERPL-MCNC: 117 MG/DL
PLATELET # BLD AUTO: 344 K/UL
POTASSIUM SERPL-SCNC: 5.3 MMOL/L
PROT SERPL-MCNC: 6.5 G/DL
RBC # BLD: 3.88 M/UL
RBC # FLD: 15 %
SODIUM SERPL-SCNC: 141 MMOL/L
TRIGL SERPL-MCNC: 84 MG/DL
TSH SERPL-ACNC: 2.74 UIU/ML
WBC # FLD AUTO: 4.36 K/UL

## 2025-03-10 ENCOUNTER — RX RENEWAL (OUTPATIENT)
Age: 45
End: 2025-03-10

## 2025-03-11 ENCOUNTER — RX RENEWAL (OUTPATIENT)
Age: 45
End: 2025-03-11

## 2025-03-24 NOTE — ED ADULT NURSE NOTE - NS ED [facility] OTHER STREET DRUGS MEDICATION
What Is The Reason For Today's Visit?: Full Body Skin Examination What Is The Reason For Today's Visit? (Being Monitored For X): the development of new lesions None known

## 2025-03-25 ENCOUNTER — RX RENEWAL (OUTPATIENT)
Age: 45
End: 2025-03-25

## 2025-03-25 RX ORDER — INSULIN GLARGINE-YFGN 100 [IU]/ML
100 INJECTION, SOLUTION SUBCUTANEOUS
Qty: 30 | Refills: 4 | Status: ACTIVE | COMMUNITY
Start: 2025-03-25 | End: 1900-01-01

## 2025-03-28 ENCOUNTER — RX RENEWAL (OUTPATIENT)
Age: 45
End: 2025-03-28

## 2025-04-23 ENCOUNTER — RX RENEWAL (OUTPATIENT)
Age: 45
End: 2025-04-23

## 2025-04-25 ENCOUNTER — TRANSCRIPTION ENCOUNTER (OUTPATIENT)
Age: 45
End: 2025-04-25

## 2025-04-25 ENCOUNTER — APPOINTMENT (OUTPATIENT)
Dept: ENDOCRINOLOGY | Facility: CLINIC | Age: 45
End: 2025-04-25

## 2025-04-25 VITALS
HEART RATE: 99 BPM | BODY MASS INDEX: 41.64 KG/M2 | DIASTOLIC BLOOD PRESSURE: 70 MMHG | OXYGEN SATURATION: 98 % | HEIGHT: 63 IN | WEIGHT: 235 LBS | SYSTOLIC BLOOD PRESSURE: 116 MMHG

## 2025-04-25 DIAGNOSIS — E66.01 MORBID (SEVERE) OBESITY DUE TO EXCESS CALORIES: ICD-10-CM

## 2025-04-25 DIAGNOSIS — E78.2 MIXED HYPERLIPIDEMIA: ICD-10-CM

## 2025-04-25 DIAGNOSIS — E11.65 TYPE 2 DIABETES MELLITUS WITH HYPERGLYCEMIA: ICD-10-CM

## 2025-04-25 LAB — GLUCOSE BLDC GLUCOMTR-MCNC: 154

## 2025-04-25 PROCEDURE — 95251 CONT GLUC MNTR ANALYSIS I&R: CPT

## 2025-04-25 PROCEDURE — 99214 OFFICE O/P EST MOD 30 MIN: CPT | Mod: 25

## 2025-04-25 PROCEDURE — 82962 GLUCOSE BLOOD TEST: CPT

## 2025-05-09 ENCOUNTER — RX RENEWAL (OUTPATIENT)
Age: 45
End: 2025-05-09

## 2025-05-21 DIAGNOSIS — Z12.11 ENCOUNTER FOR SCREENING FOR MALIGNANT NEOPLASM OF COLON: ICD-10-CM

## 2025-05-21 NOTE — BH CONSULTATION LIAISON PROGRESS NOTE - NSBHCONSFOLLOWNEEDS_PSY_ALL_CORE
Needs further psych safety assessment prior to discharge
No psychiatric contraindications to discharge
sciatica pain with walking

## 2025-05-27 ENCOUNTER — APPOINTMENT (OUTPATIENT)
Age: 45
End: 2025-05-27

## 2025-06-06 ENCOUNTER — APPOINTMENT (OUTPATIENT)
Dept: ENDOCRINOLOGY | Facility: CLINIC | Age: 45
End: 2025-06-06
Payer: MEDICAID

## 2025-06-06 VITALS
SYSTOLIC BLOOD PRESSURE: 122 MMHG | BODY MASS INDEX: 42.7 KG/M2 | WEIGHT: 241 LBS | HEIGHT: 63 IN | DIASTOLIC BLOOD PRESSURE: 78 MMHG | OXYGEN SATURATION: 97 % | HEART RATE: 94 BPM

## 2025-06-06 LAB — GLUCOSE BLDC GLUCOMTR-MCNC: 130

## 2025-06-06 PROCEDURE — 95251 CONT GLUC MNTR ANALYSIS I&R: CPT

## 2025-06-06 PROCEDURE — 99214 OFFICE O/P EST MOD 30 MIN: CPT | Mod: 25

## 2025-06-06 PROCEDURE — 82962 GLUCOSE BLOOD TEST: CPT

## 2025-06-18 ENCOUNTER — APPOINTMENT (OUTPATIENT)
Dept: CARDIOLOGY | Facility: CLINIC | Age: 45
End: 2025-06-18
Payer: MEDICAID

## 2025-06-18 VITALS
HEIGHT: 63 IN | SYSTOLIC BLOOD PRESSURE: 118 MMHG | HEART RATE: 91 BPM | OXYGEN SATURATION: 100 % | WEIGHT: 242 LBS | BODY MASS INDEX: 42.88 KG/M2 | DIASTOLIC BLOOD PRESSURE: 70 MMHG

## 2025-06-18 PROCEDURE — 93000 ELECTROCARDIOGRAM COMPLETE: CPT

## 2025-06-18 PROCEDURE — 99214 OFFICE O/P EST MOD 30 MIN: CPT | Mod: 25

## 2025-06-18 PROCEDURE — 99406 BEHAV CHNG SMOKING 3-10 MIN: CPT

## 2025-07-31 ENCOUNTER — APPOINTMENT (OUTPATIENT)
Dept: PULMONOLOGY | Facility: CLINIC | Age: 45
End: 2025-07-31
Payer: MEDICAID

## 2025-07-31 VITALS
WEIGHT: 234 LBS | SYSTOLIC BLOOD PRESSURE: 126 MMHG | RESPIRATION RATE: 16 BRPM | HEIGHT: 62.5 IN | DIASTOLIC BLOOD PRESSURE: 70 MMHG | BODY MASS INDEX: 41.98 KG/M2 | HEART RATE: 93 BPM | OXYGEN SATURATION: 98 %

## 2025-07-31 DIAGNOSIS — J45.909 UNSPECIFIED ASTHMA, UNCOMPLICATED: ICD-10-CM

## 2025-07-31 PROCEDURE — 99406 BEHAV CHNG SMOKING 3-10 MIN: CPT

## 2025-07-31 PROCEDURE — 99203 OFFICE O/P NEW LOW 30 MIN: CPT | Mod: 25

## 2025-08-01 ENCOUNTER — APPOINTMENT (OUTPATIENT)
Dept: ENDOCRINOLOGY | Facility: CLINIC | Age: 45
End: 2025-08-01

## 2025-09-11 ENCOUNTER — APPOINTMENT (OUTPATIENT)
Dept: PULMONOLOGY | Facility: CLINIC | Age: 45
End: 2025-09-11